# Patient Record
Sex: FEMALE | Race: WHITE | NOT HISPANIC OR LATINO | Employment: FULL TIME | URBAN - METROPOLITAN AREA
[De-identification: names, ages, dates, MRNs, and addresses within clinical notes are randomized per-mention and may not be internally consistent; named-entity substitution may affect disease eponyms.]

---

## 2017-01-21 ENCOUNTER — HOSPITAL ENCOUNTER (EMERGENCY)
Facility: CLINIC | Age: 22
Discharge: HOME | End: 2017-01-22
Attending: EMERGENCY MEDICINE | Admitting: EMERGENCY MEDICINE
Payer: COMMERCIAL

## 2017-01-21 ENCOUNTER — APPOINTMENT (OUTPATIENT)
Dept: ULTRASOUND IMAGING | Facility: CLINIC | Age: 22
End: 2017-01-21
Attending: EMERGENCY MEDICINE
Payer: COMMERCIAL

## 2017-01-21 ENCOUNTER — TELEPHONE (OUTPATIENT)
Dept: NURSING | Facility: CLINIC | Age: 22
End: 2017-01-21

## 2017-01-21 ENCOUNTER — APPOINTMENT (OUTPATIENT)
Dept: GENERAL RADIOLOGY | Facility: CLINIC | Age: 22
End: 2017-01-21
Attending: EMERGENCY MEDICINE
Payer: COMMERCIAL

## 2017-01-21 DIAGNOSIS — R05.9 COUGH: ICD-10-CM

## 2017-01-21 LAB
ALBUMIN SERPL-MCNC: 3.2 G/DL (ref 3.4–5)
ALP SERPL-CCNC: 50 U/L (ref 40–150)
ALT SERPL W P-5'-P-CCNC: 12 U/L (ref 0–50)
ANION GAP SERPL CALCULATED.3IONS-SCNC: 9 MMOL/L (ref 3–14)
AST SERPL W P-5'-P-CCNC: 14 U/L (ref 0–45)
BASOPHILS # BLD AUTO: 0 10E9/L (ref 0–0.2)
BASOPHILS NFR BLD AUTO: 0 %
BILIRUB SERPL-MCNC: 0.3 MG/DL (ref 0.2–1.3)
BUN SERPL-MCNC: 7 MG/DL (ref 7–30)
CALCIUM SERPL-MCNC: 8.2 MG/DL (ref 8.5–10.1)
CHLORIDE SERPL-SCNC: 109 MMOL/L (ref 94–109)
CO2 SERPL-SCNC: 22 MMOL/L (ref 20–32)
CREAT SERPL-MCNC: 0.53 MG/DL (ref 0.52–1.04)
CRP SERPL-MCNC: 3.8 MG/L (ref 0–8)
DIFFERENTIAL METHOD BLD: ABNORMAL
EOSINOPHIL # BLD AUTO: 0.1 10E9/L (ref 0–0.7)
EOSINOPHIL NFR BLD AUTO: 1.2 %
ERYTHROCYTE [DISTWIDTH] IN BLOOD BY AUTOMATED COUNT: 14 % (ref 10–15)
ERYTHROCYTE [SEDIMENTATION RATE] IN BLOOD BY WESTERGREN METHOD: 14 MM/H (ref 0–20)
GFR SERPL CREATININE-BSD FRML MDRD: ABNORMAL ML/MIN/1.7M2
GLUCOSE SERPL-MCNC: 67 MG/DL (ref 70–99)
HCT VFR BLD AUTO: 30.7 % (ref 35–47)
HGB BLD-MCNC: 10.5 G/DL (ref 11.7–15.7)
IMM GRANULOCYTES # BLD: 0 10E9/L (ref 0–0.4)
IMM GRANULOCYTES NFR BLD: 0.4 %
LYMPHOCYTES # BLD AUTO: 0.9 10E9/L (ref 0.8–5.3)
LYMPHOCYTES NFR BLD AUTO: 11.5 %
MCH RBC QN AUTO: 31.4 PG (ref 26.5–33)
MCHC RBC AUTO-ENTMCNC: 34.2 G/DL (ref 31.5–36.5)
MCV RBC AUTO: 92 FL (ref 78–100)
MONOCYTES # BLD AUTO: 0.6 10E9/L (ref 0–1.3)
MONOCYTES NFR BLD AUTO: 8.1 %
NEUTROPHILS # BLD AUTO: 6 10E9/L (ref 1.6–8.3)
NEUTROPHILS NFR BLD AUTO: 78.8 %
NRBC # BLD AUTO: 0 10*3/UL
NRBC BLD AUTO-RTO: 1 /100
PLATELET # BLD AUTO: 204 10E9/L (ref 150–450)
POTASSIUM SERPL-SCNC: 3.4 MMOL/L (ref 3.4–5.3)
PROT SERPL-MCNC: 6 G/DL (ref 6.8–8.8)
RBC # BLD AUTO: 3.34 10E12/L (ref 3.8–5.2)
SODIUM SERPL-SCNC: 140 MMOL/L (ref 133–144)
WBC # BLD AUTO: 7.6 10E9/L (ref 4–11)

## 2017-01-21 PROCEDURE — 93970 EXTREMITY STUDY: CPT

## 2017-01-21 PROCEDURE — 85025 COMPLETE CBC W/AUTO DIFF WBC: CPT | Performed by: EMERGENCY MEDICINE

## 2017-01-21 PROCEDURE — 99285 EMERGENCY DEPT VISIT HI MDM: CPT | Mod: 25 | Performed by: EMERGENCY MEDICINE

## 2017-01-21 PROCEDURE — 86140 C-REACTIVE PROTEIN: CPT | Performed by: EMERGENCY MEDICINE

## 2017-01-21 PROCEDURE — 93010 ELECTROCARDIOGRAM REPORT: CPT | Mod: Z6 | Performed by: EMERGENCY MEDICINE

## 2017-01-21 PROCEDURE — 93005 ELECTROCARDIOGRAM TRACING: CPT | Performed by: EMERGENCY MEDICINE

## 2017-01-21 PROCEDURE — 71020 XR CHEST 2 VW: CPT

## 2017-01-21 PROCEDURE — 85652 RBC SED RATE AUTOMATED: CPT | Performed by: EMERGENCY MEDICINE

## 2017-01-21 PROCEDURE — 80053 COMPREHEN METABOLIC PANEL: CPT | Performed by: EMERGENCY MEDICINE

## 2017-01-21 PROCEDURE — 36415 COLL VENOUS BLD VENIPUNCTURE: CPT | Performed by: EMERGENCY MEDICINE

## 2017-01-21 NOTE — ED AVS SNAPSHOT
[facility], [place], Emergency Department    [address] [place] [address]    Phone:  [phone]                                       Monica Puckett   MRN: [id number]    Department:  [facility], Emergency Department   Date of Visit:  1/21/2017           After Visit Summary Signature Page     I have received my discharge instructions, and my questions have been answered. I have discussed any challenges I see with this plan with the nurse or doctor.    ..........................................................................................................................................  Patient/Patient Representative Signature      ..........................................................................................................................................  Patient Representative Print Name and Relationship to Patient    ..................................................               ................................................  Date                                            Time    ..........................................................................................................................................  Reviewed by Signature/Title    ...................................................              ..............................................  Date                                                            Time

## 2017-01-21 NOTE — ED AVS SNAPSHOT
[facility], Emergency Department    [address] [facility] [address]    Phone:  [phone]                                       Monica Puckett   MRN: [id number]    Department:  [facility], Emergency Department   Date of Visit:  1/21/2017           Patient Information     Date Of Birth          1995        Your diagnoses for this visit were:     Cough        You were seen by Hosea Arevalo MD.        Discharge Instructions       Please make an appointment to follow up with your rheumatologist as soon as possible.    Try Benadryl at night for your cough.    Return to the emergency Department for any problems.      Future Appointments        Provider Department Dept Phone Center    2/8/2017 11:00 AM Chente Reina MD [facility] Rheumatology [phone] [facility]      24 Hour Appointment Hotline       To make an appointment at any [facility], call [phone] ([phone]). If you don't have a family doctor or clinic, we will help you find one. [place] clinics are conveniently located to serve the needs of you and your family.             Review of your medicines      Our records show that you are taking the medicines listed below. If these are incorrect, please call your family doctor or clinic.        Dose / Directions Last dose taken    doxylamine 25 MG Tabs tablet   Commonly known as:  UNISOM   Dose:  25 mg        Take 25 mg by mouth nightly as needed   Refills:  0        hydroxychloroquine 200 MG tablet   Commonly known as:  PLAQUENIL   Dose:  400 mg   Quantity:  90 tablet        Take 2 tablets (400 mg) by mouth daily   Refills:  6        prenatal multivitamin  plus iron 27-0.8 MG Tabs per tablet   Dose:  1 tablet        Take 1 tablet by mouth daily   Refills:  0        VALTREX PO   Dose:  500 mg        Take 500 mg by mouth as needed   Refills:  0                Procedures and tests performed during your visit     Corrine coleert DNA pcr    CBC with platelets  "differential    CRP inflammation    Chest XR,  PA & LAT    Comprehensive metabolic panel    EKG 12-lead    Erythrocyte sedimentation rate auto    Respiratory Virus Panel by PCR    US Lower Extremity Venous Duplex Bilateral      Orders Needing Specimen Collection     None      Pending Results     Date and Time Order Name Status Description    2017 0014 Bornathaliella pert parapert DNA pcr In process     2017 0014 Respiratory Virus Panel by PCR In process     2017 2103 US Lower Extremity Venous Duplex Bilateral Preliminary             Pending Culture Results     Date and Time Order Name Status Description    2017 0014 Bordetella pert parapert DNA pcr In process     2017 0014 Respiratory Virus Panel by PCR In process             Thank you for choosing [facility]       Thank you for choosing [facility] for your care. Our goal is always to provide you with excellent care. Hearing back from our patients is one way we can continue to improve our services. Please take a few minutes to complete the written survey that you may receive in the mail after you visit with us. Thank you!        [email or web address] Information     [employer] lets you send messages to your doctor, view your test results, renew your prescriptions, schedule appointments and more. To sign up, go to [email or web address] . Click on \"Log in\" on the left side of the screen, which will take you to the Welcome page. Then click on \"Sign up Now\" on the right side of the page.     You will be asked to enter the access code listed below, as well as some personal information. Please follow the directions to create your username and password.     Your access code is: [email or web address]  Expires: 2017  5:17 PM     Your access code will  in 90 days. If you need help or a new code, please call your [facility] clinic or [phone].        Care EveryWhere ID     This is your Care EveryWhere ID. This could be used by other organizations to access your [facility] " medical records  [id number]        After Visit Summary       This is your record. Keep this with you and show to your community pharmacist(s) and doctor(s) at your next visit.

## 2017-01-21 NOTE — TELEPHONE ENCOUNTER
"Call Type: Triage Call    Presenting Problem: \"I am at the [place] right now  because I have been coughing and have chest pain.\" Pt. says that she  has Lupus and is 18 weeks pregnant. Pt. says that the  at the  urgency center said that pt. may have a clot, in her lung, and wants  pt. to speak to her Rheumatologist, before pt. speaks to her OB Dr.  Pt. sees her OB DrChristy at an [facility], in [place]. Pt. says that the   at [facility] thinks pt. may need a CT scan. Dr. Harris  of [place] -was then paged, to call pt., at:  [phone], at: [phone], via page .  Triage Note:  Guideline Title: No Guideline Available (Pediatric)  Recommended Disposition: Call Provider Immediately  Original Inclination: Wanted to speak with a nurse  Override Disposition:  Intended Action: Call PCP/HCP  Physician Contacted: No  Reason: professional judgment or information in Reference ?  YES  Reason: professional judgment or information in Reference ? NO  Reason: professional judgment or information in Reference ? NO  Reason: professional judgment or information in Reference ? NO  Reason: professional judgment or information in Reference ? NO  Reason: professional judgment or information in Reference ? NO  Information only call and no triage required ? NO  Physician Instructions:  Care Advice: CALL PCP NOW: You need to discuss this with your child's  doctor. I'll page him now. If you haven't heard from the on-call doctor  within 30 minutes, call again.  CARE ADVICE given per Professional Judgment or Reference (No Guideline  Available - Pediatric).  "

## 2017-01-22 VITALS
TEMPERATURE: 98.4 F | SYSTOLIC BLOOD PRESSURE: 103 MMHG | RESPIRATION RATE: 17 BRPM | DIASTOLIC BLOOD PRESSURE: 56 MMHG | OXYGEN SATURATION: 100 % | HEIGHT: 66 IN | HEART RATE: 105 BPM

## 2017-01-22 LAB
FLUAV H1 2009 PAND RNA SPEC QL NAA+PROBE: NEGATIVE
FLUAV H1 RNA SPEC QL NAA+PROBE: NEGATIVE
FLUAV H3 RNA SPEC QL NAA+PROBE: NEGATIVE
FLUAV RNA SPEC QL NAA+PROBE: NEGATIVE
FLUBV RNA SPEC QL NAA+PROBE: NEGATIVE
HADV DNA SPEC QL NAA+PROBE: NEGATIVE
HADV DNA SPEC QL NAA+PROBE: NEGATIVE
HMPV RNA SPEC QL NAA+PROBE: NEGATIVE
HPIV1 RNA SPEC QL NAA+PROBE: NEGATIVE
HPIV2 RNA SPEC QL NAA+PROBE: NEGATIVE
HPIV3 RNA SPEC QL NAA+PROBE: NEGATIVE
INTERPRETATION ECG - MUSE: NORMAL
MICROBIOLOGIST REVIEW: NORMAL
RHINOVIRUS RNA SPEC QL NAA+PROBE: NEGATIVE
RSV RNA SPEC QL NAA+PROBE: NEGATIVE
RSV RNA SPEC QL NAA+PROBE: NEGATIVE
SPECIMEN SOURCE: NORMAL

## 2017-01-22 PROCEDURE — 87801 DETECT AGNT MULT DNA AMPLI: CPT | Performed by: EMERGENCY MEDICINE

## 2017-01-22 PROCEDURE — 87633 RESP VIRUS 12-25 TARGETS: CPT | Performed by: EMERGENCY MEDICINE

## 2017-01-22 NOTE — ED NOTES
Pt arrived to ED from [facility] in [facility]. They sent patient here for elevated d-dimer. Pt is 18 weeks pregnant in her first pregnancy. Pt has hx of lupus. Pt has had a cough for 2 months. pt developed leg swelling and pain yesterday as well as some chest pain that started 2 weeks a go that is on and off that became exacerbated the last two days.

## 2017-01-22 NOTE — ED PROVIDER NOTES
History     Chief Complaint   Patient presents with     Chest Pain     Leg Pain     Cough     HPI  Monica Puckett is a 21 year old female 18 weeks gestation with history of Lupus, RA and CKD who presents to the ED today from [facility] in [facility] with concern for chest pain, leg pain and cough. Patient reports cough for the past 2 months. She states she was seen in clinic about 3 weeks ago. At that time she was placed on a Zpac. She states it helped her symptoms initially, however, her cough returned and she developed chest pain and bilateral lower extremity swelling. The patient was seen in clinic today and sent here to the ED after labs revealed an elevated D-dimer at 1.55 with concern for PE.     I have reviewed the Medications, Allergies, Past Medical and Surgical History, and Social History in the [employer] system.  PAST MEDICAL HISTORY:   Past Medical History   Diagnosis Date     Lupus (H)      RA (rheumatoid arthritis) (H)      Chronic kidney disease        PAST SURGICAL HISTORY:   Past Surgical History   Procedure Laterality Date     Hc biopsy renal, percutaneous  1/18/13       FAMILY HISTORY: No family history on file.    SOCIAL HISTORY:   Social History   Substance Use Topics     Smoking status: Never Smoker      Smokeless tobacco: Never Used      Comment: no second hand smoke exposure at home     Alcohol Use: No       No current facility-administered medications for this encounter.     Current Outpatient Prescriptions   Medication     doxylamine (UNISOM) 25 MG TABS tablet     Prenatal Vit-Fe Fumarate-FA (PRENATAL MULTIVITAMIN  PLUS IRON) 27-0.8 MG TABS     ValACYclovir HCl (VALTREX PO)     hydroxychloroquine (PLAQUENIL) 200 MG tablet        Allergies   Allergen Reactions     Estrogens Other (See Comments)     Lupus flares     No Clinical Screening - See Comments Rash     Gets very sick from flu vaccines         Review of Systems   Constitutional: Negative for fever.   HENT: Negative for sore throat.   "  Respiratory: Positive for cough. Negative for shortness of breath.    Cardiovascular: Positive for chest pain and leg swelling.   Gastrointestinal: Negative for nausea, vomiting and diarrhea.   Skin: Negative for rash.   All other systems reviewed and are negative.      Physical Exam   BP: 111/48 mmHg  Pulse: 105  Temp: 98.4  F (36.9  C)  Resp: 14  Height: 167.6 cm (5' 6\")  SpO2: 98 %  Physical Exam   Constitutional: She is oriented to person, place, and time. Vital signs are normal. She appears well-developed and well-nourished.  Non-toxic appearance. She does not appear ill. No distress.   Patient is awake and alert, frequent, paroxysms of coughing noted.  Patient is able to speak in complete sentences and is protecting her airway without difficulty.  She is mentating normally and appears in no acute distress.   HENT:   Head: Normocephalic and atraumatic.   Mouth/Throat: Oropharynx is clear and moist. No oropharyngeal exudate.   Eyes: Conjunctivae and EOM are normal. Pupils are equal, round, and reactive to light. No scleral icterus.   Neck: Normal range of motion. Neck supple. No JVD present. No tracheal deviation present. No thyromegaly present.   Cardiovascular: Normal rate, regular rhythm, normal heart sounds and intact distal pulses.  Exam reveals no gallop and no friction rub.    No murmur heard.  Pulmonary/Chest: Effort normal and breath sounds normal. No respiratory distress.   Abdominal: Soft. Bowel sounds are normal. She exhibits no distension and no mass. There is no tenderness.   Musculoskeletal: Normal range of motion. She exhibits edema (trace bilateral pedal edema noted.). She exhibits no tenderness.   Lymphadenopathy:     She has no cervical adenopathy.   Neurological: She is alert and oriented to person, place, and time. She has normal strength. No cranial nerve deficit or sensory deficit.   Skin: Skin is warm and dry. No rash noted. No erythema. No pallor.   Psychiatric: She has a normal mood " and affect. Her behavior is normal.   Nursing note and vitals reviewed.      ED Course     [unfilled]  Procedures       8:20 PM  The patient was seen and examined by Dr. Arevalo in Room HW.          EKG Interpretation:      Interpreted by Hosea Arevalo    Symptoms at time of EKG: Cough, chest pain   Rhythm: normal sinus   Rate: 89  Axis: normal  Ectopy: none  Conduction: normal  ST Segments/ T Waves: No ST-T wave changes  Q Waves: none  Comparison to prior: No old EKG available    Clinical Impression: normal EKG          Results for orders placed or performed during the hospital encounter of 01/21/17   Chest XR,  PA & LAT    Narrative    XR CHEST 2 VW  1/21/2017 9:36 PM      HISTORY: cough, patient is 4 months pregnant    COMPARISON: 3/27/2013    FINDINGS: Frontal and lateral views of the chest. The cardiac  silhouette size and pulmonary vasculature are within normal limits.  There is no significant pleural effusion or pneumothorax. There are no  focal pulmonary opacities. The visualized upper abdomen and bones  appear normal.      Impression    IMPRESSION: Clear lungs.    I have personally reviewed the examination and initial interpretation  and I agree with the findings.    ZUNILDA TORRES MD   US Lower Extremity Venous Duplex Bilateral    Narrative    EXAMINATION: DOPPLER VENOUS ULTRASOUND OF BILATERAL LOWER EXTREMITIES,  1/21/2017 10:35 PM     COMPARISON: None.    HISTORY: Leg swelling, elevated d-dimer, history of lupus    TECHNIQUE:  Gray-scale evaluation with compression, spectral flow and  color Doppler assessment of the deep venous system of both legs from  groin to knee, and then at the ankles.    FINDINGS:  In both lower extremities, the common femoral, femoral, popliteal and  posterior tibial veins demonstrate normal compressibility and blood  flow.      Impression    IMPRESSION:  No evidence of deep venous thrombosis in either lower extremity.    I have personally reviewed the examination  and initial interpretation  and I agree with the findings.    JUAN EMMANUEL MD   CBC with platelets differential   Result Value Ref Range    WBC 7.6 4.0 - 11.0 10e9/L    RBC Count 3.34 (L) 3.8 - 5.2 10e12/L    Hemoglobin 10.5 (L) 11.7 - 15.7 g/dL    Hematocrit 30.7 (L) 35.0 - 47.0 %    MCV 92 78 - 100 fl    MCH 31.4 26.5 - 33.0 pg    MCHC 34.2 31.5 - 36.5 g/dL    RDW 14.0 10.0 - 15.0 %    Platelet Count 204 150 - 450 10e9/L    Diff Method Automated Method     % Neutrophils 78.8 %    % Lymphocytes 11.5 %    % Monocytes 8.1 %    % Eosinophils 1.2 %    % Basophils 0.0 %    % Immature Granulocytes 0.4 %    Nucleated RBCs 1 (H) 0 /100    Absolute Neutrophil 6.0 1.6 - 8.3 10e9/L    Absolute Lymphocytes 0.9 0.8 - 5.3 10e9/L    Absolute Monocytes 0.6 0.0 - 1.3 10e9/L    Absolute Eosinophils 0.1 0.0 - 0.7 10e9/L    Absolute Basophils 0.0 0.0 - 0.2 10e9/L    Abs Immature Granulocytes 0.0 0 - 0.4 10e9/L    Absolute Nucleated RBC 0.0    Comprehensive metabolic panel   Result Value Ref Range    Sodium 140 133 - 144 mmol/L    Potassium 3.4 3.4 - 5.3 mmol/L    Chloride 109 94 - 109 mmol/L    Carbon Dioxide 22 20 - 32 mmol/L    Anion Gap 9 3 - 14 mmol/L    Glucose 67 (L) 70 - 99 mg/dL    Urea Nitrogen 7 7 - 30 mg/dL    Creatinine 0.53 0.52 - 1.04 mg/dL    GFR Estimate >90  Non  GFR Calc   >60 mL/min/1.7m2    GFR Estimate If Black >90   GFR Calc   >60 mL/min/1.7m2    Calcium 8.2 (L) 8.5 - 10.1 mg/dL    Bilirubin Total 0.3 0.2 - 1.3 mg/dL    Albumin 3.2 (L) 3.4 - 5.0 g/dL    Protein Total 6.0 (L) 6.8 - 8.8 g/dL    Alkaline Phosphatase 50 40 - 150 U/L    ALT 12 0 - 50 U/L    AST 14 0 - 45 U/L   CRP inflammation   Result Value Ref Range    CRP Inflammation 3.8 0.0 - 8.0 mg/L   Erythrocyte sedimentation rate auto   Result Value Ref Range    Sed Rate 14 0 - 20 mm/h   EKG 12-lead   Result Value Ref Range    Interpretation ECG Click View Image link to view waveform and result    Respiratory Virus Panel by  PCR   Result Value Ref Range    Respiratory Virus Source Nasopharyngeal     Influenza A Negative NEG    Influenza A, H1 Negative NEG    Influenza A, H3 Negative NEG    Influenza A 2009 H1N1 Negative NEG    Influenza B Negative NEG    Respiratory Syncytial Virus A Negative NEG    Respiratory Syncytial Virus B Negative NEG    Parainfluenza Virus 1 Negative NEG    Parainfluenza Virus 2 Negative NEG    Parainfluenza Virus 3 Negative NEG    Human Metapneumovirus Negative NEG    Human Rhinovirus Negative NEG    Adenovirus Species B/E Negative NEG    Adenovirus Species C Negative NEG    Respiratory Virus Comment       The [employer] Respiratory Viral Panel (RVP) is a qualitative, multiplex,   diagnostic test for simultaneous detection and identification of multiple   respiratory viral nucleic acids in individuals exhibiting signs and symptoms of   respiratory infection. It uses innovative eSensor technology to provide   sensitive and specific respiratory virus detection.   The test detects Influenza A (H1 and H3), Influenza A 2009 H1N1, Influenza B,   Respiratory Syncytial Virus A, Respiratory Syncytial Virus B, Parainfluenza   Virus (1, 2 and 3), Human Metapneumovirus, Human Rhinovirus (HRV), Adenovirus   B/E and Adenovirus C.   The assay has received FDA approval for the testing of nasopharyngeal (NP)   swabs only. The Infectious Disease Diagnostic Laboratory at [facility], [place], has validated the performance   characteristics of the [employer] Respiratory Viral Panel for NP swabs, bronchial   alveolar lavage/wash, nasopharyngeal aspirate/wash and nasal wash.     Bordetella pert parapert DNA pcr   Result Value Ref Range    Specimen Description Nasopharyngeal     Bordetella Per/Paraper PCR  NEG     Negative  Negative for B. pertussis and B. parapertussis by PCR     This test was developed and its performance characteristics determined by the   Microbiology Laboratory at [facility]  [place], [place]. The PCR test has proven to be more sensitive than culture and   highly specific.  A false positive for B. pertussis may occur in samples   containing B. holmesii DNA.   A false positive for B. parapertussis may occur in samples containing B.   bronchiseptica DNA. Both B. holmesii and B. bronchiseptica rarely cause disease   in humans.  A negative result does not rule out the presence of B. pertussis or   B. parapertussis DNA in concentrations below detection level of the assay.   Nasopharyngeal swabs are the preferred specimen types.   Analyte Specific Reagents (ASRs) are used in many laboratory tests necessary   for standard medical care and generally do not require U.S. Food and Drug   Administration approval. The FDA has determined that such c learance or approval   is not necessary. This test is used for clinical purposes. It should not be   regarded as investigational or for research.   This laboratory is certified under the Clinical Laboratory Improvement   Amendments of 1988 (CLIA-88) as qualified to perform high complexity clinical   laboratory testing.           Assessments & Plan (with Medical Decision Making)   This patient presented to the emergency department after being referred from an urgent care in [place].  She is approximately 4 months pregnant, and also has a history of lupus and lupus nephritis.  She has had some swelling of her lower extremities, and has had approximately 2 months of cough and 2 weeks of chest pain with the cough.  She was found to have a mildly elevated d-dimer, and was referred here for further evaluation. Here in the Emergency Department, she does not manifest leukocytosis.  Her chest x-ray demonstrated no evidence of pneumonia, pleural effusion, pneumothorax, cardiomegaly or pulmonary edema.  CRP and sed rate are within normal limits, decreasing suspicion of acute lupus flare.  Pertussis and respiratory viral testing was sent.   After labs and x-ray returned, I did talk with the patient and her family regarding further workup for possible pulmonary embolism.  Patient has multiple reasons to have an elevated d-dimer, and her symptoms have been quite long-standing, suggestive of more of a bronchitis type picture.  Nonetheless, she does have some risk factor for pulmonary embolism.  She is, however, not significantly tachycardic and is not hypoxic.  Predominant complaint seems to be cough and not shortness of breath or pleuritic chest pain.  We had a long discussion regarding risk and benefit of a VQ scan and CT scan in the setting of early pregnancy.  I feel pulmonary embolus is unlikely, however, it is a possibility.  After weighing the options, patient and her mother elected to forego further testing for pulmonary embolism at this point and to follow-up with her with her rheumatologist.  I feel that this is an acceptable plan as the patient does appear stable and has had no signs of right heart strain or other EKG abnormalities, and is otherwise hemodynamically stable with no hypoxia or distress.  Patient will return should anything worsen or change and was discharged in good condition.    I have reviewed the nursing notes.    I have reviewed the findings, diagnosis, plan and need for follow up with the patient.    Discharge Medication List as of 1/22/2017  1:08 AM          Final diagnoses:   Cough     Amarjit BUNN , am serving as a trained medical scribe to document services personally performed byBeto Arevalo MD, based on the provider's statements to me.   Beto BUNN MD, was physically present and have reviewed and verified the accuracy of this note documented by Amarjit Flores.    1/21/2017   [facility], EMERGENCY DEPARTMENT      Hosea Arevalo MD  01/23/17 [id number]

## 2017-01-22 NOTE — DISCHARGE INSTRUCTIONS
Please make an appointment to follow up with your rheumatologist as soon as possible.    Try Benadryl at night for your cough.    Return to the emergency Department for any problems.

## 2017-01-23 ENCOUNTER — TELEPHONE (OUTPATIENT)
Dept: RHEUMATOLOGY | Facility: CLINIC | Age: 22
End: 2017-01-23

## 2017-01-23 LAB
B PERT+PARAPERT DNA PNL SPEC NAA+PROBE: NORMAL
SPECIMEN SOURCE: NORMAL

## 2017-01-23 ASSESSMENT — ENCOUNTER SYMPTOMS
VOMITING: 0
SORE THROAT: 0
COUGH: 1
FEVER: 0
SHORTNESS OF BREATH: 0
NAUSEA: 0
DIARRHEA: 0

## 2017-01-23 NOTE — TELEPHONE ENCOUNTER
Called and spoke with pt, gave her Dr. Reina's recommendations, that she should see her PCP, that she should have labs done from October, and that she should come see Dr. Reina.  She will see Dr. Reina this Wednesday.    Pt understands and will do so.    Flavia Mayfield RN  Rheumatology Clinic

## 2017-01-23 NOTE — TELEPHONE ENCOUNTER
Her albs from 1/21 show neg ID work up and NL inflammation markers. She never did the blood work I ordered in 10/2016. Recommend f/u with PCP, I could also see her on 1/25 at 10:30 am.

## 2017-02-08 ENCOUNTER — OFFICE VISIT (OUTPATIENT)
Dept: RHEUMATOLOGY | Facility: CLINIC | Age: 22
End: 2017-02-08
Attending: INTERNAL MEDICINE
Payer: COMMERCIAL

## 2017-02-08 VITALS
OXYGEN SATURATION: 99 % | BODY MASS INDEX: 23.14 KG/M2 | WEIGHT: 144 LBS | SYSTOLIC BLOOD PRESSURE: 104 MMHG | HEART RATE: 71 BPM | DIASTOLIC BLOOD PRESSURE: 64 MMHG | HEIGHT: 66 IN

## 2017-02-08 DIAGNOSIS — Z51.81 ENCOUNTER FOR MEDICATION MONITORING: ICD-10-CM

## 2017-02-08 DIAGNOSIS — M32.9 SYSTEMIC LUPUS ERYTHEMATOSUS (H): Primary | ICD-10-CM

## 2017-02-08 DIAGNOSIS — M32.9 SYSTEMIC LUPUS ERYTHEMATOSUS (H): ICD-10-CM

## 2017-02-08 LAB
ALBUMIN SERPL-MCNC: 3.3 G/DL (ref 3.4–5)
ALBUMIN UR-MCNC: NEGATIVE MG/DL
ALT SERPL W P-5'-P-CCNC: 13 U/L (ref 0–50)
AMORPH CRY #/AREA URNS HPF: ABNORMAL /HPF
APPEARANCE UR: ABNORMAL
AST SERPL W P-5'-P-CCNC: 10 U/L (ref 0–45)
BACTERIA #/AREA URNS HPF: ABNORMAL /HPF
BASOPHILS # BLD AUTO: 0 10E9/L (ref 0–0.2)
BASOPHILS NFR BLD AUTO: 0.1 %
BILIRUB UR QL STRIP: NEGATIVE
COLOR UR AUTO: YELLOW
CREAT UR-MCNC: 128 MG/DL
CREAT UR-MCNC: 128 MG/DL
CRP SERPL-MCNC: <2.9 MG/L (ref 0–8)
DAT POLY-SP REAG RBC QL: NORMAL
DIFFERENTIAL METHOD BLD: ABNORMAL
EOSINOPHIL # BLD AUTO: 0 10E9/L (ref 0–0.7)
EOSINOPHIL NFR BLD AUTO: 0.4 %
ERYTHROCYTE [DISTWIDTH] IN BLOOD BY AUTOMATED COUNT: 13.4 % (ref 10–15)
ERYTHROCYTE [SEDIMENTATION RATE] IN BLOOD BY WESTERGREN METHOD: 15 MM/H (ref 0–20)
GLUCOSE UR STRIP-MCNC: NEGATIVE MG/DL
HCT VFR BLD AUTO: 34.7 % (ref 35–47)
HGB BLD-MCNC: 11.9 G/DL (ref 11.7–15.7)
HGB UR QL STRIP: NEGATIVE
IMM GRANULOCYTES # BLD: 0 10E9/L (ref 0–0.4)
IMM GRANULOCYTES NFR BLD: 0.5 %
KETONES UR STRIP-MCNC: NEGATIVE MG/DL
LEUKOCYTE ESTERASE UR QL STRIP: NEGATIVE
LYMPHOCYTES # BLD AUTO: 0.9 10E9/L (ref 0.8–5.3)
LYMPHOCYTES NFR BLD AUTO: 11 %
MCH RBC QN AUTO: 32.3 PG (ref 26.5–33)
MCHC RBC AUTO-ENTMCNC: 34.3 G/DL (ref 31.5–36.5)
MCV RBC AUTO: 94 FL (ref 78–100)
MONOCYTES # BLD AUTO: 0.5 10E9/L (ref 0–1.3)
MONOCYTES NFR BLD AUTO: 5.8 %
MUCOUS THREADS #/AREA URNS LPF: PRESENT /LPF
NEUTROPHILS # BLD AUTO: 6.5 10E9/L (ref 1.6–8.3)
NEUTROPHILS NFR BLD AUTO: 82.2 %
NITRATE UR QL: NEGATIVE
NRBC # BLD AUTO: 0 10*3/UL
NRBC BLD AUTO-RTO: 0 /100
PH UR STRIP: 8 PH (ref 5–7)
PLATELET # BLD AUTO: 245 10E9/L (ref 150–450)
PROT UR-MCNC: 0.1 G/L
PROT/CREAT 24H UR: 0.08 G/G CR (ref 0–0.2)
RBC # BLD AUTO: 3.68 10E12/L (ref 3.8–5.2)
RBC #/AREA URNS AUTO: 2 /HPF (ref 0–2)
SP GR UR STRIP: 1.02 (ref 1–1.03)
SQUAMOUS #/AREA URNS AUTO: 1 /HPF (ref 0–1)
URATE SERPL-MCNC: 2.8 MG/DL (ref 2.6–6)
URN SPEC COLLECT METH UR: ABNORMAL
UROBILINOGEN UR STRIP-MCNC: 0 MG/DL (ref 0–2)
WBC # BLD AUTO: 7.9 10E9/L (ref 4–11)
WBC #/AREA URNS AUTO: 9 /HPF (ref 0–2)

## 2017-02-08 PROCEDURE — 84450 TRANSFERASE (AST) (SGOT): CPT | Performed by: INTERNAL MEDICINE

## 2017-02-08 PROCEDURE — 82040 ASSAY OF SERUM ALBUMIN: CPT | Performed by: INTERNAL MEDICINE

## 2017-02-08 PROCEDURE — 86140 C-REACTIVE PROTEIN: CPT | Performed by: INTERNAL MEDICINE

## 2017-02-08 PROCEDURE — 84460 ALANINE AMINO (ALT) (SGPT): CPT | Performed by: INTERNAL MEDICINE

## 2017-02-08 PROCEDURE — 86160 COMPLEMENT ANTIGEN: CPT | Performed by: INTERNAL MEDICINE

## 2017-02-08 PROCEDURE — 84550 ASSAY OF BLOOD/URIC ACID: CPT | Performed by: INTERNAL MEDICINE

## 2017-02-08 PROCEDURE — 86880 COOMBS TEST DIRECT: CPT | Performed by: INTERNAL MEDICINE

## 2017-02-08 PROCEDURE — 84156 ASSAY OF PROTEIN URINE: CPT | Performed by: INTERNAL MEDICINE

## 2017-02-08 PROCEDURE — 36415 COLL VENOUS BLD VENIPUNCTURE: CPT | Performed by: INTERNAL MEDICINE

## 2017-02-08 PROCEDURE — 86225 DNA ANTIBODY NATIVE: CPT | Performed by: INTERNAL MEDICINE

## 2017-02-08 PROCEDURE — 85652 RBC SED RATE AUTOMATED: CPT | Performed by: INTERNAL MEDICINE

## 2017-02-08 PROCEDURE — 81001 URINALYSIS AUTO W/SCOPE: CPT | Performed by: INTERNAL MEDICINE

## 2017-02-08 PROCEDURE — 86162 COMPLEMENT TOTAL (CH50): CPT | Performed by: INTERNAL MEDICINE

## 2017-02-08 PROCEDURE — 85025 COMPLETE CBC W/AUTO DIFF WBC: CPT | Performed by: INTERNAL MEDICINE

## 2017-02-08 PROCEDURE — 99212 OFFICE O/P EST SF 10 MIN: CPT | Mod: ZF

## 2017-02-08 ASSESSMENT — PAIN SCALES - GENERAL: PAINLEVEL: NO PAIN (0)

## 2017-02-08 NOTE — MR AVS SNAPSHOT
After Visit Summary   2/8/2017    Monica Puckett    MRN: [id number]           Patient Information     Date Of Birth          1995        Visit Information        Provider Department      2/8/2017 11:00 AM Chente Reina MD [facility] Rheumatology        Today's Diagnoses     Systemic lupus erythematosus (H)    -  1       Care Instructions    Please schedule high risk OB appointment  2D-Echo  Labs today, depending on labs consider prednisone taper  May 26 at 8 am        Follow-ups after your visit        Your next 10 appointments already scheduled     Feb 08, 2017 12:15 PM   Lab with  LAB   [facility] Lab ([facility])    [address] [place] [id number] [phone]            Feb 10, 2017  8:00 AM   Ech Complete with [id number]   [facility] Echo ([facility])    [address] [place] [address] [id number] [phone]           1.  Please bring or wear a comfortable two-piece outfit. 2.  You may eat, drink and take your normal medicines. 3.  For any questions that cannot be answered, please contact the ordering physician            May 26, 2017  8:00 AM   (Arrive by 7:45 AM)   Return Visit with Chente Reina MD   [facility] Rheumatology ([facility])    [address] [place] [address] [id number] [phone]              Future tests that were ordered for you today     Open Future Orders        Priority Expected Expires Ordered    Complement C3 Routine  2/8/2018 2/8/2017    Complement C4 Routine  2/8/2018 2/8/2017    CRP inflammation Routine  2/8/2018 2/8/2017    Erythrocyte sedimentation rate auto Routine  2/8/2018 2/8/2017    DNA double stranded antibodies Routine  2/8/2018 2/8/2017    Routine UA with Micro Reflex to Culture Routine  2/8/2018 2/8/2017    Protein  random urine Routine  2/8/2018 2/8/2017    Creatinine random urine Routine  2/8/2018 2/8/2017     "Complement total Routine  2/8/2018 2/8/2017            Who to contact     If you have questions or need follow up information about today's clinic visit or your schedule please contact [facility] RHEUMATOLOGY directly at [phone].  Normal or non-critical lab and imaging results will be communicated to you by MyChart, letter or phone within 4 business days after the clinic has received the results. If you do not hear from us within 7 days, please contact the clinic through MyChart or phone. If you have a critical or abnormal lab result, we will notify you by phone as soon as possible.  Submit refill requests through [employer] or call your pharmacy and they will forward the refill request to us. Please allow 3 business days for your refill to be completed.          Additional Information About Your Visit        Care EveryWhere ID     This is your Care EveryWhere ID. This could be used by other organizations to access your [facility] medical records  [id number]        Your Vitals Were     Pulse Height BMI (Body Mass Index) Pulse Oximetry          71 1.676 m (5' 6\") 23.25 kg/m2 99%         Blood Pressure from Last 3 Encounters:   02/08/17 104/64   01/22/17 103/56   10/26/16 116/73    Weight from Last 3 Encounters:   02/08/17 65.318 kg (144 lb)   10/26/16 66.86 kg (147 lb 6.4 oz)   03/27/14 65.1 kg (143 lb 8.3 oz) (80.26 %*)     * Growth percentiles are based on CDC 2-20 Years data.               Primary Care Provider Office Phone # Fax #    Una Canales [phone]       [facility] [address] [address]        Thank you!     Thank you for choosing [facility] RHEUMATOLOGY  for your care. Our goal is always to provide you with excellent care. Hearing back from our patients is one way we can continue to improve our services. Please take a few minutes to complete the written survey that you may receive in the mail after your visit with us. Thank you!             Your Updated " Medication List - Protect others around you: Learn how to safely use, store and throw away your medicines at www.disposemymeds.org.          This list is accurate as of: 2/8/17 12:13 PM.  Always use your most recent med list.                   Brand Name Dispense Instructions for use    doxylamine 25 MG Tabs tablet    UNISOM     Take 25 mg by mouth nightly as needed       FISH OIL PO      Take 1 tablet by mouth daily Fish oil with DHA       hydroxychloroquine 200 MG tablet    PLAQUENIL    90 tablet    Take 2 tablets (400 mg) by mouth daily       prenatal multivitamin  plus iron 27-0.8 MG Tabs per tablet      Take 1 tablet by mouth daily       VALTREX PO      Take 500 mg by mouth as needed

## 2017-02-08 NOTE — PATIENT INSTRUCTIONS
Please schedule high risk OB appointment  2D-Echo  Labs today, depending on labs consider prednisone taper  May 26 at 8 am

## 2017-02-08 NOTE — LETTER
2/8/2017      RE: Monica Puckett  [address] [place] [address]       [facility] - Rheumatology Clinic Visit     Monica Puckett MRN# [id number]   YOB: 1995    Primary care provider: Una Canales  Feb 8, 2017          Assessment and Plan:     Problem list:  # SLE w/ LN stage V (diagnosed 2/2013 age 18 y/o), features of pleuritic chest pain, malar rash/photosensitivity, fatigue, myalgias, arthralgias, oral ulcers, significant proteinuria w/ total urine Pr/Cr to 5 (2/2013), with + AWILDA (1:160 speckled pattern, repeat 10/2016 neg), +RNP (48.7), previously +ds-DNA (10/2016 neg) and +Sm (10/2016 neg), normal C3/C4, chronic mild leukopenia  # perceived poor tolerance to cellcept - GI upset. Discontinued ~  6/2016 (about 2-3 months prior to conception)  # mild leukopenia (3.7), normal differential  # Prior treatment: high dose steroids (mostly off steroids since 2014), oral CYC (~2/2013 - 7/2013), HCQ (2/2016 - 6/2016, now 10/2016 - current), Cellcept (~7/2013 - 6/2016), RTX (?7/2014 and 1/2015)    Discussion:  Pt with history of SLE with LN class V diagnosed 2/2013 most recently managed on cellcept + HCQ with good disease control over the past 2 years. She has not had any significant proteinuria or positive ds-DNA in over 2 years. Complements have always been normal. No recent flares. Repeat AWILDA negative 10/24/2016 as well as neg repeat SSA/SSB, antiphospholipid Ab's, and UA.     5 mo pregnant with IVET of 6/24/2017.    I would recommend she continue HCQ throughout pregnancy and to be seen at least once in the [facility] high-risk OB clinic given her history.     We do not need to add any additional medications at this time given her lupus seems to be in remission with no active LN. If she has difficulties with a flare throughout her pregnancy, we can use steroids safely to treat, and if needed, AZA up to 2g/d. Will also obtain TTE given her +RNP to screen for pulmonary HTN.      Will plan to repeat labs and see her back in clinic in 3 months.     Plan:  - high risk OB maternal/fetal medicine consult  - continue  mg BID, will need annual ophtho exams  - Labs today, consider prednisone if symptoms feel to be lupus related pending labs  -2D-Echo to eval for pulm HTN given + anti-RNP    Follow-up May 26 at 8AM        Orders Placed This Encounter   Procedures     Complement C3     Complement C4     CRP inflammation     Erythrocyte sedimentation rate auto     DNA double stranded antibodies     Routine UA with Micro Reflex to Culture     Protein  random urine     Creatinine random urine     Complement total       I saw and examined the patient with Dr. Reina. I acted as scribe for Dr. Reina.      Vinh Page MS IV    Attending Note: I saw and examined the patient with medical student Vinh. This note was written by who acted as scribe for me. I agree with findings and recommendations written in this note. The note reflects decisions made by me.    Chente Reina MD                Active Problem List:     Patient Active Problem List    Diagnosis Date Noted     Systemic lupus erythematosus (H) 08/16/2013     Observation after surgery 01/18/2013     Pain in joint, shoulder region 01/14/2011   Acne  Dysmenorrhea           History of Present Illness:     Patient is a 21 year old female referred by rheumatologist Dr. Ambar Muniz from [facility] Rhe[facility]ology for SLE and high risk pregnancy. The patient has a history of SLE with stg V LN (diagnosed age 17).     She reportedly developed a flu-like illness 11/2012 and had several UC visits from 11/2012 to 1/2013. She presented to the ED in 1/2013 after waking up with an erythematous facial rash and swelling of her face which was initially thought to be angioedema. She notes at the time experiencing fevers, fatigue, oral ulcer, and pleuritic chest pain. Outpatient workup was initiated and she was found to have a +AWILDA, +ds-DNA, +Sm and  "significant proteinuria. She was hospitalized at [facility] and underwent a renal biopsy which reportedly showed LN class V. She was referred to pediatric rheumatology and has since followed with Dr. Wyatt from 1/2013 until last year. She was previously seen by Dr. Yeh at  Pediatric nephrology, but for the past 2 years or so she has not seen a nephrologist. She was initially treated with high dose steroids (solumedrol 1g, unclear how many days), HCQ, and prednisone and reports being placed on \"chemotherapy\" pill which she thinks was cyclophosphamide for the first 4-5 months (+ a GnRH agonist). Per media tab scanned notes it appears she was given oral cyclophosphamide 50 mg tablets, but unclear dose. Notes she was then put back on prednisone 75 mg daily and was transitioned to cellcept 1000 mg BID. Notes she received RTX infusions (unclear dose and number) for what sounds like 2 cycles, last cycle received around 1/2015. She states the RTX was given due to \"flares\" of her lupus which included significant arthralgias, fatigue, skin rashes, and \"abnormal blood tests.\" She thinks her last flare was at least 2 years ago and that her lupus has otherwise been very well controlled. She self-stopped both her HCQ and cellcept about 2-3 months ago due to some GI upset that got to be \"annyoing.\"     Last Monday she had a pregnancy test that resulted positive and she was referred back to rheumatology. She was evaluated by Dr. Muniz from [facility] adult rheumatology where extensive workup including repeat blood tests w/ serology, kidney function, UA were obtained, which all resulted mostly normal other than +RNP. Her AWILDA, ds-DNA, C3/C4, SSA/SSB, Sm were all negative. UA was negative for protein, blood, or WBC. No total urine Pr/Cr ratio was obtained, but per chart review, this was last >0.5 g in 2/2013. She was told to restart her HCQ at 400 mg QD and was referred to lupus clinic here at the [facility].     She " "notes very intermittent arthralgias of her hands \"just here and there,\" some fatigue (which she attributes to being pregnant), and some lower abdominal cramping pain that is intermittent and severe for the past 2-3 weeks lasting seconds to 3 minutes occuring 2x per day. She had a TVUS yesterday which showed a normal gestational sac measuring around 5 weeks. She has not yet seen an OB. Estimated due date June 24/2016.     Today: Patient was hospitalized 4 weeks ago for bronchitis/severe cough that was unresponsive to antibiotics. There was some concern for DVT at that time but ultrasound was negative. Patient has been having intermittent malar rash along with intermittent swelling, myalgias and arthralgias that are predominantly in the hands but also in the ankles. Patient feels that her energy level has been about the same as previous. Chest pain is not bad currently, but has recently been increased, associated with cough. Negative for oral ulcers. Patient was recently seen several days ago in [facility] ED for medication overdose but she threw up most of the pills and was discharged to [facility] the same day. Patient has been taking her Plaquenil daily but has forgotten every once in a while.        Review of Systems:   Complete ROS negative except for symptoms mentioned in the HPI     No h/o myalgia, arthralgia, unintentional weight loss, loss of appetite, fevers, rash, swollen glands  No family or personal history of ulcerative colitis or chron's disease.   Patient denies any raynauds, recurrent mouth/genital ulcers, sicca symptoms, recurrent sinusitis/rhinitis  No h/o recurrent miscarriages or arterial/venous thrombosis in the past  No h/o persistent shortness of breath, cough  No h/o persistent vomiting, constipation, diarrhea. Some mild nausea, abd pain as described above  No h/o cancer           Past Medical History:   PMH - acne, dysmenorrhea, SLE  PSH - wisdom tooth extraction, renal biopsy 2/2013  Injuries - prior " "fracture         Social History:     Social History     Occupational History     Not on file.     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, teaches swimming lessons  School - finishing EMT class         Family History:   Mother - arthritis, likely OA  MGM - arthritis, likely OA         Allergies:   Estrogens  Influenza tri-split 08-09 vac  Measles/mumps/rubella vacc  Varicella virus vacc live         Medications:     Current Outpatient Prescriptions   Medication Sig Dispense Refill     doxylamine (UNISOM) 25 MG TABS tablet Take 25 mg by mouth nightly as needed       Prenatal Vit-Fe Fumarate-FA (PRENATAL MULTIVITAMIN  PLUS IRON) 27-0.8 MG TABS Take 1 tablet by mouth daily       ValACYclovir HCl (VALTREX PO) Take 500 mg by mouth as needed       hydroxychloroquine (PLAQUENIL) 200 MG tablet Take 2 tablets (400 mg) by mouth daily 90 tablet 6            Physical Exam:   Blood pressure 104/64, pulse 71, height 1.676 m (5' 6\"), weight 65.3 kg (144 lb), SpO2 99 %.  Wt Readings from Last 4 Encounters:   02/08/17 65.3 kg (144 lb)   10/26/16 66.9 kg (147 lb 6.4 oz)   03/27/14 65.1 kg (143 lb 8.3 oz) (80 %)*   03/11/14 65.1 kg (143 lb 8.3 oz) (80 %)*     * Growth percentiles are based on CDC 2-20 Years data.     BP Readings from Last 3 Encounters:   01/22/17 103/56   10/26/16 116/73   03/27/14 114/45       Constitutional: well-developed, appearing stated age, cooperative, mother present  Eyes: PERRLA, normal conjunctiva, sclera  ENT: nl external ears, nose, lips. No mucous membrane lesions, normal saliva pool  Neck: no cervical or supraclavicular lymphadenopathy  Resp: CTAB, no wheezing, breathing comfortably on room air  CV: RRR, no m/r/g, no LE edema  GI: soft, NT/ND, +BS, no HSM  : not " tested  Lymph: no cervical, supraclavicular or epitrochlear nodes  MS: All joints including shoulder, elbow, wrist, MCP/PIP/DIP, hip, knee, ankle, and foot MTP/IP joints examined and normal w/o synovitis or deformity  Full ROM.  Fist 100%.    No dactylitis,  tenosynovitis, enthesopathy.  Skin: no nail pitting; very faint malar rash BL cheeks/nose sparing nasolabial folds  Psych: nl judgement, orientation, memory, affect.  Neuro: CN II-XII grossly intact, moving all extremities equally, normal gait. Strength BL deltoid 5/5, BL tricep 5/5, BL bicep 5/5, BL hip flexors 5/5         Data:     Results for orders placed or performed during the hospital encounter of 01/21/17   Chest XR,  PA & LAT    Narrative    XR CHEST 2 VW  1/21/2017 9:36 PM      HISTORY: cough, patient is 4 months pregnant    COMPARISON: 3/27/2013    FINDINGS: Frontal and lateral views of the chest. The cardiac  silhouette size and pulmonary vasculature are within normal limits.  There is no significant pleural effusion or pneumothorax. There are no  focal pulmonary opacities. The visualized upper abdomen and bones  appear normal.      Impression    IMPRESSION: Clear lungs.    I have personally reviewed the examination and initial interpretation  and I agree with the findings.    ZUNILDA TORRES MD   US Lower Extremity Venous Duplex Bilateral    Narrative    EXAMINATION: DOPPLER VENOUS ULTRASOUND OF BILATERAL LOWER EXTREMITIES,  1/21/2017 10:35 PM     COMPARISON: None.    HISTORY: Leg swelling, elevated d-dimer, history of lupus    TECHNIQUE:  Gray-scale evaluation with compression, spectral flow and  color Doppler assessment of the deep venous system of both legs from  groin to knee, and then at the ankles.    FINDINGS:  In both lower extremities, the common femoral, femoral, popliteal and  posterior tibial veins demonstrate normal compressibility and blood  flow.      Impression    IMPRESSION:  No evidence of deep venous thrombosis in either lower  extremity.    I have personally reviewed the examination and initial interpretation  and I agree with the findings.    JUAN EMMANUEL MD   CBC with platelets differential   Result Value Ref Range    WBC 7.6 4.0 - 11.0 10e9/L    RBC Count 3.34 (L) 3.8 - 5.2 10e12/L    Hemoglobin 10.5 (L) 11.7 - 15.7 g/dL    Hematocrit 30.7 (L) 35.0 - 47.0 %    MCV 92 78 - 100 fl    MCH 31.4 26.5 - 33.0 pg    MCHC 34.2 31.5 - 36.5 g/dL    RDW 14.0 10.0 - 15.0 %    Platelet Count 204 150 - 450 10e9/L    Diff Method Automated Method     % Neutrophils 78.8 %    % Lymphocytes 11.5 %    % Monocytes 8.1 %    % Eosinophils 1.2 %    % Basophils 0.0 %    % Immature Granulocytes 0.4 %    Nucleated RBCs 1 (H) 0 /100    Absolute Neutrophil 6.0 1.6 - 8.3 10e9/L    Absolute Lymphocytes 0.9 0.8 - 5.3 10e9/L    Absolute Monocytes 0.6 0.0 - 1.3 10e9/L    Absolute Eosinophils 0.1 0.0 - 0.7 10e9/L    Absolute Basophils 0.0 0.0 - 0.2 10e9/L    Abs Immature Granulocytes 0.0 0 - 0.4 10e9/L    Absolute Nucleated RBC 0.0    Comprehensive metabolic panel   Result Value Ref Range    Sodium 140 133 - 144 mmol/L    Potassium 3.4 3.4 - 5.3 mmol/L    Chloride 109 94 - 109 mmol/L    Carbon Dioxide 22 20 - 32 mmol/L    Anion Gap 9 3 - 14 mmol/L    Glucose 67 (L) 70 - 99 mg/dL    Urea Nitrogen 7 7 - 30 mg/dL    Creatinine 0.53 0.52 - 1.04 mg/dL    GFR Estimate >90  Non  GFR Calc   >60 mL/min/1.7m2    GFR Estimate If Black >90   GFR Calc   >60 mL/min/1.7m2    Calcium 8.2 (L) 8.5 - 10.1 mg/dL    Bilirubin Total 0.3 0.2 - 1.3 mg/dL    Albumin 3.2 (L) 3.4 - 5.0 g/dL    Protein Total 6.0 (L) 6.8 - 8.8 g/dL    Alkaline Phosphatase 50 40 - 150 U/L    ALT 12 0 - 50 U/L    AST 14 0 - 45 U/L   CRP inflammation   Result Value Ref Range    CRP Inflammation 3.8 0.0 - 8.0 mg/L   Erythrocyte sedimentation rate auto   Result Value Ref Range    Sed Rate 14 0 - 20 mm/h   EKG 12-lead   Result Value Ref Range    Interpretation ECG Click View Image link to  view waveform and result    Respiratory Virus Panel by PCR   Result Value Ref Range    Respiratory Virus Source Nasopharyngeal     Influenza A Negative NEG    Influenza A, H1 Negative NEG    Influenza A, H3 Negative NEG    Influenza A 2009 H1N1 Negative NEG    Influenza B Negative NEG    Respiratory Syncytial Virus A Negative NEG    Respiratory Syncytial Virus B Negative NEG    Parainfluenza Virus 1 Negative NEG    Parainfluenza Virus 2 Negative NEG    Parainfluenza Virus 3 Negative NEG    Human Metapneumovirus Negative NEG    Human Rhinovirus Negative NEG    Adenovirus Species B/E Negative NEG    Adenovirus Species C Negative NEG    Respiratory Virus Comment       The [employer] Respiratory Viral Panel (RVP) is a qualitative, multiplex,   diagnostic test for simultaneous detection and identification of multiple   respiratory viral nucleic acids in individuals exhibiting signs and symptoms of   respiratory infection. It uses innovative eSensor technology to provide   sensitive and specific respiratory virus detection.   The test detects Influenza A (H1 and H3), Influenza A 2009 H1N1, Influenza B,   Respiratory Syncytial Virus A, Respiratory Syncytial Virus B, Parainfluenza   Virus (1, 2 and 3), Human Metapneumovirus, Human Rhinovirus (HRV), Adenovirus   B/E and Adenovirus C.   The assay has received FDA approval for the testing of nasopharyngeal (NP)   swabs only. The Infectious Disease Diagnostic Laboratory at [facility], [place], has validated the performance   characteristics of the [employer] Respiratory Viral Panel for NP swabs, bronchial   alveolar lavage/wash, nasopharyngeal aspirate/wash and nasal wash.     Bordetella pert parapert DNA pcr   Result Value Ref Range    Specimen Description Nasopharyngeal     Bordetella Per/Paraper PCR  NEG     Negative  Negative for B. pertussis and B. parapertussis by PCR     This test was developed and its performance characteristics determined by  the   Microbiology Laboratory at [place], [place]. The PCR test has proven to be more sensitive than culture and   highly specific.  A false positive for B. pertussis may occur in samples   containing B. holmesii DNA.   A false positive for B. parapertussis may occur in samples containing B.   bronchiseptica DNA. Both B. holmesii and B. bronchiseptica rarely cause disease   in humans.  A negative result does not rule out the presence of B. pertussis or   B. parapertussis DNA in concentrations below detection level of the assay.   Nasopharyngeal swabs are the preferred specimen types.   Analyte Specific Reagents (ASRs) are used in many laboratory tests necessary   for standard medical care and generally do not require U.S. Food and Drug   Administration approval. The FDA has determined that such c learance or approval   is not necessary. This test is used for clinical purposes. It should not be   regarded as investigational or for research.   This laboratory is certified under the Clinical Laboratory Improvement   Amendments of 1988 (CLIA-88) as qualified to perform high complexity clinical   laboratory testing.       US OB 10/25/2016:  1. Gestational sac present measuring 5 weeks and 6 days..  2. No adnexal masses are seen.    Prior workup through [facility] reviewed 10/26/2016:    Positive/abnormal:  AWILDA 1/2013 1:160 speckled, repeat AWILDA neg 10/24/2016  RNP 48.7  CBC - WBC 3.7 w/ normal diff  ESR 45 (4/2013), no repeat    Negative/normal:  Bustos (reportedly initially +)  SSA, SSB  ANCA  Lupus anticoagulant screen abnormal, but confirmatory testing negative  Cardiolipin IgA/G/M neg  Beta 2 GP1 Ab IgA/G/M neg  C3/C4 (never abnormal)  Cr - 0.68, lytes normal   CK  HCV  HIV  HLA-B27  LFT's normal  Quant gold negative  RF negative x2  Anti-CCP negative x2  TSH   RPR  Prior Lyme  TTG  Ds-DNA neg x6 (reportedly initially +)  UA - trace LE, otherwise negative for  protein/blood/WBC  Hgb, plts  CRP < 2.9          Chente Reina MD

## 2017-02-08 NOTE — NURSING NOTE
"Chief Complaint   Patient presents with     RECHECK     Follow up for Lupus        Initial /64 mmHg  Pulse 71  Ht 1.676 m (5' 6\")  Wt 65.318 kg (144 lb)  BMI 23.25 kg/m2  SpO2 99% Estimated body mass index is 23.25 kg/(m^2) as calculated from the following:    Height as of this encounter: 1.676 m (5' 6\").    Weight as of this encounter: 65.318 kg (144 lb).  Medication Reconciliation: complete   Shalonda Ramos CMA    "

## 2017-02-08 NOTE — PROGRESS NOTES
[facility] - Rheumatology Clinic Visit     Monica Puckett MRN# [id number]   YOB: 1995    Primary care provider: Una Canales  Feb 8, 2017          Assessment and Plan:     Problem list:  # SLE w/ LN stage V (diagnosed 2/2013 age 18 y/o), features of pleuritic chest pain, malar rash/photosensitivity, fatigue, myalgias, arthralgias, oral ulcers, significant proteinuria w/ total urine Pr/Cr to 5 (2/2013), with + AWILDA (1:160 speckled pattern, repeat 10/2016 neg), +RNP (48.7), previously +ds-DNA (10/2016 neg) and +Sm (10/2016 neg), normal C3/C4, chronic mild leukopenia  # perceived poor tolerance to cellcept - GI upset. Discontinued ~  6/2016 (about 2-3 months prior to conception)  # mild leukopenia (3.7), normal differential  # Prior treatment: high dose steroids (mostly off steroids since 2014), oral CYC (~2/2013 - 7/2013), HCQ (2/2016 - 6/2016, now 10/2016 - current), Cellcept (~7/2013 - 6/2016), RTX (?7/2014 and 1/2015)    Discussion:  Pt with history of SLE with LN class V diagnosed 2/2013 most recently managed on cellcept + HCQ with good disease control over the past 2 years. She has not had any significant proteinuria or positive ds-DNA in over 2 years. Complements have always been normal. No recent flares. Repeat AWLIDA negative 10/24/2016 as well as neg repeat SSA/SSB, antiphospholipid Ab's, and UA.     5 mo pregnant with IVET of 6/24/2017.    I would recommend she continue HCQ throughout pregnancy and to be seen at least once in the [facility] high-risk OB clinic given her history.     We do not need to add any additional medications at this time given her lupus seems to be in remission with no active LN. If she has difficulties with a flare throughout her pregnancy, we can use steroids safely to treat, and if needed, AZA up to 2g/d. Will also obtain TTE given her +RNP to screen for pulmonary HTN.     Will plan to repeat labs and see her back in clinic in 3 months.     Plan:  - high  risk OB maternal/fetal medicine consult  - continue  mg BID, will need annual ophtho exams  - Labs today, consider prednisone if symptoms feel to be lupus related pending labs  -2D-Echo to eval for pulm HTN given + anti-RNP    Follow-up May 26 at 8AM        Orders Placed This Encounter   Procedures     Complement C3     Complement C4     CRP inflammation     Erythrocyte sedimentation rate auto     DNA double stranded antibodies     Routine UA with Micro Reflex to Culture     Protein  random urine     Creatinine random urine     Complement total       I saw and examined the patient with Dr. Reina. I acted as scribe for Dr. Reina.      Vinh Page MS IV    Attending Note: I saw and examined the patient with medical student Vinh. This note was written by who acted as scribe for me. I agree with findings and recommendations written in this note. The note reflects decisions made by me.    Chente Reina MD                Active Problem List:     Patient Active Problem List    Diagnosis Date Noted     Systemic lupus erythematosus (H) 08/16/2013     Observation after surgery 01/18/2013     Pain in joint, shoulder region 01/14/2011   Acne  Dysmenorrhea           History of Present Illness:     Patient is a 21 year old female referred by rheumatologist Dr. Ambar Muniz from [facility] Rhe[facility]ology for SLE and high risk pregnancy. The patient has a history of SLE with stg V LN (diagnosed age 17).     She reportedly developed a flu-like illness 11/2012 and had several UC visits from 11/2012 to 1/2013. She presented to the ED in 1/2013 after waking up with an erythematous facial rash and swelling of her face which was initially thought to be angioedema. She notes at the time experiencing fevers, fatigue, oral ulcer, and pleuritic chest pain. Outpatient workup was initiated and she was found to have a +AWILDA, +ds-DNA, +Sm and significant proteinuria. She was hospitalized at [facility] and underwent a renal  "biopsy which reportedly showed LN class V. She was referred to pediatric rheumatology and has since followed with Dr. Wyatt from 1/2013 until last year. She was previously seen by Dr. Yeh at  Pediatric nephrology, but for the past 2 years or so she has not seen a nephrologist. She was initially treated with high dose steroids (solumedrol 1g, unclear how many days), HCQ, and prednisone and reports being placed on \"chemotherapy\" pill which she thinks was cyclophosphamide for the first 4-5 months (+ a GnRH agonist). Per media tab scanned notes it appears she was given oral cyclophosphamide 50 mg tablets, but unclear dose. Notes she was then put back on prednisone 75 mg daily and was transitioned to cellcept 1000 mg BID. Notes she received RTX infusions (unclear dose and number) for what sounds like 2 cycles, last cycle received around 1/2015. She states the RTX was given due to \"flares\" of her lupus which included significant arthralgias, fatigue, skin rashes, and \"abnormal blood tests.\" She thinks her last flare was at least 2 years ago and that her lupus has otherwise been very well controlled. She self-stopped both her HCQ and cellcept about 2-3 months ago due to some GI upset that got to be \"annyoing.\"     Last Monday she had a pregnancy test that resulted positive and she was referred back to rheumatology. She was evaluated by Dr. Muniz from [facility] adult rheumatology where extensive workup including repeat blood tests w/ serology, kidney function, UA were obtained, which all resulted mostly normal other than +RNP. Her AWILDA, ds-DNA, C3/C4, SSA/SSB, Sm were all negative. UA was negative for protein, blood, or WBC. No total urine Pr/Cr ratio was obtained, but per chart review, this was last >0.5 g in 2/2013. She was told to restart her HCQ at 400 mg QD and was referred to lupus clinic here at the [facility].     She notes very intermittent arthralgias of her hands \"just here and there,\" some fatigue (which " she attributes to being pregnant), and some lower abdominal cramping pain that is intermittent and severe for the past 2-3 weeks lasting seconds to 3 minutes occuring 2x per day. She had a TVUS yesterday which showed a normal gestational sac measuring around 5 weeks. She has not yet seen an OB. Estimated due date June 24/2016.     Today: Patient was hospitalized 4 weeks ago for bronchitis/severe cough that was unresponsive to antibiotics. There was some concern for DVT at that time but ultrasound was negative. Patient has been having intermittent malar rash along with intermittent swelling, myalgias and arthralgias that are predominantly in the hands but also in the ankles. Patient feels that her energy level has been about the same as previous. Chest pain is not bad currently, but has recently been increased, associated with cough. Negative for oral ulcers. Patient was recently seen several days ago in [facility] ED for medication overdose but she threw up most of the pills and was [place] [facility] [place]. Patient has been taking her Plaquenil daily but has forgotten every once in a while.        Review of Systems:   Complete ROS negative except for symptoms mentioned in the HPI     No h/o myalgia, arthralgia, unintentional weight loss, loss of appetite, fevers, rash, swollen glands  No family or personal history of ulcerative colitis or chron's disease.   Patient denies any raynauds, recurrent mouth/genital ulcers, sicca symptoms, recurrent sinusitis/rhinitis  No h/o recurrent miscarriages or arterial/venous thrombosis in the past  No h/o persistent shortness of breath, cough  No h/o persistent vomiting, constipation, diarrhea. Some mild nausea, abd pain as described above  No h/o cancer           Past Medical History:   PMH - acne, dysmenorrhea, SLE  PSH - wisdom tooth extraction, renal biopsy 2/2013  Injuries - prior fracture         Social History:     Social History     Occupational History     Not on file.  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, teaches swimming lessons  School - finishing EMT class         Family History:   Mother - arthritis, likely OA  MGM - arthritis, likely OA         Allergies:   Estrogens  Influenza tri-split 08-09 vac  Measles/mumps/rubella vacc  Varicella virus vacc live         Medications:     Current Outpatient Prescriptions   Medication Sig Dispense Refill     doxylamine (UNISOM) 25 MG TABS tablet Take 25 mg by mouth nightly as needed       Prenatal Vit-Fe Fumarate-FA (PRENATAL MULTIVITAMIN  PLUS IRON) 27-0.8 MG TABS Take 1 tablet by mouth daily       ValACYclovir HCl (VALTREX PO) Take 500 mg by mouth as needed       hydroxychloroquine (PLAQUENIL) 200 MG tablet Take 2 tablets (400 mg) by mouth daily 90 tablet 6            Physical Exam:   Blood pressure 104/64, pulse 71, height 1.676 m (5' 6\"), weight 65.3 kg (144 lb), SpO2 99 %.  Wt Readings from Last 4 Encounters:   02/08/17 65.3 kg (144 lb)   10/26/16 66.9 kg (147 lb 6.4 oz)   03/27/14 65.1 kg (143 lb 8.3 oz) (80 %)*   03/11/14 65.1 kg (143 lb 8.3 oz) (80 %)*     * Growth percentiles are based on CDC 2-20 Years data.     BP Readings from Last 3 Encounters:   01/22/17 103/56   10/26/16 116/73   03/27/14 114/45       Constitutional: well-developed, appearing stated age, cooperative, mother present  Eyes: PERRLA, normal conjunctiva, sclera  ENT: nl external ears, nose, lips. No mucous membrane lesions, normal saliva pool  Neck: no cervical or supraclavicular lymphadenopathy  Resp: CTAB, no wheezing, breathing comfortably on room air  CV: RRR, no m/r/g, no LE edema  GI: soft, NT/ND, +BS, no HSM  : not tested  Lymph: no cervical, supraclavicular or epitrochlear nodes  MS: All joints including shoulder, " elbow, wrist, MCP/PIP/DIP, hip, knee, ankle, and foot MTP/IP joints examined and normal w/o synovitis or deformity  Full ROM.  Fist 100%.    No dactylitis,  tenosynovitis, enthesopathy.  Skin: no nail pitting; very faint malar rash BL cheeks/nose sparing nasolabial folds  Psych: nl judgement, orientation, memory, affect.  Neuro: CN II-XII grossly intact, moving all extremities equally, normal gait. Strength BL deltoid 5/5, BL tricep 5/5, BL bicep 5/5, BL hip flexors 5/5         Data:     Results for orders placed or performed during the hospital encounter of 01/21/17   Chest XR,  PA & LAT    Narrative    XR CHEST 2 VW  1/21/2017 9:36 PM      HISTORY: cough, patient is 4 months pregnant    COMPARISON: 3/27/2013    FINDINGS: Frontal and lateral views of the chest. The cardiac  silhouette size and pulmonary vasculature are within normal limits.  There is no significant pleural effusion or pneumothorax. There are no  focal pulmonary opacities. The visualized upper abdomen and bones  appear normal.      Impression    IMPRESSION: Clear lungs.    I have personally reviewed the examination and initial interpretation  and I agree with the findings.    ZUNILDA TORRES MD   US Lower Extremity Venous Duplex Bilateral    Narrative    EXAMINATION: DOPPLER VENOUS ULTRASOUND OF BILATERAL LOWER EXTREMITIES,  1/21/2017 10:35 PM     COMPARISON: None.    HISTORY: Leg swelling, elevated d-dimer, history of lupus    TECHNIQUE:  Gray-scale evaluation with compression, spectral flow and  color Doppler assessment of the deep venous system of both legs from  groin to knee, and then at the ankles.    FINDINGS:  In both lower extremities, the common femoral, femoral, popliteal and  posterior tibial veins demonstrate normal compressibility and blood  flow.      Impression    IMPRESSION:  No evidence of deep venous thrombosis in either lower extremity.    I have personally reviewed the examination and initial interpretation  and I agree with  the findings.    JUAN EMMANUEL MD   CBC with platelets differential   Result Value Ref Range    WBC 7.6 4.0 - 11.0 10e9/L    RBC Count 3.34 (L) 3.8 - 5.2 10e12/L    Hemoglobin 10.5 (L) 11.7 - 15.7 g/dL    Hematocrit 30.7 (L) 35.0 - 47.0 %    MCV 92 78 - 100 fl    MCH 31.4 26.5 - 33.0 pg    MCHC 34.2 31.5 - 36.5 g/dL    RDW 14.0 10.0 - 15.0 %    Platelet Count 204 150 - 450 10e9/L    Diff Method Automated Method     % Neutrophils 78.8 %    % Lymphocytes 11.5 %    % Monocytes 8.1 %    % Eosinophils 1.2 %    % Basophils 0.0 %    % Immature Granulocytes 0.4 %    Nucleated RBCs 1 (H) 0 /100    Absolute Neutrophil 6.0 1.6 - 8.3 10e9/L    Absolute Lymphocytes 0.9 0.8 - 5.3 10e9/L    Absolute Monocytes 0.6 0.0 - 1.3 10e9/L    Absolute Eosinophils 0.1 0.0 - 0.7 10e9/L    Absolute Basophils 0.0 0.0 - 0.2 10e9/L    Abs Immature Granulocytes 0.0 0 - 0.4 10e9/L    Absolute Nucleated RBC 0.0    Comprehensive metabolic panel   Result Value Ref Range    Sodium 140 133 - 144 mmol/L    Potassium 3.4 3.4 - 5.3 mmol/L    Chloride 109 94 - 109 mmol/L    Carbon Dioxide 22 20 - 32 mmol/L    Anion Gap 9 3 - 14 mmol/L    Glucose 67 (L) 70 - 99 mg/dL    Urea Nitrogen 7 7 - 30 mg/dL    Creatinine 0.53 0.52 - 1.04 mg/dL    GFR Estimate >90  Non  GFR Calc   >60 mL/min/1.7m2    GFR Estimate If Black >90   GFR Calc   >60 mL/min/1.7m2    Calcium 8.2 (L) 8.5 - 10.1 mg/dL    Bilirubin Total 0.3 0.2 - 1.3 mg/dL    Albumin 3.2 (L) 3.4 - 5.0 g/dL    Protein Total 6.0 (L) 6.8 - 8.8 g/dL    Alkaline Phosphatase 50 40 - 150 U/L    ALT 12 0 - 50 U/L    AST 14 0 - 45 U/L   CRP inflammation   Result Value Ref Range    CRP Inflammation 3.8 0.0 - 8.0 mg/L   Erythrocyte sedimentation rate auto   Result Value Ref Range    Sed Rate 14 0 - 20 mm/h   EKG 12-lead   Result Value Ref Range    Interpretation ECG Click View Image link to view waveform and result    Respiratory Virus Panel by PCR   Result Value Ref Range    Respiratory  Virus Source Nasopharyngeal     Influenza A Negative NEG    Influenza A, H1 Negative NEG    Influenza A, H3 Negative NEG    Influenza A 2009 H1N1 Negative NEG    Influenza B Negative NEG    Respiratory Syncytial Virus A Negative NEG    Respiratory Syncytial Virus B Negative NEG    Parainfluenza Virus 1 Negative NEG    Parainfluenza Virus 2 Negative NEG    Parainfluenza Virus 3 Negative NEG    Human Metapneumovirus Negative NEG    Human Rhinovirus Negative NEG    Adenovirus Species B/E Negative NEG    Adenovirus Species C Negative NEG    Respiratory Virus Comment       The [employer] Respiratory Viral Panel (RVP) is a qualitative, multiplex,   diagnostic test for simultaneous detection and identification of multiple   respiratory viral nucleic acids in individuals exhibiting signs and symptoms of   respiratory infection. It uses innovative eSensor technology to provide   sensitive and specific respiratory virus detection.   The test detects Influenza A (H1 and H3), Influenza A 2009 H1N1, Influenza B,   Respiratory Syncytial Virus A, Respiratory Syncytial Virus B, Parainfluenza   Virus (1, 2 and 3), Human Metapneumovirus, Human Rhinovirus (HRV), Adenovirus   B/E and Adenovirus C.   The assay has received FDA approval for the testing of nasopharyngeal (NP)   swabs only. The Infectious Disease Diagnostic Laboratory at [facility], has validated the performance   characteristics of the [employer] Respiratory Viral Panel for NP swabs, bronchial   alveolar lavage/wash, nasopharyngeal aspirate/wash and nasal wash.     Bordetella pert parapert DNA pcr   Result Value Ref Range    Specimen Description Nasopharyngeal     Bordetella Per/Paraper PCR  NEG     Negative  Negative for B. pertussis and B. parapertussis by PCR     This test was developed and its performance characteristics determined by the   Microbiology Laboratory at [place], [place].  The PCR test has proven to be more sensitive than culture and   highly specific.  A false positive for B. pertussis may occur in samples   containing B. holmesii DNA.   A false positive for B. parapertussis may occur in samples containing B.   bronchiseptica DNA. Both B. holmesii and B. bronchiseptica rarely cause disease   in humans.  A negative result does not rule out the presence of B. pertussis or   B. parapertussis DNA in concentrations below detection level of the assay.   Nasopharyngeal swabs are the preferred specimen types.   Analyte Specific Reagents (ASRs) are used in many laboratory tests necessary   for standard medical care and generally do not require U.S. Food and Drug   Administration approval. The FDA has determined that such c learance or approval   is not necessary. This test is used for clinical purposes. It should not be   regarded as investigational or for research.   This laboratory is certified under the Clinical Laboratory Improvement   Amendments of 1988 (CLIA-88) as qualified to perform high complexity clinical   laboratory testing.       US OB 10/25/2016:  1. Gestational sac present measuring 5 weeks and 6 days..  2. No adnexal masses are seen.    Prior workup through [facility] reviewed 10/26/2016:    Positive/abnormal:  AWILDA 1/2013 1:160 speckled, repeat AWILDA neg 10/24/2016  RNP 48.7  CBC - WBC 3.7 w/ normal diff  ESR 45 (4/2013), no repeat    Negative/normal:  Bustos (reportedly initially +)  SSA, SSB  ANCA  Lupus anticoagulant screen abnormal, but confirmatory testing negative  Cardiolipin IgA/G/M neg  Beta 2 GP1 Ab IgA/G/M neg  C3/C4 (never abnormal)  Cr - 0.68, lytes normal   CK  HCV  HIV  HLA-B27  LFT's normal  Quant gold negative  RF negative x2  Anti-CCP negative x2  TSH   RPR  Prior Lyme  TTG  Ds-DNA neg x6 (reportedly initially +)  UA - trace LE, otherwise negative for protein/blood/WBC  Hgb, plts  CRP < 2.9

## 2017-02-08 NOTE — LETTER
Patient:  Monica Puckett  :   1995  MRN:     [id number]        Ms.Hailey Puckett  [address] [facility] [address]        2017    Dear ,    We are writing to inform you of your test results. Lupus labs look much better now so your current symptoms are unlikely to be due to lupus.        Resulted Orders   Albumin level   Result Value Ref Range    Albumin 3.3 (L) 3.4 - 5.0 g/dL   ALT   Result Value Ref Range    ALT 13 0 - 50 U/L   AST   Result Value Ref Range    AST 10 0 - 45 U/L   CBC with platelets differential   Result Value Ref Range    WBC 7.9 4.0 - 11.0 10e9/L    RBC Count 3.68 (L) 3.8 - 5.2 10e12/L    Hemoglobin 11.9 11.7 - 15.7 g/dL    Hematocrit 34.7 (L) 35.0 - 47.0 %    MCV 94 78 - 100 fl    MCH 32.3 26.5 - 33.0 pg    MCHC 34.3 31.5 - 36.5 g/dL    RDW 13.4 10.0 - 15.0 %    Platelet Count 245 150 - 450 10e9/L    Diff Method Automated Method     % Neutrophils 82.2 %    % Lymphocytes 11.0 %    % Monocytes 5.8 %    % Eosinophils 0.4 %    % Basophils 0.1 %    % Immature Granulocytes 0.5 %    Nucleated RBCs 0 0 /100    Absolute Neutrophil 6.5 1.6 - 8.3 10e9/L    Absolute Lymphocytes 0.9 0.8 - 5.3 10e9/L    Absolute Monocytes 0.5 0.0 - 1.3 10e9/L    Absolute Eosinophils 0.0 0.0 - 0.7 10e9/L    Absolute Basophils 0.0 0.0 - 0.2 10e9/L    Abs Immature Granulocytes 0.0 0 - 0.4 10e9/L    Absolute Nucleated RBC 0.0    Complement C3   Result Value Ref Range    Complement C3 97 76 - 169 mg/dL   Complement C4   Result Value Ref Range    Complement C4 23 15 - 50 mg/dL   CRP inflammation   Result Value Ref Range    CRP Inflammation <2.9 0.0 - 8.0 mg/L   Erythrocyte sedimentation rate auto   Result Value Ref Range    Sed Rate 15 0 - 20 mm/h   DNA double stranded antibodies   Result Value Ref Range    DNA-ds 2 <10 IU/mL      Comment:      Negative   Uric acid   Result Value Ref Range    Uric Acid 2.8 2.6 - 6.0 mg/dL   Routine UA with Micro Reflex to Culture   Result Value Ref Range     Color Urine Yellow     Appearance Urine Cloudy     Glucose Urine Negative NEG mg/dL    Bilirubin Urine Negative NEG    Ketones Urine Negative NEG mg/dL    Specific Gravity Urine 1.017 1.003 - 1.035    Blood Urine Negative NEG    pH Urine 8.0 (H) 5.0 - 7.0 pH    Protein Albumin Urine Negative NEG mg/dL    Urobilinogen mg/dL 0.0 0.0 - 2.0 mg/dL    Nitrite Urine Negative NEG    Leukocyte Esterase Urine Negative NEG    Source Midstream Urine     WBC Urine 9 (H) 0 - 2 /HPF    RBC Urine 2 0 - 2 /HPF    Bacteria Urine Few (A) NEG /HPF    Squamous Epithelial /HPF Urine 1 0 - 1 /HPF    Mucous Urine Present (A) NEG /LPF    Amorphous Crystals Moderate (A) NEG /HPF   Protein  random urine   Result Value Ref Range    Protein Random Urine 0.10 g/L    Protein Total Urine g/gr Creatinine 0.08 0 - 0.2 g/g Cr   Creatinine random urine   Result Value Ref Range    Creatinine Urine Random 128 mg/dL   Direct antiglobulin test (DATG)   Result Value Ref Range    TESSY  Broad Spectrum Neg    Complement total   Result Value Ref Range    Complement CH50 Total 75       Comment:      Reference range: 60 to 144  Unit:  Units  (Note)  INTERPRETIVE INFORMATION: Complement Activity, Total EIA   59   Units or less .......... Low      Units .............. Normal   145  Units or greater ....... High  Performed by [facility],  [address] [place] [id number] [phone]  [email or web address], Lázaro Segal MD, Lab. Director     Creatinine urine calculation only   Result Value Ref Range    Creatinine Urine 128 mg/dL       Chente Reina MD

## 2017-02-09 LAB
C3 SERPL-MCNC: 97 MG/DL (ref 76–169)
C4 SERPL-MCNC: 23 MG/DL (ref 15–50)
DSDNA AB SER-ACNC: 2 IU/ML

## 2017-02-11 LAB — CH50 SERPL-ACNC: 75

## 2017-02-12 NOTE — PROGRESS NOTES
Quick Note:    Lupus labs look much better now so your current symptoms are unlikely to be due to lupus.  ______

## 2017-02-20 ENCOUNTER — CARE COORDINATION (OUTPATIENT)
Dept: MATERNAL FETAL MEDICINE | Facility: CLINIC | Age: 22
End: 2017-02-20

## 2017-02-21 ENCOUNTER — TELEPHONE (OUTPATIENT)
Dept: RHEUMATOLOGY | Facility: CLINIC | Age: 22
End: 2017-02-21

## 2017-02-21 NOTE — TELEPHONE ENCOUNTER
----- Message from Akanksha Gutiérrez sent at 2/17/2017  1:32 PM CST -----  Regarding: Dr. Reina - Pt requests referral faxed to [facility] [place]  Contact: [phone]  Ileana  Pt requests the referral from Dr. Reina be faxed to [facility] in [place] - [phone] (p) and [phone] (f).  Monica can be reached at [phone].  Ok to [facility].  Thank you  [facility]

## 2017-02-22 ENCOUNTER — TELEPHONE (OUTPATIENT)
Dept: MATERNAL FETAL MEDICINE | Facility: CLINIC | Age: 22
End: 2017-02-22

## 2017-02-22 NOTE — TELEPHONE ENCOUNTER
MFM received a referral for preconception consultation in October 2016.  PCC has been in contact w/ pt's referring provider, and per Rheumatologist, patient going to [facility] for consultation.  Removing order.      Akanksha Reed

## 2017-06-01 ENCOUNTER — TELEPHONE (OUTPATIENT)
Dept: RHEUMATOLOGY | Facility: CLINIC | Age: 22
End: 2017-06-01

## 2017-06-01 DIAGNOSIS — Z51.81 ENCOUNTER FOR MEDICATION MONITORING: ICD-10-CM

## 2017-06-01 DIAGNOSIS — M32.9 SYSTEMIC LUPUS ERYTHEMATOSUS (H): Primary | ICD-10-CM

## 2017-06-01 NOTE — TELEPHONE ENCOUNTER
Patient calling to request her lupus labs be sent ASAP over to [facility] [place] [facility]. She is going in for an appointment today (patient 38 weeks pregnant) and would like them to draw her labs at the same time. She is not feeling very well (cough, soar throat, fever) and wants to make sure it is not lupus related.     Patient does not know contact info for [facility] lab. Informed that we may be unable to assist with this with such a short turn-around time, but that writer would try and send as high priority. Patient agrees.

## 2017-06-01 NOTE — TELEPHONE ENCOUNTER
Writer cued and signed standing lab orders. Writer will route to provider to add labs at discretion.   Adrianna Davis LPN

## 2017-06-01 NOTE — TELEPHONE ENCOUNTER
Writer faxed orders signed per Dr. Reina to [facility]. Writer left message with patient attempting to inform her.   Adrianna Davis LPN

## 2017-06-06 DIAGNOSIS — M32.9 SYSTEMIC LUPUS ERYTHEMATOSUS (H): ICD-10-CM

## 2017-06-06 LAB
ALBUMIN UR-MCNC: NEGATIVE MG/DL
APPEARANCE UR: CLEAR
BASOPHILS # BLD AUTO: 0 10E9/L (ref 0–0.2)
BASOPHILS NFR BLD AUTO: 0.1 %
BILIRUB UR QL STRIP: NEGATIVE
COLOR UR AUTO: YELLOW
DIFFERENTIAL METHOD BLD: ABNORMAL
EOSINOPHIL # BLD AUTO: 0.1 10E9/L (ref 0–0.7)
EOSINOPHIL NFR BLD AUTO: 0.5 %
ERYTHROCYTE [DISTWIDTH] IN BLOOD BY AUTOMATED COUNT: 12.5 % (ref 10–15)
ERYTHROCYTE [SEDIMENTATION RATE] IN BLOOD BY WESTERGREN METHOD: 47 MM/H (ref 0–20)
GLUCOSE UR STRIP-MCNC: NEGATIVE MG/DL
HCT VFR BLD AUTO: 33.6 % (ref 35–47)
HGB BLD-MCNC: 11.4 G/DL (ref 11.7–15.7)
HGB UR QL STRIP: NEGATIVE
KETONES UR STRIP-MCNC: NEGATIVE MG/DL
LEUKOCYTE ESTERASE UR QL STRIP: NEGATIVE
LYMPHOCYTES # BLD AUTO: 1.4 10E9/L (ref 0.8–5.3)
LYMPHOCYTES NFR BLD AUTO: 14.9 %
MCH RBC QN AUTO: 31.8 PG (ref 26.5–33)
MCHC RBC AUTO-ENTMCNC: 33.9 G/DL (ref 31.5–36.5)
MCV RBC AUTO: 94 FL (ref 78–100)
MONOCYTES # BLD AUTO: 0.6 10E9/L (ref 0–1.3)
MONOCYTES NFR BLD AUTO: 6.7 %
NEUTROPHILS # BLD AUTO: 7.1 10E9/L (ref 1.6–8.3)
NEUTROPHILS NFR BLD AUTO: 77.8 %
NITRATE UR QL: NEGATIVE
PH UR STRIP: 5.5 PH (ref 5–7)
PLATELET # BLD AUTO: 290 10E9/L (ref 150–450)
RBC # BLD AUTO: 3.58 10E12/L (ref 3.8–5.2)
SP GR UR STRIP: 1.02 (ref 1–1.03)
URN SPEC COLLECT METH UR: NORMAL
UROBILINOGEN UR STRIP-ACNC: 0.2 EU/DL (ref 0.2–1)
WBC # BLD AUTO: 9.2 10E9/L (ref 4–11)

## 2017-06-06 PROCEDURE — 86225 DNA ANTIBODY NATIVE: CPT | Performed by: INTERNAL MEDICINE

## 2017-06-06 PROCEDURE — 86160 COMPLEMENT ANTIGEN: CPT | Performed by: INTERNAL MEDICINE

## 2017-06-06 PROCEDURE — 85652 RBC SED RATE AUTOMATED: CPT | Performed by: INTERNAL MEDICINE

## 2017-06-06 PROCEDURE — 86162 COMPLEMENT TOTAL (CH50): CPT | Mod: 90 | Performed by: INTERNAL MEDICINE

## 2017-06-06 PROCEDURE — 84156 ASSAY OF PROTEIN URINE: CPT | Performed by: INTERNAL MEDICINE

## 2017-06-06 PROCEDURE — 84550 ASSAY OF BLOOD/URIC ACID: CPT | Performed by: INTERNAL MEDICINE

## 2017-06-06 PROCEDURE — 36415 COLL VENOUS BLD VENIPUNCTURE: CPT | Performed by: INTERNAL MEDICINE

## 2017-06-06 PROCEDURE — 81003 URINALYSIS AUTO W/O SCOPE: CPT | Performed by: INTERNAL MEDICINE

## 2017-06-06 PROCEDURE — 84460 ALANINE AMINO (ALT) (SGPT): CPT | Performed by: INTERNAL MEDICINE

## 2017-06-06 PROCEDURE — 82040 ASSAY OF SERUM ALBUMIN: CPT | Performed by: INTERNAL MEDICINE

## 2017-06-06 PROCEDURE — 84450 TRANSFERASE (AST) (SGOT): CPT | Performed by: INTERNAL MEDICINE

## 2017-06-06 PROCEDURE — 85025 COMPLETE CBC W/AUTO DIFF WBC: CPT | Performed by: INTERNAL MEDICINE

## 2017-06-06 PROCEDURE — 99000 SPECIMEN HANDLING OFFICE-LAB: CPT | Performed by: INTERNAL MEDICINE

## 2017-06-06 PROCEDURE — 86140 C-REACTIVE PROTEIN: CPT | Performed by: INTERNAL MEDICINE

## 2017-06-06 PROCEDURE — 82565 ASSAY OF CREATININE: CPT | Performed by: INTERNAL MEDICINE

## 2017-06-06 NOTE — LETTER
Patient:  Monica Puckett  :   1995  MRN:     [id number]        Ms.Hailey Puckett  [address] [facility] [address]        2017    Dear ,    We are writing to inform you of your test results. CH50 (complement) was low and ESR (inflammation marker) was high on these labs correlated with lupus flare but both improved on repeat testing.      Resulted Orders   Albumin level   Result Value Ref Range    Albumin 2.9 (L) 3.4 - 5.0 g/dL   ALT   Result Value Ref Range    ALT 16 0 - 50 U/L   AST   Result Value Ref Range    AST 18 0 - 45 U/L   CBC with platelets differential   Result Value Ref Range    WBC 9.2 4.0 - 11.0 10e9/L    RBC Count 3.58 (L) 3.8 - 5.2 10e12/L    Hemoglobin 11.4 (L) 11.7 - 15.7 g/dL    Hematocrit 33.6 (L) 35.0 - 47.0 %    MCV 94 78 - 100 fl    MCH 31.8 26.5 - 33.0 pg    MCHC 33.9 31.5 - 36.5 g/dL    RDW 12.5 10.0 - 15.0 %    Platelet Count 290 150 - 450 10e9/L    Diff Method Automated Method     % Neutrophils 77.8 %    % Lymphocytes 14.9 %    % Monocytes 6.7 %    % Eosinophils 0.5 %    % Basophils 0.1 %    Absolute Neutrophil 7.1 1.6 - 8.3 10e9/L    Absolute Lymphocytes 1.4 0.8 - 5.3 10e9/L    Absolute Monocytes 0.6 0.0 - 1.3 10e9/L    Absolute Eosinophils 0.1 0.0 - 0.7 10e9/L    Absolute Basophils 0.0 0.0 - 0.2 10e9/L   Complement C3   Result Value Ref Range    Complement C3 153 76 - 169 mg/dL   Complement C4   Result Value Ref Range    Complement C4 29 15 - 50 mg/dL   Creatinine   Result Value Ref Range    Creatinine 0.50 (L) 0.52 - 1.04 mg/dL    GFR Estimate >90  Non  GFR Calc   >60 mL/min/1.7m2    GFR Estimate If Black >90   GFR Calc   >60 mL/min/1.7m2   CRP inflammation   Result Value Ref Range    CRP Inflammation 3.2 0.0 - 8.0 mg/L   Erythrocyte sedimentation rate auto   Result Value Ref Range    Sed Rate 47 (H) 0 - 20 mm/h   DNA double stranded antibodies   Result Value Ref Range    DNA-ds 2 <10 IU/mL      Comment:      Negative    Protein  random urine   Result Value Ref Range    Protein Random Urine 0.14 g/L    Protein Total Urine g/gr Creatinine 0.12 0 - 0.2 g/g Cr   Creatinine random urine   Result Value Ref Range    Creatinine Urine Random 130 mg/dL   Uric acid   Result Value Ref Range    Uric Acid 3.1 2.6 - 6.0 mg/dL   Complement total   Result Value Ref Range    Complement CH50 Total 38 (L)       Comment:      Reference range: 60 to 144  Unit:  Units  (Note)  If low CH50 value is unexpected or does not correlate with  the patient's clinical condition, repeat analysis with a  fresh frozen serum sample is suggested for verification.  INTERPRETIVE INFORMATION: Complement Activity, Total EIA   59   Units or less .......... Low      Units .............. Normal   145  Units or greater ....... High  Performed by [facility],  [address] [place] [id number] [phone]  [email or web address], Lázaro Segal MD, Lab. Director     *UA reflex to Microscopic and Culture ([place] [facility] (except [place] and [place])   Result Value Ref Range    Color Urine Yellow     Appearance Urine Clear     Glucose Urine Negative NEG mg/dL    Bilirubin Urine Negative NEG    Ketones Urine Negative NEG mg/dL    Specific Gravity Urine 1.020 1.003 - 1.035    Blood Urine Negative NEG    pH Urine 5.5 5.0 - 7.0 pH    Protein Albumin Urine Negative NEG mg/dL    Urobilinogen Urine 0.2 0.2 - 1.0 EU/dL    Nitrite Urine Negative NEG    Leukocyte Esterase Urine Negative NEG    Source Midstream Urine        Chente Reina MD

## 2017-06-07 LAB
ALBUMIN SERPL-MCNC: 2.9 G/DL (ref 3.4–5)
ALT SERPL W P-5'-P-CCNC: 16 U/L (ref 0–50)
AST SERPL W P-5'-P-CCNC: 18 U/L (ref 0–45)
C3 SERPL-MCNC: 153 MG/DL (ref 76–169)
C4 SERPL-MCNC: 29 MG/DL (ref 15–50)
CREAT SERPL-MCNC: 0.5 MG/DL (ref 0.52–1.04)
CREAT UR-MCNC: 130 MG/DL
CRP SERPL-MCNC: 3.2 MG/L (ref 0–8)
GFR SERPL CREATININE-BSD FRML MDRD: ABNORMAL ML/MIN/1.7M2
PROT UR-MCNC: 0.14 G/L
PROT/CREAT 24H UR: 0.12 G/G CR (ref 0–0.2)
URATE SERPL-MCNC: 3.1 MG/DL (ref 2.6–6)

## 2017-06-08 ENCOUNTER — TELEPHONE (OUTPATIENT)
Dept: RHEUMATOLOGY | Facility: CLINIC | Age: 22
End: 2017-06-08

## 2017-06-08 DIAGNOSIS — M32.9 SYSTEMIC LUPUS ERYTHEMATOSUS (H): Primary | ICD-10-CM

## 2017-06-08 LAB
CH50 SERPL-ACNC: 38
DSDNA AB SER-ACNC: 2 IU/ML

## 2017-06-08 NOTE — TELEPHONE ENCOUNTER
"Pt requesting interpretation of 6/6/17 Dr. Latosha marcos. Concerned b/c almost 37 wks pregnant. Reports she is having a flare:     Pain: \"Very painful\" generalized, constant muscular pain. Worse (7/10) when she wakes up, decreases w/ use, increases again w/ rest.     Swelling: Yes, hands \"super swollen.\"    Color change: Denies.    Warm to touch: Yes, told by others hands, pt does not feel it.    Stiffness: Hands, but able to move.    Temp: Highest was 102 on 6/3/17. Decreased to approx 99/100    OTC, Rx meds: Tylenol until few days ago for fever. Helping w/ pain. No prednisone.    Started: 6/1/17    Reports pain on inhalation, especially in PM. Hx of this. No difficulty breathing/SOB. Butterfly rash starting. Fatigue. Requesting assistance ASAP. Please advise at [phone]. DVM fine. Sent to [facility].  "

## 2017-06-09 RX ORDER — PREDNISONE 5 MG/1
TABLET ORAL
Qty: 60 TABLET | Refills: 0 | Status: SHIPPED | OUTPATIENT
Start: 2017-06-09 | End: 2017-06-28

## 2017-06-09 NOTE — TELEPHONE ENCOUNTER
Labs overall are not too bad but suggestive of lupus flare given low CH50. Recommend prednisone 4tab=20 mg qd x 5 days, 3tab=15 mg qd x 5 days, 2tab=10 mg qd x 5 days, 1 tab=5 mg qd x 5 days then stop. Unfortunately she has been noncompliant with visits and no showed last time.

## 2017-06-09 NOTE — TELEPHONE ENCOUNTER
Writer left message with patient, attempted to communicate Dr. Reina's note. Writer cued up prednisone 5 mg taper order but, no pharmacy selected. Writer will await patient's phone call to confirm a pharmacy.   Adrianna Davis LPN

## 2017-06-09 NOTE — TELEPHONE ENCOUNTER
Pt called back. Dr. Reina's note relayed to pt. Please send prednisone Rx to [facility]. Sent to [facility].

## 2017-06-09 NOTE — TELEPHONE ENCOUNTER
Pt called back. Says usually w/ flares she is started on IV prednisone d/t flares quickly escalating. Caused her to be out of work for 3 months in past. She is 38 wks pregnant, first baby, and she is worried. Asking if she could speak w/ Dr. Reina or come by for brief talk. Please advise at [phone]. Sent to [facility].

## 2017-06-10 NOTE — TELEPHONE ENCOUNTER
Called and spoke with patient on Fri 6/9, offered her an appointment on 6/15 at noon as add on as she forgot last appointment and missed it. Discussed test results, low CH50 is suggestive of lupus flare. Re-assured her that she does not need hospitalization or IV steroid. She has arthralgia, recurrence of malar rash, no longer has CP. She agreed to try pred taper and Rx was faxed.

## 2017-06-19 ENCOUNTER — TELEPHONE (OUTPATIENT)
Dept: RHEUMATOLOGY | Facility: CLINIC | Age: 22
End: 2017-06-19

## 2017-06-19 NOTE — TELEPHONE ENCOUNTER
[facility] at [facility] postpartum mother/baby unit asking if Dr. Reina can see pt in-hospital for her. Pt due for discharge 6/20/17. Please advise at [phone]. Sent to [facility].

## 2017-06-19 NOTE — TELEPHONE ENCOUNTER
Writer spoke with Kimmy and informed her that she can have the post-partum provider paged to speak with the on-call rheumatologist which is Dr. Reina.   Adrianna Davis LPN

## 2017-06-21 ENCOUNTER — TELEPHONE (OUTPATIENT)
Dept: RHEUMATOLOGY | Facility: CLINIC | Age: 22
End: 2017-06-21

## 2017-06-21 DIAGNOSIS — M32.9 SYSTEMIC LUPUS ERYTHEMATOSUS, UNSPECIFIED SLE TYPE, UNSPECIFIED ORGAN INVOLVEMENT STATUS (H): Primary | ICD-10-CM

## 2017-06-21 NOTE — TELEPHONE ENCOUNTER
"Pt called reporting flare Sx's. Had son 6/19/17. Reports: \"face breaking out in some spots.\" Reports butterfly rash and is very pale. Feeling much more fatigued than expecting following delivery. Thinking it is flare-related. General malaise, more in extremities. Cannot distinguish if pain is in joints or muscles. Says takes times for muscles to loosen when she relaxes. Swelling in hands. Denies joint redness/warmth. Taking prednisone. Reports bilateral flank pain. Asking to speak to Dr. Reina about seeing nephrologist. Please advise at [phone]. Sent to [facility].  "

## 2017-06-23 NOTE — TELEPHONE ENCOUNTER
Called and spoke with pt regarding current flare, she continues to have rash on face, states that they are dark spots, Continues to feel fatigued and also, is having life side flank pain that is 7-8/10 at times, is taking percocet for stitches related to birth, which does help pain most of the time.  Currently though no problems urinating, does appear darker in color, is increasing water intake to help with that.  Pt would like to be able to come in to see Dr. Reina, and she would also like a recommendation for a nephrologist.  Pt is aware that Dr. Reina is out of the office until Monday, would like to wait until then to follow up, she is currently on 10 mg a day of prednisone.      Pt was made aware that Dr. Reina may not be able to see her next week. Will route to Dr. Reina for review and advice for when she returns.    Flavia Mayfield RN  [facility]

## 2017-06-26 DIAGNOSIS — M32.9 SYSTEMIC LUPUS ERYTHEMATOSUS, UNSPECIFIED SLE TYPE, UNSPECIFIED ORGAN INVOLVEMENT STATUS (H): Primary | ICD-10-CM

## 2017-06-27 DIAGNOSIS — M32.9 SYSTEMIC LUPUS ERYTHEMATOSUS, UNSPECIFIED SLE TYPE, UNSPECIFIED ORGAN INVOLVEMENT STATUS (H): ICD-10-CM

## 2017-06-27 LAB
ALBUMIN UR-MCNC: NEGATIVE MG/DL
APPEARANCE UR: CLEAR
BASOPHILS # BLD AUTO: 0 10E9/L (ref 0–0.2)
BASOPHILS NFR BLD AUTO: 0.2 %
BILIRUB UR QL STRIP: NEGATIVE
COLOR UR AUTO: YELLOW
DIFFERENTIAL METHOD BLD: NORMAL
EOSINOPHIL # BLD AUTO: 0.1 10E9/L (ref 0–0.7)
EOSINOPHIL NFR BLD AUTO: 1 %
ERYTHROCYTE [DISTWIDTH] IN BLOOD BY AUTOMATED COUNT: 12.2 % (ref 10–15)
ERYTHROCYTE [SEDIMENTATION RATE] IN BLOOD BY WESTERGREN METHOD: 37 MM/H (ref 0–20)
GLUCOSE UR STRIP-MCNC: NEGATIVE MG/DL
HCT VFR BLD AUTO: 36 % (ref 35–47)
HGB BLD-MCNC: 12.4 G/DL (ref 11.7–15.7)
HGB UR QL STRIP: ABNORMAL
KETONES UR STRIP-MCNC: NEGATIVE MG/DL
LEUKOCYTE ESTERASE UR QL STRIP: ABNORMAL
LYMPHOCYTES # BLD AUTO: 1.9 10E9/L (ref 0.8–5.3)
LYMPHOCYTES NFR BLD AUTO: 18.7 %
MCH RBC QN AUTO: 31.6 PG (ref 26.5–33)
MCHC RBC AUTO-ENTMCNC: 34.4 G/DL (ref 31.5–36.5)
MCV RBC AUTO: 92 FL (ref 78–100)
MONOCYTES # BLD AUTO: 0.8 10E9/L (ref 0–1.3)
MONOCYTES NFR BLD AUTO: 7.5 %
NEUTROPHILS # BLD AUTO: 7.4 10E9/L (ref 1.6–8.3)
NEUTROPHILS NFR BLD AUTO: 72.6 %
NITRATE UR QL: NEGATIVE
PH UR STRIP: 7 PH (ref 5–7)
PLATELET # BLD AUTO: 422 10E9/L (ref 150–450)
RBC # BLD AUTO: 3.92 10E12/L (ref 3.8–5.2)
RBC #/AREA URNS AUTO: ABNORMAL /HPF (ref 0–2)
SP GR UR STRIP: 1.01 (ref 1–1.03)
URN SPEC COLLECT METH UR: ABNORMAL
UROBILINOGEN UR STRIP-ACNC: 0.2 EU/DL (ref 0.2–1)
WBC # BLD AUTO: 10.2 10E9/L (ref 4–11)
WBC #/AREA URNS AUTO: ABNORMAL /HPF (ref 0–2)

## 2017-06-27 PROCEDURE — 86225 DNA ANTIBODY NATIVE: CPT | Performed by: FAMILY MEDICINE

## 2017-06-27 PROCEDURE — 36415 COLL VENOUS BLD VENIPUNCTURE: CPT | Performed by: FAMILY MEDICINE

## 2017-06-27 PROCEDURE — 80053 COMPREHEN METABOLIC PANEL: CPT | Performed by: FAMILY MEDICINE

## 2017-06-27 PROCEDURE — 81001 URINALYSIS AUTO W/SCOPE: CPT | Performed by: FAMILY MEDICINE

## 2017-06-27 PROCEDURE — 86162 COMPLEMENT TOTAL (CH50): CPT | Mod: 90 | Performed by: FAMILY MEDICINE

## 2017-06-27 PROCEDURE — 84156 ASSAY OF PROTEIN URINE: CPT | Performed by: FAMILY MEDICINE

## 2017-06-27 PROCEDURE — 85652 RBC SED RATE AUTOMATED: CPT | Performed by: FAMILY MEDICINE

## 2017-06-27 PROCEDURE — 85025 COMPLETE CBC W/AUTO DIFF WBC: CPT | Performed by: FAMILY MEDICINE

## 2017-06-27 PROCEDURE — 86140 C-REACTIVE PROTEIN: CPT | Performed by: FAMILY MEDICINE

## 2017-06-27 PROCEDURE — 86160 COMPLEMENT ANTIGEN: CPT

## 2017-06-27 PROCEDURE — 99000 SPECIMEN HANDLING OFFICE-LAB: CPT | Performed by: FAMILY MEDICINE

## 2017-06-27 NOTE — LETTER
Patient:  Monica Puckett  :   1995  MRN:     [id number]        Ms.Hailey Puckett  [address] [address] [facility] [address]        July 3, 2017    Dear ,    We are writing to inform you of your test results. CRP (inflammation marker) is slightly increased but other inflammation marker (ESR) is improved. Lupus markers are normal including CH50 which is back to normal (good news).      Resulted Orders   CRP inflammation   Result Value Ref Range    CRP Inflammation 10.0 (H) 0.0 - 8.0 mg/L   Complement C3   Result Value Ref Range    Complement C3 154 76 - 169 mg/dL   Complement C4   Result Value Ref Range    Complement C4 39 15 - 50 mg/dL   Complement total   Result Value Ref Range    Complement CH50 Total 74       Comment:      Reference range: 60 to 144  Unit:  Units  (Note)  INTERPRETIVE INFORMATION: Complement Activity, Total EIA   59   Units or less .......... Low      Units .............. Normal   145  Units or greater ....... High  Performed by [facility],  [address] [place] [id number] [phone]  [email or web address], Lázaro Segal MD, Lab. Director     Erythrocyte sedimentation rate auto   Result Value Ref Range    Sed Rate 37 (H) 0 - 20 mm/h   Creatinine random urine   Result Value Ref Range    Creatinine Urine Random 17 mg/dL   Protein  random urine (includes Creatinine Urine)   Result Value Ref Range    Protein Random Urine <0.05 g/L    Protein Total Urine g/gr Creatinine Unable to calculate due to low value 0 - 0.2 g/g Cr   DNA double stranded antibodies   Result Value Ref Range    DNA-ds 1 <10 IU/mL      Comment:      Negative   CBC with platelets differential   Result Value Ref Range    WBC 10.2 4.0 - 11.0 10e9/L    RBC Count 3.92 3.8 - 5.2 10e12/L    Hemoglobin 12.4 11.7 - 15.7 g/dL    Hematocrit 36.0 35.0 - 47.0 %    MCV 92 78 - 100 fl    MCH 31.6 26.5 - 33.0 pg    MCHC 34.4 31.5 - 36.5 g/dL    RDW 12.2 10.0 - 15.0 %    Platelet Count 422 150 - 450 10e9/L     Diff Method Automated Method     % Neutrophils 72.6 %    % Lymphocytes 18.7 %    % Monocytes 7.5 %    % Eosinophils 1.0 %    % Basophils 0.2 %    Absolute Neutrophil 7.4 1.6 - 8.3 10e9/L    Absolute Lymphocytes 1.9 0.8 - 5.3 10e9/L    Absolute Monocytes 0.8 0.0 - 1.3 10e9/L    Absolute Eosinophils 0.1 0.0 - 0.7 10e9/L    Absolute Basophils 0.0 0.0 - 0.2 10e9/L   Comprehensive metabolic panel   Result Value Ref Range    Sodium 142 133 - 144 mmol/L    Potassium 4.1 3.4 - 5.3 mmol/L    Chloride 107 94 - 109 mmol/L    Carbon Dioxide 26 20 - 32 mmol/L    Anion Gap 9 3 - 14 mmol/L    Glucose 64 (L) 70 - 99 mg/dL    Urea Nitrogen 12 7 - 30 mg/dL    Creatinine 0.65 0.52 - 1.04 mg/dL    GFR Estimate >90  Non  GFR Calc   >60 mL/min/1.7m2    GFR Estimate If Black >90   GFR Calc   >60 mL/min/1.7m2    Calcium 9.6 8.5 - 10.1 mg/dL    Bilirubin Total 0.2 0.2 - 1.3 mg/dL    Albumin 3.2 (L) 3.4 - 5.0 g/dL    Protein Total 7.0 6.8 - 8.8 g/dL    Alkaline Phosphatase 125 40 - 150 U/L    ALT 17 0 - 50 U/L    AST 13 0 - 45 U/L   UA reflex to Microscopic   Result Value Ref Range    Color Urine Yellow     Appearance Urine Clear     Glucose Urine Negative NEG mg/dL    Bilirubin Urine Negative NEG    Ketones Urine Negative NEG mg/dL    Specific Gravity Urine 1.010 1.003 - 1.035    Blood Urine Small (A) NEG    pH Urine 7.0 5.0 - 7.0 pH    Protein Albumin Urine Negative NEG mg/dL    Urobilinogen Urine 0.2 0.2 - 1.0 EU/dL    Nitrite Urine Negative NEG    Leukocyte Esterase Urine Small (A) NEG    Source Midstream Urine    Creatinine urine calculation only   Result Value Ref Range    Creatinine Urine 17 mg/dL   Urine Microscopic   Result Value Ref Range    WBC Urine 2-5 (A) 0 - 2 /HPF    RBC Urine O - 2 0 - 2 /HPF       Chente Reina MD

## 2017-06-27 NOTE — TELEPHONE ENCOUNTER
Called and spoke with pt, she is continuing to flare.  She is going to get labs done today and will come see Dr. Reina tomorrow at noon.    Flavia Mayfield RN  [facility]

## 2017-06-28 ENCOUNTER — OFFICE VISIT (OUTPATIENT)
Dept: RHEUMATOLOGY | Facility: CLINIC | Age: 22
End: 2017-06-28
Attending: INTERNAL MEDICINE
Payer: COMMERCIAL

## 2017-06-28 VITALS
DIASTOLIC BLOOD PRESSURE: 67 MMHG | WEIGHT: 153.1 LBS | SYSTOLIC BLOOD PRESSURE: 104 MMHG | BODY MASS INDEX: 24.6 KG/M2 | HEART RATE: 68 BPM | OXYGEN SATURATION: 98 % | HEIGHT: 66 IN

## 2017-06-28 DIAGNOSIS — M32.9 SYSTEMIC LUPUS ERYTHEMATOSUS, UNSPECIFIED SLE TYPE, UNSPECIFIED ORGAN INVOLVEMENT STATUS (H): ICD-10-CM

## 2017-06-28 DIAGNOSIS — N04.20 NEPHROTIC SYNDROME WITH LESION OF MEMBRANOUS GLOMERULONEPHRITIS: ICD-10-CM

## 2017-06-28 DIAGNOSIS — Z51.81 ENCOUNTER FOR MEDICATION MONITORING: Primary | ICD-10-CM

## 2017-06-28 LAB
ALBUMIN SERPL-MCNC: 3.2 G/DL (ref 3.4–5)
ALP SERPL-CCNC: 125 U/L (ref 40–150)
ALT SERPL W P-5'-P-CCNC: 17 U/L (ref 0–50)
ANION GAP SERPL CALCULATED.3IONS-SCNC: 9 MMOL/L (ref 3–14)
AST SERPL W P-5'-P-CCNC: 13 U/L (ref 0–45)
BILIRUB SERPL-MCNC: 0.2 MG/DL (ref 0.2–1.3)
BUN SERPL-MCNC: 12 MG/DL (ref 7–30)
CALCIUM SERPL-MCNC: 9.6 MG/DL (ref 8.5–10.1)
CHLORIDE SERPL-SCNC: 107 MMOL/L (ref 94–109)
CO2 SERPL-SCNC: 26 MMOL/L (ref 20–32)
CREAT SERPL-MCNC: 0.65 MG/DL (ref 0.52–1.04)
CREAT UR-MCNC: 17 MG/DL
CREAT UR-MCNC: 17 MG/DL
CRP SERPL-MCNC: 10 MG/L (ref 0–8)
GFR SERPL CREATININE-BSD FRML MDRD: ABNORMAL ML/MIN/1.7M2
GLUCOSE SERPL-MCNC: 64 MG/DL (ref 70–99)
POTASSIUM SERPL-SCNC: 4.1 MMOL/L (ref 3.4–5.3)
PROT SERPL-MCNC: 7 G/DL (ref 6.8–8.8)
PROT UR-MCNC: <0.05 G/L
PROT/CREAT 24H UR: NORMAL G/G CR (ref 0–0.2)
SODIUM SERPL-SCNC: 142 MMOL/L (ref 133–144)

## 2017-06-28 PROCEDURE — 99212 OFFICE O/P EST SF 10 MIN: CPT | Mod: ZF

## 2017-06-28 RX ORDER — PREDNISONE 5 MG/1
TABLET ORAL
Qty: 50 TABLET | Refills: 0 | Status: SHIPPED | OUTPATIENT
Start: 2017-06-28 | End: 2018-11-14

## 2017-06-28 RX ORDER — HYDROXYCHLOROQUINE SULFATE 200 MG/1
400 TABLET, FILM COATED ORAL DAILY
Qty: 180 TABLET | Refills: 0 | Status: SHIPPED | OUTPATIENT
Start: 2017-06-28 | End: 2017-10-14

## 2017-06-28 ASSESSMENT — PAIN SCALES - GENERAL: PAINLEVEL: MODERATE PAIN (4)

## 2017-06-28 NOTE — LETTER
6/28/2017      RE: Monica Puckett  [address] [place] [address]       [facility] - Rheumatology Clinic Visit     Monica Puckett MRN# [id number]   YOB: 1995    Primary care provider: Una Canales  Jun 28, 2017          Assessment and Plan:     Problem list:  # SLE w/ LN stage V (diagnosed 2/2013 age 18 y/o), features of pleuritic chest pain, malar rash/photosensitivity, fatigue, myalgias, arthralgias, oral ulcers, significant proteinuria w/ total urine Pr/Cr to 5 (2/2013), with + AWILDA (1:160 speckled pattern, repeat 10/2016 neg), +RNP (48.7), previously +ds-DNA (10/2016 neg) and +Sm (10/2016 neg), normal C3/C4, chronic mild leukopenia  # perceived poor tolerance to cellcept - GI upset. Discontinued ~  6/2016 (about 2-3 months prior to conception)  # mild leukopenia (3.7), normal differential  # Prior treatment: high dose steroids (mostly off steroids since 2014), oral CYC (~2/2013 - 7/2013), HCQ (2/2016 - 6/2016, now 10/2016 - current), Cellcept (~7/2013 - 6/2016), RTX (?7/2014 and 1/2015)    Discussion:  Pt with history of SLE with LN class V diagnosed 2/2013 most recently managed on cellcept + HCQ with good disease control over the past 2 years. She has not had any significant proteinuria or positive ds-DNA in over 2 years. Complements C3/C4 have always been normal. Repeat AWILDA negative 10/24/2016 as well as neg repeat SSA/SSB, antiphospholipid Ab's, and UA.    She delivered her baby son on 6/19/2017 (uneventful, NVD, breastfeeds), her IVET was 6/24/2017. She had a small flare towards end of pregnancy in 1st week of pregnancy with malar rash, inflammatory arthritis supported by drop in CH50 and increased ESR. She responded well to prednisone 20 mg po qd taper.    Had labs yesterday (CH50 pending, rest of labs are stable), reports increased joint pain and L flank pain. No malar rash.    Do not suspect L flank pain to be due to lupus. She ran out of HCQ 4 days  ago.    If she continues to have flare ups, recommend to add AZA.     Plan:  -L flank pain US, if normal, recommend f/u with OB/GYN. Suspect pain to be musculoskeletal in nature.  -4tab=20 mg qd x 5 days, 3tab=15 mg qd x 5 days, 2tab=10 mg qd x 5 days, 1 tab=5 mg qd x 5 days then stop  - resume  mg BID, will need annual ophtho exams. Referral was placed.  -F/U lab results from 6/27/2017  -2D-Echo to eval for pulm HTN given + anti-RNP    Follow-up 7/26/2017          Orders Placed This Encounter   Procedures     US Renal Complete     OPHTHALMOLOGY ADULT REFERRAL                   Active Problem List:     Patient Active Problem List    Diagnosis Date Noted     Systemic lupus erythematosus (H) 08/16/2013     Priority: Medium     Observation after surgery 01/18/2013     Priority: Medium     Pain in joint, shoulder region 01/14/2011     Priority: Medium   Acne  Dysmenorrhea           History of Present Illness:     Patient is a 21 year old female referred by rheumatologist Dr. Ambar Muniz from [facility] for SLE and high risk pregnancy. The patient has a history of SLE with stg V LN (diagnosed age 17).     She reportedly developed a flu-like illness 11/2012 and had several UC visits from 11/2012 to 1/2013. She presented to the ED in 1/2013 after waking up with an erythematous facial rash and swelling of her face which was initially thought to be angioedema. She notes at the time experiencing fevers, fatigue, oral ulcer, and pleuritic chest pain. Outpatient workup was initiated and she was found to have a +AWILDA, +ds-DNA, +Sm and significant proteinuria. She was hospitalized at [facility] and underwent a renal biopsy which reportedly showed LN class V. She was referred to pediatric rheumatology and has since followed with Dr. Wyatt from 1/2013 until last year. She was previously seen by Dr. Yeh at  Pediatric nephrology, but for the past 2 years or so she has not seen a nephrologist.  "She was initially treated with high dose steroids (solumedrol 1g, unclear how many days), HCQ, and prednisone and reports being placed on \"chemotherapy\" pill which she thinks was cyclophosphamide for the first 4-5 months (+ a GnRH agonist). Per media tab scanned notes it appears she was given oral cyclophosphamide 50 mg tablets, but unclear dose. Notes she was then put back on prednisone 75 mg daily and was transitioned to cellcept 1000 mg BID. Notes she received RTX infusions (unclear dose and number) for what sounds like 2 cycles, last cycle received around 1/2015. She states the RTX was given due to \"flares\" of her lupus which included significant arthralgias, fatigue, skin rashes, and \"abnormal blood tests.\" She thinks her last flare was at least 2 years ago and that her lupus has otherwise been very well controlled. She self-stopped both her HCQ and cellcept about 2-3 months ago due to some GI upset that got to be \"annyoing.\"     Last Monday she had a pregnancy test that resulted positive and she was referred back to rheumatology. She was evaluated by Dr. Muniz from [facility] adult rheumatology where extensive workup including repeat blood tests w/ serology, kidney function, UA were obtained, which all resulted mostly normal other than +RNP. Her AWILDA, ds-DNA, C3/C4, SSA/SSB, Sm were all negative. UA was negative for protein, blood, or WBC. No total urine Pr/Cr ratio was obtained, but per chart review, this was last >0.5 g in 2/2013. She was told to restart her HCQ at 400 mg QD and was referred to lupus clinic here at the [facility].     She notes very intermittent arthralgias of her hands \"just here and there,\" some fatigue (which she attributes to being pregnant), and some lower abdominal cramping pain that is intermittent and severe for the past 2-3 weeks lasting seconds to 3 minutes occuring 2x per day. She had a TVUS yesterday which showed a normal gestational sac measuring around 5 weeks. She has not yet " seen an OB. Estimated due date June 24/2016.     2/8/2017: Patient was hospitalized 4 weeks ago for bronchitis/severe cough that was unresponsive to antibiotics. There was some concern for DVT at that time but ultrasound was negative. Patient has been having intermittent malar rash along with intermittent swelling, myalgias and arthralgias that are predominantly in the hands but also in the ankles. Patient feels that her energy level has been about the same as previous. Chest pain is not bad currently, but has recently been increased, associated with cough. Negative for oral ulcers. Patient was recently seen several days ago in [facility] ED for medication overdose but she threw up most of the pills and was discharged to [facility] the same day. Patient has been taking her Plaquenil daily but has forgotten every once in a while.     Today: She delivered her son (arianna) on 6/19/2017, had NVD with no complications. She is doing both breast feeding and formula.     Reports throbbing pain over L flank since delivery. Pain is not improving, percocet helps to ease the pain.    Hands are puffy and swollen and gets gelling. AM stiffness is 10-15 minutes.    No oral ulcers. No skin rash today but had the malar rash intermittently.    No hair loss.    No pleuritic CP.    Ran out of HCQ 4 days ago.         Review of Systems:   Complete ROS negative except for symptoms mentioned in the HPI     No h/o myalgia, arthralgia, unintentional weight loss, loss of appetite, fevers, rash, swollen glands  No family or personal history of ulcerative colitis or chron's disease.   Patient denies any raynauds, recurrent mouth/genital ulcers, sicca symptoms, recurrent sinusitis/rhinitis  No h/o recurrent miscarriages or arterial/venous thrombosis in the past  No h/o persistent shortness of breath, cough  No h/o persistent vomiting, constipation, diarrhea. Some mild nausea, abd pain as described above  No h/o cancer           Past Medical History:   PMH  "- acne, dysmenorrhea, SLE  PSH - wisdom tooth extraction, renal biopsy 2/2013  Injuries - prior fracture         Social History:     Social History     Occupational History     Not on file.     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 60 tablet 0     Omega-3 Fatty Acids (FISH OIL PO) Take 1 tablet by mouth daily Fish oil with DHA       doxylamine (UNISOM) 25 MG TABS tablet Take 25 mg by mouth nightly as needed       Prenatal Vit-Fe Fumarate-FA (PRENATAL MULTIVITAMIN  PLUS IRON) 27-0.8 MG TABS Take 1 tablet by mouth daily       ValACYclovir HCl (VALTREX PO) Take 500 mg by mouth as needed       hydroxychloroquine (PLAQUENIL) 200 MG tablet Take 2 tablets (400 mg) by mouth daily 90 tablet 6            Physical Exam:   Blood pressure 104/67, pulse 68, height 1.676 m (5' 6\"), weight 69.4 kg (153 lb 1.6 oz), SpO2 98 %.  Wt Readings from Last 4 Encounters:   06/28/17 69.4 kg (153 lb 1.6 oz)   02/08/17 65.3 kg (144 lb)   10/26/16 66.9 kg (147 lb 6.4 oz)   03/27/14 65.1 kg (143 lb 8.3 oz) (80 %)*     * Growth percentiles are based on CDC 2-20 Years data.     BP Readings from Last 3 Encounters:   06/28/17 104/67   02/08/17 104/64   01/22/17 103/56       Constitutional: well-developed, appearing stated age, " cooperative  Eyes: PERRLA, normal conjunctiva, sclera  ENT: nl external ears, nose, lips. No mucous membrane lesions, normal saliva pool  Neck: no cervical or supraclavicular lymphadenopathy  Resp: CTAB  CV: RRR, no m/r/g, no LE edema  GI: soft, NT/ND, no HSM  : not tested  Lymph: no cervical, supraclavicular or epitrochlear nodes  MS: no active synovitis or joint tenderness  Skin: no skin rash  Psych: nl affect.  Neuro: CN II-XII grossly intact, moving all extremities equally, normal gait. Strength BL deltoid 5/5, BL tricep 5/5, BL bicep 5/5, BL hip flexors 5/5         Data:     Component      Latest Ref Rng & Units 6/6/2017   WBC      4.0 - 11.0 10e9/L 9.2   RBC Count      3.8 - 5.2 10e12/L 3.58 (L)   Hemoglobin      11.7 - 15.7 g/dL 11.4 (L)   Hematocrit      35.0 - 47.0 % 33.6 (L)   MCV      78 - 100 fl 94   MCH      26.5 - 33.0 pg 31.8   MCHC      31.5 - 36.5 g/dL 33.9   RDW      10.0 - 15.0 % 12.5   Platelet Count      150 - 450 10e9/L 290   Diff Method       Automated Method   % Neutrophils      % 77.8   % Lymphocytes      % 14.9   % Monocytes      % 6.7   % Eosinophils      % 0.5   % Basophils      % 0.1   Absolute Neutrophil      1.6 - 8.3 10e9/L 7.1   Absolute Lymphocytes      0.8 - 5.3 10e9/L 1.4   Absolute Monocytes      0.0 - 1.3 10e9/L 0.6   Absolute Eosinophils      0.0 - 0.7 10e9/L 0.1   Absolute Basophils      0.0 - 0.2 10e9/L 0.0   Color Urine       Yellow   Appearance Urine       Clear   Glucose Urine      NEG mg/dL Negative   Bilirubin Urine      NEG Negative   Ketones Urine      NEG mg/dL Negative   Specific Gravity Urine      1.003 - 1.035 1.020   Blood Urine      NEG Negative   pH Urine      5.0 - 7.0 pH 5.5   Protein Albumin Urine      NEG mg/dL Negative   Urobilinogen Urine      0.2 - 1.0 EU/dL 0.2   Nitrite Urine      NEG Negative   Leukocyte Esterase Urine      NEG Negative   Source       Midstream Urine   Creatinine      0.52 - 1.04 mg/dL 0.50 (L)   GFR Estimate      >60 mL/min/1.7m2  >90 . . .   GFR Estimate If Black      >60 mL/min/1.7m2 >90 . . .   Protein Random Urine      g/L 0.14   Protein Total Urine g/gr Creatinine      0 - 0.2 g/g Cr 0.12   Albumin      3.4 - 5.0 g/dL 2.9 (L)   ALT      0 - 50 U/L 16   AST      0 - 45 U/L 18   Complement C3      76 - 169 mg/dL 153   Complement C4      15 - 50 mg/dL 29   CRP Inflammation      0.0 - 8.0 mg/L 3.2   Sed Rate      0 - 20 mm/h 47 (H)   DNA-ds      <10 IU/mL 2   Creatinine Urine Random      mg/dL 130   Uric Acid      2.6 - 6.0 mg/dL 3.1   Complement CH50 Total       38 (L)     Results for orders placed or performed in visit on 06/27/17   Erythrocyte sedimentation rate auto   Result Value Ref Range    Sed Rate 37 (H) 0 - 20 mm/h   CBC with platelets differential   Result Value Ref Range    WBC 10.2 4.0 - 11.0 10e9/L    RBC Count 3.92 3.8 - 5.2 10e12/L    Hemoglobin 12.4 11.7 - 15.7 g/dL    Hematocrit 36.0 35.0 - 47.0 %    MCV 92 78 - 100 fl    MCH 31.6 26.5 - 33.0 pg    MCHC 34.4 31.5 - 36.5 g/dL    RDW 12.2 10.0 - 15.0 %    Platelet Count 422 150 - 450 10e9/L    Diff Method Automated Method     % Neutrophils 72.6 %    % Lymphocytes 18.7 %    % Monocytes 7.5 %    % Eosinophils 1.0 %    % Basophils 0.2 %    Absolute Neutrophil 7.4 1.6 - 8.3 10e9/L    Absolute Lymphocytes 1.9 0.8 - 5.3 10e9/L    Absolute Monocytes 0.8 0.0 - 1.3 10e9/L    Absolute Eosinophils 0.1 0.0 - 0.7 10e9/L    Absolute Basophils 0.0 0.0 - 0.2 10e9/L   UA reflex to Microscopic   Result Value Ref Range    Color Urine Yellow     Appearance Urine Clear     Glucose Urine Negative NEG mg/dL    Bilirubin Urine Negative NEG    Ketones Urine Negative NEG mg/dL    Specific Gravity Urine 1.010 1.003 - 1.035    Blood Urine Small (A) NEG    pH Urine 7.0 5.0 - 7.0 pH    Protein Albumin Urine Negative NEG mg/dL    Urobilinogen Urine 0.2 0.2 - 1.0 EU/dL    Nitrite Urine Negative NEG    Leukocyte Esterase Urine Small (A) NEG    Source Midstream Urine    Urine Microscopic   Result Value Ref  Range    WBC Urine 2-5 (A) 0 - 2 /HPF    RBC Urine O - 2 0 - 2 /HPF     US OB 10/25/2016:  1. Gestational sac present measuring 5 weeks and 6 days..  2. No adnexal masses are seen.    Prior workup through [facility] reviewed 10/26/2016:    Positive/abnormal:  AWILDA 1/2013 1:160 speckled, repeat AWILDA neg 10/24/2016  RNP 48.7  CBC - WBC 3.7 w/ normal diff  ESR 45 (4/2013), no repeat    Negative/normal:  Bustos (reportedly initially +)  SSA, SSB  ANCA  Lupus anticoagulant screen abnormal, but confirmatory testing negative  Cardiolipin IgA/G/M neg  Beta 2 GP1 Ab IgA/G/M neg  C3/C4 (never abnormal)  Cr - 0.68, lytes normal   CK  HCV  HIV  HLA-B27  LFT's normal  Quant gold negative  RF negative x2  Anti-CCP negative x2  TSH   RPR  Prior Lyme  TTG  Ds-DNA neg x6 (reportedly initially +)  UA - trace LE, otherwise negative for protein/blood/WBC  Hgb, plts  CRP < 2.9          Chente Reina MD

## 2017-06-28 NOTE — PATIENT INSTRUCTIONS
Resume plaquenil    Eye exam referral    L flank ultrasound and f/u OB/GYN    Prednisone taper: 4tab=20 mg qd x 5 days, 3tab=15 mg qd x 5 days, 2tab=10 mg qd x 5 days, 1 tab=5 mg qd x 5 days then stop    Consider imuran in future if flare continues    7/26 at noon add on

## 2017-06-28 NOTE — PROGRESS NOTES
[facility] - Rheumatology Clinic Visit     Monica Puckett MRN# [id number]   YOB: 1995    Primary care provider: Una Canales  Jun 28, 2017          Assessment and Plan:     Problem list:  # SLE w/ LN stage V (diagnosed 2/2013 age 18 y/o), features of pleuritic chest pain, malar rash/photosensitivity, fatigue, myalgias, arthralgias, oral ulcers, significant proteinuria w/ total urine Pr/Cr to 5 (2/2013), with + AWILDA (1:160 speckled pattern, repeat 10/2016 neg), +RNP (48.7), previously +ds-DNA (10/2016 neg) and +Sm (10/2016 neg), normal C3/C4, chronic mild leukopenia  # perceived poor tolerance to cellcept - GI upset. Discontinued ~  6/2016 (about 2-3 months prior to conception)  # mild leukopenia (3.7), normal differential  # Prior treatment: high dose steroids (mostly off steroids since 2014), oral CYC (~2/2013 - 7/2013), HCQ (2/2016 - 6/2016, now 10/2016 - current), Cellcept (~7/2013 - 6/2016), RTX (?7/2014 and 1/2015)    Discussion:  Pt with history of SLE with LN class V diagnosed 2/2013 most recently managed on cellcept + HCQ with good disease control over the past 2 years. She has not had any significant proteinuria or positive ds-DNA in over 2 years. Complements C3/C4 have always been normal. Repeat AWILDA negative 10/24/2016 as well as neg repeat SSA/SSB, antiphospholipid Ab's, and UA.    She delivered her baby son on 6/19/2017 (uneventful, NVD, breastfeeds), her IVET was 6/24/2017. She had a small flare towards end of pregnancy in 1st week of pregnancy with malar rash, inflammatory arthritis supported by drop in CH50 and increased ESR. She responded well to prednisone 20 mg po qd taper.    Had labs yesterday (CH50 pending, rest of labs are stable), reports increased joint pain and L flank pain. No malar rash.    Do not suspect L flank pain to be due to lupus. She ran out of HCQ 4 days ago.    If she continues to have flare ups, recommend to add AZA.     Plan:  -L flank pain  US, if normal, recommend f/u with OB/GYN. Suspect pain to be musculoskeletal in nature.  -4tab=20 mg qd x 5 days, 3tab=15 mg qd x 5 days, 2tab=10 mg qd x 5 days, 1 tab=5 mg qd x 5 days then stop  - resume  mg BID, will need annual ophtho exams. Referral was placed.  -F/U lab results from 6/27/2017  -2D-Echo to eval for pulm HTN given + anti-RNP    Follow-up 7/26/2017          Orders Placed This Encounter   Procedures     US Renal Complete     OPHTHALMOLOGY ADULT REFERRAL                   Active Problem List:     Patient Active Problem List    Diagnosis Date Noted     Systemic lupus erythematosus (H) 08/16/2013     Priority: Medium     Observation after surgery 01/18/2013     Priority: Medium     Pain in joint, shoulder region 01/14/2011     Priority: Medium   Acne  Dysmenorrhea           History of Present Illness:     Patient is a 21 year old female referred by rheumatologist Dr. Ambar Muniz from [facility] for SLE and high risk pregnancy. The patient has a history of SLE with stg V LN (diagnosed age 17).     She reportedly developed a flu-like illness 11/2012 and had several UC visits from 11/2012 to 1/2013. She presented to the ED in 1/2013 after waking up with an erythematous facial rash and swelling of her face which was initially thought to be angioedema. She notes at the time experiencing fevers, fatigue, oral ulcer, and pleuritic chest pain. Outpatient workup was initiated and she was found to have a +AWILDA, +ds-DNA, +Sm and significant proteinuria. She was hospitalized at [facility] and underwent a renal biopsy which reportedly showed LN class V. She was referred to pediatric rheumatology and has since followed with Dr. Wyatt from 1/2013 until last year. She was previously seen by Dr. Yeh at  Pediatric nephrology, but for the past 2 years or so she has not seen a nephrologist. She was initially treated with high dose steroids (solumedrol 1g, unclear how many days),  "HCQ, and prednisone and reports being placed on \"chemotherapy\" pill which she thinks was cyclophosphamide for the first 4-5 months (+ a GnRH agonist). Per media tab scanned notes it appears she was given oral cyclophosphamide 50 mg tablets, but unclear dose. Notes she was then put back on prednisone 75 mg daily and was transitioned to cellcept 1000 mg BID. Notes she received RTX infusions (unclear dose and number) for what sounds like 2 cycles, last cycle received around 1/2015. She states the RTX was given due to \"flares\" of her lupus which included significant arthralgias, fatigue, skin rashes, and \"abnormal blood tests.\" She thinks her last flare was at least 2 years ago and that her lupus has otherwise been very well controlled. She self-stopped both her HCQ and cellcept about 2-3 months ago due to some GI upset that got to be \"annyoing.\"     Last Monday she had a pregnancy test that resulted positive and she was referred back to rheumatology. She was evaluated by Dr. Muniz from [facility] adult rheumatology where extensive workup including repeat blood tests w/ serology, kidney function, UA were obtained, which all resulted mostly normal other than +RNP. Her AWILDA, ds-DNA, C3/C4, SSA/SSB, Sm were all negative. UA was negative for protein, blood, or WBC. No total urine Pr/Cr ratio was obtained, but per chart review, this was last >0.5 g in 2/2013. She was told to restart her HCQ at 400 mg QD and was referred to lupus clinic here at the [facility].     She notes very intermittent arthralgias of her hands \"just here and there,\" some fatigue (which she attributes to being pregnant), and some lower abdominal cramping pain that is intermittent and severe for the past 2-3 weeks lasting seconds to 3 minutes occuring 2x per day. She had a TVUS yesterday which showed a normal gestational sac measuring around 5 weeks. She has not yet seen an OB. Estimated due date June 24/2016.     2/8/2017: Patient was hospitalized 4 weeks " ago for bronchitis/severe cough that was unresponsive to antibiotics. There was some concern for DVT at that time but ultrasound was negative. Patient has been having intermittent malar rash along with intermittent swelling, myalgias and arthralgias that are predominantly in the hands but also in the ankles. Patient feels that her energy level has been about the same as previous. Chest pain is not bad currently, but has recently been increased, associated with cough. Negative for oral ulcers. Patient was recently seen several days ago in [facility] ED for medication overdose but she threw up most of the pills and was discharged to [facility] the same day. Patient has been taking her Plaquenil daily but has forgotten every once in a while.     Today: She delivered her son (arianna) on 6/19/2017, had NVD with no complications. She is doing both breast feeding and formula.     Reports throbbing pain over L flank since delivery. Pain is not improving, percocet helps to ease the pain.    Hands are puffy and swollen and gets gelling. AM stiffness is 10-15 minutes.    No oral ulcers. No skin rash today but had the malar rash intermittently.    No hair loss.    No pleuritic CP.    Ran out of HCQ 4 days ago.         Review of Systems:   Complete ROS negative except for symptoms mentioned in the HPI     No h/o myalgia, arthralgia, unintentional weight loss, loss of appetite, fevers, rash, swollen glands  No family or personal history of ulcerative colitis or chron's disease.   Patient denies any raynauds, recurrent mouth/genital ulcers, sicca symptoms, recurrent sinusitis/rhinitis  No h/o recurrent miscarriages or arterial/venous thrombosis in the past  No h/o persistent shortness of breath, cough  No h/o persistent vomiting, constipation, diarrhea. Some mild nausea, abd pain as described above  No h/o cancer           Past Medical History:   PMH - acne, dysmenorrhea, SLE  PSH - wisdom tooth extraction, renal biopsy 2/2013  Injuries -  "prior fracture         Social History:     Social History     Occupational History     Not on file.     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 60 tablet 0     Omega-3 Fatty Acids (FISH OIL PO) Take 1 tablet by mouth daily Fish oil with DHA       doxylamine (UNISOM) 25 MG TABS tablet Take 25 mg by mouth nightly as needed       Prenatal Vit-Fe Fumarate-FA (PRENATAL MULTIVITAMIN  PLUS IRON) 27-0.8 MG TABS Take 1 tablet by mouth daily       ValACYclovir HCl (VALTREX PO) Take 500 mg by mouth as needed       hydroxychloroquine (PLAQUENIL) 200 MG tablet Take 2 tablets (400 mg) by mouth daily 90 tablet 6            Physical Exam:   Blood pressure 104/67, pulse 68, height 1.676 m (5' 6\"), weight 69.4 kg (153 lb 1.6 oz), SpO2 98 %.  Wt Readings from Last 4 Encounters:   06/28/17 69.4 kg (153 lb 1.6 oz)   02/08/17 65.3 kg (144 lb)   10/26/16 66.9 kg (147 lb 6.4 oz)   03/27/14 65.1 kg (143 lb 8.3 oz) (80 %)*     * Growth percentiles are based on CDC 2-20 Years data.     BP Readings from Last 3 Encounters:   06/28/17 104/67   02/08/17 104/64   01/22/17 103/56       Constitutional: well-developed, appearing stated age, cooperative  Eyes: PERRLA, normal conjunctiva, sclera  ENT: nl external ears, nose, lips. No mucous " membrane lesions, normal saliva pool  Neck: no cervical or supraclavicular lymphadenopathy  Resp: CTAB  CV: RRR, no m/r/g, no LE edema  GI: soft, NT/ND, no HSM  : not tested  Lymph: no cervical, supraclavicular or epitrochlear nodes  MS: no active synovitis or joint tenderness  Skin: no skin rash  Psych: nl affect.  Neuro: CN II-XII grossly intact, moving all extremities equally, normal gait. Strength BL deltoid 5/5, BL tricep 5/5, BL bicep 5/5, BL hip flexors 5/5         Data:     Component      Latest Ref Rng & Units 6/6/2017   WBC      4.0 - 11.0 10e9/L 9.2   RBC Count      3.8 - 5.2 10e12/L 3.58 (L)   Hemoglobin      11.7 - 15.7 g/dL 11.4 (L)   Hematocrit      35.0 - 47.0 % 33.6 (L)   MCV      78 - 100 fl 94   MCH      26.5 - 33.0 pg 31.8   MCHC      31.5 - 36.5 g/dL 33.9   RDW      10.0 - 15.0 % 12.5   Platelet Count      150 - 450 10e9/L 290   Diff Method       Automated Method   % Neutrophils      % 77.8   % Lymphocytes      % 14.9   % Monocytes      % 6.7   % Eosinophils      % 0.5   % Basophils      % 0.1   Absolute Neutrophil      1.6 - 8.3 10e9/L 7.1   Absolute Lymphocytes      0.8 - 5.3 10e9/L 1.4   Absolute Monocytes      0.0 - 1.3 10e9/L 0.6   Absolute Eosinophils      0.0 - 0.7 10e9/L 0.1   Absolute Basophils      0.0 - 0.2 10e9/L 0.0   Color Urine       Yellow   Appearance Urine       Clear   Glucose Urine      NEG mg/dL Negative   Bilirubin Urine      NEG Negative   Ketones Urine      NEG mg/dL Negative   Specific Gravity Urine      1.003 - 1.035 1.020   Blood Urine      NEG Negative   pH Urine      5.0 - 7.0 pH 5.5   Protein Albumin Urine      NEG mg/dL Negative   Urobilinogen Urine      0.2 - 1.0 EU/dL 0.2   Nitrite Urine      NEG Negative   Leukocyte Esterase Urine      NEG Negative   Source       Midstream Urine   Creatinine      0.52 - 1.04 mg/dL 0.50 (L)   GFR Estimate      >60 mL/min/1.7m2 >90 . . .   GFR Estimate If Black      >60 mL/min/1.7m2 >90 . . .   Protein Random Urine      g/L 0.14    Protein Total Urine g/gr Creatinine      0 - 0.2 g/g Cr 0.12   Albumin      3.4 - 5.0 g/dL 2.9 (L)   ALT      0 - 50 U/L 16   AST      0 - 45 U/L 18   Complement C3      76 - 169 mg/dL 153   Complement C4      15 - 50 mg/dL 29   CRP Inflammation      0.0 - 8.0 mg/L 3.2   Sed Rate      0 - 20 mm/h 47 (H)   DNA-ds      <10 IU/mL 2   Creatinine Urine Random      mg/dL 130   Uric Acid      2.6 - 6.0 mg/dL 3.1   Complement CH50 Total       38 (L)     Results for orders placed or performed in visit on 06/27/17   Erythrocyte sedimentation rate auto   Result Value Ref Range    Sed Rate 37 (H) 0 - 20 mm/h   CBC with platelets differential   Result Value Ref Range    WBC 10.2 4.0 - 11.0 10e9/L    RBC Count 3.92 3.8 - 5.2 10e12/L    Hemoglobin 12.4 11.7 - 15.7 g/dL    Hematocrit 36.0 35.0 - 47.0 %    MCV 92 78 - 100 fl    MCH 31.6 26.5 - 33.0 pg    MCHC 34.4 31.5 - 36.5 g/dL    RDW 12.2 10.0 - 15.0 %    Platelet Count 422 150 - 450 10e9/L    Diff Method Automated Method     % Neutrophils 72.6 %    % Lymphocytes 18.7 %    % Monocytes 7.5 %    % Eosinophils 1.0 %    % Basophils 0.2 %    Absolute Neutrophil 7.4 1.6 - 8.3 10e9/L    Absolute Lymphocytes 1.9 0.8 - 5.3 10e9/L    Absolute Monocytes 0.8 0.0 - 1.3 10e9/L    Absolute Eosinophils 0.1 0.0 - 0.7 10e9/L    Absolute Basophils 0.0 0.0 - 0.2 10e9/L   UA reflex to Microscopic   Result Value Ref Range    Color Urine Yellow     Appearance Urine Clear     Glucose Urine Negative NEG mg/dL    Bilirubin Urine Negative NEG    Ketones Urine Negative NEG mg/dL    Specific Gravity Urine 1.010 1.003 - 1.035    Blood Urine Small (A) NEG    pH Urine 7.0 5.0 - 7.0 pH    Protein Albumin Urine Negative NEG mg/dL    Urobilinogen Urine 0.2 0.2 - 1.0 EU/dL    Nitrite Urine Negative NEG    Leukocyte Esterase Urine Small (A) NEG    Source Midstream Urine    Urine Microscopic   Result Value Ref Range    WBC Urine 2-5 (A) 0 - 2 /HPF    RBC Urine O - 2 0 - 2 /HPF     US OB 10/25/2016:  1. Gestational  sac present measuring 5 weeks and 6 days..  2. No adnexal masses are seen.    [facility]:    Positive/abnormal:  AWILDA 1/2013 1:160 speckled, repeat AWILDA neg 10/24/2016  RNP 48.7  CBC - WBC 3.7 w/ normal diff  ESR 45 (4/2013), no repeat    Negative/normal:  Bustos (reportedly initially +)  SSA, SSB  ANCA  Lupus anticoagulant screen abnormal, but confirmatory testing negative  Cardiolipin IgA/G/M neg  Beta 2 GP1 Ab IgA/G/M neg  C3/C4 (never abnormal)  Cr - 0.68, lytes normal   CK  HCV  HIV  HLA-B27  LFT's normal  Quant gold negative  RF negative x2  Anti-CCP negative x2  TSH   RPR  Prior Lyme  TTG  Ds-DNA neg x6 (reportedly initially +)  UA - trace LE, otherwise negative for protein/blood/WBC  Hgb, plts  CRP < 2.9

## 2017-06-28 NOTE — MR AVS SNAPSHOT
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  Call member services at your health plan with any benefit or coverage questions.      Please bring the following with you to your appointment:    (1) Any X-Rays, CTs or MRIs which have been performed.  Contact the facility where they were done to arrange for  prior to your scheduled appointment.    (2) List of current medications  (3) This referral request   (4) Any documents/labs given to you for this referral                  Your next 10 appointments already scheduled     Jun 28, 2017  1:45 PM CDT   US RENAL COMPLETE with [email or web address]   [facility] ([facility])    [address]  1st Floor  [facility] [address] [phone]           Please bring a list of your medicines (including vitamins, minerals and over-the-counter drugs). Also, tell your doctor about any allergies you may have.  "Wear comfortable clothes and leave your valuables at home.  You do not need to do anything special to prepare for your exam.  Please call the Imaging Department at your exam site with any questions.            Jul 26, 2017 12:00 PM CDT   (Arrive by 11:45 AM)   RETURN LUPUS with Chente Reina MD   [facility] ([facility])    [address] [id number] [phone]            Dec 06, 2017 10:00 AM CST   (Arrive by 9:45 AM)   RETURN LUPUS with Chente Reina MD   [facility] ([facility])    [address] [address] [phone]              Future tests that were ordered for you today     Open Future Orders        Priority Expected Expires Ordered    US Renal Complete Today  6/28/2018 6/28/2017            Who to contact     If you have questions or need follow up information about today's clinic visit or your schedule please contact [facility] directly at [phone].  Normal or non-critical lab and imaging results will be communicated to you by DDRdrivehart, letter or phone within 4 business days after the clinic has received the results. If you do not hear from us within 7 days, please contact the clinic through JumpPostt or phone. If you have a critical or abnormal lab result, we will notify you by phone as soon as possible.  Submit refill requests through Spark E[employer] or call your pharmacy and they will forward the refill request to us. Please allow 3 business days for your refill to be completed.          Additional Information About Your Visit        Spark Etail Information     [employer] lets you send messages to your doctor, view your test results, renew your prescriptions, schedule appointments and more. To sign up, go to [email or web address] . Click on \"Log in\" on the left side of the screen, which will take you to the Welcome page. Then click on \"Sign up Now\" on the " "right side of the page.     You will be asked to enter the access code listed below, as well as some personal information. Please follow the directions to create your username and password.     Your access code is: [email or web address]  Expires: 2017  6:30 AM     Your access code will  in 90 days. If you need help or a new code, please call your [facility] or [phone].        Care EveryWhere ID     This is your Care EveryWhere ID. This could be used by other organizations to access your [facility] medical records  [id number]        Your Vitals Were     Pulse Height Pulse Oximetry BMI (Body Mass Index)          68 1.676 m (5' 6\") 98% 24.71 kg/m2         Blood Pressure from Last 3 Encounters:   17 104/67   17 104/64   17 103/56    Weight from Last 3 Encounters:   17 69.4 kg (153 lb 1.6 oz)   17 65.3 kg (144 lb)   10/26/16 66.9 kg (147 lb 6.4 oz)              We Performed the Following     OPHTHALMOLOGY ADULT REFERRAL          Where to get your medicines      These medications were sent to [facility]/pharmacy #[id number] - [place], [place] -  [facility]   [facility], [facility] [address]     Phone:  [phone]     hydroxychloroquine 200 MG tablet    predniSONE 5 MG tablet          Primary Care Provider Office Phone # Fax #    Una Canales [phone]       [facility] [address] [address]        Equal Access to Services     [facility] AH: Hadii aad ku hadasho Soomaali, waaxda luqadaha, qaybta kaalmada kiesha avila . So [facility] [phone].    ATENCIÓN: Si habla español, tiene a napoles disposición servicios gratuitos de asistencia lingüística. Llame al [phone].    We comply with applicable federal civil rights laws and [place] laws. We do not discriminate on the basis of race, color, national origin, age, disability sex, sexual orientation or gender identity.            Thank you!     Thank " you for choosing [facility] R[facility]  for your care. Our goal is always to provide you with excellent care. Hearing back from our patients is one way we can continue to improve our services. Please take a few minutes to complete the written survey that you may receive in the mail after your visit with us. Thank you!             Your Updated Medication List - Protect others around you: Learn how to safely use, store and throw away your medicines at www.disposemymeds.org.          This list is accurate as of: 6/28/17  1:32 PM.  Always use your most recent med list.                   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    Systemic lupus erythematosus, unspecified SLE type, unspecified organ involvement status (H)       prenatal multivitamin  plus iron 27-0.8 MG Tabs per tablet      Take 1 tablet by mouth daily        VALTREX PO      Take 500 mg by mouth as needed

## 2017-06-29 LAB
C3 SERPL-MCNC: 154 MG/DL (ref 76–169)
C4 SERPL-MCNC: 39 MG/DL (ref 15–50)
DSDNA AB SER-ACNC: 1 IU/ML

## 2017-06-30 LAB — CH50 SERPL-ACNC: 74

## 2017-07-03 ENCOUNTER — TELEPHONE (OUTPATIENT)
Dept: RHEUMATOLOGY | Facility: CLINIC | Age: 22
End: 2017-07-03

## 2017-07-03 NOTE — TELEPHONE ENCOUNTER
Chente Reina MD at 6/28/2017  4:18 PM      Status: Signed            Ultrasound is OK, you might see your OB/GYN if your left flank pain continues.        Called and spoke with pt regarding note from , pt had no questions.    Flavia Mayfield RN  Rheumatology Clinic

## 2017-07-03 NOTE — PROGRESS NOTES
CRP (inflammation marker) is slightly increased but other inflammation marker (ESR) is improved. Lupus markers are normal including CH50 which is back to normal (good news).

## 2017-07-06 NOTE — PROGRESS NOTES
CH50 (complement) was low and ESR (inflammation marker) was high on these labs correlated with lupus flare but both improved on repeat testing.

## 2017-09-13 ENCOUNTER — HOSPITAL ENCOUNTER (EMERGENCY)
Facility: CLINIC | Age: 22
Discharge: HOME | End: 2017-09-14
Attending: EMERGENCY MEDICINE | Admitting: EMERGENCY MEDICINE
Payer: COMMERCIAL

## 2017-09-13 DIAGNOSIS — R10.13 ABDOMINAL PAIN, EPIGASTRIC: Primary | ICD-10-CM

## 2017-09-13 LAB
ALBUMIN SERPL-MCNC: 4.1 G/DL (ref 3.4–5)
ALBUMIN UR-MCNC: NEGATIVE MG/DL
ALP SERPL-CCNC: 80 U/L (ref 40–150)
ALT SERPL W P-5'-P-CCNC: 61 U/L (ref 0–50)
ANION GAP SERPL CALCULATED.3IONS-SCNC: 8 MMOL/L (ref 3–14)
APPEARANCE UR: ABNORMAL
AST SERPL W P-5'-P-CCNC: 34 U/L (ref 0–45)
B-HCG SERPL-ACNC: <1 IU/L (ref 0–5)
BASOPHILS # BLD AUTO: 0 10E9/L (ref 0–0.2)
BASOPHILS NFR BLD AUTO: 0.4 %
BILIRUB SERPL-MCNC: 0.5 MG/DL (ref 0.2–1.3)
BILIRUB UR QL STRIP: NEGATIVE
BUN SERPL-MCNC: 15 MG/DL (ref 7–30)
CALCIUM SERPL-MCNC: 9.1 MG/DL (ref 8.5–10.1)
CHLORIDE SERPL-SCNC: 107 MMOL/L (ref 94–109)
CO2 SERPL-SCNC: 25 MMOL/L (ref 20–32)
COLOR UR AUTO: YELLOW
CREAT SERPL-MCNC: 0.63 MG/DL (ref 0.52–1.04)
DIFFERENTIAL METHOD BLD: NORMAL
EOSINOPHIL # BLD AUTO: 0.2 10E9/L (ref 0–0.7)
EOSINOPHIL NFR BLD AUTO: 2.8 %
ERYTHROCYTE [DISTWIDTH] IN BLOOD BY AUTOMATED COUNT: 12.5 % (ref 10–15)
GFR SERPL CREATININE-BSD FRML MDRD: >90 ML/MIN/1.7M2
GLUCOSE SERPL-MCNC: 94 MG/DL (ref 70–99)
GLUCOSE UR STRIP-MCNC: NEGATIVE MG/DL
HCG UR QL: NEGATIVE
HCT VFR BLD AUTO: 37.6 % (ref 35–47)
HGB BLD-MCNC: 13.2 G/DL (ref 11.7–15.7)
HGB UR QL STRIP: NEGATIVE
IMM GRANULOCYTES # BLD: 0 10E9/L (ref 0–0.4)
IMM GRANULOCYTES NFR BLD: 0.4 %
KETONES UR STRIP-MCNC: NEGATIVE MG/DL
LEUKOCYTE ESTERASE UR QL STRIP: ABNORMAL
LIPASE SERPL-CCNC: 126 U/L (ref 73–393)
LYMPHOCYTES # BLD AUTO: 1.7 10E9/L (ref 0.8–5.3)
LYMPHOCYTES NFR BLD AUTO: 30.3 %
MCH RBC QN AUTO: 30.1 PG (ref 26.5–33)
MCHC RBC AUTO-ENTMCNC: 35.1 G/DL (ref 31.5–36.5)
MCV RBC AUTO: 86 FL (ref 78–100)
MONOCYTES # BLD AUTO: 0.5 10E9/L (ref 0–1.3)
MONOCYTES NFR BLD AUTO: 9.2 %
MUCOUS THREADS #/AREA URNS LPF: PRESENT /LPF
NEUTROPHILS # BLD AUTO: 3.2 10E9/L (ref 1.6–8.3)
NEUTROPHILS NFR BLD AUTO: 56.9 %
NITRATE UR QL: NEGATIVE
NRBC # BLD AUTO: 0 10*3/UL
NRBC BLD AUTO-RTO: 0 /100
PH UR STRIP: 5 PH (ref 5–7)
PLATELET # BLD AUTO: 301 10E9/L (ref 150–450)
POTASSIUM SERPL-SCNC: 3.7 MMOL/L (ref 3.4–5.3)
PROT SERPL-MCNC: 7.3 G/DL (ref 6.8–8.8)
RBC # BLD AUTO: 4.39 10E12/L (ref 3.8–5.2)
RBC #/AREA URNS AUTO: 2 /HPF (ref 0–2)
SODIUM SERPL-SCNC: 140 MMOL/L (ref 133–144)
SOURCE: ABNORMAL
SP GR UR STRIP: 1.02 (ref 1–1.03)
SPECIMEN SOURCE: NORMAL
SQUAMOUS #/AREA URNS AUTO: 1 /HPF (ref 0–1)
UROBILINOGEN UR STRIP-MCNC: 0 MG/DL (ref 0–2)
WBC # BLD AUTO: 5.7 10E9/L (ref 4–11)
WBC #/AREA URNS AUTO: 31 /HPF (ref 0–2)
WET PREP SPEC: NORMAL

## 2017-09-13 PROCEDURE — 87591 N.GONORRHOEAE DNA AMP PROB: CPT | Performed by: EMERGENCY MEDICINE

## 2017-09-13 PROCEDURE — 96375 TX/PRO/DX INJ NEW DRUG ADDON: CPT

## 2017-09-13 PROCEDURE — 84702 CHORIONIC GONADOTROPIN TEST: CPT | Performed by: EMERGENCY MEDICINE

## 2017-09-13 PROCEDURE — 25000128 H RX IP 250 OP 636: Performed by: EMERGENCY MEDICINE

## 2017-09-13 PROCEDURE — 96361 HYDRATE IV INFUSION ADD-ON: CPT

## 2017-09-13 PROCEDURE — 80053 COMPREHEN METABOLIC PANEL: CPT | Performed by: EMERGENCY MEDICINE

## 2017-09-13 PROCEDURE — 83690 ASSAY OF LIPASE: CPT | Performed by: EMERGENCY MEDICINE

## 2017-09-13 PROCEDURE — 87210 SMEAR WET MOUNT SALINE/INK: CPT | Performed by: EMERGENCY MEDICINE

## 2017-09-13 PROCEDURE — 96374 THER/PROPH/DIAG INJ IV PUSH: CPT

## 2017-09-13 PROCEDURE — 85025 COMPLETE CBC W/AUTO DIFF WBC: CPT | Performed by: EMERGENCY MEDICINE

## 2017-09-13 PROCEDURE — 87491 CHLMYD TRACH DNA AMP PROBE: CPT | Performed by: EMERGENCY MEDICINE

## 2017-09-13 PROCEDURE — 81025 URINE PREGNANCY TEST: CPT | Performed by: EMERGENCY MEDICINE

## 2017-09-13 PROCEDURE — 99284 EMERGENCY DEPT VISIT MOD MDM: CPT | Mod: 25

## 2017-09-13 PROCEDURE — 81001 URINALYSIS AUTO W/SCOPE: CPT | Performed by: EMERGENCY MEDICINE

## 2017-09-13 RX ORDER — METOCLOPRAMIDE HYDROCHLORIDE 5 MG/ML
10 INJECTION INTRAMUSCULAR; INTRAVENOUS ONCE
Status: COMPLETED | OUTPATIENT
Start: 2017-09-13 | End: 2017-09-13

## 2017-09-13 RX ORDER — DIPHENHYDRAMINE HYDROCHLORIDE 50 MG/ML
25 INJECTION INTRAMUSCULAR; INTRAVENOUS ONCE
Status: COMPLETED | OUTPATIENT
Start: 2017-09-13 | End: 2017-09-13

## 2017-09-13 RX ADMIN — DIPHENHYDRAMINE HYDROCHLORIDE 25 MG: 50 INJECTION, SOLUTION INTRAMUSCULAR; INTRAVENOUS at 21:23

## 2017-09-13 RX ADMIN — METOCLOPRAMIDE 10 MG: 5 INJECTION, SOLUTION INTRAMUSCULAR; INTRAVENOUS at 21:26

## 2017-09-13 RX ADMIN — SODIUM CHLORIDE 1000 ML: 9 INJECTION, SOLUTION INTRAVENOUS at 21:23

## 2017-09-13 ASSESSMENT — ENCOUNTER SYMPTOMS
VOMITING: 1
ABDOMINAL PAIN: 1
DIARRHEA: 0
BACK PAIN: 1
NECK PAIN: 0
CHILLS: 0
FREQUENCY: 0
DYSURIA: 0
HEMATURIA: 0
DIAPHORESIS: 0
CONSTIPATION: 0
FLANK PAIN: 0
DIFFICULTY URINATING: 0
FEVER: 1
NAUSEA: 1

## 2017-09-13 NOTE — ED AVS SNAPSHOT
[facility] Emergency Department    [address]    [facility] [address]    Phone:  [phone]    Fax:  [phone]                                       Monica Puckett   MRN: [id number]    Department:  [facility] Emergency Department   Date of Visit:  9/13/2017           Patient Information     Date Of Birth          1995        Your diagnoses for this visit were:     Abdominal pain, epigastric        You were seen by Paul Francois MD.      Follow-up Information     Follow up with [facility] Emergency Department.    Specialty:  EMERGENCY MEDICINE    Why:  If symptoms worsen    Contact information:    [address]  [facility] [id number] [phone]        Follow up with Una Canales MD. Schedule an appointment as soon as possible for a visit in 1 day.    Specialty:  Family Practice    Why:  For abdominal pain recheck    Contact information:    [facility]  [address]  [address] [facility] [address]  [phone]          Discharge Instructions       Discharge Instructions  Abdominal Pain    Abdominal pain (belly pain) can be caused by many things. Your evaluation today does not show the exact cause for your pain. Your provider today has decided that it is unlikely your pain is due to a life threatening problem, or a problem requiring surgery or hospital admission. Sometimes those problems cannot be found right away, so it is very important that you follow up as directed.  Sometimes only the changes which occur over time allow the cause of your pain to be found.    Generally, every Emergency Department visit should have a follow-up clinic visit with either a primary or a specialty clinic/provider. Please follow-up as instructed by your emergency provider today. With abdominal pain, we often recommend very close follow-up, such as the following day.    ADULTS:  Return to the Emergency Department right away if:      You get an  oral temperature above 102oF or as directed by your provider.    You have blood in your stools. This may be bright red or appear as black, tarry stools.      You keep vomiting (throwing up) or cannot drink liquids.    You see blood when you vomit.     You cannot have a bowel movement or you cannot pass gas.    Your stomach gets bloated or bigger.    Your skin or the whites of your eyes look yellow.    You faint.    You have bloody, frequent or painful urination (peeing).    You have new symptoms or anything that worries you.    CHILDREN:  Return to the Emergency Department right away if your child has any of the above-listed symptoms or the following:      Pushes your hand away or screams/cries when his/her belly is touched.    You notice your child is very fussy or weak.    Your child is very tired and is too tired to eat or drink.    Your child is dehydrated.  Signs of dehydration can be:  o Significant change in the amount of wet diapers/urine.  o Your infant or child starts to have dry mouth and lips, or no saliva (spit) or tears.    PREGNANT WOMEN:  Return to the Emergency Department right away if you have any of the above-listed symptoms or the following:      You have bleeding, leaking fluid or passing tissue from the vagina.    You have worse pain or cramping, or pain in your shoulder or back.    You have vomiting that will not stop.    You have a temperature of 100oF or more.    Your baby is not moving as much as usual.    You faint.    You get a bad headache with or without eye problems and abdominal pain.    You have a seizure.    You have unusual discharge from your vagina and abdominal pain.    Abdominal pain is pretty common during pregnancy.  Your pain may or may not be related to your pregnancy. You should follow-up closely with your OB provider so they can evaluate you and your baby.  Until you follow-up with your regular provider, do the following:       Avoid sex and do not put anything in your  "vagina.    Drink clear fluids.    Only take medications approved by your provider.    MORE INFORMATION:    Appendicitis:  A possible cause of abdominal pain in any person who still has their appendix is acute appendicitis. Appendicitis is often hard to diagnose.  Testing does not always rule out early appendicitis or other causes of abdominal pain. Close follow-up with your provider and re-evaluations may be needed to figure out the reason for your abdominal pain.    Follow-up:  It is very important that you make an appointment with your clinic and go to the appointment.  If you do not follow-up with your primary provider, it may result in missing an important development which could result in permanent injury or disability and/or lasting pain.  If there is any problem keeping your appointment, call your provider or return to the Emergency Department.    Medications:  Take your medications as directed by your provider today.  Before using over-the-counter medications, ask your provider and make sure to take the medications as directed.  If you have any questions about medications, ask your provider.    Diet:  Resume your normal diet as much as possible, but do not eat fried, fatty or spicy foods while you have pain.  Do not drink alcohol or have caffeine.  Do not smoke tobacco.    Probiotics: If you have been given an antibiotic, you may want to also take a probiotic pill or eat yogurt with live cultures. Probiotics have \"good bacteria\" to help your intestines stay healthy. Studies have shown that probiotics help prevent diarrhea (loose stools) and other intestine problems (including C. diff infection) when you take antibiotics. You can buy these without a prescription in the pharmacy section of the store.     If you were given a prescription for medicine here today, be sure to read all of the information (including the package insert) that comes with your prescription.  This will include important information about " the medicine, its side effects, and any warnings that you need to know about.  The pharmacist who fills the prescription can provide more information and answer questions you may have about the medicine.  If you have questions or concerns that the pharmacist cannot address, please call or return to the Emergency Department.       Remember that you can always come back to the Emergency Department if you are not able to see your regular provider in the amount of time listed above, if you get any new symptoms, or if there is anything that worries you.  Discharge Instructions  Urinary Tract Infection  You or your child have been diagnosed with a urinary tract infection, or UTI. The urinary tract includes the kidneys (which make urine/pee), ureters (the tubes that carry urine/pee from the kidneys to the bladder), the bladder (which stores urine/pee), and urethra (the tube that carries urine/pee out of the bladder). Urinary tract infections occur when bacteria travel up the urethra into the bladder (bladder infection) and, in some cases, from there into the kidneys (kidney infection).  Generally, every Emergency Department visit should have a follow-up clinic visit with either a primary or a specialty clinic/provider. Please follow-up as instructed by your emergency provider today.  Return to the Emergency Department if:    You or your child have severe back pain.    You or your child are vomiting (throwing up) so that you cannot take your medicine.    You or your child have a new fever (had not previously had a fever) over 101 F.    You or your child have confusion or are very weak, or feel very ill.    Your child seems much more ill, will not wake up, will not respond right, or is crying for a long time and will not calm down.    You or your child are showing signs of dehydration. These signs may include decreased urination (pee), dry mouth/gums/tongue, or decreased activity.    Follow-up with your provider:      Children under 24 months need to be seen by their regular provider within one week after a diagnosis of a UTI. It may be necessary to do some more tests to look at the child s kidney or bladder.    You should begin to feel better within 24 - 48 hours of starting your antibiotic; follow-up with your regular clinic/doctor/provider if this is not the case.    Treatment:     You will be treated with an antibiotic to kill the bacteria. We have to make an educated guess, based on what we know about common bacteria and antibiotics, as to which antibiotic will work for your infection. We will be correct most times but there will be some cases where the antibiotic chosen is not correct (see urine cultures below).    Take a pain medication such as acetaminophen (Tylenol ) or ibuprofen (Advil , Motrin , Nuprin ).    Phenazopyridine (Pyridium , Uristat ) is a prescription medication that numbs the bladder to reduce the burning pain of some UTIs.  The same medication is available in a non-prescription version (Azo-Standard , Urodol ). This medication will change the color of the urine and tears (usually blue or orange). If you wear contacts, do not wear them while taking this medication as they may be stained by the medication.    Urine Cultures:    If indicated, a urine culture may have been performed today. This test generally takes 24-48 hours to complete so the results are not known at this time. The results can confirm that an infection is present but also determine which antibiotic is effective for the specific bacteria that is causing the infection. If your urine culture shows that the antibiotic you were given today will not work to treat your infection, we will attempt to contact you to make arrangements to change the antibiotic. If the culture confirms that the antibiotic is effective for your infection, you will not be contacted. We often recommend follow-up with your regular physician/provider on the culture  "results regardless of this process.    Antibiotic Warning:     If you have been placed on antibiotics - watch for signs of allergic reaction.  These include rash, lip swelling, difficulty breathing, wheezing, and dizziness.  If you develop any of these symptoms, stop the antibiotic immediately and go to an emergency room or urgent care for evaluation.    Probiotics: If you have been given an antibiotic, you may want to also take a probiotic pill or eat yogurt with live cultures. Probiotics have \"good bacteria\" to help your intestines stay healthy. Studies have shown that probiotics help prevent diarrhea and other intestine problems (including C. diff infection) when you take antibiotics. You can buy these without a prescription in the pharmacy section of the store.   If you were given a prescription for medicine here today, be sure to read all of the information (including the package insert) that comes with your prescription.  This will include important information about the medicine, its side effects, and any warnings that you need to know about.  The pharmacist who fills the prescription can provide more information and answer questions you may have about the medicine.  If you have questions or concerns that the pharmacist cannot address, please call or return to the Emergency Department.   Remember that you can always come back to the Emergency Department if you are not able to see your regular provider in the amount of time listed above, if you get any new symptoms, or if there is anything that worries you.      Future Appointments        Provider Department Dept Phone Center    12/6/2017 10:00 AM Chente Reina MD [facility] Rheumatology [phone] [facility]      24 Hour Appointment Hotline       To make an appointment at any [facility], call [phone] ([phone]). If you don't have a family doctor or clinic, we will help you find one. [facility] are conveniently located to serve the needs " of you and your family.             Review of your medicines      START taking        Dose / Directions Last dose taken    nitroFURantoin (macrocrystal-monohydrate) 100 MG capsule   Commonly known as:  MACROBID   Dose:  100 mg   Quantity:  6 capsule        Take 1 capsule (100 mg) by mouth 2 times daily   Refills:  0        ondansetron 4 MG ODT tab   Commonly known as:  ZOFRAN ODT   Dose:  4 mg   Quantity:  10 tablet        Take 1 tablet (4 mg) by mouth every 8 hours as needed for nausea   Refills:  0          Our records show that you are taking the medicines listed below. If these are incorrect, please call your family doctor or clinic.        Dose / Directions Last dose taken    doxylamine 25 MG Tabs tablet   Commonly known as:  UNISOM   Dose:  25 mg        Take 25 mg by mouth nightly as needed   Refills:  0        FISH OIL PO   Dose:  1 tablet        Take 1 tablet by mouth daily Fish oil with DHA   Refills:  0        hydroxychloroquine 200 MG tablet   Commonly known as:  PLAQUENIL   Dose:  400 mg   Quantity:  180 tablet        Take 2 tablets (400 mg) by mouth daily   Refills:  0        predniSONE 5 MG tablet   Commonly known as:  DELTASONE   Quantity:  50 tablet        4tab=20 mg qd x 5 days, 3tab=15 mg qd x 5 days, 2tab=10 mg qd x 5 days, 1 tab=5 mg qd x 5 days then stop.   Refills:  0        prenatal multivitamin plus iron 27-0.8 MG Tabs per tablet   Dose:  1 tablet        Take 1 tablet by mouth daily   Refills:  0        VALTREX PO   Dose:  500 mg        Take 500 mg by mouth as needed   Refills:  0                Prescriptions were sent or printed at these locations (2 Prescriptions)                   Other Prescriptions                Printed at Department/Unit printer (2 of 2)         nitroFURantoin, macrocrystal-monohydrate, (MACROBID) 100 MG capsule               ondansetron (ZOFRAN ODT) 4 MG ODT tab                Procedures and tests performed during your visit     CBC with platelets differential     Chlamydia trachomatis PCR    Comprehensive metabolic panel    HCG qualitative urine    HCG quantitative pregnancy    Lipase    Neisseria gonorrhoeae PCR    UA with Microscopic    US Abdomen Limited    Wet prep      Orders Needing Specimen Collection     None      Pending Results     Date and Time Order Name Status Description    9/13/2017 2117 Chlamydia trachomatis PCR In process     9/13/2017 2117 Neisseria gonorrhoeae PCR In process     9/13/2017 2117 US Abdomen Limited Preliminary             Pending Culture Results     Date and Time Order Name Status Description    9/13/2017 2117 Chlamydia trachomatis PCR In process     9/13/2017 2117 Neisseria gonorrhoeae PCR In process             Pending Results Instructions     If you had any lab results that were not finalized at the time of your Discharge, you can call the ED Lab Result RN at [phone]. You will be contacted by this team for any positive Lab results or changes in treatment. The nurses are available 7 days a week from 10A to 6:30P.  You can leave a message 24 hours per day and they will return your call.        Test Results From Your Hospital Stay        9/13/2017  9:26 PM      Component Results     Component Value Ref Range & Units Status    WBC 5.7 4.0 - 11.0 10e9/L Final    RBC Count 4.39 3.8 - 5.2 10e12/L Final    Hemoglobin 13.2 11.7 - 15.7 g/dL Final    Hematocrit 37.6 35.0 - 47.0 % Final    MCV 86 78 - 100 fl Final    MCH 30.1 26.5 - 33.0 pg Final    MCHC 35.1 31.5 - 36.5 g/dL Final    RDW 12.5 10.0 - 15.0 % Final    Platelet Count 301 150 - 450 10e9/L Final    Diff Method Automated Method  Final    % Neutrophils 56.9 % Final    % Lymphocytes 30.3 % Final    % Monocytes 9.2 % Final    % Eosinophils 2.8 % Final    % Basophils 0.4 % Final    % Immature Granulocytes 0.4 % Final    Nucleated RBCs 0 0 /100 Final    Absolute Neutrophil 3.2 1.6 - 8.3 10e9/L Final    Absolute Lymphocytes 1.7 0.8 - 5.3 10e9/L Final    Absolute Monocytes 0.5 0.0 - 1.3  10e9/L Final    Absolute Eosinophils 0.2 0.0 - 0.7 10e9/L Final    Absolute Basophils 0.0 0.0 - 0.2 10e9/L Final    Abs Immature Granulocytes 0.0 0 - 0.4 10e9/L Final    Absolute Nucleated RBC 0.0  Final         9/13/2017  9:46 PM      Component Results     Component Value Ref Range & Units Status    Sodium 140 133 - 144 mmol/L Final    Potassium 3.7 3.4 - 5.3 mmol/L Final    Chloride 107 94 - 109 mmol/L Final    Carbon Dioxide 25 20 - 32 mmol/L Final    Anion Gap 8 3 - 14 mmol/L Final    Glucose 94 70 - 99 mg/dL Final    Urea Nitrogen 15 7 - 30 mg/dL Final    Creatinine 0.63 0.52 - 1.04 mg/dL Final    GFR Estimate >90 >60 mL/min/1.7m2 Final    Non  GFR Calc    GFR Estimate If Black >90 >60 mL/min/1.7m2 Final    African American GFR Calc    Calcium 9.1 8.5 - 10.1 mg/dL Final    Bilirubin Total 0.5 0.2 - 1.3 mg/dL Final    Albumin 4.1 3.4 - 5.0 g/dL Final    Protein Total 7.3 6.8 - 8.8 g/dL Final    Alkaline Phosphatase 80 40 - 150 U/L Final    ALT 61 (H) 0 - 50 U/L Final    AST 34 0 - 45 U/L Final         9/13/2017  9:46 PM      Component Results     Component Value Ref Range & Units Status    Lipase 126 73 - 393 U/L Final         9/13/2017  9:46 PM      Component Results     Component Value Ref Range & Units Status    HCG Quantitative Serum <1 0 - 5 IU/L Final         9/13/2017 10:35 PM      Component Results     Component Value Ref Range & Units Status    Color Urine Yellow  Final    Appearance Urine Slightly Cloudy  Final    Glucose Urine Negative NEG^Negative mg/dL Final    Bilirubin Urine Negative NEG^Negative Final    Ketones Urine Negative NEG^Negative mg/dL Final    Specific Gravity Urine 1.025 1.003 - 1.035 Final    Blood Urine Negative NEG^Negative Final    pH Urine 5.0 5.0 - 7.0 pH Final    Protein Albumin Urine Negative NEG^Negative mg/dL Final    Urobilinogen mg/dL 0.0 0.0 - 2.0 mg/dL Final    Nitrite Urine Negative NEG^Negative Final    Leukocyte Esterase Urine Trace (A) NEG^Negative  Final    Source Midstream Urine  Final    WBC Urine 31 (H) 0 - 2 /HPF Final    RBC Urine 2 0 - 2 /HPF Final    Squamous Epithelial /HPF Urine 1 0 - 1 /HPF Final    Mucous Urine Present (A) NEG^Negative /LPF Final         9/13/2017 10:36 PM      Component Results     Component Value Ref Range & Units Status    HCG Qual Urine Negative NEG^Negative Final    This test is for screening purposes.  Results should be interpreted along with   the clinical picture.  Confirmation testing is available if warranted by   ordering [id number], HCG Quantitative Pregnancy.           9/14/2017 12:35 AM      Narrative     US ABDOMEN LIMITED  9/14/2017 12:30 AM      HISTORY: Right upper quadrant pain.     COMPARISON: 6/28/2017.    FINDINGS: The liver is normal in size and texture without focal mass.  There is no intra or extrahepatic biliary dilatation. The common  hepatic duct measures 0.3 cm. The gallbladder is normal in appearance  without gallstones. The pancreas is completely obscured by bowel gas.  The right kidney measures 11.1 cm. There is mild dilatation of the  collecting system in the lower pole of the kidney as has been seen  previously. The proximal abdominal aorta and IVC appear normal.        Impression     IMPRESSION:  1. No gallstone or biliary dilatation.  2. Caliectasis in the lower pole of the right kidney as seen  previously.         9/13/2017 10:51 PM      Component Results     Component    Specimen Description    Vagina    Wet Prep    No clue cells seen    Wet Prep    No yeast seen    Wet Prep    No Trichomonas seen    Wet Prep    Many  PMNs seen           9/13/2017 10:46 PM         9/13/2017 10:46 PM                Clinical Quality Measure: Blood Pressure Screening     Your blood pressure was checked while you were in the emergency department today. The last reading we obtained was  BP: 104/45 . Please read the guidelines below about what these numbers mean and what you should do about them.  If your systolic blood  "pressure (the top number) is less than 120 and your diastolic blood pressure (the bottom number) is less than 80, then your blood pressure is normal. There is nothing more that you need to do about it.  If your systolic blood pressure (the top number) is 120-139 or your diastolic blood pressure (the bottom number) is 80-89, your blood pressure may be higher than it should be. You should have your blood pressure rechecked within a year by a primary care provider.  If your systolic blood pressure (the top number) is 140 or greater or your diastolic blood pressure (the bottom number) is 90 or greater, you may have high blood pressure. High blood pressure is treatable, but if left untreated over time it can put you at risk for heart attack, stroke, or kidney failure. You should have your blood pressure rechecked by a primary care provider within the next 4 weeks.  If your provider in the emergency department today gave you specific instructions to follow-up with your doctor or provider even sooner than that, you should follow that instruction and not wait for up to 4 weeks for your follow-up visit.        Thank you for choosing [facility]       Thank you for choosing [facility] for your care. Our goal is always to provide you with excellent care. Hearing back from our patients is one way we can continue to improve our services. Please take a few minutes to complete the written survey that you may receive in the mail after you visit with us. Thank you!        [employer] Information     [employer] lets you send messages to your doctor, view your test results, renew your prescriptions, schedule appointments and more. To sign up, go to [email or web address] . Click on \"Log in\" on the left side of the screen, which will take you to the Welcome page. Then click on \"Sign up Now\" on the right side of the page.     You will be asked to enter the access code listed below, as well as some personal information. Please follow the " directions to create your username and password.     Your access code is: [email or web address]  Expires: 2017 12:46 AM     Your access code will  in 90 days. If you need help or a new code, please call your [place] clinic or [phone].        Care EveryWhere ID     This is your Care EveryWhere ID. This could be used by other organizations to access your [place] medical records  [id number]        Equal Access to Services     [facility] : Hadii cheryle swann hadasho Soericali, waaxda luqadaha, qaybta kaalmada adeegyada, kiesha molina . So [facility] [phone].    ATENCIÓN: Si habla español, tiene a napoles disposición servicios gratuitos de asistencia lingüística. Llame al [phone].    We comply with applicable federal civil rights laws and [place] laws. We do not discriminate on the basis of race, color, national origin, age, disability sex, sexual orientation or gender identity.            After Visit Summary       This is your record. Keep this with you and show to your community pharmacist(s) and doctor(s) at your next visit.

## 2017-09-13 NOTE — LETTER
To Whom it may concern:      Monica Puckett was seen in our Emergency Department today, 09/14/17.  I expect her condition to improve over the next 2 days.  she may return to work 9/15/2017.    Sincerely,        Paul Francois MD

## 2017-09-13 NOTE — ED AVS SNAPSHOT
[facility] Emergency Department    [address]    [facility] [address]    Phone:  [phone]    Fax:  [phone]                                       Monica Puckett   MRN: [id number]    Department:  [facility] Emergency Department   Date of Visit:  9/13/2017           After Visit Summary Signature Page     I have received my discharge instructions, and my questions have been answered. I have discussed any challenges I see with this plan with the nurse or doctor.    ..........................................................................................................................................  Patient/Patient Representative Signature      ..........................................................................................................................................  Patient Representative Print Name and Relationship to Patient    ..................................................               ................................................  Date                                            Time    ..........................................................................................................................................  Reviewed by Signature/Title    ...................................................              ..............................................  Date                                                            Time

## 2017-09-14 ENCOUNTER — APPOINTMENT (OUTPATIENT)
Dept: ULTRASOUND IMAGING | Facility: CLINIC | Age: 22
End: 2017-09-14
Attending: EMERGENCY MEDICINE
Payer: COMMERCIAL

## 2017-09-14 VITALS
RESPIRATION RATE: 18 BRPM | DIASTOLIC BLOOD PRESSURE: 45 MMHG | SYSTOLIC BLOOD PRESSURE: 104 MMHG | WEIGHT: 145 LBS | OXYGEN SATURATION: 97 % | BODY MASS INDEX: 23.3 KG/M2 | HEART RATE: 64 BPM | TEMPERATURE: 97 F | HEIGHT: 66 IN

## 2017-09-14 LAB
C TRACH DNA SPEC QL NAA+PROBE: NEGATIVE
N GONORRHOEA DNA SPEC QL NAA+PROBE: NEGATIVE
SPECIMEN SOURCE: NORMAL
SPECIMEN SOURCE: NORMAL

## 2017-09-14 PROCEDURE — 76705 ECHO EXAM OF ABDOMEN: CPT

## 2017-09-14 RX ORDER — NITROFURANTOIN 25; 75 MG/1; MG/1
100 CAPSULE ORAL 2 TIMES DAILY
Qty: 6 CAPSULE | Refills: 0 | Status: SHIPPED | OUTPATIENT
Start: 2017-09-14 | End: 2018-03-23

## 2017-09-14 RX ORDER — ONDANSETRON 4 MG/1
4 TABLET, ORALLY DISINTEGRATING ORAL EVERY 8 HOURS PRN
Qty: 10 TABLET | Refills: 0 | Status: SHIPPED | OUTPATIENT
Start: 2017-09-14 | End: 2018-10-26

## 2017-09-14 NOTE — DISCHARGE INSTRUCTIONS

## 2017-09-14 NOTE — ED NOTES
Patient has been having right quadrant pain that radiates into back. Pain 10/10 Denies vomiting but is nauseated with frequent diarrhea.

## 2017-09-14 NOTE — ED PROVIDER NOTES
History     Chief Complaint:  Abdominal pain    HPI   Monica Puckett is a 21 year old female with a history of CKD and lupus who presents with her boyfriend to the ED for abdominal pain. The patient reports that her pain started a week ago in her lower back and was diagnosed as having an ovarian cyst. The pain then radiated into the epigastric and LUQ region of the abdomen as well. She notes that the pain throughout the week was intermittent. 6 days ago she had an episode of vomiting coupled with a fever. Yesterday she noticed having white vaginal discharge that carried on into today. Tonight, her pain suddenly became worse after eating dinner and has been having constant epigastric and LUQ abdominal pain. Here she reports feeling nauseous but denies any vomiting, fever, flank pain, vaginal pain, pelvic pain, urinary problems, or any other symptoms. Her last menstrual period was August 13th. She denies any previous abdomen surgeries or a possibility of being pregnant.    Allergies:  Estrogens (lupus flares)     Medications:    Plaquenil  Deltasone  Fish Oil  Unisom  Prenatal Multivitamin plus Iron  Valtrex     Past Medical History:    CKD  Systemic lupus erythematosus  RA  Pain in shoulder joint    Past Surgical History:    Renal percutaneous biopsy    Family History:    History reviewed. No pertinent family history.     Social History:  Smoking status: Never  Alcohol use: No  Marital Status: Single      Review of Systems   Constitutional: Positive for fever. Negative for chills and diaphoresis.   Gastrointestinal: Positive for abdominal pain (periumbilical), nausea and vomiting. Negative for constipation and diarrhea.   Genitourinary: Positive for vaginal discharge. Negative for decreased urine volume, difficulty urinating, dysuria, flank pain, frequency, hematuria, pelvic pain, urgency, vaginal bleeding and vaginal pain.   Musculoskeletal: Positive for back pain (low). Negative for gait problem and neck pain.  "  All other systems reviewed and are negative.    Physical Exam   Patient Vitals for the past 24 hrs:   BP Temp Temp src Pulse Resp SpO2 Height Weight   09/13/17 2243 106/49 - - - - 97 % - -   09/13/17 2205 - - - 64 18 97 % - -   09/13/17 2202 - - - - - 97 % - -   09/13/17 2145 - - - - - 96 % - -   09/13/17 2130 124/77 - - - - 98 % - -   09/13/17 2057 (!) 146/98 97  F (36.1  C) Oral 135 18 99 % 1.676 m (5' 6\") 65.8 kg (145 lb)     Physical Exam  General: Alert, appears well-developed and well-nourished. Cooperative.     In moderate distress, tearful  HEENT:  Head:  Atraumatic  Ears:  External ears are normal  Mouth/Throat:  Oropharynx is without erythema or exudate and mucous membranes are moist.  Eyes:   Conjunctivae normal and EOM are normal. No scleral icterus.    Pupils are equal, round, and reactive to light.   Neck:   Normal range of motion. Neck supple.  CV:  Tachycardic rate, regular rhythm, normal heart sounds and radial and dorsalis pedis pulses are 2+ and symmetric.  No murmur.  Resp:  Breath sounds are clear bilaterally    Non-labored, no retractions or accessory muscle use  GI:  Abdomen is soft, no distension, mid epigastric and LUQ tenderness. No tenderness to     suprapubic region. No rebound or guarding.  Pelvic: White discharge. No cervical motion tenderness. No vaginal bleeding. Normal age     appropriate genitalia.  MS:  Normal range of motion. No edema.    Normal strength in all 4 extremities.     Back atraumatic.    No CVA tenderness bilaterally.   Skin:  Warm and dry.  No rash or lesions noted.  Neuro:   Alert. Normal strength.  Sensation intact in all 4 extremities. GCS: 15  Psych: Normal mood and affect.    Emergency Department Course     Imaging:  Radiographic findings were communicated with the patient who voiced understanding of the findings.    US Abdomen Limited:  IMPRESSION:  1. No gallstone or biliary dilatation.  2. Caliectasis in the lower pole of the right kidney as " seen  previously.  Results per radiology.    Laboratory:  Wet Prep: none seen  GC Chlamydia: Pending  Neisseria gonorrhoeae: Pending  UA: Trace leukocyte esterase, Mucous present, WBC 31 (H) o/w Negative  HCG qualitative: Negative  CBC: WNL (WBC 5.7, HGB 13.2, )   CMP: ALT 61 (H) o/w WNL (Creatinine 0.63)  Lipase: 126  HCG quantitative: <1    Interventions:  2123: NS 1L IV Bolus  2123: Benadryl 25 mg IV  2126: Reglan 10 mg IV    Emergency Department Course:  Past medical records, nursing notes, and vitals reviewed.  2111: I performed an exam of the patient and obtained history, as documented above. GCS 15.    IV inserted and blood drawn. The patient was placed on continuous cardiac monitoring and pulse oximetry.    The patient was sent for a US while in the emergency department, findings above.    0045: I rechecked the patient. Findings and plan explained to the Patient. Patient discharged home with instructions regarding supportive care, medications, and reasons to return. The importance of close follow-up was reviewed.     Impression & Plan      Medical Decision Making:  Monica Puckett is a 21 year old female who presents with mid epigastric abdominal pain. The patient is extremely tearful at bedside and writhing in pain initially. Patient was given IV fluids, Reglan, and benadryl for her extreme abdominal discomfort. Patient improved significantly after Reglan administration. Her UA is mildly concerning for a UTI. The patient is having some urinary frequency symptoms and due to trace leukocyte esterase and some WBC's seen on her urine, we will treat for an uncomplicated UTI. The patient was given a 3 day course of Macrobid. On my recheck, patient's abdominal pain is quite improved. She had an ultrasound which was negative for cholecystitis, no evidence of gall stones. The patient also had a pelvic exam as she had some vaginal discharge. Wet prep was negative and gonorrhea and chlamydia PCR's are pending. No  cervical motion tenderness, low concern for PID or other sinister uterine pathology. Patient with no suprapubic abdominal pain, no lateralization of that pain, and very low concern for ovarian pathology. Lipase is within normal range and LFT's all appear normal. It is unclear as to the etiology of the patient's abdominal pain. This could be a gastritis or gastroenteritis. Although the patient does not have diarrhea symptoms, she only had one episode of vomiting which was several days ago. The symptoms have been persistent for several days so I suspect that this is unlikely surgical pathology. Although, patient will return if she has continuing or worsening symptoms in the next 24 hours. She is planning on going to her primary care for a 24 hour abdominal pain recheck. She will return to the ED if worsening symptoms prior to that. If she were to return to the ED, I would consider a CT scan of her abdomen for other sinister pathology's not evaluated for this evening. Patient orally challenged well prior to discharge. She understood return precautions and return instructions prior to discharge.    Diagnosis:    ICD-10-CM   1. Abdominal pain, epigastric R10.13     Disposition:  discharged to home  Discharge Medications:  New Prescriptions    NITROFURANTOIN, MACROCRYSTAL-MONOHYDRATE, (MACROBID) 100 MG CAPSULE    Take 1 capsule (100 mg) by mouth 2 times daily    ONDANSETRON (ZOFRAN ODT) 4 MG ODT TAB    Take 1 tablet (4 mg) by mouth every 8 hours as needed for nausea     Rosa Jammie  9/13/2017   [facility] EMERGENCY DEPARTMENT  I, Rosa Jammie, am serving as a scribe at 9:11 PM on 9/13/2017 to document services personally performed by Paul Francois MD based on my observations and the provider's statements to me.        Paul Francois MD  09/14/17 0112

## 2017-10-14 DIAGNOSIS — M32.9 SYSTEMIC LUPUS ERYTHEMATOSUS, UNSPECIFIED SLE TYPE, UNSPECIFIED ORGAN INVOLVEMENT STATUS (H): ICD-10-CM

## 2017-10-14 DIAGNOSIS — N04.20 NEPHROTIC SYNDROME WITH LESION OF MEMBRANOUS GLOMERULONEPHRITIS: ICD-10-CM

## 2017-10-14 NOTE — LETTER
Dearae Anderson,    Regular eye exams are required while taking Hydroxychloroquine (Plaquenil). These may be yearly or as determined by your eye specialist.    Although vision problems and loss of sight while taking hydroxchloroquine are very rare, notify your doctor if you notice changes in your vision. Visual changes experienced early on the medication or seen early during regular eye exams usually improve after stopping the medication.     Eye exams should be completed by an ophthalmologist who is experienced in monitoring for Hydroxchloroquine toxicity.    Exam may include visual field testing and slit lamp exam or other testing as determined by the ophthalmologist.     We received a refill request from your pharmacy. A copy of your current eye exam was not found in your medical record. Your Hydroxychloroquine prescription has been refilled for 3 months.  Please request your eye clinic to fax or mail a copy of your eye exam report to our clinic indicating that testing was completed for toxicity screening.        Sincerely,  Rheumatology Staff

## 2017-10-16 RX ORDER — HYDROXYCHLOROQUINE SULFATE 200 MG/1
400 TABLET, FILM COATED ORAL DAILY
Qty: 180 TABLET | Refills: 0 | Status: SHIPPED | OUTPATIENT
Start: 2017-10-16 | End: 2018-01-13

## 2017-10-16 NOTE — TELEPHONE ENCOUNTER
Plaquenil      Last Written Prescription Date:  6-28-17  Last Fill Quantity: 180,   # refills: 0  Last Office Visit: 6-28-17  Future Office visit: 12-6-17  Last Eye Exam:none    No record of eye exam in EPIC - Letter sent today.      Kathleen M Doege RN

## 2017-10-19 PROBLEM — Z79.899 LONG-TERM USE OF PLAQUENIL: Status: ACTIVE | Noted: 2017-10-19

## 2017-11-03 NOTE — TELEPHONE ENCOUNTER
Left message requesting return call. Have attempted to call x 5, no response from pt.  Letter has been sent.  Will close encounter at this time.    Flavia Mayfield RN  R[facility]umatology Clinic

## 2018-01-13 DIAGNOSIS — M32.9 SYSTEMIC LUPUS ERYTHEMATOSUS, UNSPECIFIED SLE TYPE, UNSPECIFIED ORGAN INVOLVEMENT STATUS (H): ICD-10-CM

## 2018-01-13 DIAGNOSIS — N04.20 NEPHROTIC SYNDROME WITH LESION OF MEMBRANOUS GLOMERULONEPHRITIS: ICD-10-CM

## 2018-01-15 NOTE — TELEPHONE ENCOUNTER
hydroxychloroquine (PLAQUENIL) 200 MG   Last Written Prescription Date:  10/16/17  Last Fill Quantity: 180,   # refills: 0  Last Office Visit : 6/28/17  Future Office visit:  NONE  EYE EXAM   NONE   LETTER SENT 10/16/17  Routing refill request to provider for review/approval because:  NO EYE EXAM   LETTER + 90 DAY RF 10/16/17  NO MD APPT  NO SHOW LETTER SENT  12/6/17

## 2018-01-16 RX ORDER — HYDROXYCHLOROQUINE SULFATE 200 MG/1
400 TABLET, FILM COATED ORAL DAILY
Qty: 60 TABLET | Refills: 0 | Status: SHIPPED | OUTPATIENT
Start: 2018-01-16 | End: 2018-03-23

## 2018-03-16 DIAGNOSIS — M25.519 PAIN IN JOINT, SHOULDER REGION: ICD-10-CM

## 2018-03-16 DIAGNOSIS — Z79.899 LONG-TERM USE OF PLAQUENIL: ICD-10-CM

## 2018-03-16 DIAGNOSIS — Z79.899 HIGH RISK MEDICATION USE: ICD-10-CM

## 2018-03-16 DIAGNOSIS — M32.9 SYSTEMIC LUPUS ERYTHEMATOSUS (H): Primary | ICD-10-CM

## 2018-03-16 NOTE — TELEPHONE ENCOUNTER
Called PT and said Dr Reina would like to see her next Friday 3/23/18.  She said that works great and I advised her she needs labs done before so to come 45 minutes early and get labs before. She said that works great.  I added Lupus labs in the computer for her as well.     Piotr STAFFORDN  Rheumatology

## 2018-03-23 ENCOUNTER — OFFICE VISIT (OUTPATIENT)
Dept: RHEUMATOLOGY | Facility: CLINIC | Age: 23
End: 2018-03-23
Attending: INTERNAL MEDICINE
Payer: COMMERCIAL

## 2018-03-23 VITALS
SYSTOLIC BLOOD PRESSURE: 119 MMHG | HEIGHT: 66 IN | DIASTOLIC BLOOD PRESSURE: 76 MMHG | BODY MASS INDEX: 22.69 KG/M2 | WEIGHT: 141.2 LBS | OXYGEN SATURATION: 99 % | TEMPERATURE: 98.1 F | HEART RATE: 79 BPM

## 2018-03-23 DIAGNOSIS — N04.20 NEPHROTIC SYNDROME WITH LESION OF MEMBRANOUS GLOMERULONEPHRITIS: ICD-10-CM

## 2018-03-23 DIAGNOSIS — M32.9 SYSTEMIC LUPUS ERYTHEMATOSUS (H): ICD-10-CM

## 2018-03-23 DIAGNOSIS — M32.9 SYSTEMIC LUPUS ERYTHEMATOSUS, UNSPECIFIED SLE TYPE, UNSPECIFIED ORGAN INVOLVEMENT STATUS (H): Primary | ICD-10-CM

## 2018-03-23 DIAGNOSIS — Z79.899 LONG-TERM USE OF PLAQUENIL: ICD-10-CM

## 2018-03-23 DIAGNOSIS — Z79.899 HIGH RISK MEDICATION USE: ICD-10-CM

## 2018-03-23 LAB
ALBUMIN UR-MCNC: 30 MG/DL
APPEARANCE UR: ABNORMAL
BACTERIA #/AREA URNS HPF: ABNORMAL /HPF
BASOPHILS # BLD AUTO: 0 10E9/L (ref 0–0.2)
BASOPHILS NFR BLD AUTO: 0.6 %
BILIRUB UR QL STRIP: NEGATIVE
C3 SERPL-MCNC: 91 MG/DL (ref 76–169)
C4 SERPL-MCNC: 23 MG/DL (ref 15–50)
COLOR UR AUTO: ABNORMAL
CREAT UR-MCNC: 309 MG/DL
CRP SERPL-MCNC: <2.9 MG/L (ref 0–8)
DIFFERENTIAL METHOD BLD: NORMAL
EOSINOPHIL # BLD AUTO: 0.1 10E9/L (ref 0–0.7)
EOSINOPHIL NFR BLD AUTO: 1.6 %
ERYTHROCYTE [DISTWIDTH] IN BLOOD BY AUTOMATED COUNT: 12.1 % (ref 10–15)
ERYTHROCYTE [SEDIMENTATION RATE] IN BLOOD BY WESTERGREN METHOD: 13 MM/H (ref 0–20)
GLUCOSE UR STRIP-MCNC: NEGATIVE MG/DL
HCT VFR BLD AUTO: 36.4 % (ref 35–47)
HGB BLD-MCNC: 12.6 G/DL (ref 11.7–15.7)
HGB UR QL STRIP: NEGATIVE
IMM GRANULOCYTES # BLD: 0 10E9/L (ref 0–0.4)
IMM GRANULOCYTES NFR BLD: 0.2 %
KETONES UR STRIP-MCNC: NEGATIVE MG/DL
LEUKOCYTE ESTERASE UR QL STRIP: ABNORMAL
LYMPHOCYTES # BLD AUTO: 1.5 10E9/L (ref 0.8–5.3)
LYMPHOCYTES NFR BLD AUTO: 29.5 %
MCH RBC QN AUTO: 31.1 PG (ref 26.5–33)
MCHC RBC AUTO-ENTMCNC: 34.6 G/DL (ref 31.5–36.5)
MCV RBC AUTO: 90 FL (ref 78–100)
MONOCYTES # BLD AUTO: 0.5 10E9/L (ref 0–1.3)
MONOCYTES NFR BLD AUTO: 10.2 %
MUCOUS THREADS #/AREA URNS LPF: PRESENT /LPF
NEUTROPHILS # BLD AUTO: 2.9 10E9/L (ref 1.6–8.3)
NEUTROPHILS NFR BLD AUTO: 57.9 %
NITRATE UR QL: NEGATIVE
NRBC # BLD AUTO: 0 10*3/UL
NRBC BLD AUTO-RTO: 0 /100
PH UR STRIP: 5 PH (ref 5–7)
PLATELET # BLD AUTO: 244 10E9/L (ref 150–450)
PROT UR-MCNC: 0.24 G/L
PROT/CREAT 24H UR: 0.08 G/G CR (ref 0–0.2)
RBC # BLD AUTO: 4.05 10E12/L (ref 3.8–5.2)
RBC #/AREA URNS AUTO: 1 /HPF (ref 0–2)
SOURCE: ABNORMAL
SP GR UR STRIP: 1.03 (ref 1–1.03)
SQUAMOUS #/AREA URNS AUTO: 2 /HPF (ref 0–1)
UROBILINOGEN UR STRIP-MCNC: 0 MG/DL (ref 0–2)
WBC # BLD AUTO: 4.9 10E9/L (ref 4–11)
WBC #/AREA URNS AUTO: 27 /HPF (ref 0–5)

## 2018-03-23 PROCEDURE — 86225 DNA ANTIBODY NATIVE: CPT | Performed by: INTERNAL MEDICINE

## 2018-03-23 PROCEDURE — 86140 C-REACTIVE PROTEIN: CPT | Performed by: INTERNAL MEDICINE

## 2018-03-23 PROCEDURE — 86160 COMPLEMENT ANTIGEN: CPT | Performed by: INTERNAL MEDICINE

## 2018-03-23 PROCEDURE — 85025 COMPLETE CBC W/AUTO DIFF WBC: CPT | Performed by: INTERNAL MEDICINE

## 2018-03-23 PROCEDURE — 86162 COMPLEMENT TOTAL (CH50): CPT | Performed by: INTERNAL MEDICINE

## 2018-03-23 PROCEDURE — 81001 URINALYSIS AUTO W/SCOPE: CPT | Performed by: INTERNAL MEDICINE

## 2018-03-23 PROCEDURE — G0463 HOSPITAL OUTPT CLINIC VISIT: HCPCS

## 2018-03-23 PROCEDURE — 87086 URINE CULTURE/COLONY COUNT: CPT | Performed by: INTERNAL MEDICINE

## 2018-03-23 PROCEDURE — 85652 RBC SED RATE AUTOMATED: CPT | Performed by: INTERNAL MEDICINE

## 2018-03-23 PROCEDURE — 36415 COLL VENOUS BLD VENIPUNCTURE: CPT | Performed by: INTERNAL MEDICINE

## 2018-03-23 PROCEDURE — 84156 ASSAY OF PROTEIN URINE: CPT | Performed by: INTERNAL MEDICINE

## 2018-03-23 RX ORDER — HYDROXYCHLOROQUINE SULFATE 200 MG/1
400 TABLET, FILM COATED ORAL DAILY
Qty: 60 TABLET | Refills: 11 | Status: SHIPPED | OUTPATIENT
Start: 2018-03-23 | End: 2018-11-14

## 2018-03-23 ASSESSMENT — PAIN SCALES - GENERAL: PAINLEVEL: MODERATE PAIN (5)

## 2018-03-23 NOTE — PROGRESS NOTES
[facility] - Rheumatology Clinic Visit     Monica Puckett MRN# [id number]   YOB: 1995    Primary care provider: Una Canales  Mar 23, 2018          Assessment and Plan:     Problem list:  # SLE w/ LN stage V (diagnosed 2/2013 age 16 y/o), features of pleuritic chest pain, malar rash/photosensitivity, fatigue, myalgias, arthralgias, oral ulcers, significant proteinuria w/ total urine Pr/Cr to 5 (2/2013), with + AWILDA (1:160 speckled pattern, repeat 10/2016 neg), +RNP (48.7), previously +ds-DNA (10/2016 neg) and +Sm (10/2016 neg), normal C3/C4, chronic mild leukopenia  # perceived poor tolerance to cellcept - GI upset. Discontinued ~  6/2016 (about 2-3 months prior to conception)  # mild leukopenia (3.7), normal differential  # Prior treatment: high dose steroids (mostly off steroids since 2014), oral CYC (~2/2013 - 7/2013), HCQ (2/2016 - 6/2016, now 10/2016 - current), Cellcept (~7/2013 - 6/2016), RTX (?7/2014 and 1/2015)    Discussion:  Pt with history of SLE with LN class V diagnosed 2/2013 most recently managed on HCQ. She has not had any significant proteinuria or positive ds-DNA in over 2 years. Complements C3/C4 have always been normal. Repeat AWILDA negative 10/24/2016 as well as neg repeat SSA/SSB, antiphospholipid Ab's, and UA.    She had a normal delivery of her first child on 6/19/2017. She had a small flare towards the end of pregnancy with malar rash, inflammatory arthritis supported by drop in CH50 and increased ESR. She responded well to prednisone 20 mg PO Qday taper.     Currently with faint malar rash and fluctuating joint pain. Has not taken HCQ for 6-8 wk since she ran out. Ophtho exam up to date (1/2018). She has mild L abd pain (which has had in the past, f/u with OB). U/A abnormal, f/u urine cx.     Plan:  - Resume hydroxychloroquine 200 BID (she has not been taking for 6-8 wk because she ran out)  - f/u urine cx -- if positive will need to tx UTI  - f/u in 3  "months      Discussed with Dr Reina.    Chon Bernal MD, MPH  Internal Medicine, PGY-2    Attending Note: I saw and evaluated the patient with Dr. Bernal. I agree with the assessment and plan.    Chente Reina MD            Active Problem List:     Patient Active Problem List    Diagnosis Date Noted     Long-term use of Plaquenil 10/19/2017     Priority: Medium     Systemic lupus erythematosus (H) 08/16/2013     Priority: Medium     Observation after surgery 01/18/2013     Priority: Medium     Pain in joint, shoulder region 01/14/2011     Priority: Medium   Acne  Dysmenorrhea           History of Present Illness:     Patient is a 21 year old female referred by rheumatologist Dr. Ambar Muniz from [facility] for SLE and high risk pregnancy. The patient has a history of SLE with stg V LN (diagnosed age 17).     She reportedly developed a flu-like illness 11/2012 and had several UC visits from 11/2012 to 1/2013. She presented to the ED in 1/2013 after waking up with an erythematous facial rash and swelling of her face which was initially thought to be angioedema. She notes at the time experiencing fevers, fatigue, oral ulcer, and pleuritic chest pain. Outpatient workup was initiated and she was found to have a +AWILDA, +ds-DNA, +Sm and significant proteinuria. She was hospitalized at [facility] and underwent a renal biopsy which reportedly showed LN class V. She was referred to pediatric rheumatology and has since followed with Dr. Wyatt from 1/2013 until last year. She was previously seen by Dr. Yeh at  Pediatric nephrology, but for the past 2 years or so she has not seen a nephrologist. She was initially treated with high dose steroids (solumedrol 1g, unclear how many days), HCQ, and prednisone and reports being placed on \"chemotherapy\" pill which she thinks was cyclophosphamide for the first 4-5 months (+ a GnRH agonist). Per media tab scanned notes it appears she was given oral " "cyclophosphamide 50 mg tablets, but unclear dose. Notes she was then put back on prednisone 75 mg daily and was transitioned to cellcept 1000 mg BID. Notes she received RTX infusions (unclear dose and number) for what sounds like 2 cycles, last cycle received around 1/2015. She states the RTX was given due to \"flares\" of her lupus which included significant arthralgias, fatigue, skin rashes, and \"abnormal blood tests.\" She thinks her last flare was at least 2 years ago and that her lupus has otherwise been very well controlled. She self-stopped both her HCQ and cellcept about 2-3 months ago due to some GI upset that got to be \"annyoing.\"     Last Monday she had a pregnancy test that resulted positive and she was referred back to rheumatology. She was evaluated by Dr. Muniz from [facility] adult rheumatology where extensive workup including repeat blood tests w/ serology, kidney function, UA were obtained, which all resulted mostly normal other than +RNP. Her AWILDA, ds-DNA, C3/C4, SSA/SSB, Sm were all negative. UA was negative for protein, blood, or WBC. No total urine Pr/Cr ratio was obtained, but per chart review, this was last >0.5 g in 2/2013. She was told to restart her HCQ at 400 mg QD and was referred to lupus clinic here at the [facility].     She notes very intermittent arthralgias of her hands \"just here and there,\" some fatigue (which she attributes to being pregnant), and some lower abdominal cramping pain that is intermittent and severe for the past 2-3 weeks lasting seconds to 3 minutes occuring 2x per day. She had a TVUS yesterday which showed a normal gestational sac measuring around 5 weeks. She has not yet seen an OB. Estimated due date June 24/2016.     2/8/2017: Patient was hospitalized 4 weeks ago for bronchitis/severe cough that was unresponsive to antibiotics. There was some concern for DVT at that time but ultrasound was negative. Patient has been having intermittent malar rash along with " intermittent swelling, myalgias and arthralgias that are predominantly in the hands but also in the ankles. Patient feels that her energy level has been about the same as previous. Chest pain is not bad currently, but has recently been increased, associated with cough. Negative for oral ulcers. Patient was recently seen several days ago in [facility] ED for medication overdose but she threw up most of the pills and was discharged to [facility] the same day. Patient has been taking her Plaquenil daily but has forgotten every once in a while.     6/2017: She delivered her son (arianna) on 6/19/2017, had NVD with no complications. She is doing both breast feeding and formula.     Reports throbbing pain over L flank since delivery. Pain is not improving, percocet helps to ease the pain.    Hands are puffy and swollen and gets gelling. AM stiffness is 10-15 minutes.    No oral ulcers. No skin rash today but had the malar rash intermittently.    No hair loss.    No pleuritic CP.    Ran out of HCQ 4 days ago.    3/2018: Today she reports she recently found out she was pregnant. Her LMP was 2/12 giving an estimated delivery date of 11/19/2018. She is currently ~5 weeks gestational age. She has been feeling lightheaded and fatigued. She had a runny nose and cough for the past two weeks, which she says is finally improved. She reports a mild rash on her face. She says her joint pain fluctuates, today it is not too bad. She feels it most in her wrists and knees. She has felt some L abd pain. She denies dysuria.         Review of Systems:   Complete ROS negative except for symptoms mentioned in the HPI     No h/o myalgia, arthralgia, unintentional weight loss, loss of appetite, fevers, rash, swollen glands  No family or personal history of ulcerative colitis or chron's disease.   Patient denies any raynauds, recurrent mouth/genital ulcers, sicca symptoms, recurrent sinusitis/rhinitis  No h/o recurrent miscarriages or arterial/venous  thrombosis in the past  No h/o persistent shortness of breath, cough  No h/o persistent vomiting, constipation, diarrhea. Some mild nausea, abd pain as described above  No h/o cancer           Past Medical History:   PMH - acne, dysmenorrhea, SLE  PSH - wisdom tooth extraction, renal biopsy 2/2013  Injuries - prior fracture         Social History:     Social History     Occupational History     Not on file.     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, teaches swimming lessons  School - finishing EMT class         Family History:   Mother - arthritis, likely OA  MGM - arthritis, likely OA         Allergies:   Estrogens  Influenza tri-split 08-09 vac  Measles/mumps/rubella vacc  Varicella virus vacc live         Medications:     Current Outpatient Prescriptions   Medication Sig Dispense Refill     Amphetamine-Dextroamphetamine (ADDERALL PO) Take 20 mg by mouth daily       Amphetamine-Dextroamphetamine (ADDERALL XR PO) Take 20 mg by mouth daily       Omega-3 Fatty Acids (FISH OIL PO) Take 1 tablet by mouth daily Fish oil with DHA       Prenatal Vit-Fe Fumarate-FA (PRENATAL MULTIVITAMIN  PLUS IRON) 27-0.8 MG TABS Take 1 tablet by mouth daily       hydroxychloroquine (PLAQUENIL) 200 MG tablet Take 2 tablets (400 mg) by mouth daily *EYE EXAM NEEDED* No more refills till we hear from patient, she has lost follow up. (Patient not taking: Reported on 3/23/2018) 60 tablet 0     ondansetron (ZOFRAN ODT) 4 MG ODT tab Take 1 tablet (4 mg) by mouth every 8 hours as needed for nausea (Patient not taking: Reported on 3/23/2018) 10 tablet 0     predniSONE (DELTASONE) 5 MG tablet 4tab=20 mg qd x 5 days, 3tab=15 mg qd x 5 days, 2tab=10 mg qd x 5 days, 1 tab=5 mg qd x 5 days then stop. (Patient not taking:  "Reported on 3/23/2018) 50 tablet 0     doxylamine (UNISOM) 25 MG TABS tablet Take 25 mg by mouth nightly as needed       ValACYclovir HCl (VALTREX PO) Take 500 mg by mouth as needed              Physical Exam:   Blood pressure 119/76, pulse 79, temperature 98.1  F (36.7  C), temperature source Oral, height 1.676 m (5' 6\"), weight 64 kg (141 lb 3.2 oz), last menstrual period 02/12/2018, SpO2 99 %, not currently breastfeeding.  Wt Readings from Last 4 Encounters:   03/23/18 64 kg (141 lb 3.2 oz)   09/13/17 65.8 kg (145 lb)   06/28/17 69.4 kg (153 lb 1.6 oz)   02/08/17 65.3 kg (144 lb)     BP Readings from Last 3 Encounters:   03/23/18 119/76   09/14/17 104/45   06/28/17 104/67       Constitutional: well-developed, appearing stated age, cooperative  Eyes: PERRLA, normal conjunctiva, sclera  ENT: nl external ears, nose, lips. No mucous membrane lesions, normal saliva pool  Neck: no cervical or supraclavicular lymphadenopathy  Resp: CTAB  CV: RRR, no m/r/g, no LE edema  GI: soft, NT/ND, no HSM  : not tested  Lymph: no cervical, supraclavicular or epitrochlear nodes  MS: no active synovitis or joint tenderness  Skin: faint malar rash present  Psych: nl affect.         Data:     Component      Latest Ref Rng & Units 6/6/2017   WBC      4.0 - 11.0 10e9/L 9.2   RBC Count      3.8 - 5.2 10e12/L 3.58 (L)   Hemoglobin      11.7 - 15.7 g/dL 11.4 (L)   Hematocrit      35.0 - 47.0 % 33.6 (L)   MCV      78 - 100 fl 94   MCH      26.5 - 33.0 pg 31.8   MCHC      31.5 - 36.5 g/dL 33.9   RDW      10.0 - 15.0 % 12.5   Platelet Count      150 - 450 10e9/L 290   Diff Method       Automated Method   % Neutrophils      % 77.8   % Lymphocytes      % 14.9   % Monocytes      % 6.7   % Eosinophils      % 0.5   % Basophils      % 0.1   Absolute Neutrophil      1.6 - 8.3 10e9/L 7.1   Absolute Lymphocytes      0.8 - 5.3 10e9/L 1.4   Absolute Monocytes      0.0 - 1.3 10e9/L 0.6   Absolute Eosinophils      0.0 - 0.7 10e9/L 0.1   Absolute " Basophils      0.0 - 0.2 10e9/L 0.0   Color Urine       Yellow   Appearance Urine       Clear   Glucose Urine      NEG mg/dL Negative   Bilirubin Urine      NEG Negative   Ketones Urine      NEG mg/dL Negative   Specific Gravity Urine      1.003 - 1.035 1.020   Blood Urine      NEG Negative   pH Urine      5.0 - 7.0 pH 5.5   Protein Albumin Urine      NEG mg/dL Negative   Urobilinogen Urine      0.2 - 1.0 EU/dL 0.2   Nitrite Urine      NEG Negative   Leukocyte Esterase Urine      NEG Negative   Source       Midstream Urine   Creatinine      0.52 - 1.04 mg/dL 0.50 (L)   GFR Estimate      >60 mL/min/1.7m2 >90 . . .   GFR Estimate If Black      >60 mL/min/1.7m2 >90 . . .   Protein Random Urine      g/L 0.14   Protein Total Urine g/gr Creatinine      0 - 0.2 g/g Cr 0.12   Albumin      3.4 - 5.0 g/dL 2.9 (L)   ALT      0 - 50 U/L 16   AST      0 - 45 U/L 18   Complement C3      76 - 169 mg/dL 153   Complement C4      15 - 50 mg/dL 29   CRP Inflammation      0.0 - 8.0 mg/L 3.2   Sed Rate      0 - 20 mm/h 47 (H)   DNA-ds      <10 IU/mL 2   Creatinine Urine Random      mg/dL 130   Uric Acid      2.6 - 6.0 mg/dL 3.1   Complement CH50 Total       38 (L)     Results for orders placed or performed in visit on 03/23/18   Routine UA with micro reflex to culture   Result Value Ref Range    Color Urine Lori     Appearance Urine Slightly Cloudy     Glucose Urine Negative NEG^Negative mg/dL    Bilirubin Urine Negative NEG^Negative    Ketones Urine Negative NEG^Negative mg/dL    Specific Gravity Urine 1.031 1.003 - 1.035    Blood Urine Negative NEG^Negative    pH Urine 5.0 5.0 - 7.0 pH    Protein Albumin Urine 30 (A) NEG^Negative mg/dL    Urobilinogen mg/dL 0.0 0.0 - 2.0 mg/dL    Nitrite Urine Negative NEG^Negative    Leukocyte Esterase Urine Small (A) NEG^Negative    Source Midstream Urine     WBC Urine 27 (H) 0 - 5 /HPF    RBC Urine 1 0 - 2 /HPF    Bacteria Urine Few (A) NEG^Negative /HPF    Squamous Epithelial /HPF Urine 2 (H) 0 -  1 /HPF    Mucous Urine Present (A) NEG^Negative /LPF   CBC with platelets differential   Result Value Ref Range    WBC 4.9 4.0 - 11.0 10e9/L    RBC Count 4.05 3.8 - 5.2 10e12/L    Hemoglobin 12.6 11.7 - 15.7 g/dL    Hematocrit 36.4 35.0 - 47.0 %    MCV 90 78 - 100 fl    MCH 31.1 26.5 - 33.0 pg    MCHC 34.6 31.5 - 36.5 g/dL    RDW 12.1 10.0 - 15.0 %    Platelet Count 244 150 - 450 10e9/L    Diff Method Automated Method     % Neutrophils 57.9 %    % Lymphocytes 29.5 %    % Monocytes 10.2 %    % Eosinophils 1.6 %    % Basophils 0.6 %    % Immature Granulocytes 0.2 %    Nucleated RBCs 0 0 /100    Absolute Neutrophil 2.9 1.6 - 8.3 10e9/L    Absolute Lymphocytes 1.5 0.8 - 5.3 10e9/L    Absolute Monocytes 0.5 0.0 - 1.3 10e9/L    Absolute Eosinophils 0.1 0.0 - 0.7 10e9/L    Absolute Basophils 0.0 0.0 - 0.2 10e9/L    Abs Immature Granulocytes 0.0 0 - 0.4 10e9/L    Absolute Nucleated RBC 0.0    Erythrocyte sedimentation rate auto   Result Value Ref Range    Sed Rate 13 0 - 20 mm/h     US OB 10/25/2016:  1. Gestational sac present measuring 5 weeks and 6 days..  2. No adnexal masses are seen.    Prior workup through [facility] reviewed 10/26/2016:    Positive/abnormal:  AWILDA 1/2013 1:160 speckled, repeat AWILDA neg 10/24/2016  RNP 48.7  CBC - WBC 3.7 w/ normal diff  ESR 45 (4/2013), no repeat    Negative/normal:  Bustos (reportedly initially +)  SSA, SSB  ANCA  Lupus anticoagulant screen abnormal, but confirmatory testing negative  Cardiolipin IgA/G/M neg  Beta 2 GP1 Ab IgA/G/M neg  C3/C4 (never abnormal)  Cr - 0.68, lytes normal   CK  HCV  HIV  HLA-B27  LFT's normal  Quant gold negative  RF negative x2  Anti-CCP negative x2  TSH   RPR  Prior Lyme  TTG  Ds-DNA neg x6 (reportedly initially +)  UA - trace LE, otherwise negative for protein/blood/WBC  Hgb, plts  CRP < 2.9

## 2018-03-23 NOTE — LETTER
Patient:  Monica Puckett  :   1995  MRN:     [id number]        Ms.Hailey Puckett  [address] [place] [facility] [address]        2018    Dear ,    We are writing to inform you of your test results. Over all stable labs except low CH50 which expect to normalize by resuming plaquenil.      Resulted Orders   Routine UA with micro reflex to culture   Result Value Ref Range    Color Urine Lori     Appearance Urine Slightly Cloudy     Glucose Urine Negative NEG^Negative mg/dL    Bilirubin Urine Negative NEG^Negative    Ketones Urine Negative NEG^Negative mg/dL    Specific Gravity Urine 1.031 1.003 - 1.035    Blood Urine Negative NEG^Negative    pH Urine 5.0 5.0 - 7.0 pH    Protein Albumin Urine 30 (A) NEG^Negative mg/dL    Urobilinogen mg/dL 0.0 0.0 - 2.0 mg/dL    Nitrite Urine Negative NEG^Negative    Leukocyte Esterase Urine Small (A) NEG^Negative    Source Midstream Urine     WBC Urine 27 (H) 0 - 5 /HPF    RBC Urine 1 0 - 2 /HPF    Bacteria Urine Few (A) NEG^Negative /HPF    Squamous Epithelial /HPF Urine 2 (H) 0 - 1 /HPF    Mucous Urine Present (A) NEG^Negative /LPF   Protein  random urine with Creat Ratio   Result Value Ref Range    Protein Random Urine 0.24 g/L    Protein Total Urine g/gr Creatinine 0.08 0 - 0.2 g/g Cr   CBC with platelets differential   Result Value Ref Range    WBC 4.9 4.0 - 11.0 10e9/L    RBC Count 4.05 3.8 - 5.2 10e12/L    Hemoglobin 12.6 11.7 - 15.7 g/dL    Hematocrit 36.4 35.0 - 47.0 %    MCV 90 78 - 100 fl    MCH 31.1 26.5 - 33.0 pg    MCHC 34.6 31.5 - 36.5 g/dL    RDW 12.1 10.0 - 15.0 %    Platelet Count 244 150 - 450 10e9/L    Diff Method Automated Method     % Neutrophils 57.9 %    % Lymphocytes 29.5 %    % Monocytes 10.2 %    % Eosinophils 1.6 %    % Basophils 0.6 %    % Immature Granulocytes 0.2 %    Nucleated RBCs 0 0 /100    Absolute Neutrophil 2.9 1.6 - 8.3 10e9/L    Absolute Lymphocytes 1.5 0.8 - 5.3 10e9/L    Absolute Monocytes 0.5 0.0 - 1.3 10e9/L     Absolute Eosinophils 0.1 0.0 - 0.7 10e9/L    Absolute Basophils 0.0 0.0 - 0.2 10e9/L    Abs Immature Granulocytes 0.0 0 - 0.4 10e9/L    Absolute Nucleated RBC 0.0    DNA double stranded antibodies   Result Value Ref Range    DNA-ds 2 <10 IU/mL      Comment:      Negative   Erythrocyte sedimentation rate auto   Result Value Ref Range    Sed Rate 13 0 - 20 mm/h   Complement total   Result Value Ref Range    Complement CH50 Total 27 (L) 60 - 144  Units      Comment:      (Note)  If low CH50 value is unexpected or does not correlate with   the patient's clinical condition, repeat analysis with a   fresh frozen serum sample is suggested for verification.  INTERPRETIVE INFORMATION: Complement Activity, Total EIA   59   Units or less .......... Low      Units .............. Normal   145  Units or greater ....... High  Performed by [email or web address],  [address] [place] [id number] [phone]  [email or web address], Lázaro Segal MD, Lab. Director     Complement C4   Result Value Ref Range    Complement C4 23 15 - 50 mg/dL   Complement C3   Result Value Ref Range    Complement C3 91 76 - 169 mg/dL   CRP inflammation   Result Value Ref Range    CRP Inflammation <2.9 0.0 - 8.0 mg/L   Creatinine urine calculation only   Result Value Ref Range    Creatinine Urine 309 mg/dL   Urine Culture Aerobic Bacterial   Result Value Ref Range    Specimen Description Midstream Urine     Special Requests Specimen received in preservative     Culture Micro       <10,000 colonies/mL  mixed urogenital wayne  Susceptibility testing not routinely done         Chente Reina MD

## 2018-03-23 NOTE — MR AVS SNAPSHOT
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  Normal or non-critical lab and imaging results will be communicated to you by MyChart, letter or phone within 4 business days after the clinic has received the results. If you do not hear from us within 7 days, please contact the clinic through [email or web address] or phone. If you have a critical or abnormal lab result, we will notify you by phone as soon as possible.  Submit refill requests through [employer] or call your pharmacy and they will forward the refill request to us. Please allow 3 business days for your refill to be completed.          Additional Information About Your Visit        MyChart Information     [employer] lets you send messages to your doctor, view your test results, renew your prescriptions, schedule appointments and more. To sign up, go to [email or web address] . Click on \"Log in\" on the left side of the screen, which will take you to the Welcome page. Then click on \"Sign up Now\" on the right side of the page.     You will be asked to enter the access code listed below, as well as some personal information. Please follow the directions to create your username and password.     Your access code is: [email or web address]  Expires: 6/17/2018  6:30 AM   " "  Your access code will  in 90 days. If you need help or a new code, please call your [facility] clinic or [phone].        Care EveryWhere ID     This is your Care EveryWhere ID. This could be used by other organizations to access your [facility] medical records  [id number]        Your Vitals Were     Pulse Temperature Height Last Period Pulse Oximetry Breastfeeding?    79 98.1  F (36.7  C) (Oral) 1.676 m (5' 6\") 2018 99% No    BMI (Body Mass Index)                   22.79 kg/m2            Blood Pressure from Last 3 Encounters:   18 119/76   17 104/45   17 104/67    Weight from Last 3 Encounters:   18 64 kg (141 lb 3.2 oz)   17 65.8 kg (145 lb)   17 69.4 kg (153 lb 1.6 oz)              Today, you had the following     No orders found for display         Today's Medication Changes          These changes are accurate as of 3/23/18 11:59 PM.  If you have any questions, ask your nurse or doctor.               These medicines have changed or have updated prescriptions.        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 Please contact your care team if you have questions.     nitroFURantoin (macrocrystal-monohydrate) 100 MG capsule   Commonly known as:  MACROBID   Stopped by:  Chente Reina MD                Where to get your medicines      These medications were sent to [facility]/pharmacy #[id number] - [place], [place] -   [address] [place], [facility] [address]     Phone:  [phone]     hydroxychloroquine 200 " MG tablet                Primary Care Provider Office Phone # Fax #    Una Canales [phone]       [facility] [address]  [address] [facility] [address]        Equal Access to Services     WILLIS THORNE : Hadii cheryle ku jenno Soericali, waaxda luqadaha, qaybta kaalmada adebrigitteyada, kiesha reddylisa nolan. So [facility] [phone].    ATENCIÓN: Si habla español, tiene a napoles disposición servicios gratuitos de asistencia lingüística. Ll[facility] [phone].    We comply with applicable federal civil rights laws and [place] laws. We do not discriminate on the basis of race, color, national origin, age, disability, sex, sexual orientation, or gender identity.            Thank you!     Thank you for choosing [facility] RHEUMATOLOGY  for your care. Our goal is always to provide you with excellent care. Hearing back from our patients is one way we can continue to improve our services. Please take a few minutes to complete the written survey that you may receive in the mail after your visit with us. Thank you!             Your Updated Medication List - Protect others around you: Learn how to safely use, store and throw away your medicines at www.disposemymeds.org.          This list is accurate as of 3/23/18 11:59 PM.  Always use your most recent med list.                   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    Systemic lupus erythematosus, unspecified SLE type, unspecified organ involvement status (H)       prenatal multivitamin plus iron 27-0.8 MG Tabs per tablet      Take 1 tablet by mouth daily        VALTREX PO      Take 500 mg by mouth as needed        * Notice:  This list has 2 medication(s) that are the same as other medications prescribed for you. Read the directions carefully, and ask your doctor or other care provider to review them with you.

## 2018-03-23 NOTE — LETTER
3/23/2018       RE: Monica Puckett  [address] [place] [address]     Dear Colleague,    Thank you for referring your patient, Monica Puckett, to the [facility] RHEUMATOLOGY at [facility]. Please see a copy of my visit note below.    [facility] - Rheumatology Clinic Visit     Monica Puckett MRN# [id number]   YOB: 1995    Primary care provider: Una Canales  Mar 23, 2018          Assessment and Plan:     Problem list:  # SLE w/ LN stage V (diagnosed 2/2013 age 16 y/o), features of pleuritic chest pain, malar rash/photosensitivity, fatigue, myalgias, arthralgias, oral ulcers, significant proteinuria w/ total urine Pr/Cr to 5 (2/2013), with + AWILDA (1:160 speckled pattern, repeat 10/2016 neg), +RNP (48.7), previously +ds-DNA (10/2016 neg) and +Sm (10/2016 neg), normal C3/C4, chronic mild leukopenia  # perceived poor tolerance to cellcept - GI upset. Discontinued ~  6/2016 (about 2-3 months prior to conception)  # mild leukopenia (3.7), normal differential  # Prior treatment: high dose steroids (mostly off steroids since 2014), oral CYC (~2/2013 - 7/2013), HCQ (2/2016 - 6/2016, now 10/2016 - current), Cellcept (~7/2013 - 6/2016), RTX (?7/2014 and 1/2015)    Discussion:  Pt with history of SLE with LN class V diagnosed 2/2013 most recently managed on HCQ. She has not had any significant proteinuria or positive ds-DNA in over 2 years. Complements C3/C4 have always been normal. Repeat WAILDA negative 10/24/2016 as well as neg repeat SSA/SSB, antiphospholipid Ab's, and UA.    She had a normal delivery of her first child on 6/19/2017. She had a small flare towards the end of pregnancy with malar rash, inflammatory arthritis supported by drop in CH50 and increased ESR. She responded well to prednisone 20 mg PO Qday taper.     Currently with faint malar rash and fluctuating joint pain. Has not taken HCQ for 6-8 wk since she ran out. Ophtho exam up  to date (1/2018). She has mild L abd pain (which has had in the past, f/u with OB). U/A abnormal, f/u urine cx.     Plan:  - Resume hydroxychloroquine 200 BID (she has not been taking for 6-8 wk because she ran out)  - f/u urine cx -- if positive will need to tx UTI  - f/u in 3 months      Discussed with Dr Reina.    Chon Bernal MD, MPH  Internal Medicine, PGY-2    Attending Note: I saw and evaluated the patient with Dr. Bernal. I agree with the assessment and plan.    Chente Reina MD            Active Problem List:     Patient Active Problem List    Diagnosis Date Noted     Long-term use of Plaquenil 10/19/2017     Priority: Medium     Systemic lupus erythematosus (H) 08/16/2013     Priority: Medium     Observation after surgery 01/18/2013     Priority: Medium     Pain in joint, shoulder region 01/14/2011     Priority: Medium   Acne  Dysmenorrhea           History of Present Illness:     Patient is a 21 year old female referred by rheumatologist Dr. Ambar Muniz from [facility] Rhe[facility]ology for SLE and high risk pregnancy. The patient has a history of SLE with stg V LN (diagnosed age 17).     She reportedly developed a flu-like illness 11/2012 and had several UC visits from 11/2012 to 1/2013. She presented to the ED in 1/2013 after waking up with an erythematous facial rash and swelling of her face which was initially thought to be angioedema. She notes at the time experiencing fevers, fatigue, oral ulcer, and pleuritic chest pain. Outpatient workup was initiated and she was found to have a +AWILDA, +ds-DNA, +Sm and significant proteinuria. She was hospitalized at [facility] and underwent a renal biopsy which reportedly showed LN class V. She was referred to pediatric rheumatology and has since followed with Dr. Wyatt from 1/2013 until last year. She was previously seen by Dr. Yeh at  Pediatric nephrology, but for the past 2 years or so she has not seen a nephrologist. She was initially treated  "with high dose steroids (solumedrol 1g, unclear how many days), HCQ, and prednisone and reports being placed on \"chemotherapy\" pill which she thinks was cyclophosphamide for the first 4-5 months (+ a GnRH agonist). Per media tab scanned notes it appears she was given oral cyclophosphamide 50 mg tablets, but unclear dose. Notes she was then put back on prednisone 75 mg daily and was transitioned to cellcept 1000 mg BID. Notes she received RTX infusions (unclear dose and number) for what sounds like 2 cycles, last cycle received around 1/2015. She states the RTX was given due to \"flares\" of her lupus which included significant arthralgias, fatigue, skin rashes, and \"abnormal blood tests.\" She thinks her last flare was at least 2 years ago and that her lupus has otherwise been very well controlled. She self-stopped both her HCQ and cellcept about 2-3 months ago due to some GI upset that got to be \"annyoing.\"     Last Monday she had a pregnancy test that resulted positive and she was referred back to rheumatology. She was evaluated by Dr. Muniz from [facility] adult rheumatology where extensive workup including repeat blood tests w/ serology, kidney function, UA were obtained, which all resulted mostly normal other than +RNP. Her AWILDA, ds-DNA, C3/C4, SSA/SSB, Sm were all negative. UA was negative for protein, blood, or WBC. No total urine Pr/Cr ratio was obtained, but per chart review, this was last >0.5 g in 2/2013. She was told to restart her HCQ at 400 mg QD and was referred to lupus clinic here at the [facility].     She notes very intermittent arthralgias of her hands \"just here and there,\" some fatigue (which she attributes to being pregnant), and some lower abdominal cramping pain that is intermittent and severe for the past 2-3 weeks lasting seconds to 3 minutes occuring 2x per day. She had a TVUS yesterday which showed a normal gestational sac measuring around 5 weeks. She has not yet seen an OB. Estimated due " date June 24/2016.     2/8/2017: Patient was hospitalized 4 weeks ago for bronchitis/severe cough that was unresponsive to antibiotics. There was some concern for DVT at that time but ultrasound was negative. Patient has been having intermittent malar rash along with intermittent swelling, myalgias and arthralgias that are predominantly in the hands but also in the ankles. Patient feels that her energy level has been about the same as previous. Chest pain is not bad currently, but has recently been increased, associated with cough. Negative for oral ulcers. Patient was recently seen several days ago in [facility] ED for medication overdose but she threw up most of the pills and was discharged to [facility] the same day. Patient has been taking her Plaquenil daily but has forgotten every once in a while.     6/2017: She delivered her son (arianna) on 6/19/2017, had NVD with no complications. She is doing both breast feeding and formula.     Reports throbbing pain over L flank since delivery. Pain is not improving, percocet helps to ease the pain.    Hands are puffy and swollen and gets gelling. AM stiffness is 10-15 minutes.    No oral ulcers. No skin rash today but had the malar rash intermittently.    No hair loss.    No pleuritic CP.    Ran out of HCQ 4 days ago.    3/2018: Today she reports she recently found out she was pregnant. Her LMP was 2/12 giving an estimated delivery date of 11/19/2018. She is currently ~5 weeks gestational age. She has been feeling lightheaded and fatigued. She had a runny nose and cough for the past two weeks, which she says is finally improved. She reports a mild rash on her face. She says her joint pain fluctuates, today it is not too bad. She feels it most in her wrists and knees. She has felt some L abd pain. She denies dysuria.         Review of Systems:   Complete ROS negative except for symptoms mentioned in the HPI     No h/o myalgia, arthralgia, unintentional weight loss, loss of  appetite, fevers, rash, swollen glands  No family or personal history of ulcerative colitis or chron's disease.   Patient denies any raynauds, recurrent mouth/genital ulcers, sicca symptoms, recurrent sinusitis/rhinitis  No h/o recurrent miscarriages or arterial/venous thrombosis in the past  No h/o persistent shortness of breath, cough  No h/o persistent vomiting, constipation, diarrhea. Some mild nausea, abd pain as described above  No h/o cancer           Past Medical History:   PMH - acne, dysmenorrhea, SLE  PSH - wisdom tooth extraction, renal biopsy 2/2013  Injuries - prior fracture         Social History:     Social History     Occupational History     Not on file.     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, teaches swimming lessons  School - finishing EMT class         Family History:   Mother - arthritis, likely OA  MGM - arthritis, likely OA         Allergies:   Estrogens  Influenza tri-split 08-09 vac  Measles/mumps/rubella vacc  Varicella virus vacc live         Medications:     Current Outpatient Prescriptions   Medication Sig Dispense Refill     Amphetamine-Dextroamphetamine (ADDERALL PO) Take 20 mg by mouth daily       Amphetamine-Dextroamphetamine (ADDERALL XR PO) Take 20 mg by mouth daily       Omega-3 Fatty Acids (FISH OIL PO) Take 1 tablet by mouth daily Fish oil with DHA       Prenatal Vit-Fe Fumarate-FA (PRENATAL MULTIVITAMIN  PLUS IRON) 27-0.8 MG TABS Take 1 tablet by mouth daily       hydroxychloroquine (PLAQUENIL) 200 MG tablet Take 2 tablets (400 mg) by mouth daily *EYE EXAM NEEDED* No more refills till we hear from patient, she has lost follow up. (Patient not taking: Reported on 3/23/2018) 60 tablet 0     ondansetron (ZOFRAN ODT) 4 MG ODT tab Take 1 tablet (4  "mg) by mouth every 8 hours as needed for nausea (Patient not taking: Reported on 3/23/2018) 10 tablet 0     predniSONE (DELTASONE) 5 MG tablet 4tab=20 mg qd x 5 days, 3tab=15 mg qd x 5 days, 2tab=10 mg qd x 5 days, 1 tab=5 mg qd x 5 days then stop. (Patient not taking: Reported on 3/23/2018) 50 tablet 0     doxylamine (UNISOM) 25 MG TABS tablet Take 25 mg by mouth nightly as needed       ValACYclovir HCl (VALTREX PO) Take 500 mg by mouth as needed              Physical Exam:   Blood pressure 119/76, pulse 79, temperature 98.1  F (36.7  C), temperature source Oral, height 1.676 m (5' 6\"), weight 64 kg (141 lb 3.2 oz), last menstrual period 02/12/2018, SpO2 99 %, not currently breastfeeding.  Wt Readings from Last 4 Encounters:   03/23/18 64 kg (141 lb 3.2 oz)   09/13/17 65.8 kg (145 lb)   06/28/17 69.4 kg (153 lb 1.6 oz)   02/08/17 65.3 kg (144 lb)     BP Readings from Last 3 Encounters:   03/23/18 119/76   09/14/17 104/45   06/28/17 104/67       Constitutional: well-developed, appearing stated age, cooperative  Eyes: PERRLA, normal conjunctiva, sclera  ENT: nl external ears, nose, lips. No mucous membrane lesions, normal saliva pool  Neck: no cervical or supraclavicular lymphadenopathy  Resp: CTAB  CV: RRR, no m/r/g, no LE edema  GI: soft, NT/ND, no HSM  : not tested  Lymph: no cervical, supraclavicular or epitrochlear nodes  MS: no active synovitis or joint tenderness  Skin: faint malar rash present  Psych: nl affect.         Data:     Component      Latest Ref Rng & Units 6/6/2017   WBC      4.0 - 11.0 10e9/L 9.2   RBC Count      3.8 - 5.2 10e12/L 3.58 (L)   Hemoglobin      11.7 - 15.7 g/dL 11.4 (L)   Hematocrit      35.0 - 47.0 % 33.6 (L)   MCV      78 - 100 fl 94   MCH      26.5 - 33.0 pg 31.8   MCHC      31.5 - 36.5 g/dL 33.9   RDW      10.0 - 15.0 % 12.5   Platelet Count      150 - 450 10e9/L 290   Diff Method       Automated Method   % Neutrophils      % 77.8   % Lymphocytes      % 14.9   % Monocytes      % " 6.7   % Eosinophils      % 0.5   % Basophils      % 0.1   Absolute Neutrophil      1.6 - 8.3 10e9/L 7.1   Absolute Lymphocytes      0.8 - 5.3 10e9/L 1.4   Absolute Monocytes      0.0 - 1.3 10e9/L 0.6   Absolute Eosinophils      0.0 - 0.7 10e9/L 0.1   Absolute Basophils      0.0 - 0.2 10e9/L 0.0   Color Urine       Yellow   Appearance Urine       Clear   Glucose Urine      NEG mg/dL Negative   Bilirubin Urine      NEG Negative   Ketones Urine      NEG mg/dL Negative   Specific Gravity Urine      1.003 - 1.035 1.020   Blood Urine      NEG Negative   pH Urine      5.0 - 7.0 pH 5.5   Protein Albumin Urine      NEG mg/dL Negative   Urobilinogen Urine      0.2 - 1.0 EU/dL 0.2   Nitrite Urine      NEG Negative   Leukocyte Esterase Urine      NEG Negative   Source       Midstream Urine   Creatinine      0.52 - 1.04 mg/dL 0.50 (L)   GFR Estimate      >60 mL/min/1.7m2 >90 . . .   GFR Estimate If Black      >60 mL/min/1.7m2 >90 . . .   Protein Random Urine      g/L 0.14   Protein Total Urine g/gr Creatinine      0 - 0.2 g/g Cr 0.12   Albumin      3.4 - 5.0 g/dL 2.9 (L)   ALT      0 - 50 U/L 16   AST      0 - 45 U/L 18   Complement C3      76 - 169 mg/dL 153   Complement C4      15 - 50 mg/dL 29   CRP Inflammation      0.0 - 8.0 mg/L 3.2   Sed Rate      0 - 20 mm/h 47 (H)   DNA-ds      <10 IU/mL 2   Creatinine Urine Random      mg/dL 130   Uric Acid      2.6 - 6.0 mg/dL 3.1   Complement CH50 Total       38 (L)     Results for orders placed or performed in visit on 03/23/18   Routine UA with micro reflex to culture   Result Value Ref Range    Color Urine Lori     Appearance Urine Slightly Cloudy     Glucose Urine Negative NEG^Negative mg/dL    Bilirubin Urine Negative NEG^Negative    Ketones Urine Negative NEG^Negative mg/dL    Specific Gravity Urine 1.031 1.003 - 1.035    Blood Urine Negative NEG^Negative    pH Urine 5.0 5.0 - 7.0 pH    Protein Albumin Urine 30 (A) NEG^Negative mg/dL    Urobilinogen mg/dL 0.0 0.0 - 2.0 mg/dL     Nitrite Urine Negative NEG^Negative    Leukocyte Esterase Urine Small (A) NEG^Negative    Source Midstream Urine     WBC Urine 27 (H) 0 - 5 /HPF    RBC Urine 1 0 - 2 /HPF    Bacteria Urine Few (A) NEG^Negative /HPF    Squamous Epithelial /HPF Urine 2 (H) 0 - 1 /HPF    Mucous Urine Present (A) NEG^Negative /LPF   CBC with platelets differential   Result Value Ref Range    WBC 4.9 4.0 - 11.0 10e9/L    RBC Count 4.05 3.8 - 5.2 10e12/L    Hemoglobin 12.6 11.7 - 15.7 g/dL    Hematocrit 36.4 35.0 - 47.0 %    MCV 90 78 - 100 fl    MCH 31.1 26.5 - 33.0 pg    MCHC 34.6 31.5 - 36.5 g/dL    RDW 12.1 10.0 - 15.0 %    Platelet Count 244 150 - 450 10e9/L    Diff Method Automated Method     % Neutrophils 57.9 %    % Lymphocytes 29.5 %    % Monocytes 10.2 %    % Eosinophils 1.6 %    % Basophils 0.6 %    % Immature Granulocytes 0.2 %    Nucleated RBCs 0 0 /100    Absolute Neutrophil 2.9 1.6 - 8.3 10e9/L    Absolute Lymphocytes 1.5 0.8 - 5.3 10e9/L    Absolute Monocytes 0.5 0.0 - 1.3 10e9/L    Absolute Eosinophils 0.1 0.0 - 0.7 10e9/L    Absolute Basophils 0.0 0.0 - 0.2 10e9/L    Abs Immature Granulocytes 0.0 0 - 0.4 10e9/L    Absolute Nucleated RBC 0.0    Erythrocyte sedimentation rate auto   Result Value Ref Range    Sed Rate 13 0 - 20 mm/h     US OB 10/25/2016:  1. Gestational sac present measuring 5 weeks and 6 days..  2. No adnexal masses are seen.    Prior workup through [facility] reviewed 10/26/2016:    Positive/abnormal:  AWILDA 1/2013 1:160 speckled, repeat AWILDA neg 10/24/2016  RNP 48.7  CBC - WBC 3.7 w/ normal diff  ESR 45 (4/2013), no repeat    Negative/normal:  Bustos (reportedly initially +)  SSA, SSB  ANCA  Lupus anticoagulant screen abnormal, but confirmatory testing negative  Cardiolipin IgA/G/M neg  Beta 2 GP1 Ab IgA/G/M neg  C3/C4 (never abnormal)  Cr - 0.68, lytes normal   CK  HCV  HIV  HLA-B27  LFT's normal  Quant gold negative  RF negative x2  Anti-CCP negative x2  TSH   RPR  Prior Lyme  TTG  Ds-DNA neg x6 (reportedly  initially +)  UA - trace LE, otherwise negative for protein/blood/WBC  Hgb, plts  CRP < 2.9      Sincerely,    Chente Reina MD

## 2018-03-23 NOTE — NURSING NOTE
"/76 (BP Location: Right arm, Patient Position: Sitting, Cuff Size: Adult Regular)  Pulse 79  Temp 98.1  F (36.7  C) (Oral)  Ht 1.676 m (5' 6\")  Wt 64 kg (141 lb 3.2 oz)  LMP 02/12/2018  SpO2 99%  Breastfeeding? No  BMI 22.79 kg/m2    "

## 2018-03-24 LAB
BACTERIA SPEC CULT: NORMAL
Lab: NORMAL
SPECIMEN SOURCE: NORMAL

## 2018-03-25 LAB — CH50 SERPL-ACNC: 27 CAE UNITS (ref 60–144)

## 2018-03-26 LAB — DSDNA AB SER-ACNC: 2 IU/ML

## 2018-05-02 ENCOUNTER — TELEPHONE (OUTPATIENT)
Dept: RHEUMATOLOGY | Facility: CLINIC | Age: 23
End: 2018-05-02

## 2018-05-02 NOTE — TELEPHONE ENCOUNTER
Left message requesting return call.  Need an updated phone number.    Flavia Mayfield RN  Rheumatology Clinic

## 2018-05-02 NOTE — TELEPHONE ENCOUNTER
Health Call Center    Phone Message    May a detailed message be left on voicemail: no    Reason for Call: Other: The pt is seeing an OB/GYN at [facility] and they are not treating her as a high risk pregnancy and will not until told otherwise by another MD. Pt wants to discuss what she should do so that she rcvs the correct care that she needs. Please call the pt asap to discuss. Also, the pt's cousin  today so she is very distraught.     Action Taken: Message routed to:  [facility] ([facility]

## 2018-05-03 NOTE — TELEPHONE ENCOUNTER
[facility] Call Center    Phone Message    May a detailed message be left on voicemail: no    Reason for Call: Other: Pt 's father Paul called in, stating they had received a message from [employer] plus asking for a phone number for pt. Updated chart, her mobile is [phone]. Please f/u when available.    Action Taken: Other: Saved to encounter.

## 2018-05-03 NOTE — TELEPHONE ENCOUNTER
Called and spoke with pt, she no longer needs Dr. Reina to intervene in getting her into see a high risk OB.  She was able to get it worked out with Dr. Carter.  She will be seeing a high risk OB.      Pt is wondering though, if she should be concerned about a gene mutation that women with autoimmune diseases have, where they are unable to process folic acid.  So her OB is wondering if pt needs to have a multivitamin that does not have folic acid.     Pt aware that I will be waiting to hear from Dr. Reina and will call her tomorrow.    Flavia Mayfield RN  Rheumatology Clinic

## 2018-05-04 NOTE — TELEPHONE ENCOUNTER
Called and spoke with pt regarding Dr. Reina's response.  Pt states she is going to follow her OB's recommendation and take a Prenatal vitamin without folic acid.    Flavia Mayfield RN  Rheumatology Clinic

## 2018-05-04 NOTE — TELEPHONE ENCOUNTER
I am unaware of such mutation in autoimmune disorders. We recommend the same prenatal vit and folic acid to our lupus patients during pregnancy.

## 2018-06-29 ENCOUNTER — TELEPHONE (OUTPATIENT)
Dept: INTERNAL MEDICINE | Facility: CLINIC | Age: 23
End: 2018-06-29

## 2018-06-29 ENCOUNTER — OFFICE VISIT - HEALTHEAST (OUTPATIENT)
Dept: UROLOGY | Facility: CLINIC | Age: 23
End: 2018-06-29

## 2018-06-29 DIAGNOSIS — N13.30 HYDRONEPHROSIS, UNSPECIFIED HYDRONEPHROSIS TYPE: ICD-10-CM

## 2018-06-29 DIAGNOSIS — N23 RENAL COLIC: ICD-10-CM

## 2018-06-29 DIAGNOSIS — N39.0 UTI (URINARY TRACT INFECTION): ICD-10-CM

## 2018-06-29 DIAGNOSIS — M32.9 SYSTEMIC LUPUS ERYTHEMATOSUS, UNSPECIFIED SLE TYPE, UNSPECIFIED ORGAN INVOLVEMENT STATUS (H): Primary | ICD-10-CM

## 2018-06-29 LAB
ALBUMIN SERPL-MCNC: 2.8 G/DL (ref 3.5–5)
ALBUMIN UR-MCNC: ABNORMAL MG/DL
ANION GAP SERPL CALCULATED.3IONS-SCNC: 8 MMOL/L (ref 5–18)
APPEARANCE UR: ABNORMAL
BASOPHILS # BLD AUTO: 0 THOU/UL (ref 0–0.2)
BASOPHILS NFR BLD AUTO: 0 % (ref 0–2)
BILIRUB UR QL STRIP: NEGATIVE
BUN SERPL-MCNC: 6 MG/DL (ref 8–22)
C REACTIVE PROTEIN LHE: 3.4 MG/DL (ref 0–0.8)
CALCIUM SERPL-MCNC: 9 MG/DL (ref 8.5–10.5)
CHLORIDE BLD-SCNC: 107 MMOL/L (ref 98–107)
CO2 SERPL-SCNC: 24 MMOL/L (ref 22–31)
COLOR UR AUTO: YELLOW
CREAT SERPL-MCNC: 0.55 MG/DL (ref 0.6–1.1)
EOSINOPHIL # BLD AUTO: 0.2 THOU/UL (ref 0–0.4)
EOSINOPHIL NFR BLD AUTO: 2 % (ref 0–6)
ERYTHROCYTE [DISTWIDTH] IN BLOOD BY AUTOMATED COUNT: 13.2 % (ref 11–14.5)
GFR SERPL CREATININE-BSD FRML MDRD: >60 ML/MIN/1.73M2
GLUCOSE BLD-MCNC: 101 MG/DL (ref 70–125)
GLUCOSE UR STRIP-MCNC: NEGATIVE MG/DL
HCT VFR BLD AUTO: 33 % (ref 35–47)
HGB BLD-MCNC: 11.1 G/DL (ref 12–16)
HGB UR QL STRIP: ABNORMAL
KETONES UR STRIP-MCNC: NEGATIVE MG/DL
LEUKOCYTE ESTERASE UR QL STRIP: ABNORMAL
LYMPHOCYTES # BLD AUTO: 1.2 THOU/UL (ref 0.8–4.4)
LYMPHOCYTES NFR BLD AUTO: 11 % (ref 20–40)
MCH RBC QN AUTO: 32.1 PG (ref 27–34)
MCHC RBC AUTO-ENTMCNC: 33.6 G/DL (ref 32–36)
MCV RBC AUTO: 95 FL (ref 80–100)
MONOCYTES # BLD AUTO: 0.6 THOU/UL (ref 0–0.9)
MONOCYTES NFR BLD AUTO: 6 % (ref 2–10)
NEUTROPHILS # BLD AUTO: 8.5 THOU/UL (ref 2–7.7)
NEUTROPHILS NFR BLD AUTO: 81 % (ref 50–70)
NITRATE UR QL: NEGATIVE
PH UR STRIP: 7 [PH] (ref 5–8)
PHOSPHATE SERPL-MCNC: 3.2 MG/DL (ref 2.5–4.5)
PLATELET # BLD AUTO: 287 THOU/UL (ref 140–440)
PMV BLD AUTO: 9.2 FL (ref 8.5–12.5)
POTASSIUM BLD-SCNC: 3.7 MMOL/L (ref 3.5–5)
RBC # BLD AUTO: 3.46 MILL/UL (ref 3.8–5.4)
SODIUM SERPL-SCNC: 139 MMOL/L (ref 136–145)
SP GR UR STRIP: 1.02 (ref 1–1.03)
UROBILINOGEN UR STRIP-ACNC: ABNORMAL
WBC: 10.6 THOU/UL (ref 4–11)

## 2018-06-29 ASSESSMENT — MIFFLIN-ST. JEOR: SCORE: 1424.47

## 2018-06-29 NOTE — TELEPHONE ENCOUNTER
"I called pt and provided her with Dr Reina's recommendations:  \"She is pregnant and if is this much pain should contact her OB/GYN right away and notify them and go to the ER. I ordered labs including lupus labs as well to assess for lupus flare\"    Pt says she her OB/GYN kept telling her to take Tylenol and not to worry. Pt says she last saw OB/GYN about 2 weeks ago. Pt says she has been to the ER 4 times in 2 weeks.    Pt says she just took a Zofran, but she will go to [place] urgent care in [place] tomorrow or this evening.    Salma Rene, ARSLAN    "

## 2018-06-29 NOTE — TELEPHONE ENCOUNTER
I saw her last in March not June. She is pregnant and if is this much pain should contact her OB/GYN right away and notify them and go to the ER. I ordered labs including lupus labs as well to assess for lupus flare.

## 2018-06-29 NOTE — TELEPHONE ENCOUNTER
I called pt. Pt says she has been having severe R kidney pain. Pt says she started having pain about 3 months ago, around 04/01/18. Pt said her pain is 7/10 and has been crying since yesterday. She says she throws up the opiods, when she tries taking them.     Pt says she went to the [facility] [place] today. Urologist preformed a US and found nothing. Urologist suspects pt may has Lupus Cystitis.     Pt previously had a catheter placed 06/25 at the [facility] because of bladder retention. Pt says she was retaining 300-400 cc of urine after going to the bathroom. Pt says urine retention is a little better & cath helps the pelvic pressure.     Pt said the kidney pain started about a week after her office visit with Dr Reina on 03/23. Pt says she has UTI symptoms, but all the test results show normal.    Pt's pharmacy is [facility] in [place]    Salma Rene LPN

## 2018-06-29 NOTE — TELEPHONE ENCOUNTER
[facility] Call Center    Phone Message    May a detailed message be left on voicemail: yes    Reason for Call: Other: Pt called as she is still experiencing severe kidney pain, her lupus if flaring up, and she is 19-20 weeks pregnant.  Pt called from the office of a kidney specialist - she had a catheter in at the moment, to relieve the built up fluid.  Please have Dr. Reina follow up with pt.  Thank you.     Action Taken: Other: [facility]

## 2018-06-30 LAB — BACTERIA SPEC CULT: NO GROWTH

## 2018-07-01 ENCOUNTER — COMMUNICATION - HEALTHEAST (OUTPATIENT)
Dept: UROLOGY | Facility: CLINIC | Age: 23
End: 2018-07-01

## 2018-07-06 ENCOUNTER — COMMUNICATION - HEALTHEAST (OUTPATIENT)
Dept: UROLOGY | Facility: CLINIC | Age: 23
End: 2018-07-06

## 2018-07-09 ENCOUNTER — TELEPHONE (OUTPATIENT)
Dept: RHEUMATOLOGY | Facility: CLINIC | Age: 23
End: 2018-07-09

## 2018-07-09 NOTE — TELEPHONE ENCOUNTER
Dr. Reina would like to see the pt at 10 am on Wednesday.  Pt has seen nephrologist, they are thinking that it could be her lupus attacking her bladder.     Pt has been having pain in her bladder, feels like a bladder infection, retaining urine, pain backing up into her kidney.  Has been tested several times for bladder infections, always negative, but +blood in urine and WBCs in urine.  Has been seen in the ER x 4, admitted once due to pain, but ends up getting sent home.  Severe kidney pain, and urinating is very painful.  Pt has been vomiting and low grade fevers since this started 3.5 months ago. Pt is also having her lupus rash and puffy fingers.  She also has fatigue that has gotten worse as well.  Pt has also been feeling dizzy and having headaches, these are all new symptoms.    Pt aware to come to clinic on Wednesday at 10 am. She will get labs done at a [place] clinic tomorrow.  Lab orders are in.    Flavia Mayfield RN  Rheumatology Clinic

## 2018-07-09 NOTE — TELEPHONE ENCOUNTER
[facility] Call Center    Phone Message    May a detailed message be left on voicemail: yes    Reason for Call: Other: Pt is wanting a call back to discuss lupus flare up and getting an appt with Dr. Latosha desai. She is 22 weeks pregnant and needs to discuss care. Please f/u with pt asap.      Action Taken: Message routed to:  [facility] Rheum

## 2018-07-10 DIAGNOSIS — M32.9 SYSTEMIC LUPUS ERYTHEMATOSUS, UNSPECIFIED SLE TYPE, UNSPECIFIED ORGAN INVOLVEMENT STATUS (H): Primary | ICD-10-CM

## 2018-07-10 DIAGNOSIS — R82.90 NONSPECIFIC FINDING ON EXAMINATION OF URINE: ICD-10-CM

## 2018-07-10 LAB
ALBUMIN UR-MCNC: 100 MG/DL
APPEARANCE UR: ABNORMAL
BACTERIA #/AREA URNS HPF: ABNORMAL /HPF
BASOPHILS # BLD AUTO: 0 10E9/L (ref 0–0.2)
BASOPHILS NFR BLD AUTO: 0.1 %
BILIRUB UR QL STRIP: NEGATIVE
COLOR UR AUTO: YELLOW
DIFFERENTIAL METHOD BLD: ABNORMAL
EOSINOPHIL # BLD AUTO: 0.3 10E9/L (ref 0–0.7)
EOSINOPHIL NFR BLD AUTO: 2.9 %
ERYTHROCYTE [DISTWIDTH] IN BLOOD BY AUTOMATED COUNT: 13 % (ref 10–15)
ERYTHROCYTE [SEDIMENTATION RATE] IN BLOOD BY WESTERGREN METHOD: 41 MM/H (ref 0–20)
GLUCOSE UR STRIP-MCNC: NEGATIVE MG/DL
HCT VFR BLD AUTO: 33.6 % (ref 35–47)
HGB BLD-MCNC: 11.2 G/DL (ref 11.7–15.7)
HGB UR QL STRIP: ABNORMAL
KETONES UR STRIP-MCNC: NEGATIVE MG/DL
LEUKOCYTE ESTERASE UR QL STRIP: ABNORMAL
LYMPHOCYTES # BLD AUTO: 1.6 10E9/L (ref 0.8–5.3)
LYMPHOCYTES NFR BLD AUTO: 16.4 %
MCH RBC QN AUTO: 31.7 PG (ref 26.5–33)
MCHC RBC AUTO-ENTMCNC: 33.3 G/DL (ref 31.5–36.5)
MCV RBC AUTO: 95 FL (ref 78–100)
MONOCYTES # BLD AUTO: 0.8 10E9/L (ref 0–1.3)
MONOCYTES NFR BLD AUTO: 7.5 %
NEUTROPHILS # BLD AUTO: 7.3 10E9/L (ref 1.6–8.3)
NEUTROPHILS NFR BLD AUTO: 73.1 %
NITRATE UR QL: NEGATIVE
PH UR STRIP: 7.5 PH (ref 5–7)
PLATELET # BLD AUTO: 329 10E9/L (ref 150–450)
RBC # BLD AUTO: 3.53 10E12/L (ref 3.8–5.2)
RBC #/AREA URNS AUTO: ABNORMAL /HPF
SOURCE: ABNORMAL
SP GR UR STRIP: 1.02 (ref 1–1.03)
UROBILINOGEN UR STRIP-ACNC: 0.2 EU/DL (ref 0.2–1)
WBC # BLD AUTO: 10 10E9/L (ref 4–11)
WBC #/AREA URNS AUTO: >100 /HPF

## 2018-07-10 PROCEDURE — 85652 RBC SED RATE AUTOMATED: CPT | Performed by: INTERNAL MEDICINE

## 2018-07-10 PROCEDURE — 84156 ASSAY OF PROTEIN URINE: CPT | Performed by: INTERNAL MEDICINE

## 2018-07-10 PROCEDURE — 86140 C-REACTIVE PROTEIN: CPT | Performed by: INTERNAL MEDICINE

## 2018-07-10 PROCEDURE — 85025 COMPLETE CBC W/AUTO DIFF WBC: CPT | Performed by: INTERNAL MEDICINE

## 2018-07-10 PROCEDURE — 36415 COLL VENOUS BLD VENIPUNCTURE: CPT | Performed by: INTERNAL MEDICINE

## 2018-07-10 PROCEDURE — 81001 URINALYSIS AUTO W/SCOPE: CPT | Performed by: INTERNAL MEDICINE

## 2018-07-10 PROCEDURE — 86225 DNA ANTIBODY NATIVE: CPT | Performed by: INTERNAL MEDICINE

## 2018-07-10 PROCEDURE — 82040 ASSAY OF SERUM ALBUMIN: CPT | Performed by: INTERNAL MEDICINE

## 2018-07-10 PROCEDURE — 99000 SPECIMEN HANDLING OFFICE-LAB: CPT | Performed by: INTERNAL MEDICINE

## 2018-07-10 PROCEDURE — 87086 URINE CULTURE/COLONY COUNT: CPT | Performed by: INTERNAL MEDICINE

## 2018-07-10 PROCEDURE — 84450 TRANSFERASE (AST) (SGOT): CPT | Performed by: INTERNAL MEDICINE

## 2018-07-10 PROCEDURE — 86160 COMPLEMENT ANTIGEN: CPT | Performed by: INTERNAL MEDICINE

## 2018-07-10 PROCEDURE — 84460 ALANINE AMINO (ALT) (SGPT): CPT | Performed by: INTERNAL MEDICINE

## 2018-07-10 PROCEDURE — 86162 COMPLEMENT TOTAL (CH50): CPT | Mod: 90 | Performed by: INTERNAL MEDICINE

## 2018-07-10 PROCEDURE — 82565 ASSAY OF CREATININE: CPT | Performed by: INTERNAL MEDICINE

## 2018-07-10 NOTE — LETTER
Patient:  Monica Puckett  :   1995  MRN:     [id number]        Ms.Hailey Puckett  [address] [facility] [address]        2018    Dear ,    We are writing to inform you of your test results. High ESR/CRP, drop in CH50 and increased urine protein is concerning for lupus flare up. Has prednisone helped? Please keep your appointment with nephrology tomorrow to decide about prednisone taper.      Results for orders placed or performed in visit on 07/10/18   ALT   Result Value Ref Range    ALT 18 0 - 50 U/L   Albumin level   Result Value Ref Range    Albumin 2.9 (L) 3.4 - 5.0 g/dL   AST   Result Value Ref Range    AST 15 0 - 45 U/L   CBC with platelets differential   Result Value Ref Range    WBC 10.0 4.0 - 11.0 10e9/L    RBC Count 3.53 (L) 3.8 - 5.2 10e12/L    Hemoglobin 11.2 (L) 11.7 - 15.7 g/dL    Hematocrit 33.6 (L) 35.0 - 47.0 %    MCV 95 78 - 100 fl    MCH 31.7 26.5 - 33.0 pg    MCHC 33.3 31.5 - 36.5 g/dL    RDW 13.0 10.0 - 15.0 %    Platelet Count 329 150 - 450 10e9/L    Diff Method Automated Method     % Neutrophils 73.1 %    % Lymphocytes 16.4 %    % Monocytes 7.5 %    % Eosinophils 2.9 %    % Basophils 0.1 %    Absolute Neutrophil 7.3 1.6 - 8.3 10e9/L    Absolute Lymphocytes 1.6 0.8 - 5.3 10e9/L    Absolute Monocytes 0.8 0.0 - 1.3 10e9/L    Absolute Eosinophils 0.3 0.0 - 0.7 10e9/L    Absolute Basophils 0.0 0.0 - 0.2 10e9/L   Creatinine   Result Value Ref Range    Creatinine 0.52 0.52 - 1.04 mg/dL    GFR Estimate >90 >60 mL/min/1.7m2    GFR Estimate If Black >90 >60 mL/min/1.7m2   Complement C4   Result Value Ref Range    Complement C4 38 15 - 50 mg/dL   Complement C3   Result Value Ref Range    Complement C3 148 76 - 169 mg/dL   CRP inflammation   Result Value Ref Range    CRP Inflammation 13.0 (H) 0.0 - 8.0 mg/L   Erythrocyte sedimentation rate auto   Result Value Ref Range    Sed Rate 41 (H) 0 - 20 mm/h   DNA double stranded antibodies   Result Value Ref Range    DNA-ds 1  <10 IU/mL   Protein  random urine with Creat Ratio   Result Value Ref Range    Protein Random Urine 0.64 g/L    Protein Total Urine g/gr Creatinine 0.99 (H) 0 - 0.2 g/g Cr   Complement total   Result Value Ref Range    Complement CH50 Total 51 (L) 60 - 144  Units   *UA reflex to Microscopic and Culture ([place] and [facility] (except [place] and [place])   Result Value Ref Range    Color Urine Yellow     Appearance Urine Turbid     Glucose Urine Negative NEG^Negative mg/dL    Bilirubin Urine Negative NEG^Negative    Ketones Urine Negative NEG^Negative mg/dL    Specific Gravity Urine 1.020 1.003 - 1.035    Blood Urine Moderate (A) NEG^Negative    pH Urine 7.5 (H) 5.0 - 7.0 pH    Protein Albumin Urine 100 (A) NEG^Negative mg/dL    Urobilinogen Urine 0.2 0.2 - 1.0 EU/dL    Nitrite Urine Negative NEG^Negative    Leukocyte Esterase Urine Large (A) NEG^Negative    Source Midstream Urine    Creatinine urine calculation only   Result Value Ref Range    Creatinine Urine 65 mg/dL   Urine Microscopic   Result Value Ref Range    WBC Urine >100 (A) OTO5^0 - 5 /HPF    RBC Urine 25-50 (A) OTO2^O - 2 /HPF    Bacteria Urine Many (A) NEG^Negative /HPF   Urine Culture Aerobic Bacterial   Result Value Ref Range    Specimen Description Midstream Urine     Culture Micro No growth        Chente Reina MD

## 2018-07-11 ENCOUNTER — OFFICE VISIT (OUTPATIENT)
Dept: RHEUMATOLOGY | Facility: CLINIC | Age: 23
End: 2018-07-11
Attending: INTERNAL MEDICINE
Payer: COMMERCIAL

## 2018-07-11 ENCOUNTER — MYC MEDICAL ADVICE (OUTPATIENT)
Dept: RHEUMATOLOGY | Facility: CLINIC | Age: 23
End: 2018-07-11

## 2018-07-11 VITALS
WEIGHT: 147.2 LBS | OXYGEN SATURATION: 96 % | HEART RATE: 78 BPM | SYSTOLIC BLOOD PRESSURE: 110 MMHG | BODY MASS INDEX: 23.66 KG/M2 | HEIGHT: 66 IN | DIASTOLIC BLOOD PRESSURE: 68 MMHG

## 2018-07-11 DIAGNOSIS — M32.9 SYSTEMIC LUPUS ERYTHEMATOSUS, UNSPECIFIED SLE TYPE, UNSPECIFIED ORGAN INVOLVEMENT STATUS (H): Primary | ICD-10-CM

## 2018-07-11 LAB
ALBUMIN SERPL-MCNC: 2.9 G/DL (ref 3.4–5)
ALT SERPL W P-5'-P-CCNC: 18 U/L (ref 0–50)
AST SERPL W P-5'-P-CCNC: 15 U/L (ref 0–45)
BACTERIA SPEC CULT: NO GROWTH
CREAT SERPL-MCNC: 0.52 MG/DL (ref 0.52–1.04)
CREAT UR-MCNC: 65 MG/DL
CRP SERPL-MCNC: 13 MG/L (ref 0–8)
GFR SERPL CREATININE-BSD FRML MDRD: >90 ML/MIN/1.7M2
PROT UR-MCNC: 0.64 G/L
PROT/CREAT 24H UR: 0.99 G/G CR (ref 0–0.2)
SPECIMEN SOURCE: NORMAL

## 2018-07-11 PROCEDURE — G0463 HOSPITAL OUTPT CLINIC VISIT: HCPCS | Mod: ZF

## 2018-07-11 RX ORDER — PREDNISONE 20 MG/1
20 TABLET ORAL DAILY
Qty: 30 TABLET | Refills: 1 | Status: SHIPPED | OUTPATIENT
Start: 2018-07-11 | End: 2018-11-14

## 2018-07-11 ASSESSMENT — PAIN SCALES - GENERAL: PAINLEVEL: SEVERE PAIN (7)

## 2018-07-11 NOTE — LETTER
July 11, 2018    Monica Puckett  [address]  [facility] [address]      To Whom It May Concern:    Monica Puckett is a patient of mine who has Systemic Lupus Erythematosus(SLE).   She is currently having a flare of her SLE and is being treated for this flare.  Please excuse her from work she her next appointment with me in a month.  If her condition improves before then, she will return to work as able.    Sincerely,    Chente Reina MD    Division of Rheumatic and Autoimmune Diseases

## 2018-07-11 NOTE — MR AVS SNAPSHOT
After Visit Summary   7/11/2018    Monica Puckett    MRN: [id number]           Patient Information     Date Of Birth          1995        Visit Information        Provider Department      7/11/2018 10:00 AM Chente Reina MD [facility] Rheumatology        Today's Diagnoses     Systemic lupus erythematosus, unspecified SLE type, unspecified organ involvement status (H)    -  1      Care Instructions    Prednisone 20 mg a day till I get all test results and get nephrology evaluation    Will send a Rx for Phenazopyridine to help pain if it is safe in pregnancy        Return in a month 8/8 11:15 am add on                  Follow-ups after your visit        Your next 10 appointments already scheduled     Aug 08, 2018 11:15 AM CDT   (Arrive by 11:00 AM)   RETURN LUPUS with Chente Reina MD   [facility] Rheumatology ([facility])    [address] [place] [address]  [address]  [facility] [address] [phone]              Who to contact     If you have questions or need follow up information about today's clinic visit or your schedule please contact [facility] RHEUMATOLOGY directly at [phone].  Normal or non-critical lab and imaging results will be communicated to you by [employer], letter or phone within 4 business days after the clinic has received the results. If you do not hear from us within 7 days, please contact the clinic through [employer] or phone. If you have a critical or abnormal lab result, we will notify you by phone as soon as possible.  Submit refill requests through [employer] or call your pharmacy and they will forward the refill request to us. Please allow 3 business days for your refill to be completed.          Additional Information About Your Visit        MindmancerharZENTICKET Information     [employer] gives you secure access to your electronic health record. If you see a primary care provider, you can also send messages to your care team and make appointments. If you have  "questions, please call your primary care clinic.  If you do not have a primary care provider, please call [phone] and they will assist you.        Care EveryWhere ID     This is your Care EveryWhere ID. This could be used by other organizations to access your [facility] medical records  [id number]        Your Vitals Were     Pulse Height Last Period Pulse Oximetry BMI (Body Mass Index)       78 1.676 m (5' 6\") 02/12/2018 96% 23.76 kg/m2        Blood Pressure from Last 3 Encounters:   07/11/18 110/68   03/23/18 119/76   09/14/17 104/45    Weight from Last 3 Encounters:   07/11/18 66.8 kg (147 lb 3.2 oz)   03/23/18 64 kg (141 lb 3.2 oz)   09/13/17 65.8 kg (145 lb)              Today, you had the following     No orders found for display         Today's Medication Changes          These changes are accurate as of 7/11/18 12:09 PM.  If you have any questions, ask your nurse or doctor.               These medicines have changed or have updated prescriptions.        Dose/Directions    * predniSONE 5 MG tablet   Commonly known as:  DELTASONE   This may have changed:  Another medication with the same name was added. Make sure you understand how and when to take each.   Used for:  Systemic lupus erythematosus, unspecified SLE type, unspecified organ involvement status (H)   Changed by:  Chente Reina MD        4tab=20 mg qd x 5 days, 3tab=15 mg qd x 5 days, 2tab=10 mg qd x 5 days, 1 tab=5 mg qd x 5 days then stop.   Quantity:  50 tablet   Refills:  0       * predniSONE 20 MG tablet   Commonly known as:  DELTASONE   This may have changed:  You were already taking a medication with the same name, and this prescription was added. Make sure you understand how and when to take each.   Used for:  Systemic lupus erythematosus, unspecified SLE type, unspecified organ involvement status (H)   Changed by:  Chente Reina MD        Dose:  20 mg   Take 1 tablet (20 mg) by mouth daily   Quantity:  30 tablet   Refills:  1    "    * Notice:  This list has 2 medication(s) that are the same as other medications prescribed for you. Read the directions carefully, and ask your doctor or other care provider to review them with you.         Where to get your medicines      These medications were sent to [facility] #[id number] - [place], [place] - [address]  [address], [facility] [address]     Phone:  [phone]     predniSONE 20 MG tablet                Primary Care Provider Office Phone # Fax #    Una Canales [phone]       [facility] [address]  [address] [facility] [address]        Equal Access to Services     : Hadii aad ku hadasho Soomaali, waaxda luqadaha, qaybta kaalmada adeegyada, kiesha greer adebrigitte molina . So [facility] [phone].    ATENCIÓN: Si habla español, tiene a napoles disposición servicios gratuitos de asistencia lingüística. Ll[facility] [phone].    We comply with applicable federal civil rights laws and [place] laws. We do not discriminate on the basis of race, color, national origin, age, disability, sex, sexual orientation, or gender identity.            Thank you!     Thank you for choosing [facility] RHEUMATOLOGY  for your care. Our goal is always to provide you with excellent care. Hearing back from our patients is one way we can continue to improve our services. Please take a few minutes to complete the written survey that you may receive in the mail after your visit with us. Thank you!             Your Updated Medication List - Protect others around you: Learn how to safely use, store and throw away your medicines at www.disposemymeds.org.          This list is accurate as of 7/11/18 12:09 PM.  Always use your most recent med list.                   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    Systemic lupus erythematosus, unspecified SLE type, unspecified organ involvement status (H)       * predniSONE 20 MG tablet    DELTASONE    30 tablet    Take 1 tablet (20 mg) by mouth daily    Systemic lupus erythematosus, unspecified SLE type, unspecified organ involvement status (H)       prenatal multivitamin plus iron 27-0.8 MG Tabs per tablet      Take 1 tablet by mouth daily        VALTREX PO      Take 500 mg by mouth as needed        * Notice:  This list has 4 medication(s) that are the same as other medications prescribed for you. Read the directions carefully, and ask your doctor or other care provider to review them with you.

## 2018-07-11 NOTE — PROGRESS NOTES
[facility] - Rheumatology Clinic Visit (urgent visit with concern for lupus cystitis, lupus flare during pregnancy)     Monica Puckett MRN# [id number]   YOB: 1995    Primary care provider: Una Canales  2018          Assessment and Plan:     Problem list:  # SLE w/ LN class V (diagnosed 2013 age 16 y/o), features of pleuritic chest pain, malar rash/photosensitivity, fatigue, myalgias, arthralgias, oral ulcers, significant proteinuria w/ total urine Pr/Cr to 5 (2013), with + AWILDA (1:160 speckled pattern, repeat 10/2016 neg), +RNP (48.7), previously +ds-DNA (10/2016 neg) and +Sm (10/2016 neg), normal C3/C4, chronic mild leukopenia  # perceived poor tolerance to cellcept - GI upset. Discontinued ~  2016 (about 2-3 months prior to conception)  # mild leukopenia (3.7), normal differential  # Prior treatment: high dose steroids (mostly off steroids since ), oral CYC (~2013 - 2013), HCQ (2016 - 2016, now 10/2016 - current), Cellcept (~2013 - 2016), RTX (?2014 and 2015)    Discussion:  Pt  with history of SLE with LN class V diagnosed 2013 most recently managed on HCQ. 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 She is currently 22 weeks pregnant and expected due date is 2018.     She also has a number of other symptoms, including extreme fatigue, upper respiratory symptoms, malar rash, knee pain, dizziness, memory fog, severe bilateral hand pain, constipation, and bladder spasms. She has continued taking her plaquenil 400 mg daily.    Several ED visits for severe dysuria, no confirmed UTI. Lupus cystitis is a rare manifestation of SLE but it's a possible explanation for pt's sx.     Pt has other sx  of lupus flare up and will treat as lupus flare to see if bladder sx improve. Lupus labs have been ordered, will follow results. Should see nephrology with concern for lupus nephritis flare, no concern for pre-eclampsia given NL BP.    Plan:  - Will add 20 mg prednisone daily until she sees nephrology (referral was placed), prednisone is safe in pregnancy but BP and BG should be monitored closely  - We can consider imuran down the line as steroid sparing agent if can't come off prednisone, AZA is safe in pregnancy   - Phenazopyridine 200 mg TID x 2 days for pain with urination is an option, will check with our pharmacist to make sure it's safe in pregnancy (according to uptodate, should be safe)  - Will follow up with the patients bloodwork, majority of which is currently pending and decide on prednisone taper pending test results, pt's response, renal appointment  -Was reminded to have annual eye exam on HCQ  -Recommend to be off work till pain is under better control    RTC 8/8/2018    Discussed with Dr Reina.    Chava Smiley DO PGY-3    Attending Note: I saw and evaluated the patient with Dr. Smiley. I agree with the assessment and plan.    Chente Reina MD                Active Problem List:     Patient Active Problem List    Diagnosis Date Noted     Long-term use of Plaquenil 10/19/2017     Priority: Medium     Systemic lupus erythematosus (H) 08/16/2013     Priority: Medium     Observation after surgery 01/18/2013     Priority: Medium     Pain in joint, shoulder region 01/14/2011     Priority: Medium   Acne  Dysmenorrhea           History of Present Illness:     Patient is a 21 year old female referred by rheumatologist Dr. Ambar Muniz from [facility] for SLE and high risk pregnancy. The patient has a history of SLE with stage V LN (diagnosed age 17).   She reportedly developed a flu-like illness 11/2012 and had several UC visits from 11/2012 to 1/2013. She presented to the ED in 1/2013 after  "waking up with an erythematous facial rash and swelling of her face which was initially thought to be angioedema. She notes at the time experiencing fevers, fatigue, oral ulcer, and pleuritic chest pain. Outpatient workup was initiated and she was found to have a +AWILDA, +ds-DNA, +Sm and significant proteinuria. She was hospitalized at [facility] and underwent a renal biopsy which reportedly showed LN class V. She was referred to pediatric rheumatology and has since followed with Dr. Wyatt from 1/2013 until last year. She was previously seen by Dr. Yeh at  Pediatric nephrology, but for the past 2 years or so she has not seen a nephrologist. She was initially treated with high dose steroids (solumedrol 1g, unclear how many days), HCQ, and prednisone and reports being placed on \"chemotherapy\" pill which she thinks was cyclophosphamide for the first 4-5 months (+ a GnRH agonist). Per media tab scanned notes it appears she was given oral cyclophosphamide 50 mg tablets, but unclear dose. Notes she was then put back on prednisone 75 mg daily and was transitioned to cellcept 1000 mg BID. Notes she received RTX infusions (unclear dose and number) for what sounds like 2 cycles, last cycle received around 1/2015. She states the RTX was given due to \"flares\" of her lupus which included significant arthralgias, fatigue, skin rashes, and \"abnormal blood tests.\" She thinks her last flare was at least 2 years ago and that her lupus has otherwise been very well controlled. She self-stopped both her HCQ and cellcept about 2-3 months ago due to some GI upset that got to be \"annyoing.\"     She had a pregnancy test that resulted positive and she was referred back to rheumatology. She was evaluated by Dr. Muniz from [facility] adult [facility] where extensive workup including repeat blood tests w/ serology, kidney function, UA were obtained, which all resulted mostly normal other than +RNP. Her AWILDA, ds-DNA, C3/C4, " "SSA/SSB, Sm were all negative. UA was negative for protein, blood, or WBC. No total urine Pr/Cr ratio was obtained, but per chart review, this was last >0.5 g in 2/2013. She was told to restart her HCQ at 400 mg QD and was referred to lupus clinic here at the [facility].     She notes very intermittent arthralgias of her hands \"just here and there,\" some fatigue (which she attributes to being pregnant), and some lower abdominal cramping pain that is intermittent and severe for the past 2-3 weeks lasting seconds to 3 minutes occuring 2x per day. She had a TVUS yesterday which showed a normal gestational sac measuring around 5 weeks. She has not yet seen an OB. Estimated due date June 24/2016.     2/8/2017: Patient was hospitalized 4 weeks ago for bronchitis/severe cough that was unresponsive to antibiotics. There was some concern for DVT at that time but ultrasound was negative. Patient has been having intermittent malar rash along with intermittent swelling, myalgias and arthralgias that are predominantly in the hands but also in the ankles. Patient feels that her energy level has been about the same as previous. Chest pain is not bad currently, but has recently been increased, associated with cough. Negative for oral ulcers. Patient was recently seen several days ago in [facility] ED for medication overdose but she threw up most of the pills and was discharged to [facility] the same day. Patient has been taking her Plaquenil daily but has forgotten every once in a while.     6/2017: She delivered her son (arianna) on 6/19/2017, had NVD with no complications. She is doing both breast feeding and formula.   Reports throbbing pain over L flank since delivery. Pain is not improving, percocet helps to ease the pain.  Hands are puffy and swollen and gets gelling. AM stiffness is 10-15 minutes.  No oral ulcers. No skin rash today but had the malar rash intermittently.  No hair loss.  No pleuritic CP.  Ran out of HCQ 4 days " ago.    3/2018: She reports she recently found out she was pregnant. Her LMP was 2/12 giving an estimated delivery date of 11/19/2018. She is currently ~5 weeks gestational age. She has been feeling lightheaded and fatigued. She had a runny nose and cough for the past two weeks, which she says is finally improved. She reports a mild rash on her face. She says her joint pain fluctuates, today it is not too bad. She feels it most in her wrists and knees. She has felt some L abd pain. She denies dysuria.    7/2018: Today, Ms. Puckett is currently 22 weeks pregnant with a expected delivery date of November 20th, 2018. She complains of extreme pain with urination and right sided flank pain. The patient says the symptoms have been present for approximately 3 months. She has had 4 visits to the ED during this time for these symptoms, one of those times resulting in a 3 day admission where she was started on IV antibiotics and sent home with a catheter to use for a week. She feels the same symptoms now, except that the flank pain has began to appear in her left side as well, though not as excruciatingly painful as the right side. She says she has been given numerous antibiotics and the work up for this has been negative for UTI multiple times. There is concern for lupus cystitis from her OBGYN at this point as well. She also complains of DIP swelling, return of her malar rash, vomiting, low grade fever, increased sensation of bladder pressure, dizziness, URI-like symptoms, SOB due to pain with inhalation from her flank, and bladder spasms. She is crying currently from the pain at the time of this examination. Her UA from this visit shows a large number of proteins. UA also shows >100 WBC and many bacteria, there is a culture that is currently pending among other lab work.          Review of Systems:   Complete ROS negative except for symptoms mentioned in the HPI   General: fatigue, generalized aches and pains throughout her  body, low grade fevers  Eye: normal  ENT: URI-like symptoms  CV: pleuritic chest pain, no palpitations  Resp: SOB from inhalation due to the flank pain  GI: Constipation- last BM was yesterday, but the one before that was 3 days prior  : severe right flank pain and pain/blood with urination. She also has 2 week history of left sided flank pain  MSK: generalized pain thoughout all of her joints. Swelling of her DIP's bilaterally. Knee pain. There is also numbness of her 1st-3rd digits in both hands bilaterally  Skin: malar rash reappearing on her face, but no other rashes  Endocrine: increased fatigues to the point she can only function 2 hours out of the day.  Neuro: dizziness, numbness of the 1st-3rd digits in both hands bilaterally  Psych: anxiety, depression           Past Medical History:   PMH - acne, dysmenorrhea, SLE  PSH - wisdom tooth extraction, renal biopsy 2/2013  Injuries - prior fracture         Social History:     Social History     Occupational History     Not on file.     Social History Main Topics     Smoking status: Never Smoker     Smokeless tobacco: Never Used      Comment: no second hand smoke exposure at home     Alcohol use No     Drug use: No     Sexual activity: Yes   Working as a , currently does not qualify for FMLA as she just started in her position         Family History:   Mother - arthritis, likely OA  MGM - arthritis, likely OA         Allergies:   Estrogens  Influenza tri-split 08-09 vac  Measles/mumps/rubella vacc  Varicella virus vacc live         Medications:     Current Outpatient Prescriptions   Medication Sig Dispense Refill     hydroxychloroquine (PLAQUENIL) 200 MG tablet Take 2 tablets (400 mg) by mouth daily 60 tablet 11     ondansetron (ZOFRAN ODT) 4 MG ODT tab Take 1 tablet (4 mg) by mouth every 8 hours as needed for nausea 10 tablet 0     predniSONE (DELTASONE) 20 MG tablet Take 1 tablet (20 mg) by mouth daily 30 tablet 1     Prenatal Vit-Fe Fumarate-FA  "(PRENATAL MULTIVITAMIN  PLUS IRON) 27-0.8 MG TABS Take 1 tablet by mouth daily       Amphetamine-Dextroamphetamine (ADDERALL PO) Take 20 mg by mouth daily       Amphetamine-Dextroamphetamine (ADDERALL XR PO) Take 20 mg by mouth daily       doxylamine (UNISOM) 25 MG TABS tablet Take 25 mg by mouth nightly as needed       Omega-3 Fatty Acids (FISH OIL PO) Take 1 tablet by mouth daily Fish oil with DHA       predniSONE (DELTASONE) 5 MG tablet 4tab=20 mg qd x 5 days, 3tab=15 mg qd x 5 days, 2tab=10 mg qd x 5 days, 1 tab=5 mg qd x 5 days then stop. (Patient not taking: Reported on 3/23/2018) 50 tablet 0     ValACYclovir HCl (VALTREX PO) Take 500 mg by mouth as needed              Physical Exam:   Height 1.676 m (5' 6\"), weight 66.8 kg (147 lb 3.2 oz), last menstrual period 02/12/2018, not currently breastfeeding.  Wt Readings from Last 4 Encounters:   07/11/18 66.8 kg (147 lb 3.2 oz)   03/23/18 64 kg (141 lb 3.2 oz)   09/13/17 65.8 kg (145 lb)   06/28/17 69.4 kg (153 lb 1.6 oz)     BP Readings from Last 3 Encounters:   07/11/18 110/68   03/23/18 119/76   09/14/17 104/45       Constitutional: in pain, NAD  Eyes: PERRLA, normal conjunctiva, sclera  ENT: nl external ears, nose, lips. No mucous membrane lesions, normal saliva pool  Neck: no cervical or supraclavicular lymphadenopathy  Resp: Lungs clear to ausculation,  CV: RRR, no m/r/g, no LE edema  GI: did not allow examination due to L flank pain  Lymph: no cervical, supraclavicular nodes  MS: no active synovitis or joint tenderness or joint deformities,  Skin: +malar rash  Psych: crying during her examination due to the pain          Data:     Component      Latest Ref Rng & Units 6/6/2017   WBC      4.0 - 11.0 10e9/L 9.2   RBC Count      3.8 - 5.2 10e12/L 3.58 (L)   Hemoglobin      11.7 - 15.7 g/dL 11.4 (L)   Hematocrit      35.0 - 47.0 % 33.6 (L)   MCV      78 - 100 fl 94   MCH      26.5 - 33.0 pg 31.8   MCHC      31.5 - 36.5 g/dL 33.9   RDW      10.0 - 15.0 % 12.5 "   Platelet Count      150 - 450 10e9/L 290   Diff Method       Automated Method   % Neutrophils      % 77.8   % Lymphocytes      % 14.9   % Monocytes      % 6.7   % Eosinophils      % 0.5   % Basophils      % 0.1   Absolute Neutrophil      1.6 - 8.3 10e9/L 7.1   Absolute Lymphocytes      0.8 - 5.3 10e9/L 1.4   Absolute Monocytes      0.0 - 1.3 10e9/L 0.6   Absolute Eosinophils      0.0 - 0.7 10e9/L 0.1   Absolute Basophils      0.0 - 0.2 10e9/L 0.0   Color Urine       Yellow   Appearance Urine       Clear   Glucose Urine      NEG mg/dL Negative   Bilirubin Urine      NEG Negative   Ketones Urine      NEG mg/dL Negative   Specific Gravity Urine      1.003 - 1.035 1.020   Blood Urine      NEG Negative   pH Urine      5.0 - 7.0 pH 5.5   Protein Albumin Urine      NEG mg/dL Negative   Urobilinogen Urine      0.2 - 1.0 EU/dL 0.2   Nitrite Urine      NEG Negative   Leukocyte Esterase Urine      NEG Negative   Source       Midstream Urine   Creatinine      0.52 - 1.04 mg/dL 0.50 (L)   GFR Estimate      >60 mL/min/1.7m2 >90 . . .   GFR Estimate If Black      >60 mL/min/1.7m2 >90 . . .   Protein Random Urine      g/L 0.14   Protein Total Urine g/gr Creatinine      0 - 0.2 g/g Cr 0.12   Albumin      3.4 - 5.0 g/dL 2.9 (L)   ALT      0 - 50 U/L 16   AST      0 - 45 U/L 18   Complement C3      76 - 169 mg/dL 153   Complement C4      15 - 50 mg/dL 29   CRP Inflammation      0.0 - 8.0 mg/L 3.2   Sed Rate      0 - 20 mm/h 47 (H)   DNA-ds      <10 IU/mL 2   Creatinine Urine Random      mg/dL 130   Uric Acid      2.6 - 6.0 mg/dL 3.1   Complement CH50 Total       38 (L)     Results for orders placed or performed in visit on 07/10/18   CBC with platelets differential   Result Value Ref Range    WBC 10.0 4.0 - 11.0 10e9/L    RBC Count 3.53 (L) 3.8 - 5.2 10e12/L    Hemoglobin 11.2 (L) 11.7 - 15.7 g/dL    Hematocrit 33.6 (L) 35.0 - 47.0 %    MCV 95 78 - 100 fl    MCH 31.7 26.5 - 33.0 pg    MCHC 33.3 31.5 - 36.5 g/dL    RDW 13.0 10.0 -  15.0 %    Platelet Count 329 150 - 450 10e9/L    Diff Method Automated Method     % Neutrophils 73.1 %    % Lymphocytes 16.4 %    % Monocytes 7.5 %    % Eosinophils 2.9 %    % Basophils 0.1 %    Absolute Neutrophil 7.3 1.6 - 8.3 10e9/L    Absolute Lymphocytes 1.6 0.8 - 5.3 10e9/L    Absolute Monocytes 0.8 0.0 - 1.3 10e9/L    Absolute Eosinophils 0.3 0.0 - 0.7 10e9/L    Absolute Basophils 0.0 0.0 - 0.2 10e9/L   Erythrocyte sedimentation rate auto   Result Value Ref Range    Sed Rate 41 (H) 0 - 20 mm/h   *UA reflex to Microscopic and Culture ([place] and [facility] (except [place] and [place])   Result Value Ref Range    Color Urine Yellow     Appearance Urine Turbid     Glucose Urine Negative NEG^Negative mg/dL    Bilirubin Urine Negative NEG^Negative    Ketones Urine Negative NEG^Negative mg/dL    Specific Gravity Urine 1.020 1.003 - 1.035    Blood Urine Moderate (A) NEG^Negative    pH Urine 7.5 (H) 5.0 - 7.0 pH    Protein Albumin Urine 100 (A) NEG^Negative mg/dL    Urobilinogen Urine 0.2 0.2 - 1.0 EU/dL    Nitrite Urine Negative NEG^Negative    Leukocyte Esterase Urine Large (A) NEG^Negative    Source Midstream Urine    Urine Microscopic   Result Value Ref Range    WBC Urine >100 (A) OTO5^0 - 5 /HPF    RBC Urine 25-50 (A) OTO2^O - 2 /HPF    Bacteria Urine Many (A) NEG^Negative /HPF   Urine Culture Aerobic Bacterial   Result Value Ref Range    Specimen Description Midstream Urine     Culture Micro No growth      US OB 10/25/2016:  1. Gestational sac present measuring 5 weeks and 6 days..  2. No adnexal masses are seen.    [facility]  Positive/abnormal:  AWILDA 1/2013 1:160 speckled, repeat AWILDA neg 10/24/2016  RNP 48.7  CBC - WBC 3.7 w/ normal diff  ESR 45 (4/2013), no repeat  Negative/normal:  Bustos (reportedly initially +)  SSA, SSB  ANCA  Lupus anticoagulant screen abnormal, but confirmatory testing negative  Cardiolipin IgA/G/M neg  Beta 2 GP1 Ab IgA/G/M neg  C3/C4  (never abnormal)  Cr - 0.68, lytes normal   CK  HCV  HIV  HLA-B27  LFT's normal  Quant gold negative  RF negative x2  Anti-CCP negative x2  TSH   RPR  Prior Lyme  TTG  Ds-DNA neg x6 (reportedly initially +)  UA - trace LE, otherwise negative for protein/blood/WBC  Hgb, plts  CRP < 2.9

## 2018-07-11 NOTE — LETTER
2018      RE: Monica Puckett  [id number]  [facility] [address]       [facility] - Rheumatology Clinic Visit (urgent visit with concern for lupus cystitis, lupus flare during pregnancy)     Monica Puckett MRN# [id number]   YOB: 1995    Primary care provider: Una Canales  2018          Assessment and Plan:     Problem list:  # SLE w/ LN class V (diagnosed 2013 age 18 y/o), features of pleuritic chest pain, malar rash/photosensitivity, fatigue, myalgias, arthralgias, oral ulcers, significant proteinuria w/ total urine Pr/Cr to 5 (2013), with + AWILDA (1:160 speckled pattern, repeat 10/2016 neg), +RNP (48.7), previously +ds-DNA (10/2016 neg) and +Sm (10/2016 neg), normal C3/C4, chronic mild leukopenia  # perceived poor tolerance to cellcept - GI upset. Discontinued ~  2016 (about 2-3 months prior to conception)  # mild leukopenia (3.7), normal differential  # Prior treatment: high dose steroids (mostly off steroids since ), oral CYC (~2013 - 2013), HCQ (2016 - 2016, now 10/2016 - current), Cellcept (~2013 - 2016), RTX (?2014 and 2015)    Discussion:  Pt  with history of SLE with LN class V diagnosed 2013 most recently managed on HCQ. 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 She is currently 22 weeks pregnant and expected due date is 2018.     She also has a number of other symptoms, including extreme fatigue, upper respiratory symptoms, malar rash, knee pain, dizziness, memory fog, severe bilateral hand pain, constipation, and bladder spasms. She has continued taking her plaquenil 400 mg daily.    Several ED visits for severe dysuria, no confirmed UTI. Lupus cystitis is a rare  manifestation of SLE but it's a possible explanation for pt's sx.     Pt has other sx of lupus flare up and will treat as lupus flare to see if bladder sx improve. Lupus labs have been ordered, will follow results. Should see nephrology with concern for lupus nephritis flare, no concern for pre-eclampsia given NL BP.    Plan:  - Will add 20 mg prednisone daily until she sees nephrology (referral was placed), prednisone is safe in pregnancy but BP and BG should be monitored closely  - We can consider imuran down the line as steroid sparing agent if can't come off prednisone, AZA is safe in pregnancy   - Phenazopyridine 200 mg TID x 2 days for pain with urination is an option, will check with our pharmacist to make sure it's safe in pregnancy (according to uptodate, should be safe)  - Will follow up with the patients bloodwork, majority of which is currently pending and decide on prednisone taper pending test results, pt's response, renal appointment  -Was reminded to have annual eye exam on HCQ  -Recommend to be off work till pain is under better control    RTC 8/8/2018    Discussed with Dr Reina.    Chava Smiley DO PGY-3    Attending Note: I saw and evaluated the patient with Dr. Smiley. I agree with the assessment and plan.    Chente Reina MD                Active Problem List:     Patient Active Problem List    Diagnosis Date Noted     Long-term use of Plaquenil 10/19/2017     Priority: Medium     Systemic lupus erythematosus (H) 08/16/2013     Priority: Medium     Observation after surgery 01/18/2013     Priority: Medium     Pain in joint, shoulder region 01/14/2011     Priority: Medium   Acne  Dysmenorrhea           History of Present Illness:     Patient is a 21 year old female referred by rheumatologist Dr. Ambar [facility] for SLE and high risk pregnancy. The patient has a history of SLE with stage V LN (diagnosed age 17).   She reportedly developed a flu-like illness 11/2012 and  "had several UC visits from 11/2012 to 1/2013. She presented to the ED in 1/2013 after waking up with an erythematous facial rash and swelling of her face which was initially thought to be angioedema. She notes at the time experiencing fevers, fatigue, oral ulcer, and pleuritic chest pain. Outpatient workup was initiated and she was found to have a +AWILDA, +ds-DNA, +Sm and significant proteinuria. She was hospitalized at [facility] and underwent a renal biopsy which reportedly showed LN class V. She was referred to pediatric rheumatology and has since followed with Dr. Wyatt from 1/2013 until last year. She was previously seen by Dr. Yeh at  Pediatric nephrology, but for the past 2 years or so she has not seen a nephrologist. She was initially treated with high dose steroids (solumedrol 1g, unclear how many days), HCQ, and prednisone and reports being placed on \"chemotherapy\" pill which she thinks was cyclophosphamide for the first 4-5 months (+ a GnRH agonist). Per media tab scanned notes it appears she was given oral cyclophosphamide 50 mg tablets, but unclear dose. Notes she was then put back on prednisone 75 mg daily and was transitioned to cellcept 1000 mg BID. Notes she received RTX infusions (unclear dose and number) for what sounds like 2 cycles, last cycle received around 1/2015. She states the RTX was given due to \"flares\" of her lupus which included significant arthralgias, fatigue, skin rashes, and \"abnormal blood tests.\" She thinks her last flare was at least 2 years ago and that her lupus has otherwise been very well controlled. She self-stopped both her HCQ and cellcept about 2-3 months ago due to some GI upset that got to be \"annyoing.\"     She had a pregnancy test that resulted positive and she was referred back to rheumatology. She was evaluated by Dr. Muniz from [facility] adult rheumatology where extensive workup including repeat blood tests w/ serology, kidney function, UA were " "obtained, which all resulted mostly normal other than +RNP. Her AWILDA, ds-DNA, C3/C4, SSA/SSB, Sm were all negative. UA was negative for protein, blood, or WBC. No total urine Pr/Cr ratio was obtained, but per chart review, this was last >0.5 g in 2/2013. She was told to restart her HCQ at 400 mg QD and was referred to lupus clinic here at the [facility].     She notes very intermittent arthralgias of her hands \"just here and there,\" some fatigue (which she attributes to being pregnant), and some lower abdominal cramping pain that is intermittent and severe for the past 2-3 weeks lasting seconds to 3 minutes occuring 2x per day. She had a TVUS yesterday which showed a normal gestational sac measuring around 5 weeks. She has not yet seen an OB. Estimated due date June 24/2016.     2/8/2017: Patient was hospitalized 4 weeks ago for bronchitis/severe cough that was unresponsive to antibiotics. There was some concern for DVT at that time but ultrasound was negative. Patient has been having intermittent malar rash along with intermittent swelling, myalgias and arthralgias that are predominantly in the hands but also in the ankles. Patient feels that her energy level has been about the same as previous. Chest pain is not bad currently, but has recently been increased, associated with cough. Negative for oral ulcers. Patient was recently seen several days ago in [facility] ED for medication overdose but she threw up most of the pills and was discharged to Mom the same day. Patient has been taking her Plaquenil daily but has forgotten every once in a while.     6/2017: She delivered her son (arianna) on 6/19/2017, had NVD with no complications. She is doing both breast feeding and formula.   Reports throbbing pain over L flank since delivery. Pain is not improving, percocet helps to ease the pain.  Hands are puffy and swollen and gets gelling. AM stiffness is 10-15 minutes.  No oral ulcers. No skin rash today but had the malar " rash intermittently.  No hair loss.  No pleuritic CP.  Ran out of HCQ 4 days ago.    3/2018: She reports she recently found out she was pregnant. Her LMP was 2/12 giving an estimated delivery date of 11/19/2018. She is currently ~5 weeks gestational age. She has been feeling lightheaded and fatigued. She had a runny nose and cough for the past two weeks, which she says is finally improved. She reports a mild rash on her face. She says her joint pain fluctuates, today it is not too bad. She feels it most in her wrists and knees. She has felt some L abd pain. She denies dysuria.    7/2018: Today, Ms. Puckett is currently 22 weeks pregnant with a expected delivery date of November 20th, 2018. She complains of extreme pain with urination and right sided flank pain. The patient says the symptoms have been present for approximately 3 months. She has had 4 visits to the ED during this time for these symptoms, one of those times resulting in a 3 day admission where she was started on IV antibiotics and sent home with a catheter to use for a week. She feels the same symptoms now, except that the flank pain has began to appear in her left side as well, though not as excruciatingly painful as the right side. She says she has been given numerous antibiotics and the work up for this has been negative for UTI multiple times. There is concern for lupus cystitis from her OBGYN at this point as well. She also complains of DIP swelling, return of her malar rash, vomiting, low grade fever, increased sensation of bladder pressure, dizziness, URI-like symptoms, SOB due to pain with inhalation from her flank, and bladder spasms. She is crying currently from the pain at the time of this examination. Her UA from this visit shows a large number of proteins. UA also shows >100 WBC and many bacteria, there is a culture that is currently pending among other lab work.          Review of Systems:   Complete ROS negative except for symptoms  mentioned in the HPI   General: fatigue, generalized aches and pains throughout her body, low grade fevers  Eye: normal  ENT: URI-like symptoms  CV: pleuritic chest pain, no palpitations  Resp: SOB from inhalation due to the flank pain  GI: Constipation- last BM was yesterday, but the one before that was 3 days prior  : severe right flank pain and pain/blood with urination. She also has 2 week history of left sided flank pain  MSK: generalized pain thoughout all of her joints. Swelling of her DIP's bilaterally. Knee pain. There is also numbness of her 1st-3rd digits in both hands bilaterally  Skin: malar rash reappearing on her face, but no other rashes  Endocrine: increased fatigues to the point she can only function 2 hours out of the day.  Neuro: dizziness, numbness of the 1st-3rd digits in both hands bilaterally  Psych: anxiety, depression           Past Medical History:   PMH - acne, dysmenorrhea, SLE  PSH - wisdom tooth extraction, renal biopsy 2/2013  Injuries - prior fracture         Social History:     Social History     Occupational History     Not on file.     Social History Main Topics     Smoking status: Never Smoker     Smokeless tobacco: Never Used      Comment: no second hand smoke exposure at home     Alcohol use No     Drug use: No     Sexual activity: Yes   Working as a , currently does not qualify for FMLA as she just started in her position         Family History:   Mother - arthritis, likely OA  MGM - arthritis, likely OA         Allergies:   Estrogens  Influenza tri-split 08-09 vac  Measles/mumps/rubella vacc  Varicella virus vacc live         Medications:     Current Outpatient Prescriptions   Medication Sig Dispense Refill     hydroxychloroquine (PLAQUENIL) 200 MG tablet Take 2 tablets (400 mg) by mouth daily 60 tablet 11     ondansetron (ZOFRAN ODT) 4 MG ODT tab Take 1 tablet (4 mg) by mouth every 8 hours as needed for nausea 10 tablet 0     predniSONE (DELTASONE) 20 MG tablet  "Take 1 tablet (20 mg) by mouth daily 30 tablet 1     Prenatal Vit-Fe Fumarate-FA (PRENATAL MULTIVITAMIN  PLUS IRON) 27-0.8 MG TABS Take 1 tablet by mouth daily       Amphetamine-Dextroamphetamine (ADDERALL PO) Take 20 mg by mouth daily       Amphetamine-Dextroamphetamine (ADDERALL XR PO) Take 20 mg by mouth daily       doxylamine (UNISOM) 25 MG TABS tablet Take 25 mg by mouth nightly as needed       Omega-3 Fatty Acids (FISH OIL PO) Take 1 tablet by mouth daily Fish oil with DHA       predniSONE (DELTASONE) 5 MG tablet 4tab=20 mg qd x 5 days, 3tab=15 mg qd x 5 days, 2tab=10 mg qd x 5 days, 1 tab=5 mg qd x 5 days then stop. (Patient not taking: Reported on 3/23/2018) 50 tablet 0     ValACYclovir HCl (VALTREX PO) Take 500 mg by mouth as needed              Physical Exam:   Height 1.676 m (5' 6\"), weight 66.8 kg (147 lb 3.2 oz), last menstrual period 02/12/2018, not currently breastfeeding.  Wt Readings from Last 4 Encounters:   07/11/18 66.8 kg (147 lb 3.2 oz)   03/23/18 64 kg (141 lb 3.2 oz)   09/13/17 65.8 kg (145 lb)   06/28/17 69.4 kg (153 lb 1.6 oz)     BP Readings from Last 3 Encounters:   07/11/18 110/68   03/23/18 119/76   09/14/17 104/45       Constitutional: in pain, NAD  Eyes: PERRLA, normal conjunctiva, sclera  ENT: nl external ears, nose, lips. No mucous membrane lesions, normal saliva pool  Neck: no cervical or supraclavicular lymphadenopathy  Resp: Lungs clear to ausculation,  CV: RRR, no m/r/g, no LE edema  GI: did not allow examination due to L flank pain  Lymph: no cervical, supraclavicular nodes  MS: no active synovitis or joint tenderness or joint deformities,  Skin: +malar rash  Psych: crying during her examination due to the pain          Data:     Component      Latest Ref Rng & Units 6/6/2017   WBC      4.0 - 11.0 10e9/L 9.2   RBC Count      3.8 - 5.2 10e12/L 3.58 (L)   Hemoglobin      11.7 - 15.7 g/dL 11.4 (L)   Hematocrit      35.0 - 47.0 % 33.6 (L)   MCV      78 - 100 fl 94   MCH      26.5 - " 33.0 pg 31.8   MCHC      31.5 - 36.5 g/dL 33.9   RDW      10.0 - 15.0 % 12.5   Platelet Count      150 - 450 10e9/L 290   Diff Method       Automated Method   % Neutrophils      % 77.8   % Lymphocytes      % 14.9   % Monocytes      % 6.7   % Eosinophils      % 0.5   % Basophils      % 0.1   Absolute Neutrophil      1.6 - 8.3 10e9/L 7.1   Absolute Lymphocytes      0.8 - 5.3 10e9/L 1.4   Absolute Monocytes      0.0 - 1.3 10e9/L 0.6   Absolute Eosinophils      0.0 - 0.7 10e9/L 0.1   Absolute Basophils      0.0 - 0.2 10e9/L 0.0   Color Urine       Yellow   Appearance Urine       Clear   Glucose Urine      NEG mg/dL Negative   Bilirubin Urine      NEG Negative   Ketones Urine      NEG mg/dL Negative   Specific Gravity Urine      1.003 - 1.035 1.020   Blood Urine      NEG Negative   pH Urine      5.0 - 7.0 pH 5.5   Protein Albumin Urine      NEG mg/dL Negative   Urobilinogen Urine      0.2 - 1.0 EU/dL 0.2   Nitrite Urine      NEG Negative   Leukocyte Esterase Urine      NEG Negative   Source       Midstream Urine   Creatinine      0.52 - 1.04 mg/dL 0.50 (L)   GFR Estimate      >60 mL/min/1.7m2 >90 . . .   GFR Estimate If Black      >60 mL/min/1.7m2 >90 . . .   Protein Random Urine      g/L 0.14   Protein Total Urine g/gr Creatinine      0 - 0.2 g/g Cr 0.12   Albumin      3.4 - 5.0 g/dL 2.9 (L)   ALT      0 - 50 U/L 16   AST      0 - 45 U/L 18   Complement C3      76 - 169 mg/dL 153   Complement C4      15 - 50 mg/dL 29   CRP Inflammation      0.0 - 8.0 mg/L 3.2   Sed Rate      0 - 20 mm/h 47 (H)   DNA-ds      <10 IU/mL 2   Creatinine Urine Random      mg/dL 130   Uric Acid      2.6 - 6.0 mg/dL 3.1   Complement CH50 Total       38 (L)     Results for orders placed or performed in visit on 07/10/18   CBC with platelets differential   Result Value Ref Range    WBC 10.0 4.0 - 11.0 10e9/L    RBC Count 3.53 (L) 3.8 - 5.2 10e12/L    Hemoglobin 11.2 (L) 11.7 - 15.7 g/dL    Hematocrit 33.6 (L) 35.0 - 47.0 %    MCV 95 78 - 100 fl     MCH 31.7 26.5 - 33.0 pg    MCHC 33.3 31.5 - 36.5 g/dL    RDW 13.0 10.0 - 15.0 %    Platelet Count 329 150 - 450 10e9/L    Diff Method Automated Method     % Neutrophils 73.1 %    % Lymphocytes 16.4 %    % Monocytes 7.5 %    % Eosinophils 2.9 %    % Basophils 0.1 %    Absolute Neutrophil 7.3 1.6 - 8.3 10e9/L    Absolute Lymphocytes 1.6 0.8 - 5.3 10e9/L    Absolute Monocytes 0.8 0.0 - 1.3 10e9/L    Absolute Eosinophils 0.3 0.0 - 0.7 10e9/L    Absolute Basophils 0.0 0.0 - 0.2 10e9/L   Erythrocyte sedimentation rate auto   Result Value Ref Range    Sed Rate 41 (H) 0 - 20 mm/h   *UA reflex to Microscopic and Culture ([place] and [facility] (except [place] and [place])   Result Value Ref Range    Color Urine Yellow     Appearance Urine Turbid     Glucose Urine Negative NEG^Negative mg/dL    Bilirubin Urine Negative NEG^Negative    Ketones Urine Negative NEG^Negative mg/dL    Specific Gravity Urine 1.020 1.003 - 1.035    Blood Urine Moderate (A) NEG^Negative    pH Urine 7.5 (H) 5.0 - 7.0 pH    Protein Albumin Urine 100 (A) NEG^Negative mg/dL    Urobilinogen Urine 0.2 0.2 - 1.0 EU/dL    Nitrite Urine Negative NEG^Negative    Leukocyte Esterase Urine Large (A) NEG^Negative    Source Midstream Urine    Urine Microscopic   Result Value Ref Range    WBC Urine >100 (A) OTO5^0 - 5 /HPF    RBC Urine 25-50 (A) OTO2^O - 2 /HPF    Bacteria Urine Many (A) NEG^Negative /HPF   Urine Culture Aerobic Bacterial   Result Value Ref Range    Specimen Description Midstream Urine     Culture Micro No growth      US OB 10/25/2016:  1. Gestational sac present measuring 5 weeks and 6 days..  2. No adnexal masses are seen.    Prior workup through [facility] reviewed 10/26/2016:  Positive/abnormal:  AWILDA 1/2013 1:160 speckled, repeat AWILDA neg 10/24/2016  RNP 48.7  CBC - WBC 3.7 w/ normal diff  ESR 45 (4/2013), no repeat  Negative/normal:  Bustos (reportedly initially +)  SSA, SSB  ANCA  Lupus anticoagulant screen abnormal, but confirmatory  testing negative  Cardiolipin IgA/G/M neg  Beta 2 GP1 Ab IgA/G/M neg  C3/C4 (never abnormal)  Cr - 0.68, lytes normal   CK  HCV  HIV  HLA-B27  LFT's normal  Quant gold negative  RF negative x2  Anti-CCP negative x2  TSH   RPR  Prior Lyme  TTG  Ds-DNA neg x6 (reportedly initially +)  UA - trace LE, otherwise negative for protein/blood/WBC  Hgb, plts  CRP < 2.9          Chente Reina MD

## 2018-07-11 NOTE — PATIENT INSTRUCTIONS
Prednisone 20 mg a day till I get all test results and get nephrology evaluation    Will send a Rx for Phenazopyridine to help pain if it is safe in pregnancy        Return in a month 8/8 11:15 am add on

## 2018-07-12 LAB
C3 SERPL-MCNC: 148 MG/DL (ref 76–169)
C4 SERPL-MCNC: 38 MG/DL (ref 15–50)
DSDNA AB SER-ACNC: 1 IU/ML

## 2018-07-12 NOTE — TELEPHONE ENCOUNTER
Chente Reina MD Lundell, Lindsay M, [facility] Cc: Flavia Mayfield, RN                   Thank you, Flavia sent Rx for 2 days, if prednisone does not help, could try this med for dysuria.       Called and updated pt about what to about medication for urinary discomfort.  She has tried the prednisone and doesn't think that it is helping as she is still having issues urinating and it is still painful.  She is on day 2 of prednisone.      She will likely try other medication tomorrow.    Flavia Mayfield RN  Rheumatology Clinic

## 2018-07-12 NOTE — TELEPHONE ENCOUNTER
I spoke to pt and confirmed that she recieved Rx for prednisone. Also, Pt asked about rx for urinary tract discomfort and confirmed it was sent to pharm this morning.     Salma Rene LPN

## 2018-07-14 LAB — CH50 SERPL-ACNC: 51 CAE UNITS (ref 60–144)

## 2018-07-15 ENCOUNTER — HEALTH MAINTENANCE LETTER (OUTPATIENT)
Age: 23
End: 2018-07-15

## 2018-07-17 ENCOUNTER — TELEPHONE (OUTPATIENT)
Dept: NEPHROLOGY | Facility: CLINIC | Age: 23
End: 2018-07-17

## 2018-07-18 ENCOUNTER — OFFICE VISIT (OUTPATIENT)
Dept: NEPHROLOGY | Facility: CLINIC | Age: 23
End: 2018-07-18
Attending: INTERNAL MEDICINE
Payer: COMMERCIAL

## 2018-07-18 VITALS
OXYGEN SATURATION: 97 % | BODY MASS INDEX: 23.87 KG/M2 | SYSTOLIC BLOOD PRESSURE: 105 MMHG | DIASTOLIC BLOOD PRESSURE: 69 MMHG | WEIGHT: 148.5 LBS | HEART RATE: 85 BPM | TEMPERATURE: 98.1 F | HEIGHT: 66 IN

## 2018-07-18 DIAGNOSIS — O09.90 HIGH-RISK PREGNANCY, UNSPECIFIED TRIMESTER: Primary | ICD-10-CM

## 2018-07-18 DIAGNOSIS — M32.19 OTHER SYSTEMIC LUPUS ERYTHEMATOSUS WITH OTHER ORGAN INVOLVEMENT (H): ICD-10-CM

## 2018-07-18 DIAGNOSIS — M32.9 SYSTEMIC LUPUS ERYTHEMATOSUS, UNSPECIFIED SLE TYPE, UNSPECIFIED ORGAN INVOLVEMENT STATUS (H): Primary | ICD-10-CM

## 2018-07-18 DIAGNOSIS — M32.19 SYSTEMIC LUPUS ERYTHEMATOSUS WITH OTHER ORGAN INVOLVEMENT, UNSPECIFIED SLE TYPE (H): ICD-10-CM

## 2018-07-18 LAB
ALBUMIN UR-MCNC: 100 MG/DL
APPEARANCE UR: ABNORMAL
BACTERIA #/AREA URNS HPF: ABNORMAL /HPF
BILIRUB UR QL STRIP: NEGATIVE
COLOR UR AUTO: YELLOW
CREAT UR-MCNC: 81 MG/DL
GLUCOSE UR STRIP-MCNC: NEGATIVE MG/DL
HGB UR QL STRIP: NEGATIVE
KETONES UR STRIP-MCNC: NEGATIVE MG/DL
LEUKOCYTE ESTERASE UR QL STRIP: ABNORMAL
NITRATE UR QL: NEGATIVE
PH UR STRIP: 8 PH (ref 5–7)
PROT UR-MCNC: 0.78 G/L
PROT/CREAT 24H UR: 0.96 G/G CR (ref 0–0.2)
RBC #/AREA URNS AUTO: 9 /HPF (ref 0–2)
SOURCE: ABNORMAL
SP GR UR STRIP: 1.01 (ref 1–1.03)
UROBILINOGEN UR STRIP-MCNC: 0 MG/DL (ref 0–2)
WBC #/AREA URNS AUTO: >182 /HPF (ref 0–5)

## 2018-07-18 PROCEDURE — 85613 RUSSELL VIPER VENOM DILUTED: CPT | Performed by: INTERNAL MEDICINE

## 2018-07-18 PROCEDURE — 86147 CARDIOLIPIN ANTIBODY EA IG: CPT | Performed by: INTERNAL MEDICINE

## 2018-07-18 PROCEDURE — 87086 URINE CULTURE/COLONY COUNT: CPT | Performed by: INTERNAL MEDICINE

## 2018-07-18 PROCEDURE — 84156 ASSAY OF PROTEIN URINE: CPT | Performed by: INTERNAL MEDICINE

## 2018-07-18 PROCEDURE — 86235 NUCLEAR ANTIGEN ANTIBODY: CPT | Performed by: INTERNAL MEDICINE

## 2018-07-18 PROCEDURE — 81001 URINALYSIS AUTO W/SCOPE: CPT | Performed by: INTERNAL MEDICINE

## 2018-07-18 PROCEDURE — G0463 HOSPITAL OUTPT CLINIC VISIT: HCPCS | Mod: ZF

## 2018-07-18 ASSESSMENT — PAIN SCALES - GENERAL: PAINLEVEL: SEVERE PAIN (6)

## 2018-07-18 NOTE — PROGRESS NOTES
Nephrology Clinic    Monica Campbell MRN:[id number] YOB: 1995  Date of Service: 07/18/2018  Primary care provider: Una Canales  Requesting physician: Dr Chente Reina      REASON FOR CONSULT: Pyuria and possible lupus flare.    HISTORY OF PRESENT ILLNESS:   Monica Campbell is a 22 year old female who presents for evaluation of recurrent dysuria, pyuria, in setting of repeatedly negative urine cultures. Question whether these could be related to lupus nephritis or lupus cystitis.    SLE x 5 years, since age 17. 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     Got IV medrol + cyclophosphamide x 6 months -> MMF + MTX   -> MTX po weekly for about 3 months , then stopped because quiescence.    -> + RITUXIMAB every 8 months x 3 doses at which time she was on no other med.  Last dose beginning of 2016.  Preg  #1, delivery June 19.  Preg was uneventful, full term! Healthy Rog[place].  After delivery, had pain x 7 weeks but eventually cleared. Remained on no immunosuppressants other that HCQ.  Was asymptomatic other than urinary tract symptoms.    Has had hospitalized in mid June for flank pain, [facility].  She was told she had increase protein, rbc, and wbc in urine, and concern about a flare of lupus nephritis.  She Reports having been told of hydronephrosis on the  Right side during her last hospitalization.  She Reportedly received  IV antibiotics x 3 days followed by oral Abx  (Cefaclor?) x 10 days.  Cultures were reportedly neg.  After an initial improvement, the symptoms returned  fast.  Prednisone started last week 20 mg daily    MS CAMPBELL IS CURRENTLY 22 WEEKS PREGNANT.  OB: Followed Dr Montana  At [facility].  Due for level II US today.    ROS  Positive for myalgias and arthralgias   Had oral ulcers better since on pred.  +SOB + BLACK.   C Pressure with difficulty breathing.   Heart racing  No Cough  Mild malar rash started about 3 m ago.  + N, V - random, does not think it is related to  pregnancy  Mild swelling in fingers.  Some Hair loss.      PAST MEDICAL HISTORY:  Past Medical History:   Diagnosis Date     Chronic kidney disease      Lupus      RA (rheumatoid arthritis) (H)      PAST SURGICAL HISTORY:  Past Surgical History:   Procedure Laterality Date     HC BIOPSY RENAL, PERCUTANEOUS  1/18/13     MEDICATIONS:  Prescription Medications as of 7/18/2018             Amphetamine-Dextroamphetamine (ADDERALL PO) Take 20 mg by mouth daily    Amphetamine-Dextroamphetamine (ADDERALL XR PO) Take 20 mg by mouth daily    doxylamine (UNISOM) 25 MG TABS tablet Take 25 mg by mouth nightly as needed    hydroxychloroquine (PLAQUENIL) 200 MG tablet Take 2 tablets (400 mg) by mouth daily    Omega-3 Fatty Acids (FISH OIL PO) Take 1 tablet by mouth daily Fish oil with DHA    ondansetron (ZOFRAN ODT) 4 MG ODT tab Take 1 tablet (4 mg) by mouth every 8 hours as needed for nausea    phenazopyridine (AZO) 97.5 MG tablet Take 2 tablets (195 mg) by mouth 3 times daily as needed for urinary tract discomfort    predniSONE (DELTASONE) 20 MG tablet Take 1 tablet (20 mg) by mouth daily         Prenatal Vit-Fe Fumarate-FA (PRENATAL MULTIVITAMIN  PLUS IRON) 27-0.8 MG TABS Take 1 tablet by mouth daily    ValACYclovir HCl (VALTREX PO) Take 500 mg by mouth as needed         ALLERGIES:    Allergies   Allergen Reactions     Estrogens Other (See Comments)     Lupus flares     No Clinical Screening - See Comments Rash     Gets very sick from flu vaccines     REVIEW OF SYSTEMS:  A comprehensive review of systems was performed and found to be negative except as described here or above.   SOCIAL HISTORY:   Social History     Social History     Marital status: Single     Spouse name: N/A     Number of children: N/A     Years of education: N/A     Occupational History     Not on file.     Social History Main Topics     Smoking status: Never Smoker     Smokeless tobacco: Never Used      Comment: no second hand smoke exposure at home      "Alcohol use No     Drug use: No     Sexual activity: Yes     Other Topics Concern     Not on file     Social History Narrative     FAMILY MEDICAL HISTORY:   No family history on file.  PHYSICAL EXAM:   /69  Pulse 85  Temp 98.1  F (36.7  C) (Oral)  Ht 1.676 m (5' 6\")  Wt 67.4 kg (148 lb 8 oz)  LMP 02/12/2018  SpO2 97%  BMI 23.97 kg/m2  GENERAL APPEARANCE: alert and no distress  EYES: nonicteric  HENT: mouth without ulcers or lesions  NECK: supple, no adenopathy  RESP: lungs clear to auscultation   CV: 2/6 sys m no rub.  Increased HR  Respiratory variation   ABDOMEN: soft, nontender, normal bowel sounds, . Gravid uterus   Extremities: no clubbing, cyanosis, or edema  MS: no evidence of inflammation in joints, no muscle tenderness  SKIN: mild faint malar rash  NEURO: mentation intact and speech normal  PSYCH: affect normal/bright   LABS:   Recent Results (from the past 1008 hour(s))   ALT    Collection Time: 07/10/18  3:57 PM   Result Value Ref Range    ALT 18 0 - 50 U/L   Albumin level    Collection Time: 07/10/18  3:57 PM   Result Value Ref Range    Albumin 2.9 (L) 3.4 - 5.0 g/dL   AST    Collection Time: 07/10/18  3:57 PM   Result Value Ref Range    AST 15 0 - 45 U/L   CBC with platelets differential    Collection Time: 07/10/18  3:57 PM   Result Value Ref Range    WBC 10.0 4.0 - 11.0 10e9/L    RBC Count 3.53 (L) 3.8 - 5.2 10e12/L    Hemoglobin 11.2 (L) 11.7 - 15.7 g/dL    Hematocrit 33.6 (L) 35.0 - 47.0 %    MCV 95 78 - 100 fl    MCH 31.7 26.5 - 33.0 pg    MCHC 33.3 31.5 - 36.5 g/dL    RDW 13.0 10.0 - 15.0 %    Platelet Count 329 150 - 450 10e9/L    Diff Method Automated Method     % Neutrophils 73.1 %    % Lymphocytes 16.4 %    % Monocytes 7.5 %    % Eosinophils 2.9 %    % Basophils 0.1 %    Absolute Neutrophil 7.3 1.6 - 8.3 10e9/L    Absolute Lymphocytes 1.6 0.8 - 5.3 10e9/L    Absolute Monocytes 0.8 0.0 - 1.3 10e9/L    Absolute Eosinophils 0.3 0.0 - 0.7 10e9/L    Absolute Basophils 0.0 0.0 - 0.2 " 10e9/L   Creatinine    Collection Time: 07/10/18  3:57 PM   Result Value Ref Range    Creatinine 0.52 0.52 - 1.04 mg/dL    GFR Estimate >90 >60 mL/min/1.7m2    GFR Estimate If Black >90 >60 mL/min/1.7m2   Complement C4    Collection Time: 07/10/18  3:57 PM   Result Value Ref Range    Complement C4 38 15 - 50 mg/dL   Complement C3    Collection Time: 07/10/18  3:57 PM   Result Value Ref Range    Complement C3 148 76 - 169 mg/dL   CRP inflammation    Collection Time: 07/10/18  3:57 PM   Result Value Ref Range    CRP Inflammation 13.0 (H) 0.0 - 8.0 mg/L   Erythrocyte sedimentation rate auto    Collection Time: 07/10/18  3:57 PM   Result Value Ref Range    Sed Rate 41 (H) 0 - 20 mm/h   DNA double stranded antibodies    Collection Time: 07/10/18  3:57 PM   Result Value Ref Range    DNA-ds 1 <10 IU/mL   Complement total    Collection Time: 07/10/18  3:57 PM   Result Value Ref Range    Complement CH50 Total 51 (L) 60 - 144  Units   Protein  random urine with Creat Ratio    Collection Time: 07/10/18  4:00 PM   Result Value Ref Range    Protein Random Urine 0.64 g/L    Protein Total Urine g/gr Creatinine 0.99 (H) 0 - 0.2 g/g Cr   *UA reflex to Microscopic and Culture ([place] and [facility] (except [place] and [place])    Collection Time: 07/10/18  4:00 PM   Result Value Ref Range    Color Urine Yellow     Appearance Urine Turbid     Glucose Urine Negative NEG^Negative mg/dL    Bilirubin Urine Negative NEG^Negative    Ketones Urine Negative NEG^Negative mg/dL    Specific Gravity Urine 1.020 1.003 - 1.035    Blood Urine Moderate (A) NEG^Negative    pH Urine 7.5 (H) 5.0 - 7.0 pH    Protein Albumin Urine 100 (A) NEG^Negative mg/dL    Urobilinogen Urine 0.2 0.2 - 1.0 EU/dL    Nitrite Urine Negative NEG^Negative    Leukocyte Esterase Urine Large (A) NEG^Negative    Source Midstream Urine    Creatinine urine calculation only    Collection Time: 07/10/18  4:00 PM   Result Value Ref Range    Creatinine Urine 65 mg/dL    Urine Culture Aerobic Bacterial    Collection Time: 07/10/18  4:00 PM   Result Value Ref Range    Specimen Description Midstream Urine     Culture Micro No growth    Urine Microscopic    Collection Time: 07/10/18  4:00 PM   Result Value Ref Range    WBC Urine >100 (A) OTO5^0 - 5 /HPF    RBC Urine 25-50 (A) OTO2^O - 2 /HPF    Bacteria Urine Many (A) NEG^Negative /HPF     CMP  Recent Labs   Lab Test  07/10/18   1557  09/13/17   2115  06/27/17   1603  06/06/17   1712   01/21/17   2041  06/04/14   1600  04/28/14   1201  04/17/14   1515   03/11/14   1107   NA   --   140  142   --    --   140  142  141  142  143   < >  141   POTASSIUM   --   3.7  4.1   --    --   3.4  4.1  4.2  3.8   < >  4.5   CHLORIDE   --   107  107   --    --   109  101  102  104   < >  104   CO2   --   25  26   --    --   22  25  25  21   < >  26   ANIONGAP   --   8  9   --    --   9  16  13  17   < >  10.4   GLC   --   94  64*   --    --   67*  90  87  100*   < >  76   BUN   --   15  12   --    --   7  15  14  12   < >  14   CR  0.52  0.63  0.65  0.50*   --   0.53  0.74  0.63  0.53  0.62   < >  0.50   GFRESTIMATED  >90  >90  >90  Non African American GFR Calc    >90  Non  GFR Calc     --   >90  Non  GFR Calc    >90  >90  >90  >90   < >  >90   GFRESTBLACK  >90  >90  >90  African American GFR Calc    >90   GFR Calc     --   >90   GFR Calc    >90  >90  >90  >90   < >  >90   VIRGEN   --   9.1  9.6   --    --   8.2*  9.4  9.0  9.3   < >  9.4   PHOS   --    --    --    --    --    --   4.9*  3.4  4.8*   --   4.5   PROTTOTAL   --   7.3  7.0   --    --   6.0*   --    --   7.2   < >   --    ALBUMIN  2.9*  4.1  3.2*  2.9*   < >  3.2*  3.9  4.2  4.1  4.1   < >  4.0   BILITOTAL   --   0.5  0.2   --    --   0.3   --    --   0.3   < >   --    ALKPHOS   --   80  125   --    --   50   --    --   61   < >   --    AST  15  34  13  18   < >  14   --    --   29   < >   --    ALT  18  61*  17  16   < >   12   --    --   27   < >   --     < > = values in this interval not displayed.     CBC  Recent Labs   Lab Test  07/10/18   1557  03/23/18   1056  09/13/17   2115  06/27/17   1603   HGB  11.2*  12.6  13.2  12.4   WBC  10.0  4.9  5.7  10.2   RBC  3.53*  4.05  4.39  3.92   HCT  33.6*  36.4  37.6  36.0   MCV  95  90  86  92   MCH  31.7  31.1  30.1  31.6   MCHC  33.3  34.6  35.1  34.4   RDW  13.0  12.1  12.5  12.2   PLT  329  244  301  422     INR  Recent Labs   Lab Test  01/18/13   1033  01/14/13   1140   INR  0.82*  0.97   PTT  54*  55*       URINE STUDIES  Recent Labs   Lab Test  07/18/18   0940  07/10/18   1600  03/23/18   1103  09/13/17   2210  06/27/17   1604  06/06/17   1715   06/04/14   1600   COLOR  Yellow  Yellow  Lori  Yellow  Yellow  Yellow   < >  Yellow   APPEARANCE  Cloudy  Turbid  Slightly Cloudy  Slightly Cloudy  Clear  Clear   < >  Clear   URINEGLC  Negative  Negative  Negative  Negative  Negative  Negative   < >  Negative   URINEBILI  Negative  Negative  Negative  Negative  Negative  Negative   < >  Negative   URINEKETONE  Negative  Negative  Negative  Negative  Negative  Negative   < >  Negative   SG  1.012  1.020  1.031  1.025  1.010  1.020   < >  >1.030   UBLD  Negative  Moderate*  Negative  Negative  Small*  Negative   < >  Negative   URINEPH  8.0*  7.5*  5.0  5.0  7.0  5.5   < >  6.0   PROTEIN  100*  100*  30*  Negative  Negative  Negative   < >  Trace*   UROBILINOGEN   --   0.2   --    --   0.2  0.2   --   0.2   NITRITE  Negative  Negative  Negative  Negative  Negative  Negative   < >  Negative   LEUKEST  Large*  Large*  Small*  Trace*  Small*  Negative   < >  Negative   RBCU  9*  25-50*  1  2  O - 2   --    < >  O - 2   WBCU  >182*  >100*  27*  31*  2-5*   --    < >  O - 2    < > = values in this interval not displayed.     Urine microscopy performed myself: TNTC WBC.  No RBC or other casts seen.      Recent Labs   Lab Test  07/18/18   0940  07/10/18   1600  03/23/18   1103  06/27/17   1604   06/06/17   1715  02/08/17   1257  06/04/14   1600  04/28/14   1200  04/17/14   1514  03/27/14   1725  03/11/14   1100  02/03/14   1233  11/13/13   1509  10/01/13   1115  08/19/13   1346  05/30/13   1529  03/27/13   1540  02/26/13   1140  01/18/13   1230  01/14/13   0945   UTPG  0.96*  0.99*  0.08  Unable to calculate due to low value  0.12  0.08  0.03  <0.03  <0.06  0.39*  <0.06  0.06  <0.05  0.14  0.08  <0.03  0.39*  2.59*  3.22*  2.41*     [facility]  US RENAL AND BLADDER COMPLETE  5/18/2018 5:12 PM    INDICATION: Left flank and low back pain 13 weeks and 3 days pregnant, history  of lupus  TECHNIQUE: Routine kidneys and bladder.  COMPARISON: CT 09/07/2017    FINDINGS:  No ascites. Right kidney measures 12.3 x 5.5 x 6.6 cm and the left 11.0 x 4.6 x  5.1 cm. No hydronephrosis. No calculi. Bladder is unremarkable.    CONCLUSION:  1.  Normal ultrasound of kidneys.    This report was electronically interpreted by: Dr. Debra Mendes MD ON 05/18/2018 at 17:21      ASSESSMENT AND PLAN:   22 y.o.  female with a history of SLE and Class V nephritis, who is referred for evaluation of pyuria, dysuria, and increased proteinuria in the setting of pregnancy at 22 weeks.    The urine sediment would be very atypical for a relapse of lupus nephritis alone.  However, the extra renal manifestations, her serologies, and the increased proteinuria are concerning for a flare of SLE and likely relapse of lupus nephritis.  (her previous UPCRs were normal, making it unlikely that the proteinuria seen now is attributable to pregnancy alone)  The extent and severity of pyuria, in the setting of negative cultures (reportedly) raise the question of inflammatory cystitis, or non-bacterial/atypical bacterial infection.   Unfortunately, I have not yet been able to locate the reports of the latest renal ultrasound.  The latest urine culture (7/10/18) was negative, but I am not sure as to the chronologic relationship with  "her last dose of antibiotics.  At this point, I would suggest:  1- urogynecology evaluation of pyuria.    2- I concur with Dr Reina that starting azathioprine 50 mg daily is a reasonable approach given the evidence in support of relapsing SLE and lupus nephritis.   3- In addition to azathioprine, will also consider adding aspirine  mg daily to decrease the risk of preeclampsia (I do not currently have all the factors to estimate her risk of pre-ecclampsia according to the algorithm used in the  \"Combined Multimarker Screening and Randomized Patient Treatment with Aspirin for Evidence-Based Preeclampsia Prevention (ASPRE)\" trial. Https://[email or web address]/[email or web address]  4- verify anticardiolipin and YUMI antibodies  5- with C Pressure, SOB, and respiratory variation in HR, I would like to obtain a cardiac echo. (ordered)      Don Santana MD  July 18, 2018    "

## 2018-07-18 NOTE — PATIENT INSTRUCTIONS
"Following is some information about azathioprine     Azathioprine: Patient drug information  Access [employer] here.  Copyright 7692-1313 [employer] All rights reserved.  (For additional information see \"Azathioprine: Drug information\" and see \"Azathioprine: Pediatric drug information\")  Brand Names: [place]    Azasan;    Imuran  Brand Names: [place]    Imuran  Warning  .  Long-term use may raise your chance of cancer.  .  Lymphoma and other cancers have happened in people who take this drug or drugs like it. This has been deadly in some cases. Talk with the doctor.  .  A rare type of cancer called hepatosplenic T-cell lymphoma (HSTCL) has happened with this drug. These cases have been deadly. Most of the time, these cases happened in teenagers or young adults. Most of these patients were using this drug to treat certain types of bowel problems like Crohn disease and ulcerative colitis. This drug is not approved for use to treat bowel problems like these. Tell the doctor if you have ever had any type of cancer. Talk with the doctor.  What is this drug used for?  .  It is used to keep the body from turning down the kidney after a kidney transplant.  .  It is used to treat rheumatoid arthritis.  .  It may be given to you for other reasons. Talk with the doctor.  What do I need to tell my doctor BEFORE I take this drug?  .  For all uses of this drug:  .  If you have an allergy to azathioprine or any other part of this drug.  .  If you are allergic to any drugs like this one, any other drugs, foods, or other substances. Tell your doctor about the allergy and what signs you had, like rash; hives; itching; shortness of breath; wheezing; cough; swelling of face, lips, tongue, or throat; or any other signs.  .  If you have ever been treated with chlorambucil, cyclosporine, or melphalan in the past.  .  If you are breast-feeding or plan to breast-feed.  .  Rheumatoid arthritis patients:  .  If you are pregnant or may be " pregnant. Do not take this drug if you are pregnant.  .  This is not a list of all drugs or health problems that interact with this drug.  .  Tell your doctor and pharmacist about all of your drugs (prescription or OTC, natural products, vitamins) and health problems. You must check to make sure that it is safe for you to take this drug with all of your drugs and health problems. Do not start, stop, or change the dose of any drug without checking with your doctor.  What are some things I need to know or do while I take this drug?  .  Tell all of your health care providers that you take this drug. This includes your doctors, nurses, pharmacists, and dentists.  .  You may have more of a chance of getting an infection. Wash hands often. Stay away from people with infections, colds, or flu. Some infections have been very bad and even deadly.  .  You may bleed more easily. Be careful and avoid injury. Use a soft toothbrush and an electric razor.  .  If you have a thiopurine methyltransferase deficiency, talk with your doctor.  .  Have blood work checked as you have been told by the doctor. Talk with the doctor.  .  The chance of skin cancer may be raised. Avoid lots of sun, sunlamps, and tanning beds. Use sunscreen and wear clothing and eyewear that protects you from the sun.  .  You may need to have your skin checked while you take this drug. Talk with your doctor.  .  Talk with your doctor before getting any vaccines. Use with this drug may either raise the chance of an infection or make the vaccine not work as well.  .  If you are taking warfarin, talk with your doctor. You may need to have your blood work checked more closely while you are taking it with this drug.  .  This drug may cause harm to the unborn baby if you take it while you are pregnant.  .  Use birth control that you can trust to prevent pregnancy while taking this drug.  .  If you are pregnant or you get pregnant while taking this drug, call your  doctor right away.  What are some side effects that I need to call my doctor about right away?  .  WARNING/CAUTION: Even though it may be rare, some people may have very bad and sometimes deadly side effects when taking a drug. Tell your doctor or get medical help right away if you have any of the following signs or symptoms that may be related to a very bad side effect:  .  Signs of an allergic reaction, like rash; hives; itching; red, swollen, blistered, or peeling skin with or without fever; wheezing; tightness in the chest or throat; trouble breathing, swallowing, or talking; unusual hoarseness; or swelling of the mouth, face, lips, tongue, or throat.  .  Signs of infection like fever, chills, very bad sore throat, ear or sinus pain, cough, more sputum or change in color of sputum, pain with passing urine, mouth sores, or wound that will not heal.  .  Signs of bleeding like throwing up blood or throw up that looks like coffee grounds; coughing up blood; blood in the urine; black, red, or tarry stools; bleeding from the gums; vaginal bleeding that is not normal; bruises without a reason or that get bigger; or any bleeding that is very bad or that you cannot stop.  .  Signs of liver problems like dark urine, feeling tired, not hungry, upset stomach or stomach pain, light-colored stools, throwing up, or yellow skin or eyes.  .  Signs of a pancreas problem (pancreatitis) like very bad stomach pain, very bad back pain, or very bad upset stomach or throwing up.  .  Chest pain or pressure.  .  Very upset stomach or throwing up.  .  Feeling very tired or weak.  .  Very bad dizziness or passing out.  .  Loose stools (diarrhea).  .  Muscle pain or weakness.  .  Night sweats.  .  A big weight loss.  .  Fever that does not go away.  .  Change in color or size of a mole.  .  A skin lump or growth.  .  A very bad brain problem called progressive multifocal leukoencephalopathy (PML) has happened with this drug. It may cause  disability or can be deadly. Tell your doctor right away if you have signs like confusion, memory problems, low mood (depression), change in the way you act, change in strength on 1 side is greater than the other, trouble speaking or thinking, change in balance, or change in eyesight.  What are some other side effects of this drug?  .  All drugs may cause side effects. However, many people have no side effects or only have minor side effects. Call your doctor or get medical help if any of these side effects or any other side effects bother you or do not go away:  .  Upset stomach or throwing up.  .  These are not all of the side effects that may occur. If you have questions about side effects, call your doctor. Call your doctor for medical advice about side effects.  .  You may report side effects to your national health agency.  How is this drug best taken?  .  Use this drug as ordered by your doctor. Read all information given to you. Follow all instructions closely.  .  Tablets:  .  Take after meals unless your doctor says otherwise.  .  To gain the most benefit, do not miss doses.  .  Keep taking this drug as you have been told by your doctor or other health care provider, even if you feel well.  .  Injection:  .  It is given as an infusion into a vein over a period of time.  What do I do if I miss a dose?  .  Tablet:  .  Take a missed dose as soon as you think about it.  .  If it is close to the time for your next dose, skip the missed dose and go back to your normal time.  .  Do not take 2 doses at the same time or extra doses.  .  Injection:  .  Call your doctor to find out what to do.  How do I store and/or throw out this drug?  .  Tablet:  .  Store at room temperature.  .  Store in a dry place. Do not store in a bathroom.  .  Protect from light.  .  Injection:  .  If you need to store this drug at home, talk with your doctor, nurse, or pharmacist about how to store it.  .  All products:  .  Keep all drugs  in a safe place. Keep all drugs out of the reach of children and pets.  .  Throw away unused or  drugs. Do not flush down a toilet or pour down a drain unless you are told to do so. Check with your pharmacist if you have questions about the best way to throw out drugs. There may be drug take-back programs in your area.  General drug facts  .  If your symptoms or health problems do not get better or if they become worse, call your doctor.  .  Do not share your drugs with others and do not take anyone else's drugs.  .  Keep a list of all your drugs (prescription, natural products, vitamins, OTC) with you. Give this list to your doctor.  .  Talk with the doctor before starting any new drug, including prescription or OTC, natural products, or vitamins.  .  Some drugs may have another patient information leaflet. If you have any questions about this drug, please talk with your doctor, nurse, pharmacist, or other health care provider.  .  If you think there has been an overdose, call your poison control center or get medical care right away. Be ready to tell or show what was taken, how much, and when it happened.  Use of [employer] is subject to the Subscription and License Agreement.  Topic 08485 Version 124.0

## 2018-07-18 NOTE — MR AVS SNAPSHOT
"              After Visit Summary   7/18/2018    Monica Puckett    MRN: [id number]           Patient Information     Date Of Birth          1995        Visit Information        Provider Department      7/18/2018 8:30 AM oDn Santana MD [facility] Ne[facility]        Today's Diagnoses     High-risk pregnancy, unspecified trimester    -  1    Systemic lupus erythematosus with other organ involvement, unspecified SLE type (H)          Care Instructions    Following is some information about azathioprine     Azathioprine: Patient drug information  Access [employer] here.  Copyright 7713-8545 [employer] All rights reserved.  (For additional information see \"Azathioprine: Drug information\" and see \"Azathioprine: Pediatric drug information\")  Brand Names: [place]    Azasan;    Imuran  Brand Names: [place]    Imuran  Warning  .  Long-term use may raise your chance of cancer.  .  Lymphoma and other cancers have happened in people who take this drug or drugs like it. This has been deadly in some cases. Talk with the doctor.  .  A rare type of cancer called hepatosplenic T-cell lymphoma (HSTCL) has happened with this drug. These cases have been deadly. Most of the time, these cases happened in teenagers or young adults. Most of these patients were using this drug to treat certain types of bowel problems like Crohn disease and ulcerative colitis. This drug is not approved for use to treat bowel problems like these. Tell the doctor if you have ever had any type of cancer. Talk with the doctor.  What is this drug used for?  .  It is used to keep the body from turning down the kidney after a kidney transplant.  .  It is used to treat rheumatoid arthritis.  .  It may be given to you for other reasons. Talk with the doctor.  What do I need to tell my doctor BEFORE I take this drug?  .  For all uses of this drug:  .  If you have an allergy to azathioprine or any other part of this drug.  .  If you are allergic to any drugs " like this one, any other drugs, foods, or other substances. Tell your doctor about the allergy and what signs you had, like rash; hives; itching; shortness of breath; wheezing; cough; swelling of face, lips, tongue, or throat; or any other signs.  .  If you have ever been treated with chlorambucil, cyclosporine, or melphalan in the past.  .  If you are breast-feeding or plan to breast-feed.  .  Rheumatoid arthritis patients:  .  If you are pregnant or may be pregnant. Do not take this drug if you are pregnant.  .  This is not a list of all drugs or health problems that interact with this drug.  .  Tell your doctor and pharmacist about all of your drugs (prescription or OTC, natural products, vitamins) and health problems. You must check to make sure that it is safe for you to take this drug with all of your drugs and health problems. Do not start, stop, or change the dose of any drug without checking with your doctor.  What are some things I need to know or do while I take this drug?  .  Tell all of your health care providers that you take this drug. This includes your doctors, nurses, pharmacists, and dentists.  .  You may have more of a chance of getting an infection. Wash hands often. Stay away from people with infections, colds, or flu. Some infections have been very bad and even deadly.  .  You may bleed more easily. Be careful and avoid injury. Use a soft toothbrush and an electric razor.  .  If you have a thiopurine methyltransferase deficiency, talk with your doctor.  .  Have blood work checked as you have been told by the doctor. Talk with the doctor.  .  The chance of skin cancer may be raised. Avoid lots of sun, sunlamps, and tanning beds. Use sunscreen and wear clothing and eyewear that protects you from the sun.  .  You may need to have your skin checked while you take this drug. Talk with your doctor.  .  Talk with your doctor before getting any vaccines. Use with this drug may either raise the chance  of an infection or make the vaccine not work as well.  .  If you are taking warfarin, talk with your doctor. You may need to have your blood work checked more closely while you are taking it with this drug.  .  This drug may cause harm to the unborn baby if you take it while you are pregnant.  .  Use birth control that you can trust to prevent pregnancy while taking this drug.  .  If you are pregnant or you get pregnant while taking this drug, call your doctor right away.  What are some side effects that I need to call my doctor about right away?  .  WARNING/CAUTION: Even though it may be rare, some people may have very bad and sometimes deadly side effects when taking a drug. Tell your doctor or get medical help right away if you have any of the following signs or symptoms that may be related to a very bad side effect:  .  Signs of an allergic reaction, like rash; hives; itching; red, swollen, blistered, or peeling skin with or without fever; wheezing; tightness in the chest or throat; trouble breathing, swallowing, or talking; unusual hoarseness; or swelling of the mouth, face, lips, tongue, or throat.  .  Signs of infection like fever, chills, very bad sore throat, ear or sinus pain, cough, more sputum or change in color of sputum, pain with passing urine, mouth sores, or wound that will not heal.  .  Signs of bleeding like throwing up blood or throw up that looks like coffee grounds; coughing up blood; blood in the urine; black, red, or tarry stools; bleeding from the gums; vaginal bleeding that is not normal; bruises without a reason or that get bigger; or any bleeding that is very bad or that you cannot stop.  .  Signs of liver problems like dark urine, feeling tired, not hungry, upset stomach or stomach pain, light-colored stools, throwing up, or yellow skin or eyes.  .  Signs of a pancreas problem (pancreatitis) like very bad stomach pain, very bad back pain, or very bad upset stomach or throwing up.  .   Chest pain or pressure.  .  Very upset stomach or throwing up.  .  Feeling very tired or weak.  .  Very bad dizziness or passing out.  .  Loose stools (diarrhea).  .  Muscle pain or weakness.  .  Night sweats.  .  A big weight loss.  .  Fever that does not go away.  .  Change in color or size of a mole.  .  A skin lump or growth.  .  A very bad brain problem called progressive multifocal leukoencephalopathy (PML) has happened with this drug. It may cause disability or can be deadly. Tell your doctor right away if you have signs like confusion, memory problems, low mood (depression), change in the way you act, change in strength on 1 side is greater than the other, trouble speaking or thinking, change in balance, or change in eyesight.  What are some other side effects of this drug?  .  All drugs may cause side effects. However, many people have no side effects or only have minor side effects. Call your doctor or get medical help if any of these side effects or any other side effects bother you or do not go away:  .  Upset stomach or throwing up.  .  These are not all of the side effects that may occur. If you have questions about side effects, call your doctor. Call your doctor for medical advice about side effects.  .  You may report side effects to your national health agency.  How is this drug best taken?  .  Use this drug as ordered by your doctor. Read all information given to you. Follow all instructions closely.  .  Tablets:  .  Take after meals unless your doctor says otherwise.  .  To gain the most benefit, do not miss doses.  .  Keep taking this drug as you have been told by your doctor or other health care provider, even if you feel well.  .  Injection:  .  It is given as an infusion into a vein over a period of time.  What do I do if I miss a dose?  .  Tablet:  .  Take a missed dose as soon as you think about it.  .  If it is close to the time for your next dose, skip the missed dose and go back to your  normal time.  .  Do not take 2 doses at the same time or extra doses.  .  Injection:  .  Call your doctor to find out what to do.  How do I store and/or throw out this drug?  .  Tablet:  .  Store at room temperature.  .  Store in a dry place. Do not store in a bathroom.  .  Protect from light.  .  Injection:  .  If you need to store this drug at home, talk with your doctor, nurse, or pharmacist about how to store it.  .  All products:  .  Keep all drugs in a safe place. Keep all drugs out of the reach of children and pets.  .  Throw away unused or  drugs. Do not flush down a toilet or pour down a drain unless you are told to do so. Check with your pharmacist if you have questions about the best way to throw out drugs. There may be drug take-back programs in your area.  General drug facts  .  If your symptoms or health problems do not get better or if they become worse, call your doctor.  .  Do not share your drugs with others and do not take anyone else's drugs.  .  Keep a list of all your drugs (prescription, natural products, vitamins, OTC) with you. Give this list to your doctor.  .  Talk with the doctor before starting any new drug, including prescription or OTC, natural products, or vitamins.  .  Some drugs may have another patient information leaflet. If you have any questions about this drug, please talk with your doctor, nurse, pharmacist, or other health care provider.  .  If you think there has been an overdose, call your poison control center or get medical care right away. Be ready to tell or show what was taken, how much, and when it happened.  Use of UpTo[employer] is subject to the Subscription and License Agreement.  Topic 80025 Version 124.0            Follow-ups after your visit        Follow-up notes from your care team     Return in about 2 weeks (around 2018).      Your next 10 appointments already scheduled     Aug 08, 2018 11:15 AM CDT   (Arrive by 11:00 AM)   RETURN LUPUS with Parast[facility]  "MD Latosha   [facility] ([facility])    [address]  [address]  [place] [address] [phone]              Future tests that were ordered for you today     Open Future Orders        Priority Expected Expires Ordered    Echocardiogram Complete ASAP  7/18/2019 7/18/2018    Thiopurine Methyltransferase Genotyping Routine  1/17/2019 7/18/2018            Who to contact     If you have questions or need follow up information about today's clinic visit or your schedule please contact [facility] directly at [phone].  Normal or non-critical lab and imaging results will be communicated to you by FSLogixhart, letter or phone within 4 business days after the clinic has received the results. If you do not hear from us within 7 days, please contact the clinic through C[employer] or phone. If you have a critical or abnormal lab result, we will notify you by phone as soon as possible.  Submit refill requests through [employer] or call your pharmacy and they will forward the refill request to us. Please allow 3 business days for your refill to be completed.          Additional Information About Your Visit        FSLogixhart Information     [employer] gives you secure access to your electronic health record. If you see a primary care provider, you can also send messages to your care team and make appointments. If you have questions, please call your primary care clinic.  If you do not have a primary care provider, please call [phone] and they will assist you.        Care EveryWhere ID     This is your Care EveryWhere ID. This could be used by other organizations to access your [facility] medical records  [id number]        Your Vitals Were     Pulse Temperature Height Last Period Pulse Oximetry BMI (Body Mass Index)    85 98.1  F (36.7  C) (Oral) 1.676 m (5' 6\") 02/12/2018 97% 23.97 kg/m2       Blood Pressure from Last 3 Encounters:   07/18/18 105/69   07/11/18 110/68   03/23/18 " 119/76    Weight from Last 3 Encounters:   07/18/18 67.4 kg (148 lb 8 oz)   07/11/18 66.8 kg (147 lb 3.2 oz)   03/23/18 64 kg (141 lb 3.2 oz)              We Performed the Following     Anticardiolipin Ab, IgG/M, Qn ([facility])     Cardiolipin Ngoc IgG and IgM     Creatinine urine calculation only     YUMI antibody panel     Lupus Anticoagulant Panel     Protein  random urine with Creat Ratio     Routine UA with micro reflex to culture     Urine Culture Aerobic Bacterial        Primary Care Provider Office Phone # Fax #    Una Canales [phone]       [facility] [address]  [address] [place] [facility] [address]        Equal Access to Services     WILLIS THORNE : Hadii cheryle barajaso Sorachell, waaxda fab, qaybta kaalmada eliciayadeandre, kiesha molina . So [facility] [phone].    ATENCIÓN: Si habla español, tiene a napoles disposición servicios gratuitos de asistencia lingüística. Llame al [phone].    We comply with applicable federal civil rights laws and [place] laws. We do not discriminate on the basis of race, color, national origin, age, disability, sex, sexual orientation, or gender identity.            Thank you!     Thank you for choosing [facility]  for your care. Our goal is always to provide you with excellent care. Hearing back from our patients is one way we can continue to improve our services. Please take a few minutes to complete the written survey that you may receive in the mail after your visit with us. Thank you!             Your Updated Medication List - Protect others around you: Learn how to safely use, store and throw away your medicines at www.disposemymeds.org.          This list is accurate as of 7/18/18  5:33 PM.  Always use your most recent med list.                   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    Systemic lupus erythematosus, unspecified SLE type, unspecified organ involvement status (H)       * predniSONE 20 MG tablet    DELTASONE    30 tablet    Take 1 tablet (20 mg) by mouth daily    Systemic lupus erythematosus, unspecified SLE type, unspecified organ involvement status (H)       prenatal multivitamin plus iron 27-0.8 MG Tabs per tablet      Take 1 tablet by mouth daily        VALTREX PO      Take 500 mg by mouth as needed        * Notice:  This list has 4 medication(s) that are the same as other medications prescribed for you. Read the directions carefully, and ask your doctor or other care provider to review them with you.

## 2018-07-18 NOTE — LETTER
7/18/2018       RE: Monica Campbell  [address]  [facility] [address]       Nephrology Clinic    Monica Campbell MRN:[id number] YOB: 1995  Date of Service: 07/18/2018  Primary care provider: Una Canales  Requesting physician: Dr Chente Reina      REASON FOR CONSULT: Pyuria and possible lupus flare.    HISTORY OF PRESENT ILLNESS:   Monica Campbell is a 22 year old female who presents for evaluation of recurrent dysuria, pyuria, in setting of repeatedly negative urine cultures. Question whether these could be related to lupus nephritis or lupus cystitis.    SLE x 5 years, since age 17. 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     Got IV medrol + cyclophosphamide x 6 months -> MMF + MTX   -> MTX po weekly for about 3 months , then stopped because quiescence.    -> + RITUXIMAB every 8 months x 3 doses at which time she was on no other med.  Last dose beginning of 2016.  Preg  #1, delivery June 19.  Preg was uneventful, full term! Healthy Rogelio.  After delivery, had pain x 7 weeks but eventually cleared. Remained on no immunosuppressants other that HCQ.  Was asymptomatic other than urinary tract symptoms.    Has had hospitalized in mid June for flank pain, [facility] [place] [facility].  She was told she had increase protein, rbc, and wbc in urine, and concern about a flare of lupus nephritis.  She Reports having been told of hydronephrosis on the  Right side during her last hospitalization.  She Reportedly received  IV antibiotics x 3 days followed by oral Abx  (Cefaclor?) x 10 days.  Cultures were reportedly neg.  After an initial improvement, the symptoms returned  fast.  Prednisone started last week 20 mg daily    MS CAMPBELL IS CURRENTLY 22 WEEKS PREGNANT.  OB: Followed Dr Montana  At [facility].  Due for level II US today.    ROS  Positive for myalgias and arthralgias   Had oral ulcers better since on pred.  +SOB + BLACK.   C Pressure with difficulty breathing.   Heart racing  No Cough  Mild malar  rash started about 3 m ago.  + N, V - random, does not think it is related to pregnancy  Mild swelling in fingers.  Some Hair loss.      PAST MEDICAL HISTORY:  Past Medical History:   Diagnosis Date     Chronic kidney disease      Lupus      RA (rheumatoid arthritis) (H)      PAST SURGICAL HISTORY:  Past Surgical History:   Procedure Laterality Date     HC BIOPSY RENAL, PERCUTANEOUS  1/18/13     MEDICATIONS:  Prescription Medications as of 7/18/2018             Amphetamine-Dextroamphetamine (ADDERALL PO) Take 20 mg by mouth daily    Amphetamine-Dextroamphetamine (ADDERALL XR PO) Take 20 mg by mouth daily    doxylamine (UNISOM) 25 MG TABS tablet Take 25 mg by mouth nightly as needed    hydroxychloroquine (PLAQUENIL) 200 MG tablet Take 2 tablets (400 mg) by mouth daily    Omega-3 Fatty Acids (FISH OIL PO) Take 1 tablet by mouth daily Fish oil with DHA    ondansetron (ZOFRAN ODT) 4 MG ODT tab Take 1 tablet (4 mg) by mouth every 8 hours as needed for nausea    phenazopyridine (AZO) 97.5 MG tablet Take 2 tablets (195 mg) by mouth 3 times daily as needed for urinary tract discomfort    predniSONE (DELTASONE) 20 MG tablet Take 1 tablet (20 mg) by mouth daily         Prenatal Vit-Fe Fumarate-FA (PRENATAL MULTIVITAMIN  PLUS IRON) 27-0.8 MG TABS Take 1 tablet by mouth daily    ValACYclovir HCl (VALTREX PO) Take 500 mg by mouth as needed         ALLERGIES:    Allergies   Allergen Reactions     Estrogens Other (See Comments)     Lupus flares     No Clinical Screening - See Comments Rash     Gets very sick from flu vaccines     REVIEW OF SYSTEMS:  A comprehensive review of systems was performed and found to be negative except as described here or above.   SOCIAL HISTORY:   Social History     Social History     Marital status: Single     Spouse name: N/A     Number of children: N/A     Years of education: N/A     Occupational History     Not on file.     Social History Main Topics     Smoking status: Never Smoker     Smokeless  "tobacco: Never Used      Comment: no second hand smoke exposure at home     Alcohol use No     Drug use: No     Sexual activity: Yes     Other Topics Concern     Not on file     Social History Narrative     FAMILY MEDICAL HISTORY:   No family history on file.  PHYSICAL EXAM:   /69  Pulse 85  Temp 98.1  F (36.7  C) (Oral)  Ht 1.676 m (5' 6\")  Wt 67.4 kg (148 lb 8 oz)  LMP 02/12/2018  SpO2 97%  BMI 23.97 kg/m2  GENERAL APPEARANCE: alert and no distress  EYES: nonicteric  HENT: mouth without ulcers or lesions  NECK: supple, no adenopathy  RESP: lungs clear to auscultation   CV: 2/6 sys m no rub.  Increased HR  Respiratory variation   ABDOMEN: soft, nontender, normal bowel sounds, . Gravid uterus   Extremities: no clubbing, cyanosis, or edema  MS: no evidence of inflammation in joints, no muscle tenderness  SKIN: mild faint malar rash  NEURO: mentation intact and speech normal  PSYCH: affect normal/bright   LABS:   Recent Results (from the past 1008 hour(s))   ALT    Collection Time: 07/10/18  3:57 PM   Result Value Ref Range    ALT 18 0 - 50 U/L   Albumin level    Collection Time: 07/10/18  3:57 PM   Result Value Ref Range    Albumin 2.9 (L) 3.4 - 5.0 g/dL   AST    Collection Time: 07/10/18  3:57 PM   Result Value Ref Range    AST 15 0 - 45 U/L   CBC with platelets differential    Collection Time: 07/10/18  3:57 PM   Result Value Ref Range    WBC 10.0 4.0 - 11.0 10e9/L    RBC Count 3.53 (L) 3.8 - 5.2 10e12/L    Hemoglobin 11.2 (L) 11.7 - 15.7 g/dL    Hematocrit 33.6 (L) 35.0 - 47.0 %    MCV 95 78 - 100 fl    MCH 31.7 26.5 - 33.0 pg    MCHC 33.3 31.5 - 36.5 g/dL    RDW 13.0 10.0 - 15.0 %    Platelet Count 329 150 - 450 10e9/L    Diff Method Automated Method     % Neutrophils 73.1 %    % Lymphocytes 16.4 %    % Monocytes 7.5 %    % Eosinophils 2.9 %    % Basophils 0.1 %    Absolute Neutrophil 7.3 1.6 - 8.3 10e9/L    Absolute Lymphocytes 1.6 0.8 - 5.3 10e9/L    Absolute Monocytes 0.8 0.0 - 1.3 10e9/L    " Absolute Eosinophils 0.3 0.0 - 0.7 10e9/L    Absolute Basophils 0.0 0.0 - 0.2 10e9/L   Creatinine    Collection Time: 07/10/18  3:57 PM   Result Value Ref Range    Creatinine 0.52 0.52 - 1.04 mg/dL    GFR Estimate >90 >60 mL/min/1.7m2    GFR Estimate If Black >90 >60 mL/min/1.7m2   Complement C4    Collection Time: 07/10/18  3:57 PM   Result Value Ref Range    Complement C4 38 15 - 50 mg/dL   Complement C3    Collection Time: 07/10/18  3:57 PM   Result Value Ref Range    Complement C3 148 76 - 169 mg/dL   CRP inflammation    Collection Time: 07/10/18  3:57 PM   Result Value Ref Range    CRP Inflammation 13.0 (H) 0.0 - 8.0 mg/L   Erythrocyte sedimentation rate auto    Collection Time: 07/10/18  3:57 PM   Result Value Ref Range    Sed Rate 41 (H) 0 - 20 mm/h   DNA double stranded antibodies    Collection Time: 07/10/18  3:57 PM   Result Value Ref Range    DNA-ds 1 <10 IU/mL   Complement total    Collection Time: 07/10/18  3:57 PM   Result Value Ref Range    Complement CH50 Total 51 (L) 60 - 144  Units   Protein  random urine with Creat Ratio    Collection Time: 07/10/18  4:00 PM   Result Value Ref Range    Protein Random Urine 0.64 g/L    Protein Total Urine g/gr Creatinine 0.99 (H) 0 - 0.2 g/g Cr   *UA reflex to Microscopic and Culture ([place] and [facility] (except [place] and [place])    Collection Time: 07/10/18  4:00 PM   Result Value Ref Range    Color Urine Yellow     Appearance Urine Turbid     Glucose Urine Negative NEG^Negative mg/dL    Bilirubin Urine Negative NEG^Negative    Ketones Urine Negative NEG^Negative mg/dL    Specific Gravity Urine 1.020 1.003 - 1.035    Blood Urine Moderate (A) NEG^Negative    pH Urine 7.5 (H) 5.0 - 7.0 pH    Protein Albumin Urine 100 (A) NEG^Negative mg/dL    Urobilinogen Urine 0.2 0.2 - 1.0 EU/dL    Nitrite Urine Negative NEG^Negative    Leukocyte Esterase Urine Large (A) NEG^Negative    Source Midstream Urine    Creatinine urine calculation only    Collection  Time: 07/10/18  4:00 PM   Result Value Ref Range    Creatinine Urine 65 mg/dL   Urine Culture Aerobic Bacterial    Collection Time: 07/10/18  4:00 PM   Result Value Ref Range    Specimen Description Midstream Urine     Culture Micro No growth    Urine Microscopic    Collection Time: 07/10/18  4:00 PM   Result Value Ref Range    WBC Urine >100 (A) OTO5^0 - 5 /HPF    RBC Urine 25-50 (A) OTO2^O - 2 /HPF    Bacteria Urine Many (A) NEG^Negative /HPF     CMP  Recent Labs   Lab Test  07/10/18   1557  09/13/17   2115  06/27/17   1603  06/06/17   1712   01/21/17   2041  06/04/14   1600  04/28/14   1201  04/17/14   1515   03/11/14   1107   NA   --   140  142   --    --   140  142  141  142  143   < >  141   POTASSIUM   --   3.7  4.1   --    --   3.4  4.1  4.2  3.8   < >  4.5   CHLORIDE   --   107  107   --    --   109  101  102  104   < >  104   CO2   --   25  26   --    --   22  25  25  21   < >  26   ANIONGAP   --   8  9   --    --   9  16  13  17   < >  10.4   GLC   --   94  64*   --    --   67*  90  87  100*   < >  76   BUN   --   15  12   --    --   7  15  14  12   < >  14   CR  0.52  0.63  0.65  0.50*   --   0.53  0.74  0.63  0.53  0.62   < >  0.50   GFRESTIMATED  >90  >90  >90  Non African American GFR Calc    >90  Non  GFR Calc     --   >90  Non  GFR Calc    >90  >90  >90  >90   < >  >90   GFRESTBLACK  >90  >90  >90  African American GFR Calc    >90   GFR Calc     --   >90   GFR Calc    >90  >90  >90  >90   < >  >90   VIRGEN   --   9.1  9.6   --    --   8.2*  9.4  9.0  9.3   < >  9.4   PHOS   --    --    --    --    --    --   4.9*  3.4  4.8*   --   4.5   PROTTOTAL   --   7.3  7.0   --    --   6.0*   --    --   7.2   < >   --    ALBUMIN  2.9*  4.1  3.2*  2.9*   < >  3.2*  3.9  4.2  4.1  4.1   < >  4.0   BILITOTAL   --   0.5  0.2   --    --   0.3   --    --   0.3   < >   --    ALKPHOS   --   80  125   --    --   50   --    --   61   < >   --    AST  15   34  13  18   < >  14   --    --   29   < >   --    ALT  18  61*  17  16   < >  12   --    --   27   < >   --     < > = values in this interval not displayed.     CBC  Recent Labs   Lab Test  07/10/18   1557  03/23/18   1056  09/13/17   2115  06/27/17   1603   HGB  11.2*  12.6  13.2  12.4   WBC  10.0  4.9  5.7  10.2   RBC  3.53*  4.05  4.39  3.92   HCT  33.6*  36.4  37.6  36.0   MCV  95  90  86  92   MCH  31.7  31.1  30.1  31.6   MCHC  33.3  34.6  35.1  34.4   RDW  13.0  12.1  12.5  12.2   PLT  329  244  301  422     INR  Recent Labs   Lab Test  01/18/13   1033  01/14/13   1140   INR  0.82*  0.97   PTT  54*  55*       URINE STUDIES  Recent Labs   Lab Test  07/18/18   0940  07/10/18   1600  03/23/18   1103  09/13/17   2210  06/27/17   1604  06/06/17   1715   06/04/14   1600   COLOR  Yellow  Yellow  Lori  Yellow  Yellow  Yellow   < >  Yellow   APPEARANCE  Cloudy  Turbid  Slightly Cloudy  Slightly Cloudy  Clear  Clear   < >  Clear   URINEGLC  Negative  Negative  Negative  Negative  Negative  Negative   < >  Negative   URINEBILI  Negative  Negative  Negative  Negative  Negative  Negative   < >  Negative   URINEKETONE  Negative  Negative  Negative  Negative  Negative  Negative   < >  Negative   SG  1.012  1.020  1.031  1.025  1.010  1.020   < >  >1.030   UBLD  Negative  Moderate*  Negative  Negative  Small*  Negative   < >  Negative   URINEPH  8.0*  7.5*  5.0  5.0  7.0  5.5   < >  6.0   PROTEIN  100*  100*  30*  Negative  Negative  Negative   < >  Trace*   UROBILINOGEN   --   0.2   --    --   0.2  0.2   --   0.2   NITRITE  Negative  Negative  Negative  Negative  Negative  Negative   < >  Negative   LEUKEST  Large*  Large*  Small*  Trace*  Small*  Negative   < >  Negative   RBCU  9*  25-50*  1  2  O - 2   --    < >  O - 2   WBCU  >182*  >100*  27*  31*  2-5*   --    < >  O - 2    < > = values in this interval not displayed.     Urine microscopy performed myself: TNTC WBC.  No RBC or other casts seen.      Recent Labs    Lab Test  07/18/18   0940  07/10/18   1600  03/23/18   1103  06/27/17   1604  06/06/17   1715  02/08/17   1257  06/04/14   1600  04/28/14   1200  04/17/14   1514  03/27/14   1725  03/11/14   1100  02/03/14   1233  11/13/13   1509  10/01/13   1115  08/19/13   1346  05/30/13   1529  03/27/13   1540  02/26/13   1140  01/18/13   1230  01/14/13   0945   UTPG  0.96*  0.99*  0.08  Unable to calculate due to low value  0.12  0.08  0.03  <0.03  <0.06  0.39*  <0.06  0.06  <0.05  0.14  0.08  <0.03  0.39*  2.59*  3.22*  2.41*     [facility] [place]  US RENAL AND BLADDER COMPLETE  5/18/2018 5:12 PM    INDICATION: Left flank and low back pain 13 weeks and 3 days pregnant, history  of lupus  TECHNIQUE: Routine kidneys and bladder.  COMPARISON: CT 09/07/2017    FINDINGS:  No ascites. Right kidney measures 12.3 x 5.5 x 6.6 cm and the left 11.0 x 4.6 x  5.1 cm. No hydronephrosis. No calculi. Bladder is unremarkable.    CONCLUSION:  1.  Normal ultrasound of kidneys.    This report was electronically interpreted by: Dr. Debra Mendes MD ON 05/18/2018 at 17:21      ASSESSMENT AND PLAN:   22 y.o.  female with a history of SLE and Class V nephritis, who is referred for evaluation of pyuria, dysuria, and increased proteinuria in the setting of pregnancy at 22 weeks.    The urine sediment would be very atypical for a relapse of lupus nephritis alone.  However, the extra renal manifestations, her serologies, and the increased proteinuria are concerning for a flare of SLE and likely relapse of lupus nephritis.  (her previous UPCRs were normal, making it unlikely that the proteinuria seen now is attributable to pregnancy alone)  The extent and severity of pyuria, in the setting of negative cultures (reportedly) raise the question of inflammatory cystitis, or non-bacterial/atypical bacterial infection.   Unfortunately, I have not yet been able to locate the reports of the latest renal ultrasound.  The latest urine culture  "(7/10/18) was negative, but I am not sure as to the chronologic relationship with her last dose of antibiotics.  At this point, I would suggest:  1- urogynecology evaluation of pyuria.    2- I concur with Dr Reina that starting azathioprine 50 mg daily is a reasonable approach given the evidence in support of relapsing SLE and lupus nephritis.   3- In addition to azathioprine, will also consider adding aspirine  mg daily to decrease the risk of preeclampsia (I do not currently have all the factors to estimate her risk of pre-ecclampsia according to the algorithm used in the  \"Combined Multimarker Screening and Randomized Patient Treatment with Aspirin for Evidence-Based Preeclampsia Prevention (ASPRE)\" trial. Https://fetalmedicine.org/research/assess/preeclampsia  4- verify anticardiolipin and YUMI antibodies  5- with C Pressure, SOB, and respiratory variation in HR, I would like to obtain a cardiac echo. (ordered)      Don Santana MD  July 18, 2018      "

## 2018-07-18 NOTE — PROGRESS NOTES
High ESR/CRP, drop in CH50 and increased urine protein is concerning for lupus flare up. Has prednisone helped? Please keep your appointment with nephrology tomorrow to decide about prednisone taper.

## 2018-07-19 LAB
BACTERIA SPEC CULT: NO GROWTH
Lab: NORMAL
SPECIMEN SOURCE: NORMAL

## 2018-07-20 ENCOUNTER — TELEPHONE (OUTPATIENT)
Dept: RHEUMATOLOGY | Facility: CLINIC | Age: 23
End: 2018-07-20

## 2018-07-20 NOTE — TELEPHONE ENCOUNTER
----- Message from Chente Reina MD sent at 7/18/2018 10:32 PM CDT -----  Would you please do f/u with her? urogyn referral, TPMT and start AZA 50 mg every day if NL TPMT, will keep her on pred 20 till she starts AZA. Thanks

## 2018-08-01 NOTE — TELEPHONE ENCOUNTER
Called and spoke with pt regarding Dr. Reina's recommendations.  Discussed that she is still not feeling well, still has fatigue and pain when she urinates.  Provided number to urogyn clinic so she may reschedule.  She was also told to continue with prednisone at 20 mg until she is able to start the AZATHIOPRINE. Pt is aware that she needs to get TPMT.    Flavia Mayfield RN  Rheumatology Clinic

## 2018-09-05 ENCOUNTER — OFFICE VISIT (OUTPATIENT)
Dept: UROLOGY | Facility: CLINIC | Age: 23
End: 2018-09-05
Attending: OBSTETRICS & GYNECOLOGY
Payer: COMMERCIAL

## 2018-09-05 ENCOUNTER — MYC MEDICAL ADVICE (OUTPATIENT)
Dept: RHEUMATOLOGY | Facility: CLINIC | Age: 23
End: 2018-09-05

## 2018-09-05 VITALS — BODY MASS INDEX: 23.78 KG/M2 | WEIGHT: 148 LBS | HEIGHT: 66 IN

## 2018-09-05 DIAGNOSIS — M32.9 SYSTEMIC LUPUS ERYTHEMATOSUS, UNSPECIFIED SLE TYPE, UNSPECIFIED ORGAN INVOLVEMENT STATUS (H): Primary | ICD-10-CM

## 2018-09-05 ASSESSMENT — PAIN SCALES - GENERAL: PAINLEVEL: EXTREME PAIN (8)

## 2018-09-05 NOTE — MR AVS SNAPSHOT
After Visit Summary   9/5/2018    Monica Puckett    MRN: [id number]           Patient Information     Date Of Birth          1995        Visit Information        Provider Department      9/5/2018 10:30 AM Ismael Valadez MD [facility]        Today's Diagnoses     Systemic lupus erythematosus, unspecified SLE type, unspecified organ involvement status (H)    -  1       Follow-ups after your visit        Follow-up notes from your care team     Return for cystoscopy.      Your next 10 appointments already scheduled     Sep 07, 2018 10:30 AM CDT   Ech Complete with [address] Echo ([facility])    [address] [facility] [place]  [address] [facility] [id number] [phone]           1.  Please bring or wear a comfortable two-piece outfit. 2.  You may eat, drink and take your normal medicines. 3.  For any questions that cannot be answered, please contact the ordering physician 4.  Please do not wear perfumes or scented lotions on the day of your exam.            Sep 07, 2018  3:30 PM CDT   (Arrive by 3:15 PM)   Return Urogyn with Ismael Valadez MD   [facility] ([facility])    [address] [place] [address]  [address] [facility] [address] [phone]           Please call [facility] , [phone], with any questions or concerns you may have regarding your appointment            Dec 28, 2018  1:00 PM CST   (Arrive by 12:45 PM)   RETURN LUPUS with Chente Reina MD   [facility] ([facility])    [address] [place] [address]  [address]  [facility] [address] [phone]              Future tests that were ordered for you today     Open Future Orders        Priority Expected Expires Ordered    US Renal Complete Routine  9/5/2019 9/5/2018            Who to contact     Please call your clinic at [phone] to:    Ask questions  "about your health    Make or cancel appointments    Discuss your medicines    Learn about your test results    Speak to your doctor            Additional Information About Your Visit        meetsharNolio Information     [employer] gives you secure access to your electronic health record. If you see a primary care provider, you can also send messages to your care team and make appointments. If you have questions, please call your primary care clinic.  If you do not have a primary care provider, please call [phone] and they will assist you.      [employer] is an electronic gateway that provides easy, online access to your medical records. With [employer], you can request a clinic appointment, read your test results, renew a prescription or communicate with your care team.     To access your existing account, please contact your [facility] or call [phone] for assistance.        Care EveryWhere ID     This is your Care EveryWhere ID. This could be used by other organizations to access your [place] medical records  [id number]        Your Vitals Were     Height Last Period BMI (Body Mass Index)             1.676 m (5' 6\") 02/12/2018 23.89 kg/m2          Blood Pressure from Last 3 Encounters:   07/18/18 105/69   07/11/18 110/68   03/23/18 119/76    Weight from Last 3 Encounters:   09/05/18 67.1 kg (148 lb)   07/18/18 67.4 kg (148 lb 8 oz)   07/11/18 66.8 kg (147 lb 3.2 oz)                 Where to get your medicines      These medications were sent to [facility]/pharmacy #[id number] - [place], [place] - [address]  [address] , [facility] [address]     Phone:  [phone]     phenazopyridine 97.5 MG tablet          Primary Care Provider Office Phone # Fax #    Una Canales [phone]       [facility] [address] [facility] [address]        Equal Access to Services     WILLIS THORNE AH: Hadii cheryle barajaso Soomaali, waaxda luqadaha, qaybta kaalmada " kiesha avilabrigitte coronado'aan ah. So [facility] [phone].    ATENCIÓN: Si liborio lambert, tiene a napoles disposición servicios gratuitos de asistencia lingüística. Emily al [phone].    We comply with applicable federal civil rights laws and [place] laws. We do not discriminate on the basis of race, color, national origin, age, disability, sex, sexual orientation, or gender identity.            Thank you!     Thank you for choosing [facility]  for your care. Our goal is always to provide you with excellent care. Hearing back from our patients is one way we can continue to improve our services. Please take a few minutes to complete the written survey that you may receive in the mail after your visit with us. Thank you!             Your Updated Medication List - Protect others around you: Learn how to safely use, store and throw away your medicines at www.disposemymeds.org.          This list is accurate as of 9/5/18  3:45 PM.  Always use your most recent med list.                   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    Systemic lupus erythematosus, unspecified SLE type, unspecified organ involvement status (H)       * predniSONE 20 MG tablet    DELTASONE    30 tablet    Take 1 tablet (20 mg) by mouth daily    Systemic lupus erythematosus, unspecified SLE type, unspecified organ involvement status (H)       prenatal multivitamin plus iron 27-0.8 MG Tabs per tablet      Take 1 tablet by mouth daily        VALTREX PO      Take 500 mg by mouth as needed        * Notice:  This list has 4 medication(s) that are the same as other medications prescribed for you. Read the directions carefully, and ask your doctor or other care provider to review them with you.

## 2018-09-05 NOTE — PROGRESS NOTES
2018    Referring Provider: Chente Reina MD  [address] [place] [id number]    Primary Care Provider: Una Canales    CC: pelvic pain and dysuria    HPI:  Monica Puckett is a  22 year old female at 29 weeks with lupus nephritis who presents for evaluation of dysuria and pelvic pain.  She has experienced 8 out of 10 burning pain on urination throughout her pregnancy. She reports that the pain starts as soon as she feels the need to void, and intensifies when she initiates a stream. The pain lasts about 10 minutes post-void before it returns to 4 out of 10 pain. She says she occasionally has the feeling of an incomplete bladder, but not routinely. She notes that pain has caused her to visit the emergency room 6-7 times during her pregnancy. She has been managing her pain with hydrocodone and dilaudid prescribed in the ED when she visits. She uses it only at night to help her sleep through the pain. UAs repeatedly reveal pyuria without any culture growth (, ).    Monica saw Dr. Reina on  who believed her dysuria may be secondary to lupus cystitis, a rare manifestation of SLE. She saw Dr. Santana in nephrology on  who added azathioprine to manage the lupus nephritis flare.    On , she experienced contractions that she believed to be labor. She was also passing what she describes as nickel-sized clots. She went to the emergency room and was evaluated on L&D, who told her that it was not likely to be placental pathology and may instead be from her urethra.    Prolapse:  Do you feel a vaginal bulge? No                                    Pressure? Yes   Do you have to place your fingers in the vagina or in the rectum to have a bowel movement? No  Impact to quality of life? Moderate    Stress Incontinence:  Do you leak urine with cough, sneeze, exercise? Yes  How often do you leak with cough, sneeze, exercise? Weekly  How much do you usually leak? More than drops  Do  you wear a pad? Yes. If so; light  Impact to quality of life? Severe    Urge Incontinence:  Do you often get sudden urges to urinate? Yes  How often do have urges? Daily  If so, do you leak with these urges? Yes  How much do you usually leak? Drops or more  Impact to quality of life? Severe    Voids/day: 15+  Nocturia: 6+  Fluid intake: 2+ liters  Caffeine: 0    Urinating:  Difficulty starting urination or strain to void? Yes  Weak or intermittent stream? Yes  Incomplete emptying or dribbling? Yes  Pain or burning with urination? Yes  Any blood in your urine? Yes    GI:  Constipation? Yes  Frequency stools Daily    Straining for stools Yes  Stool consistency Loose  Ever leak stool (Accidental Bowel Leakage)? No      If so, how often?               N/A      If so, do you leak?             N/A      Soiling without sensation? No  History of irritable bowel or Crohn's? No    Sexual/Pain:  Are you currently having sex? No  Pain with sex?   No  Sexual Partner: Male  Do any of these symptoms interfere with sex? Yes  Impact to quality of life? Severe    Prior therapy:  Ever done pelvic floor physical therapy? No  Trial of medication? No  Have you ever tried a pessary? No    Medical History:  Do you have?   High Cholesterol? No  Diabetes? No  High Blood pressure? No  Recurrent UTIs? No  Sleep Apnea? No  Other medical problems: lupus, class V lupus nephritis (kidney biopsy in )    Surgical History:    Hysterectomy? No  Bladder Surgery? No   Other? Kidney biopsy     OB/Gyn History:  Pregnancies? 2  Deliveries? 1  Vaginal 1  Section 0  Current birth control? None (pregnant)  Periods? Irregular  When was the first day of your last period? 2/15/2018  Last Pap smear?  Any abnormal? No  Last mammogram? NA  Last colonoscopy? NA    Medications/Vitamins/Supplements:    Current Outpatient Prescriptions   Medication     hydroxychloroquine (PLAQUENIL) 200 MG tablet     Omega-3 Fatty Acids (FISH OIL PO)     Prenatal  Vit-Fe Fumarate-FA (PRENATAL MULTIVITAMIN  PLUS IRON) 27-0.8 MG TABS     ValACYclovir HCl (VALTREX PO)     Amphetamine-Dextroamphetamine (ADDERALL PO)     Amphetamine-Dextroamphetamine (ADDERALL XR PO)     doxylamine (UNISOM) 25 MG TABS tablet     ondansetron (ZOFRAN ODT) 4 MG ODT tab     predniSONE (DELTASONE) 20 MG tablet     predniSONE (DELTASONE) 5 MG tablet     No current facility-administered medications for this visit.        Drug Allergies: None  Latex Allergy: No  Iodine Allergy No    Family History: (list relationship and age at diagnosis)  Breast cancer? None  Ovarian cancer? None  Colon cancer? None  Other? Kidney stones    Social History:  Marital status: single  Do you/ have you ever smoke(d)  cigarettes? No  Drink more than 1 alcoholic beverage a day?  No  Occupation?     In the past 3 months have you regularly experienced:  Chest pain w/ walking/exercise? Yes  Unusual headaches? Yes  Leg pain w/ walking/exercise? No        Easy bruising? Yes  Difficulty breathing w/ walking/exercise? Yes  Problems with vision? No  Dizziness, falls, or fainting? Yes  Excessive bleeding from cuts, gums, surgery? No  Other: severe pain in bladder/urethra, lupus flare    Past Medical History:   Diagnosis Date     Chronic kidney disease      Lupus      RA (rheumatoid arthritis) (H)        Past Surgical History:   Procedure Laterality Date     HC BIOPSY RENAL, PERCUTANEOUS  1/18/13       Social History     Social History     Marital status: Single     Spouse name: N/A     Number of children: N/A     Years of education: N/A     Occupational History     Not on file.     Social History Main Topics     Smoking status: Never Smoker     Smokeless tobacco: Never Used      Comment: no second hand smoke exposure at home     Alcohol use No     Drug use: No     Sexual activity: Yes     Other Topics Concern     Not on file     Social History Narrative       No family history on file.    ROS    Allergies   Allergen  "Reactions     Estrogens Other (See Comments)     Lupus flares     No Clinical Screening - See Comments Other (See Comments) and Rash     She is immunosuppressed   Gets very sick from flu vaccines     mmr     Varicella Virus Vaccine Live Other (See Comments)     She is immunosuppressed        Current Outpatient Prescriptions   Medication     hydroxychloroquine (PLAQUENIL) 200 MG tablet     Omega-3 Fatty Acids (FISH OIL PO)     Prenatal Vit-Fe Fumarate-FA (PRENATAL MULTIVITAMIN  PLUS IRON) 27-0.8 MG TABS     ValACYclovir HCl (VALTREX PO)     Amphetamine-Dextroamphetamine (ADDERALL PO)     Amphetamine-Dextroamphetamine (ADDERALL XR PO)     doxylamine (UNISOM) 25 MG TABS tablet     ondansetron (ZOFRAN ODT) 4 MG ODT tab     predniSONE (DELTASONE) 20 MG tablet     predniSONE (DELTASONE) 5 MG tablet     No current facility-administered medications for this visit.        Ht 1.676 m (5' 6\")  Wt 67.1 kg (148 lb)  LMP 02/12/2018  BMI 23.89 kg/m2 Patient's last menstrual period was 02/12/2018. Body mass index is 23.89 kg/(m^2).  Monica is alert, in mild distress due to her pain, and tearful, with non-labored breathing.   Abdomen is soft, non-tender, non-distended, no CVAT.  Normal external female genitalia. The urethra was normal, without lesions, distal urethra not tender.  She has good support on supine strain.  Speculum and bimanual exam are remarkable for tenderness along the bladder base, with only minimal tenderness along the urethra    positive SST  VOID 400 ml  PVR 59 mL    A/P: Monica Puckett is a 22 year old F at 29 weeks gestation with lupus nephritis, experiencing significant dysuria suspected to be lupus cystitis. Will reattempt pyridium to reduce bladder spasms to attempt to alleviate bladder pain. Patient will return on Friday for cystoscopy to evaluate of bladder. Will evaluate for other causes of bladder pain, and assess for inflammation suggestive of lupus cystitis. Will schedule a kidney ultrasound to rule " out nephrolithiasis or other identifiable cause of pain. We discussed that any pain management will need to be conducted through either MFM or primary OB.    A total of 40 minutes were spent with the patient today, > 50% in counseling and coordination of care     Ismael Valadez MD  Professor, OB/GYN  Urogynecologist    CC  Patient Care Team:  Una Canales MD as PCP - General (Family Practice)  Vesta Yeh MD as MD (Pediatric Nephrology)  Chente Hughes MD as MD (Rheumatology)  Marycarmen Montana MD as MD (Speciality Unknown)  Ismael Valadez MD as MD (OB/Gyn)  CHENTE HUGHES

## 2018-09-05 NOTE — LETTER
2018     RE: Monica Puckett  [id number]  [facility] [address]     Dear Colleague,    Thank you for referring your patient, Monica Puckett, to the [facility] at [facility]. Please see a copy of my visit note below.    2018    Referring Provider: Chente Reina MD  [address] [place] [address]    Primary Care Provider: Una Canales    CC: pelvic pain and dysuria    HPI:  Monica Puckett is a  22 year old female at 29 weeks with lupus nephritis who presents for evaluation of dysuria and pelvic pain.  She has experienced 8 out of 10 burning pain on urination throughout her pregnancy. She reports that the pain starts as soon as she feels the need to void, and intensifies when she initiates a stream. The pain lasts about 10 minutes post-void before it returns to 4 out of 10 pain. She says she occasionally has the feeling of an incomplete bladder, but not routinely. She notes that pain has caused her to visit the emergency room 6-7 times during her pregnancy. She has been managing her pain with hydrocodone and dilaudid prescribed in the ED when she visits. She uses it only at night to help her sleep through the pain. UAs repeatedly reveal pyuria without any culture growth (, ).    Monica saw Dr. Reina on  who believed her dysuria may be secondary to lupus cystitis, a rare manifestation of SLE. She saw Dr. Santana in nephrology on  who added azathioprine to manage the lupus nephritis flare.    On , she experienced contractions that she believed to be labor. She was also passing what she describes as nickel-sized clots. She went to the emergency room and was evaluated on L&D, who told her that it was not likely to be placental pathology and may instead be from her urethra.    Prolapse:  Do you feel a vaginal bulge? No                                    Pressure? Yes   Do you have to place your  fingers in the vagina or in the rectum to have a bowel movement? No  Impact to quality of life? Moderate    Stress Incontinence:  Do you leak urine with cough, sneeze, exercise? Yes  How often do you leak with cough, sneeze, exercise? Weekly  How much do you usually leak? More than drops  Do you wear a pad? Yes. If so; light  Impact to quality of life? Severe    Urge Incontinence:  Do you often get sudden urges to urinate? Yes  How often do have urges? Daily  If so, do you leak with these urges? Yes  How much do you usually leak? Drops or more  Impact to quality of life? Severe    Voids/day: 15+  Nocturia: 6+  Fluid intake: 2+ liters  Caffeine: 0    Urinating:  Difficulty starting urination or strain to void? Yes  Weak or intermittent stream? Yes  Incomplete emptying or dribbling? Yes  Pain or burning with urination? Yes  Any blood in your urine? Yes    GI:  Constipation? Yes  Frequency stools Daily    Straining for stools Yes  Stool consistency Loose  Ever leak stool (Accidental Bowel Leakage)? No      If so, how often?               N/A      If so, do you leak?             N/A      Soiling without sensation? No  History of irritable bowel or Crohn's? No    Sexual/Pain:  Are you currently having sex? No  Pain with sex?   No  Sexual Partner: Male  Do any of these symptoms interfere with sex? Yes  Impact to quality of life? Severe    Prior therapy:  Ever done pelvic floor physical therapy? No  Trial of medication? No  Have you ever tried a pessary? No    Medical History:  Do you have?   High Cholesterol? No  Diabetes? No  High Blood pressure? No  Recurrent UTIs? No  Sleep Apnea? No  Other medical problems: lupus, class V lupus nephritis (kidney biopsy in )    Surgical History:    Hysterectomy? No  Bladder Surgery? No   Other? Kidney biopsy 2013    OB/Gyn History:  Pregnancies? 2  Deliveries? 1  Vaginal 1  Section 0  Current birth control? None (pregnant)  Periods? Irregular  When was the first day of your  last period? 2/15/2018  Last Pap smear? 2018 Any abnormal? No  Last mammogram? NA  Last colonoscopy? NA    Medications/Vitamins/Supplements:    Current Outpatient Prescriptions   Medication     hydroxychloroquine (PLAQUENIL) 200 MG tablet     Omega-3 Fatty Acids (FISH OIL PO)     Prenatal Vit-Fe Fumarate-FA (PRENATAL MULTIVITAMIN  PLUS IRON) 27-0.8 MG TABS     ValACYclovir HCl (VALTREX PO)     Amphetamine-Dextroamphetamine (ADDERALL PO)     Amphetamine-Dextroamphetamine (ADDERALL XR PO)     doxylamine (UNISOM) 25 MG TABS tablet     ondansetron (ZOFRAN ODT) 4 MG ODT tab     predniSONE (DELTASONE) 20 MG tablet     predniSONE (DELTASONE) 5 MG tablet     No current facility-administered medications for this visit.        Drug Allergies: None  Latex Allergy: No  Iodine Allergy No    Family History: (list relationship and age at diagnosis)  Breast cancer? None  Ovarian cancer? None  Colon cancer? None  Other? Kidney stones    Social History:  Marital status: single  Do you/ have you ever smoke(d)  cigarettes? No  Drink more than 1 alcoholic beverage a day?  No  Occupation?     In the past 3 months have you regularly experienced:  Chest pain w/ walking/exercise? Yes  Unusual headaches? Yes  Leg pain w/ walking/exercise? No        Easy bruising? Yes  Difficulty breathing w/ walking/exercise? Yes  Problems with vision? No  Dizziness, falls, or fainting? Yes  Excessive bleeding from cuts, gums, surgery? No  Other: severe pain in bladder/urethra, lupus flare    Past Medical History:   Diagnosis Date     Chronic kidney disease      Lupus      RA (rheumatoid arthritis) (H)        Past Surgical History:   Procedure Laterality Date     HC BIOPSY RENAL, PERCUTANEOUS  1/18/13       Social History     Social History     Marital status: Single     Spouse name: N/A     Number of children: N/A     Years of education: N/A     Occupational History     Not on file.     Social History Main Topics     Smoking status: Never  "Smoker     Smokeless tobacco: Never Used      Comment: no second hand smoke exposure at home     Alcohol use No     Drug use: No     Sexual activity: Yes     Other Topics Concern     Not on file     Social History Narrative       No family history on file.    ROS    Allergies   Allergen Reactions     Estrogens Other (See Comments)     Lupus flares     No Clinical Screening - See Comments Other (See Comments) and Rash     She is immunosuppressed   Gets very sick from flu vaccines     mmr     Varicella Virus Vaccine Live Other (See Comments)     She is immunosuppressed        Current Outpatient Prescriptions   Medication     hydroxychloroquine (PLAQUENIL) 200 MG tablet     Omega-3 Fatty Acids (FISH OIL PO)     Prenatal Vit-Fe Fumarate-FA (PRENATAL MULTIVITAMIN  PLUS IRON) 27-0.8 MG TABS     ValACYclovir HCl (VALTREX PO)     Amphetamine-Dextroamphetamine (ADDERALL PO)     Amphetamine-Dextroamphetamine (ADDERALL XR PO)     doxylamine (UNISOM) 25 MG TABS tablet     ondansetron (ZOFRAN ODT) 4 MG ODT tab     predniSONE (DELTASONE) 20 MG tablet     predniSONE (DELTASONE) 5 MG tablet     No current facility-administered medications for this visit.        Ht 1.676 m (5' 6\")  Wt 67.1 kg (148 lb)  LMP 02/12/2018  BMI 23.89 kg/m2 Patient's last menstrual period was 02/12/2018. Body mass index is 23.89 kg/(m^2).  Monica is alert, in mild distress due to her pain, and tearful, with non-labored breathing.   Abdomen is soft, non-tender, non-distended, no CVAT.  Normal external female genitalia. The urethra was normal, without lesions, distal urethra not tender.  She has good support on supine strain.  Speculum and bimanual exam are remarkable for tenderness along the bladder base, with only minimal tenderness along the urethra    positive SST  VOID 400 ml  PVR 59 mL    A/P: Monica Puckett is a 22 year old F at 29 weeks gestation with lupus nephritis, experiencing significant dysuria suspected to be lupus cystitis. Will reattempt " pyridium to reduce bladder spasms to attempt to alleviate bladder pain. Patient will return on Friday for cystoscopy to evaluate of bladder. Will evaluate for other causes of bladder pain, and assess for inflammation suggestive of lupus cystitis. Will schedule a kidney ultrasound to rule out nephrolithiasis or other identifiable cause of pain. We discussed that any pain management will need to be conducted through either MFM or primary OB.    A total of 40 minutes were spent with the patient today, > 50% in counseling and coordination of care     Ismael Valadez MD  Professor, OB/GYN  Urogynecologist    CC  Patient Care Team:  Una Canales MD as PCP - General (Family Practice)  Vesta Yeh MD as MD (Pediatric Nephrology)  Chente Hughes MD as MD (Rheumatology)  Marycarmen Montana MD as MD (Speciality Unknown)  Ismael Valadez MD as MD (OB/Gyn)  CHENTE HUGHES

## 2018-09-06 NOTE — TELEPHONE ENCOUNTER
Pt has been scheduled to see Dr. Reina on 9/7/18 at 2:30 pm.    Flavia Mayfield RN  Rheumatology Clinic

## 2018-09-07 ENCOUNTER — RADIANT APPOINTMENT (OUTPATIENT)
Dept: CARDIOLOGY | Facility: CLINIC | Age: 23
End: 2018-09-07
Payer: COMMERCIAL

## 2018-09-07 ENCOUNTER — OFFICE VISIT (OUTPATIENT)
Dept: RHEUMATOLOGY | Facility: CLINIC | Age: 23
End: 2018-09-07
Attending: INTERNAL MEDICINE
Payer: COMMERCIAL

## 2018-09-07 ENCOUNTER — OFFICE VISIT (OUTPATIENT)
Dept: UROLOGY | Facility: CLINIC | Age: 23
End: 2018-09-07
Payer: COMMERCIAL

## 2018-09-07 VITALS
HEART RATE: 98 BPM | WEIGHT: 155.6 LBS | BODY MASS INDEX: 25.01 KG/M2 | HEIGHT: 66 IN | TEMPERATURE: 98.2 F | OXYGEN SATURATION: 98 % | DIASTOLIC BLOOD PRESSURE: 71 MMHG | SYSTOLIC BLOOD PRESSURE: 114 MMHG

## 2018-09-07 DIAGNOSIS — M32.19 SYSTEMIC LUPUS ERYTHEMATOSUS WITH OTHER ORGAN INVOLVEMENT, UNSPECIFIED SLE TYPE (H): ICD-10-CM

## 2018-09-07 DIAGNOSIS — O09.90 HIGH-RISK PREGNANCY, UNSPECIFIED TRIMESTER: ICD-10-CM

## 2018-09-07 DIAGNOSIS — M32.9 SYSTEMIC LUPUS ERYTHEMATOSUS, UNSPECIFIED SLE TYPE, UNSPECIFIED ORGAN INVOLVEMENT STATUS (H): Primary | ICD-10-CM

## 2018-09-07 DIAGNOSIS — M32.9 SYSTEMIC LUPUS ERYTHEMATOSUS, UNSPECIFIED SLE TYPE, UNSPECIFIED ORGAN INVOLVEMENT STATUS (H): ICD-10-CM

## 2018-09-07 LAB
CARDIOLIPIN ANTIBODY IGG: <1.6 GPL-U/ML (ref 0–19.9)
CARDIOLIPIN ANTIBODY IGM: <0.2 MPL-U/ML (ref 0–19.9)
ENA RNP IGG SER IA-ACNC: 0.4 AI (ref 0–0.9)
ENA SCL70 IGG SER IA-ACNC: <0.2 AI (ref 0–0.9)
ENA SM IGG SER-ACNC: <0.2 AI (ref 0–0.9)
ENA SS-A IGG SER IA-ACNC: <0.2 AI (ref 0–0.9)
ENA SS-B IGG SER IA-ACNC: <0.2 AI (ref 0–0.9)

## 2018-09-07 PROCEDURE — 86235 NUCLEAR ANTIGEN ANTIBODY: CPT | Performed by: INTERNAL MEDICINE

## 2018-09-07 PROCEDURE — 86147 CARDIOLIPIN ANTIBODY EA IG: CPT | Performed by: INTERNAL MEDICINE

## 2018-09-07 PROCEDURE — 00000167 ZZHCL STATISTIC INR NC: Performed by: INTERNAL MEDICINE

## 2018-09-07 PROCEDURE — 85730 THROMBOPLASTIN TIME PARTIAL: CPT | Performed by: INTERNAL MEDICINE

## 2018-09-07 PROCEDURE — 00000401 ZZHCL STATISTIC THROMBIN TIME NC: Performed by: INTERNAL MEDICINE

## 2018-09-07 PROCEDURE — G0463 HOSPITAL OUTPT CLINIC VISIT: HCPCS | Mod: ZF

## 2018-09-07 PROCEDURE — 85613 RUSSELL VIPER VENOM DILUTED: CPT | Performed by: INTERNAL MEDICINE

## 2018-09-07 PROCEDURE — 81335 TPMT GENE COM VARIANTS: CPT | Performed by: INTERNAL MEDICINE

## 2018-09-07 ASSESSMENT — PAIN SCALES - GENERAL: PAINLEVEL: SEVERE PAIN (7)

## 2018-09-07 NOTE — MR AVS SNAPSHOT
After Visit Summary   9/7/2018    Monica Puckett    MRN: [id number]           Patient Information     Date Of Birth          1995        Visit Information        Provider Department      9/7/2018 3:30 PM Ismael Valadez MD [facility]        Today's Diagnoses     Systemic lupus erythematosus, unspecified SLE type, unspecified organ involvement status (H)    -  1       Follow-ups after your visit        Your next 10 appointments already scheduled     Dec 28, 2018  1:00 PM CST   (Arrive by 12:45 PM)   RETURN LUPUS with Chente Reina MD   [facility] ([facility])    [address]  [address]  [facility] [address] [phone]              Who to contact     Please call your clinic at [phone] to:    Ask questions about your health    Make or cancel appointments    Discuss your medicines    Learn about your test results    Speak to your doctor            Additional Information About Your Visit        MyChart Information     [employer] gives you secure access to your electronic health record. If you see a primary care provider, you can also send messages to your care team and make appointments. If you have questions, please call your primary care clinic.  If you do not have a primary care provider, please call [phone] and they will assist you.      [employer] is an electronic gateway that provides easy, online access to your medical records. With [employer], you can request a clinic appointment, read your test results, renew a prescription or communicate with your care team.     To access your existing account, please contact your [facility] or call [phone] for assistance.        Care EveryWhere ID     This is your Care EveryWhere ID. This could be used by other organizations to access your [facility] medical records  [id number]        Your Vitals Were     Last Period                    02/12/2018            Blood Pressure from Last 3 Encounters:   09/07/18 114/71   07/18/18 105/69   07/11/18 110/68    Weight from Last 3 Encounters:   09/07/18 70.6 kg (155 lb 9.6 oz)   09/05/18 67.1 kg (148 lb)   07/18/18 67.4 kg (148 lb 8 oz)              Today, you had the following     No orders found for display       Primary Care Provider Office Phone # Fax #    Una Canales [phone]       [facility] [address]  [address] [facility] [address]        Equal Access to Services     [facility]: Hadii aad ku hadasho Soomaali, waaxda luqadaha, qaybta kaalmada adeegyada, kiesha molina . So [facility] [phone].    ATENCIÓN: Si habla español, tiene a napoles disposición servicios gratuitos de asistencia lingüística. Llame al [phone].    We comply with applicable federal civil rights laws and [place] laws. We do not discriminate on the basis of race, color, national origin, age, disability, sex, sexual orientation, or gender identity.            Thank you!     Thank you for choosing [facility] UROLOG[facility] AND [facility]OLOG[facility]  for your care. Our goal is always to provide you with excellent care. Hearing back from our patients is one way we can continue to improve our services. Please take a few minutes to complete the written survey that you may receive in the mail after your visit with us. Thank you!             Your Updated Medication List - Protect others around you: Learn how to safely use, store and throw away your medicines at www.disposemymeds.org.          This list is accurate as of 9/7/18 11:59 PM.  Always use your most recent med list.                   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    Systemic lupus erythematosus, unspecified SLE type, unspecified organ involvement status (H)       * predniSONE 20 MG tablet    DELTASONE    30 tablet    Take 1 tablet (20 mg) by mouth daily    Systemic lupus erythematosus, unspecified SLE type, unspecified organ involvement status (H)       prenatal multivitamin plus iron 27-0.8 MG Tabs per tablet      Take 1 tablet by mouth daily        VALTREX PO      Take 500 mg by mouth as needed        * Notice:  This list has 4 medication(s) that are the same as other medications prescribed for you. Read the directions carefully, and ask your doctor or other care provider to review them with you.

## 2018-09-07 NOTE — NURSING NOTE
Lidocaine ndc [address]-2            Cystoscopy Invasive Procedure Safety Checklist:    Procedure:     Action: Complete sections and checkboxes as appropriate.    Pre-procedure:  1. Patient ID Verified with 2 identifiers (Adalgisa and  or MRN) : YES    2. Procedure and site verified with patient/designee (when able) : YES    3. Accurate consent documentation in medical record : YES    4. H&P (or appropriate assessment) documented in medical record : YES  H&P must be up to 30 days prior to procedure an updated within 24 hours of                 Procedure as applicable.     5. Relevant diagnostic and radiology test results appropriately labeled and displayed as applicable : YES    6. Blood products, implants, devices, and/or special equipment available for the procedure as applicable : YES    7. Procedure site(s) marked with provider initials [Exclusions:   ] :no     8. Marking not required. Reason : Yes  Procedure does not require site marking    Time Out:     Time-Out performed immediately prior to starting procedure, including verbal and active participation of all team members addressing: YES    1. Correct patient identity.  2. Confirmed that the correct side and site are marked.  3. An accurate procedure to be done.  4. Agreement on the procedure to be done.  5. Correct patient position.  6. Relevant images and results are properly labeled and appropriately displayed.  7. The need to administer antibiotics or fluids for irrigation purposes during the procedure as applicable.  8. Safety precautions based on patient history or medication use.    During Procedure: Verification of correct person, site, and procedure occurs any time the responsibility for care of the patient is transferred to another member of the care team.  today  Patient prepped ans lidocaine2% jelly inserted into urethra.

## 2018-09-07 NOTE — LETTER
9/7/2018       RE: Monica Puckett  [address]  [facility] [address]     Dear Colleague,    Thank you for referring your patient, Monica Puckett, to the [facility] UROLOGY AND INST FOR PROSTATE AND UROLOGIC CANCERS at [facility]. Please see a copy of my visit note below.    Reason for Visit:  Cystoscopy    Clinical Data: Ms. 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 She was seen by me in clinic on 9/5 with severe dysuria with a presumptive diagnosis of lupus cystitis. She presents for cystoscopy to rule out other causes and look for evidence suggestive of lupus cystitis.    Cystoscopy procedure:  Patient was consented and placed in the lithotomy position.  She was cleaned and prepared in the usual fashion.  Lidocaine gel was inserted into the urethra and given time to take effect.  A 16 fr flexible cystoscope was then inserted through the urethra and into the bladder.  The urethra was wnl.  The bladder was without.  No tumors, diverticulae, or stones.  Bladder showed diffuse hyperemia and erythema due to severe inflammation. No punctate hemorrhages were visualized, but visualization was limited. Bilateral u/o's were effluxing clear urine.  The cystoscope was then withdrawn.  The patient was able to complete the procedure, but was in significant distress due to the pain caused by the cystoscope. No pictures were taken at this time due to patient discomfort and attempts to shorten exam.    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    After conferring with Dr. Reina, we discussed that the cystoscopy suggested likely lupus cystitis. Review of the literature does not give guidance on treatment in pregnancy. The case reports reviewed reveal a significant response to symptoms with IV  methylprednisolone and cyclophosphamide, a category D drug. Response was also seen with CellCept, which (1)  the patient has not had success with in the past, and (2) is associated with congenital malformations and pregnancy loss.    Patient was given the opportunity to ask her questions.     Reiterated with patient that pain medications should be managed through primary OB/GYN. We also discussed the importance of continued management of her lupus flare, as a decrease in those symptoms are likely to assist in treatment of her lupus cystitis.    Plan:  - bladder instillation with steroids early next week or as soon as possible, to attempt to decrease inflammation locally and control symptoms until other treatments take effect  - consider cystoscopy under anesthesia to allow for more complete evaluation of the bladder. Cysto today was limited by large amount of inflammation and significant pt discomfort.  - would like to get renal US to evaluate for hydronephrosis, which the literature suggests may be present in up to 92% of cases  - follow with Dr. Reina to continue treatment for lupus flare, which will continue to manage symptoms    Thank you for allowing me to participate in the care of  Ms. Monica Puckett and I will keep you updated on her progress.    Again, thank you for allowing me to participate in the care of your patient.      Sincerely,    Ismael Valadez MD

## 2018-09-07 NOTE — MR AVS SNAPSHOT
After Visit Summary   9/7/2018    Monica Puckett    MRN: [id number]           Patient Information     Date Of Birth          1995        Visit Information        Provider Department      9/7/2018 2:30 PM Chente Reina MD [facility] Rheumatology        Today's Diagnoses     Systemic lupus erythematosus, unspecified SLE type, unspecified organ involvement status (H)    -  1      Care Instructions    Will call you with follow up appointment after cystoscopy              Follow-ups after your visit        Your next 10 appointments already scheduled     Oct 02, 2018  3:00 PM CDT   Nurse Visit with [facility] Nurse   [facility] ([facility])    [address]  [address]  [facility] [address] [phone]            Oct 09, 2018  8:00 AM CDT   NEW OB with Yamile Arias MD   [facility] ([facility])    [address] [address]  [address]  [facility] [address] [phone]            Oct 11, 2018  5:00 PM CDT   (Arrive by 4:30 PM)   RETURN GLOMERULAR with Don Santana MD   [facility] Nephrology ([facility])    [address]  [address]  [facility] [id number] [phone]            Oct 18, 2018   Procedure with Ismael Valadez MD   [facility] [place], Same Day Surgery (--)    [address]  [facility] [address]   [phone]            Nov 01, 2018  3:30 PM CDT   Return Urogyn with Ismael Valadez MD   [facility] ([facility])    [facility]  [address]  [facility] [address] [phone]           Please call [facility] , [phone], with any questions or concerns you may have regarding your appointment            Dec 28, 2018  1:00 PM CST   (Arrive by 12:45 PM)   RETURN LUPUS with Chente Reina MD   [facility] Rheumatology ([facility])    [address]  [address]  [place] [address] [phone]              Who to contact     If you have questions or need follow up information about today's clinic visit or your schedule please contact [facility] RHEUMATOLOGY directly at [phone].  Normal or non-critical lab and imaging results will be communicated to you by MyChart, letter or phone within 4 business days after the clinic has received the results. If you do not hear from us within 7 days, please contact the clinic through [employer] or phone. If you have a critical or abnormal lab result, we will notify you by phone as soon as possible.  Submit refill requests through [employer] or call your pharmacy and they will forward the refill request to us. Please allow 3 business days for your refill to be completed.          Additional Information About Your Visit        MyChart Information     [employer] gives you secure access to your electronic health record. If you see a primary care provider, you can also send messages to your care team and make appointments. If you have questions, please call your primary care clinic.  If you do not have a primary care provider, please call [phone] and they will assist you.        Care EveryWhere ID     This is your Care EveryWhere ID. This could be used by other organizations to access your [facility] medical records  [id number]        Your Vitals Were     Pulse Temperature Height Last Period Pulse Oximetry BMI (Body Mass Index)    98 98.2  F (36.8  C) (Oral) 1.676 m (5' 6\") 02/12/2018 98% 25.11 kg/m2       Blood Pressure from Last 3 Encounters:   09/26/18 111/67   09/14/18 105/56   09/07/18 114/71    Weight from Last 3 Encounters:   09/26/18 71.4 kg (157 lb 6.4 oz)   09/14/18 71.7 kg (158 lb 1.1 oz)   09/07/18 70.6 kg (155 lb 9.6 oz)              Today, you had the following     No orders found for display       Primary Care Provider Office Phone # Fax #    Una VALDEZ " MD Ally [phone]       [facility] [address]  [facility] [address]        Equal Access to Services     WILLIS THORNE : Hadpat cheryle swann millie Valenzuela, yisel trevorsuhasha, janny kacesar avila, kiesha wyattyoselin an. So [facility] [phone].    ATENCIÓN: Si habla español, tiene a napoles disposición servicios gratuitos de asistencia lingüística. Llame al [phone].    We comply with applicable federal civil rights laws and [place] laws. We do not discriminate on the basis of race, color, national origin, age, disability, sex, sexual orientation, or gender identity.            Thank you!     Thank you for choosing [facility] RHEUMATOLOGY  for your care. Our goal is always to provide you with excellent care. Hearing back from our patients is one way we can continue to improve our services. Please take a few minutes to complete the written survey that you may receive in the mail after your visit with us. Thank you!             Your Updated Medication List - Protect others around you: Learn how to safely use, store and throw away your medicines at www.disposemymeds.org.          This list is accurate as of 9/7/18 11:59 PM.  Always use your most recent med list.                   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    Systemic lupus erythematosus, unspecified SLE type, unspecified organ involvement status (H)       * predniSONE 20 MG tablet    DELTASONE    30 tablet    Take 1 tablet (20 mg) by mouth daily    Systemic lupus erythematosus, unspecified SLE type, unspecified organ involvement status (H)       prenatal multivitamin plus iron 27-0.8 MG Tabs per tablet      Take 1 tablet by mouth daily        VALTREX PO      Take 500 mg by mouth as needed        * Notice:  This list has 4 medication(s) that are the same as other medications prescribed for you. Read the directions carefully, and ask your doctor or other care provider to review them with you.

## 2018-09-07 NOTE — LETTER
9/7/2018      RE: Monica Puckett  [address]  [place] [address]       Reason for Visit:  Cystoscopy    Clinical Data: Ms. 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 She was seen by me in clinic on 9/5 with severe dysuria with a presumptive diagnosis of lupus cystitis. She presents for cystoscopy to rule out other causes and look for evidence suggestive of lupus cystitis.    Cystoscopy procedure:  Patient was consented and placed in the lithotomy position.  She was cleaned and prepared in the usual fashion.  Lidocaine gel was inserted into the urethra and given time to take effect.  A 16 fr flexible cystoscope was then inserted through the urethra and into the bladder.  The urethra was wnl.  The bladder was without.  No tumors, diverticulae, or stones.  Bladder showed diffuse hyperemia and erythema due to severe inflammation. No punctate hemorrhages were visualized, but visualization was limited. Bilateral u/o's were effluxing clear urine.  The cystoscope was then withdrawn.  The patient was able to complete the procedure, but was in significant distress due to the pain caused by the cystoscope. No pictures were taken at this time due to patient discomfort and attempts to shorten exam.    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    After conferring with Dr. Renia, we discussed that the cystoscopy suggested likely lupus cystitis. Review of the literature does not give guidance on treatment in pregnancy. The case reports reviewed reveal a significant response to symptoms with IV methylprednisolone and cyclophosphamide, a category D drug. Response was also seen with CellCept, which (1)  the patient has not had success with in the past, and (2) is associated with congenital malformations and pregnancy loss.    Patient was  given the opportunity to ask her questions.     Reiterated with patient that pain medications should be managed through primary OB/GYN. We also discussed the importance of continued management of her lupus flare, as a decrease in those symptoms are likely to assist in treatment of her lupus cystitis.      Plan:  - bladder instillation with steroids early next week or as soon as possible, to attempt to decrease inflammation locally and control symptoms until other treatments take effect  - consider cystoscopy under anesthesia to allow for more complete evaluation of the bladder. Cysto today was limited by large amount of inflammation and significant pt discomfort.  - would like to get renal US to evaluate for hydronephrosis, which the literature suggests may be present in up to 92% of cases  - follow with Dr. Reina to continue treatment for lupus flare, which will continue to manage symptoms    Thank you for allowing me to participate in the care of  Ms. Monica Puckett and I will keep you updated on her progress.      Ismael Valadez MD

## 2018-09-07 NOTE — LETTER
2018      RE: Monica Puckett  [id number] [facility] [address]       [facility] - Rheumatology Clinic Visit (urgent visit with concern for lupus cystitis, lupus flare during pregnancy)     Monica Puckett MRN# [id number]   YOB: 1995    Primary care provider: Una Canales  Date of last visit: 2018  Sep 7, 2018          Assessment and Plan:     Problem list:  # SLE w/ LN class V (diagnosed 2013 age 18 y/o), features of pleuritic chest pain, malar rash/photosensitivity, fatigue, myalgias, arthralgias, oral ulcers, significant proteinuria w/ total urine Pr/Cr to 5 (2013), with + AWILDA (1:160 speckled pattern, repeat 10/2016 neg), +RNP (48.7), previously +ds-DNA (10/2016 neg) and +Sm (10/2016 neg), normal C3/C4, chronic mild leukopenia  # perceived poor tolerance to cellcept - GI upset. Discontinued ~  2016 (about 2-3 months prior to conception)  # mild leukopenia (3.7), normal differential  # Prior treatment: high dose steroids (mostly off steroids since ), oral CYC (~2013 - 2013), HCQ (2016 - 2016, now 10/2016 - current), Cellcept (~2013 - 2016), RTX (?2014 and 2015)    Discussion:  Pt  with history of SLE with LN class V diagnosed 2013 most recently managed on HCQ. Her last visit UA shows >100 protein and as she complains of severe flank pain and pain with urination that has lead to> 4 ED visits in the last 5 months, there is concern of this being exacerbation of her lupus, also lupus nephritis. Her expected due date is 2018.     She also has a number of other symptoms, including extreme fatigue, upper respiratory symptoms, malar rash, knee pain, dizziness, memory fog, severe bilateral hand pain, constipation, and bladder spasms. She has continued taking her plaquenil 400 mg daily.    Several ED visits for severe dysuria, no confirmed UTI. Lupus cystitis is a rare manifestation of SLE but it's a possible explanation for  pt's sx.     At last visit in 7/2018, was treated with prednisone 20 mg every day taper which did not help with urge and dysuria but helped with flank pain. Was seen by nephrology, concern was more for lupus cystitis than nephritis. AZA was recommended both by me and nephrology but unfortunately Monica did not do her blood test for TPMT till today, missed her urology appointment ( was re-scheduled to today) and no showed at her f/u visit with me till we contacted her and set up appointments.    Plan:  - She is scheduled to have cystoscopy today for evaluation of lupus cystitis, will follow the results.  - Will follow up with TPMT, if NL will start AZA 50 mg every day. Risks were discussed. AZA is safe in pregnancy  -Was reminded to have annual eye exam on HCQ  -Recommend to be off work till pain is under better control    RTC 2 months                Active Problem List:     Patient Active Problem List    Diagnosis Date Noted     Long-term use of Plaquenil 10/19/2017     Priority: Medium     Systemic lupus erythematosus (H) 08/16/2013     Priority: Medium     Observation after surgery 01/18/2013     Priority: Medium     Pain in joint, shoulder region 01/14/2011     Priority: Medium   Acne  Dysmenorrhea           History of Present Illness:     Patient is a 21 year old female referred by rheumatologist Dr. Ambar Muniz from [facility] for SLE and high risk pregnancy. The patient has a history of SLE with stage V LN (diagnosed age 17).   She reportedly developed a flu-like illness 11/2012 and had several UC visits from 11/2012 to 1/2013. She presented to the ED in 1/2013 after waking up with an erythematous facial rash and swelling of her face which was initially thought to be angioedema. She notes at the time experiencing fevers, fatigue, oral ulcer, and pleuritic chest pain. Outpatient workup was initiated and she was found to have a +AWILDA, +ds-DNA, +Sm and significant proteinuria. She was hospitalized  "at [facility] and underwent a renal biopsy which reportedly showed LN class V. She was referred to pediatric rheumatology and has since followed with Dr. Wyatt from 1/2013 until last year. She was previously seen by Dr. Yeh at  Pediatric nephrology, but for the past 2 years or so she has not seen a nephrologist. She was initially treated with high dose steroids (solumedrol 1g, unclear how many days), HCQ, and prednisone and reports being placed on \"chemotherapy\" pill which she thinks was cyclophosphamide for the first 4-5 months (+ a GnRH agonist). Per media tab scanned notes it appears she was given oral cyclophosphamide 50 mg tablets, but unclear dose. Notes she was then put back on prednisone 75 mg daily and was transitioned to cellcept 1000 mg BID. Notes she received RTX infusions (unclear dose and number) for what sounds like 2 cycles, last cycle received around 1/2015. She states the RTX was given due to \"flares\" of her lupus which included significant arthralgias, fatigue, skin rashes, and \"abnormal blood tests.\" She thinks her last flare was at least 2 years ago and that her lupus has otherwise been very well controlled. She self-stopped both her HCQ and cellcept about 2-3 months ago due to some GI upset that got to be \"annyoing.\"     She had a pregnancy test that resulted positive and she was referred back to rheumatology. She was evaluated by Dr. Muniz from [facility] adult rheumatology where extensive workup including repeat blood tests w/ serology, kidney function, UA were obtained, which all resulted mostly normal other than +RNP. Her AWILDA, ds-DNA, C3/C4, SSA/SSB, Sm were all negative. UA was negative for protein, blood, or WBC. No total urine Pr/Cr ratio was obtained, but per chart review, this was last >0.5 g in 2/2013. She was told to restart her HCQ at 400 mg QD and was referred to lupus clinic here at the [facility].     She notes very intermittent arthralgias of her hands \"just " "here and there,\" some fatigue (which she attributes to being pregnant), and some lower abdominal cramping pain that is intermittent and severe for the past 2-3 weeks lasting seconds to 3 minutes occuring 2x per day. She had a TVUS yesterday which showed a normal gestational sac measuring around 5 weeks. She has not yet seen an OB. Estimated due date June 24/2016.     2/8/2017: Patient was hospitalized 4 weeks ago for bronchitis/severe cough that was unresponsive to antibiotics. There was some concern for DVT at that time but ultrasound was negative. Patient has been having intermittent malar rash along with intermittent swelling, myalgias and arthralgias that are predominantly in the hands but also in the ankles. Patient feels that her energy level has been about the same as previous. Chest pain is not bad currently, but has recently been increased, associated with cough. Negative for oral ulcers. Patient was recently seen several days ago in [facility] ED for medication overdose but she threw up most of the pills and was discharged to [facility] the same day. Patient has been taking her Plaquenil daily but has forgotten every once in a while.     6/2017: She delivered her son (arianna) on 6/19/2017, had NVD with no complications. She is doing both breast feeding and formula.   Reports throbbing pain over L flank since delivery. Pain is not improving, percocet helps to ease the pain.  Hands are puffy and swollen and gets gelling. AM stiffness is 10-15 minutes.  No oral ulcers. No skin rash today but had the malar rash intermittently.  No hair loss.  No pleuritic CP.  Ran out of HCQ 4 days ago.    3/2018: She reports she recently found out she was pregnant. Her LMP was 2/12 giving an estimated delivery date of 11/19/2018. She is currently ~5 weeks gestational age. She has been feeling lightheaded and fatigued. She had a runny nose and cough for the past two weeks, which she says is finally improved. She reports a mild rash on " her face. She says her joint pain fluctuates, today it is not too bad. She feels it most in her wrists and knees. She has felt some L abd pain. She denies dysuria.    7/2018: Today, Ms. Puckett is currently 22 weeks pregnant with a expected delivery date of November 20th, 2018. She complains of extreme pain with urination and right sided flank pain. The patient says the symptoms have been present for approximately 3 months. She has had 4 visits to the ED during this time for these symptoms, one of those times resulting in a 3 day admission where she was started on IV antibiotics and sent home with a catheter to use for a week. She feels the same symptoms now, except that the flank pain has began to appear in her left side as well, though not as excruciatingly painful as the right side. She says she has been given numerous antibiotics and the work up for this has been negative for UTI multiple times. There is concern for lupus cystitis from her OBGYN at this point as well. She also complains of DIP swelling, return of her malar rash, vomiting, low grade fever, increased sensation of bladder pressure, dizziness, URI-like symptoms, SOB due to pain with inhalation from her flank, and bladder spasms. She is crying currently from the pain at the time of this examination. Her UA from this visit shows a large number of proteins. UA also shows >100 WBC and many bacteria, there is a culture that is currently pending among other lab work.     Today:     29 wk pregnant with IVET of 11/20/2018. Continues to have severe pain on urination. Scheduled for cystoscopy today. Flanke pain improved after prednisone 20 mg every day taper.          Past Medical History:   PMH - acne, dysmenorrhea, SLE  PSH - wisdom tooth extraction, renal biopsy 2/2013  Injuries - prior fracture         Social History:     Social History     Occupational History     Not on file.     Social History Main Topics     Smoking status: Never Smoker     Smokeless  "tobacco: Never Used      Comment: no second hand smoke exposure at home     Alcohol use No     Drug use: No     Sexual activity: Yes   Working as a , currently does not qualify for FMLA as she just started in her position         Family History:   Mother - arthritis, likely OA  MGM - arthritis, likely OA         Allergies:   Estrogens  Influenza tri-split 08-09 vac  Measles/mumps/rubella vacc  Varicella virus vacc live         Medications:     Current Outpatient Prescriptions   Medication Sig Dispense Refill     doxylamine (UNISOM) 25 MG TABS tablet Take 25 mg by mouth nightly as needed       hydroxychloroquine (PLAQUENIL) 200 MG tablet Take 2 tablets (400 mg) by mouth daily 60 tablet 11     Omega-3 Fatty Acids (FISH OIL PO) Take 1 tablet by mouth daily Fish oil with DHA       phenazopyridine (AZO) 97.5 MG tablet Take 2 tablets (195 mg) by mouth 3 times daily as needed for urinary tract discomfort 12 tablet 0     Prenatal Vit-Fe Fumarate-FA (PRENATAL MULTIVITAMIN  PLUS IRON) 27-0.8 MG TABS Take 1 tablet by mouth daily       ValACYclovir HCl (VALTREX PO) Take 500 mg by mouth as needed       Amphetamine-Dextroamphetamine (ADDERALL PO) Take 20 mg by mouth daily       Amphetamine-Dextroamphetamine (ADDERALL XR PO) Take 20 mg by mouth daily       ondansetron (ZOFRAN ODT) 4 MG ODT tab Take 1 tablet (4 mg) by mouth every 8 hours as needed for nausea (Patient not taking: Reported on 9/5/2018) 10 tablet 0     predniSONE (DELTASONE) 20 MG tablet Take 1 tablet (20 mg) by mouth daily (Patient not taking: Reported on 9/5/2018) 30 tablet 1     predniSONE (DELTASONE) 5 MG tablet 4tab=20 mg qd x 5 days, 3tab=15 mg qd x 5 days, 2tab=10 mg qd x 5 days, 1 tab=5 mg qd x 5 days then stop. (Patient not taking: Reported on 9/5/2018) 50 tablet 0            Physical Exam:   Blood pressure 114/71, pulse 98, temperature 98.2  F (36.8  C), temperature source Oral, height 1.676 m (5' 6\"), weight 70.6 kg (155 lb 9.6 oz), last menstrual " period 02/12/2018, SpO2 98 %, not currently breastfeeding.  Wt Readings from Last 4 Encounters:   09/07/18 70.6 kg (155 lb 9.6 oz)   09/05/18 67.1 kg (148 lb)   07/18/18 67.4 kg (148 lb 8 oz)   07/11/18 66.8 kg (147 lb 3.2 oz)     BP Readings from Last 3 Encounters:   09/07/18 114/71   07/18/18 105/69   07/11/18 110/68       Constitutional: in pain, NAD  Eyes: PERRLA, normal conjunctiva, sclera  ENT: nl external ears, nose, lips. No mucous membrane lesions, normal saliva pool  Neck: no cervical or supraclavicular lymphadenopathy  Resp: Lungs clear to ausculation,  CV: RRR, no m/r/g, no LE edema  GI: did not allow examination due to L flank pain  Lymph: no cervical, supraclavicular nodes  MS: no active synovitis or joint tenderness or joint deformities,  Skin: +malar rash  Psych:  Nl affect         Data:     Component      Latest Ref Rng & Units 6/6/2017   WBC      4.0 - 11.0 10e9/L 9.2   RBC Count      3.8 - 5.2 10e12/L 3.58 (L)   Hemoglobin      11.7 - 15.7 g/dL 11.4 (L)   Hematocrit      35.0 - 47.0 % 33.6 (L)   MCV      78 - 100 fl 94   MCH      26.5 - 33.0 pg 31.8   MCHC      31.5 - 36.5 g/dL 33.9   RDW      10.0 - 15.0 % 12.5   Platelet Count      150 - 450 10e9/L 290   Diff Method       Automated Method   % Neutrophils      % 77.8   % Lymphocytes      % 14.9   % Monocytes      % 6.7   % Eosinophils      % 0.5   % Basophils      % 0.1   Absolute Neutrophil      1.6 - 8.3 10e9/L 7.1   Absolute Lymphocytes      0.8 - 5.3 10e9/L 1.4   Absolute Monocytes      0.0 - 1.3 10e9/L 0.6   Absolute Eosinophils      0.0 - 0.7 10e9/L 0.1   Absolute Basophils      0.0 - 0.2 10e9/L 0.0   Color Urine       Yellow   Appearance Urine       Clear   Glucose Urine      NEG mg/dL Negative   Bilirubin Urine      NEG Negative   Ketones Urine      NEG mg/dL Negative   Specific Gravity Urine      1.003 - 1.035 1.020   Blood Urine      NEG Negative   pH Urine      5.0 - 7.0 pH 5.5   Protein Albumin Urine      NEG mg/dL Negative    Urobilinogen Urine      0.2 - 1.0 EU/dL 0.2   Nitrite Urine      NEG Negative   Leukocyte Esterase Urine      NEG Negative   Source       Midstream Urine   Creatinine      0.52 - 1.04 mg/dL 0.50 (L)   GFR Estimate      >60 mL/min/1.7m2 >90 . . .   GFR Estimate If Black      >60 mL/min/1.7m2 >90 . . .   Protein Random Urine      g/L 0.14   Protein Total Urine g/gr Creatinine      0 - 0.2 g/g Cr 0.12   Albumin      3.4 - 5.0 g/dL 2.9 (L)   ALT      0 - 50 U/L 16   AST      0 - 45 U/L 18   Complement C3      76 - 169 mg/dL 153   Complement C4      15 - 50 mg/dL 29   CRP Inflammation      0.0 - 8.0 mg/L 3.2   Sed Rate      0 - 20 mm/h 47 (H)   DNA-ds      <10 IU/mL 2   Creatinine Urine Random      mg/dL 130   Uric Acid      2.6 - 6.0 mg/dL 3.1   Complement CH50 Total       38 (L)     Results for orders placed or performed in visit on 18   Echocardiogram Complete    Narrative    [id number]  [id number]  [id number]  [id number]^PATSY^AWILDA           [facility]  [facility]  [address] [place] [address]     Name: BABATUNDE CAMPBELL  MRN: [id number]  : 1995  Study Date: 2018 10:42 AM  Age: 22 yrs  Gender: Female  Patient Location: [facility]  Reason For Study: Systemic lupus erythematosus with other organ involvement  Ordering Physician: AWILDA GARNER  Referring Physician: AWILDA GARNER  Performed By: Angelic Morrison RDCS     BSA: 1.8 m2  Height: 66 in  Weight: 148 lb  HR: 77  BP: 105/69 mmHg  _____________________________________________________________________________  __        Procedure  Echocardiogram with two-dimensional, color and spectral Doppler performed.  _____________________________________________________________________________  __        Interpretation Summary  Global and regional left ventricular function is normal with an EF of 60-65%.  Global right ventricular function is normal.  No significant valvular  abnormalities were noted.  The inferior vena cava was normal in size with preserved respiratory  variability.  No pericardial effusion is present.  _____________________________________________________________________________  __        Left Ventricle  Left ventricular size is normal. Left ventricular wall thickness is normal.  Global and regional left ventricular function is normal with an EF of 60-65%.  Left ventricular diastolic function is normal. No regional wall motion  abnormalities are seen.     Right Ventricle  The right ventricle is normal size. Global right ventricular function is  normal.     Atria  Both atria appear normal. The atrial septum is intact as assessed by color  Doppler .     Mitral Valve  Mitral leaflet thickness is increased . Trace mitral insufficiency is present.        Aortic Valve  Aortic valve is normal in structure and function.     Tricuspid Valve  The tricuspid valve is normal. The peak velocity of the tricuspid regurgitant  jet is not obtainable. Pulmonary artery systolic pressure cannot be assessed.     Pulmonic Valve  The pulmonic valve is normal.     Vessels  The aorta root is normal. The inferior vena cava was normal in size with  preserved respiratory variability.     Pericardium  No pericardial effusion is present.        Compared to Previous Study  Previous study not available for comparison.  _____________________________________________________________________________  __  MMode/2D Measurements & Calculations  IVSd: 0.79 cm     LVIDd: 5.1 cm  LVIDs: 3.5 cm  LVPWd: 0.83 cm  FS: 31.4 %  LV mass(C)d: 142.7 grams  LV mass(C)dI: 81.1 grams/m2  asc Aorta Diam: 2.6 cm  LVOT diam: 2.1 cm  LVOT area: 3.6 cm2  LA Volume (BP): 44.2 ml  LA Volume Index (BP): 25.1 ml/m2  RWT: 0.33           Doppler Measurements & Calculations  MV E max alberto: 79.5 cm/sec  MV A max alberto: 48.5 cm/sec  MV E/A: 1.6  MV dec slope: 389.5 cm/sec2  MV dec time: 0.20 sec  PA V2 max: 105.7 cm/sec  PA max P.5  mmHg  E/E' av.2  Lateral E/e': 7.9  Medial E/e': 8.5     _____________________________________________________________________________  __           Report approved by: Leon Reaves 2018 12:18 PM        US OB 10/25/2016:  1. Gestational sac present measuring 5 weeks and 6 days..  2. No adnexal masses are seen.    Prior workup through [facility] reviewed 10/26/2016:  Positive/abnormal:  AWILDA 2013 1:160 speckled, repeat AWILDA neg 10/24/2016  RNP 48.7  CBC - WBC 3.7 w/ normal diff  ESR 45 (2013), no repeat  Negative/normal:  Bustos (reportedly initially +)  SSA, SSB  ANCA  Lupus anticoagulant screen abnormal, but confirmatory testing negative  Cardiolipin IgA/G/M neg  Beta 2 GP1 Ab IgA/G/M neg  C3/C4 (never abnormal)  Cr - 0.68, lytes normal   CK  HCV  HIV  HLA-B27  LFT's normal  Quant gold negative  RF negative x2  Anti-CCP negative x2  TSH   RPR  Prior Lyme  TTG  Ds-DNA neg x6 (reportedly initially +)  UA - trace LE, otherwise negative for protein/blood/WBC  Hgb, plts  CRP < 2.9          Chente Reina MD

## 2018-09-07 NOTE — PROGRESS NOTES
[facility] - Rheumatology Clinic Visit (urgent visit with concern for lupus cystitis, lupus flare during pregnancy)     Monica Puckett MRN# [id number]   YOB: 1995    Primary care provider: Una Canales  Date of last visit: 2018  Sep 7, 2018          Assessment and Plan:     Problem list:  # SLE w/ LN class V (diagnosed 2013 age 16 y/o), features of pleuritic chest pain, malar rash/photosensitivity, fatigue, myalgias, arthralgias, oral ulcers, significant proteinuria w/ total urine Pr/Cr to 5 (2013), with + AWILDA (1:160 speckled pattern, repeat 10/2016 neg), +RNP (48.7), previously +ds-DNA (10/2016 neg) and +Sm (10/2016 neg), normal C3/C4, chronic mild leukopenia  # perceived poor tolerance to cellcept - GI upset. Discontinued ~  2016 (about 2-3 months prior to conception)  # mild leukopenia (3.7), normal differential  # Prior treatment: high dose steroids (mostly off steroids since ), oral CYC (~2013 - 2013), HCQ (2016 - 2016, now 10/2016 - current), Cellcept (~2013 - 2016), RTX (?2014 and 2015)    Discussion:  Pt  with history of SLE with LN class V diagnosed 2013 most recently managed on HCQ. Her last visit UA shows >100 protein and as she complains of severe flank pain and pain with urination that has lead to> 4 ED visits in the last 5 months, there is concern of this being exacerbation of her lupus, also lupus nephritis. Her expected due date is 2018.     She also has a number of other symptoms, including extreme fatigue, upper respiratory symptoms, malar rash, knee pain, dizziness, memory fog, severe bilateral hand pain, constipation, and bladder spasms. She has continued taking her plaquenil 400 mg daily.    Several ED visits for severe dysuria, no confirmed UTI. Lupus cystitis is a rare manifestation of SLE but it's a possible explanation for pt's sx.     At last visit in 2018, was treated with prednisone 20 mg every day  taper which did not help with urge and dysuria but helped with flank pain. Was seen by nephrology, concern was more for lupus cystitis than nephritis. AZA was recommended both by me and nephrology but unfortunately Monica did not do her blood test for TPMT till today, missed her urology appointment ( was re-scheduled to today) and no showed at her f/u visit with me till we contacted her and set up appointments.    Plan:  - She is scheduled to have cystoscopy today for evaluation of lupus cystitis, will follow the results.  - Will follow up with TPMT, if NL will start AZA 50 mg every day. Risks were discussed. AZA is safe in pregnancy  -Was reminded to have annual eye exam on HCQ  -Recommend to be off work till pain is under better control    RTC 2 months                Active Problem List:     Patient Active Problem List    Diagnosis Date Noted     Long-term use of Plaquenil 10/19/2017     Priority: Medium     Systemic lupus erythematosus (H) 08/16/2013     Priority: Medium     Observation after surgery 01/18/2013     Priority: Medium     Pain in joint, shoulder region 01/14/2011     Priority: Medium   Acne  Dysmenorrhea           History of Present Illness:     Patient is a 21 year old female referred by rheumatologist Dr. Ambar Muniz from [facility] for SLE and high risk pregnancy. The patient has a history of SLE with stage V LN (diagnosed age 17).   She reportedly developed a flu-like illness 11/2012 and had several UC visits from 11/2012 to 1/2013. She presented to the ED in 1/2013 after waking up with an erythematous facial rash and swelling of her face which was initially thought to be angioedema. She notes at the time experiencing fevers, fatigue, oral ulcer, and pleuritic chest pain. Outpatient workup was initiated and she was found to have a +AWILDA, +ds-DNA, +Sm and significant proteinuria. She was hospitalized at [facility] and underwent a renal biopsy which reportedly showed LN  "class V. She was referred to pediatric rheumatology and has since followed with Dr. Wyatt from 1/2013 until last year. She was previously seen by Dr. Yeh at  Pediatric nephrology, but for the past 2 years or so she has not seen a nephrologist. She was initially treated with high dose steroids (solumedrol 1g, unclear how many days), HCQ, and prednisone and reports being placed on \"chemotherapy\" pill which she thinks was cyclophosphamide for the first 4-5 months (+ a GnRH agonist). Per media tab scanned notes it appears she was given oral cyclophosphamide 50 mg tablets, but unclear dose. Notes she was then put back on prednisone 75 mg daily and was transitioned to cellcept 1000 mg BID. Notes she received RTX infusions (unclear dose and number) for what sounds like 2 cycles, last cycle received around 1/2015. She states the RTX was given due to \"flares\" of her lupus which included significant arthralgias, fatigue, skin rashes, and \"abnormal blood tests.\" She thinks her last flare was at least 2 years ago and that her lupus has otherwise been very well controlled. She self-stopped both her HCQ and cellcept about 2-3 months ago due to some GI upset that got to be \"annyoing.\"     She had a pregnancy test that resulted positive and she was referred back to rheumatology. She was evaluated by Dr. Muniz from [facility] adult rheumatology where extensive workup including repeat blood tests w/ serology, kidney function, UA were obtained, which all resulted mostly normal other than +RNP. Her AWILDA, ds-DNA, C3/C4, SSA/SSB, Sm were all negative. UA was negative for protein, blood, or WBC. No total urine Pr/Cr ratio was obtained, but per chart review, this was last >0.5 g in 2/2013. She was told to restart her HCQ at 400 mg QD and was referred to lupus clinic here at the [facility].     She notes very intermittent arthralgias of her hands \"just here and there,\" some fatigue (which she attributes to being pregnant), and some " lower abdominal cramping pain that is intermittent and severe for the past 2-3 weeks lasting seconds to 3 minutes occuring 2x per day. She had a TVUS yesterday which showed a normal gestational sac measuring around 5 weeks. She has not yet seen an OB. Estimated due date June 24/2016.     2/8/2017: Patient was hospitalized 4 weeks ago for bronchitis/severe cough that was unresponsive to antibiotics. There was some concern for DVT at that time but ultrasound was negative. Patient has been having intermittent malar rash along with intermittent swelling, myalgias and arthralgias that are predominantly in the hands but also in the ankles. Patient feels that her energy level has been about the same as previous. Chest pain is not bad currently, but has recently been increased, associated with cough. Negative for oral ulcers. Patient was recently seen several days ago in [facility] ED for medication overdose but she threw up most of the pills and was discharged to [facility] the same day. Patient has been taking her Plaquenil daily but has forgotten every once in a while.     6/2017: She delivered her son (arianna) on 6/19/2017, had NVD with no complications. She is doing both breast feeding and formula.   Reports throbbing pain over L flank since delivery. Pain is not improving, percocet helps to ease the pain.  Hands are puffy and swollen and gets gelling. AM stiffness is 10-15 minutes.  No oral ulcers. No skin rash today but had the malar rash intermittently.  No hair loss.  No pleuritic CP.  Ran out of HCQ 4 days ago.    3/2018: She reports she recently found out she was pregnant. Her LMP was 2/12 giving an estimated delivery date of 11/19/2018. She is currently ~5 weeks gestational age. She has been feeling lightheaded and fatigued. She had a runny nose and cough for the past two weeks, which she says is finally improved. She reports a mild rash on her face. She says her joint pain fluctuates, today it is not too bad. She feels it  most in her wrists and knees. She has felt some L abd pain. She denies dysuria.    7/2018: Today, Ms. Puckett is currently 22 weeks pregnant with a expected delivery date of November 20th, 2018. She complains of extreme pain with urination and right sided flank pain. The patient says the symptoms have been present for approximately 3 months. She has had 4 visits to the ED during this time for these symptoms, one of those times resulting in a 3 day admission where she was started on IV antibiotics and sent home with a catheter to use for a week. She feels the same symptoms now, except that the flank pain has began to appear in her left side as well, though not as excruciatingly painful as the right side. She says she has been given numerous antibiotics and the work up for this has been negative for UTI multiple times. There is concern for lupus cystitis from her OBGYN at this point as well. She also complains of DIP swelling, return of her malar rash, vomiting, low grade fever, increased sensation of bladder pressure, dizziness, URI-like symptoms, SOB due to pain with inhalation from her flank, and bladder spasms. She is crying currently from the pain at the time of this examination. Her UA from this visit shows a large number of proteins. UA also shows >100 WBC and many bacteria, there is a culture that is currently pending among other lab work.     Today:     29 wk pregnant with IVET of 11/20/2018. Continues to have severe pain on urination. Scheduled for cystoscopy today. Flanke pain improved after prednisone 20 mg every day taper.        Review of Systems:   Complete ROS negative except for symptoms mentioned in the HPI   General: fatigue, generalized aches and pains throughout her body, low grade fevers  Eye: normal  ENT: URI-like symptoms  CV: pleuritic chest pain, no palpitations  Resp: SOB from inhalation due to the flank pain  GI: Constipation- last BM was yesterday, but the one before that was 3 days  prior  : severe right flank pain and pain/blood with urination. She also has 2 week history of left sided flank pain  MSK: generalized pain thoughout all of her joints. Swelling of her DIP's bilaterally. Knee pain. There is also numbness of her 1st-3rd digits in both hands bilaterally  Skin: malar rash reappearing on her face, but no other rashes  Endocrine: increased fatigues to the point she can only function 2 hours out of the day.  Neuro: dizziness, numbness of the 1st-3rd digits in both hands bilaterally  Psych: anxiety, depression           Past Medical History:   PMH - acne, dysmenorrhea, SLE  PSH - wisdom tooth extraction, renal biopsy 2/2013  Injuries - prior fracture         Social History:     Social History     Occupational History     Not on file.     Social History Main Topics     Smoking status: Never Smoker     Smokeless tobacco: Never Used      Comment: no second hand smoke exposure at home     Alcohol use No     Drug use: No     Sexual activity: Yes   Working as a , currently does not qualify for FMLA as she just started in her position         Family History:   Mother - arthritis, likely OA  MGM - arthritis, likely OA         Allergies:   Estrogens  Influenza tri-split 08-09 vac  Measles/mumps/rubella vacc  Varicella virus vacc live         Medications:     Current Outpatient Prescriptions   Medication Sig Dispense Refill     doxylamine (UNISOM) 25 MG TABS tablet Take 25 mg by mouth nightly as needed       hydroxychloroquine (PLAQUENIL) 200 MG tablet Take 2 tablets (400 mg) by mouth daily 60 tablet 11     Omega-3 Fatty Acids (FISH OIL PO) Take 1 tablet by mouth daily Fish oil with DHA       phenazopyridine (AZO) 97.5 MG tablet Take 2 tablets (195 mg) by mouth 3 times daily as needed for urinary tract discomfort 12 tablet 0     Prenatal Vit-Fe Fumarate-FA (PRENATAL MULTIVITAMIN  PLUS IRON) 27-0.8 MG TABS Take 1 tablet by mouth daily       ValACYclovir HCl (VALTREX PO) Take 500 mg by  "mouth as needed       Amphetamine-Dextroamphetamine (ADDERALL PO) Take 20 mg by mouth daily       Amphetamine-Dextroamphetamine (ADDERALL XR PO) Take 20 mg by mouth daily       ondansetron (ZOFRAN ODT) 4 MG ODT tab Take 1 tablet (4 mg) by mouth every 8 hours as needed for nausea (Patient not taking: Reported on 9/5/2018) 10 tablet 0     predniSONE (DELTASONE) 20 MG tablet Take 1 tablet (20 mg) by mouth daily (Patient not taking: Reported on 9/5/2018) 30 tablet 1     predniSONE (DELTASONE) 5 MG tablet 4tab=20 mg qd x 5 days, 3tab=15 mg qd x 5 days, 2tab=10 mg qd x 5 days, 1 tab=5 mg qd x 5 days then stop. (Patient not taking: Reported on 9/5/2018) 50 tablet 0            Physical Exam:   Blood pressure 114/71, pulse 98, temperature 98.2  F (36.8  C), temperature source Oral, height 1.676 m (5' 6\"), weight 70.6 kg (155 lb 9.6 oz), last menstrual period 02/12/2018, SpO2 98 %, not currently breastfeeding.  Wt Readings from Last 4 Encounters:   09/07/18 70.6 kg (155 lb 9.6 oz)   09/05/18 67.1 kg (148 lb)   07/18/18 67.4 kg (148 lb 8 oz)   07/11/18 66.8 kg (147 lb 3.2 oz)     BP Readings from Last 3 Encounters:   09/07/18 114/71   07/18/18 105/69   07/11/18 110/68       Constitutional: in pain, NAD  Eyes: PERRLA, normal conjunctiva, sclera  ENT: nl external ears, nose, lips. No mucous membrane lesions, normal saliva pool  Neck: no cervical or supraclavicular lymphadenopathy  Resp: Lungs clear to ausculation,  CV: RRR, no m/r/g, no LE edema  GI: did not allow examination due to L flank pain  Lymph: no cervical, supraclavicular nodes  MS: no active synovitis or joint tenderness or joint deformities,  Skin: +malar rash  Psych:  Nl affect         Data:     Component      Latest Ref Rng & Units 6/6/2017   WBC      4.0 - 11.0 10e9/L 9.2   RBC Count      3.8 - 5.2 10e12/L 3.58 (L)   Hemoglobin      11.7 - 15.7 g/dL 11.4 (L)   Hematocrit      35.0 - 47.0 % 33.6 (L)   MCV      78 - 100 fl 94   MCH      26.5 - 33.0 pg 31.8   MCHC    "   31.5 - 36.5 g/dL 33.9   RDW      10.0 - 15.0 % 12.5   Platelet Count      150 - 450 10e9/L 290   Diff Method       Automated Method   % Neutrophils      % 77.8   % Lymphocytes      % 14.9   % Monocytes      % 6.7   % Eosinophils      % 0.5   % Basophils      % 0.1   Absolute Neutrophil      1.6 - 8.3 10e9/L 7.1   Absolute Lymphocytes      0.8 - 5.3 10e9/L 1.4   Absolute Monocytes      0.0 - 1.3 10e9/L 0.6   Absolute Eosinophils      0.0 - 0.7 10e9/L 0.1   Absolute Basophils      0.0 - 0.2 10e9/L 0.0   Color Urine       Yellow   Appearance Urine       Clear   Glucose Urine      NEG mg/dL Negative   Bilirubin Urine      NEG Negative   Ketones Urine      NEG mg/dL Negative   Specific Gravity Urine      1.003 - 1.035 1.020   Blood Urine      NEG Negative   pH Urine      5.0 - 7.0 pH 5.5   Protein Albumin Urine      NEG mg/dL Negative   Urobilinogen Urine      0.2 - 1.0 EU/dL 0.2   Nitrite Urine      NEG Negative   Leukocyte Esterase Urine      NEG Negative   Source       Midstream Urine   Creatinine      0.52 - 1.04 mg/dL 0.50 (L)   GFR Estimate      >60 mL/min/1.7m2 >90 . . .   GFR Estimate If Black      >60 mL/min/1.7m2 >90 . . .   Protein Random Urine      g/L 0.14   Protein Total Urine g/gr Creatinine      0 - 0.2 g/g Cr 0.12   Albumin      3.4 - 5.0 g/dL 2.9 (L)   ALT      0 - 50 U/L 16   AST      0 - 45 U/L 18   Complement C3      76 - 169 mg/dL 153   Complement C4      15 - 50 mg/dL 29   CRP Inflammation      0.0 - 8.0 mg/L 3.2   Sed Rate      0 - 20 mm/h 47 (H)   DNA-ds      <10 IU/mL 2   Creatinine Urine Random      mg/dL 130   Uric Acid      2.6 - 6.0 mg/dL 3.1   Complement CH50 Total       38 (L)     Results for orders placed or performed in visit on 09/07/18   Echocardiogram Complete    Narrative    [id number]  [id number]  [id number]  [id number]^PATSY^AWILDA           [facility]  Diagnostic and Treamtent-3rd Floor  [address] [place] [address]     Name:  BABATUNDE CAMPBELL  MRN: [id number]  : 1995  Study Date: 2018 10:42 AM  Age: 22 yrs  Gender: Female  Patient Location: [facility]  Reason For Study: Systemic lupus erythematosus with other organ involvement  Ordering Physician: AWILDA GARNER  Referring Physician: AWILDA GARNER  Performed By: Angelic Morrison RDCS     BSA: 1.8 m2  Height: 66 in  Weight: 148 lb  HR: 77  BP: 105/69 mmHg  _____________________________________________________________________________  __        Procedure  Echocardiogram with two-dimensional, color and spectral Doppler performed.  _____________________________________________________________________________  __        Interpretation Summary  Global and regional left ventricular function is normal with an EF of 60-65%.  Global right ventricular function is normal.  No significant valvular abnormalities were noted.  The inferior vena cava was normal in size with preserved respiratory  variability.  No pericardial effusion is present.  _____________________________________________________________________________  __        Left Ventricle  Left ventricular size is normal. Left ventricular wall thickness is normal.  Global and regional left ventricular function is normal with an EF of 60-65%.  Left ventricular diastolic function is normal. No regional wall motion  abnormalities are seen.     Right Ventricle  The right ventricle is normal size. Global right ventricular function is  normal.     Atria  Both atria appear normal. The atrial septum is intact as assessed by color  Doppler .     Mitral Valve  Mitral leaflet thickness is increased . Trace mitral insufficiency is present.        Aortic Valve  Aortic valve is normal in structure and function.     Tricuspid Valve  The tricuspid valve is normal. The peak velocity of the tricuspid regurgitant  jet is not obtainable. Pulmonary artery systolic pressure cannot be assessed.     Pulmonic Valve  The pulmonic valve is normal.      Vessels  The aorta root is normal. The inferior vena cava was normal in size with  preserved respiratory variability.     Pericardium  No pericardial effusion is present.        Compared to Previous Study  Previous study not available for comparison.  _____________________________________________________________________________  __  MMode/2D Measurements & Calculations  IVSd: 0.79 cm     LVIDd: 5.1 cm  LVIDs: 3.5 cm  LVPWd: 0.83 cm  FS: 31.4 %  LV mass(C)d: 142.7 grams  LV mass(C)dI: 81.1 grams/m2  asc Aorta Diam: 2.6 cm  LVOT diam: 2.1 cm  LVOT area: 3.6 cm2  LA Volume (BP): 44.2 ml  LA Volume Index (BP): 25.1 ml/m2  RWT: 0.33           Doppler Measurements & Calculations  MV E max alberto: 79.5 cm/sec  MV A max alberto: 48.5 cm/sec  MV E/A: 1.6  MV dec slope: 389.5 cm/sec2  MV dec time: 0.20 sec  PA V2 max: 105.7 cm/sec  PA max P.5 mmHg  E/E' av.2  Lateral E/e': 7.9  Medial E/e': 8.5     _____________________________________________________________________________  __           Report approved by: Leon Reaves 2018 12:18 PM        US OB 10/25/2016:  1. Gestational sac present measuring 5 weeks and 6 days..  2. No adnexal masses are seen.    Prior workup through [facility] reviewed 10/26/2016:  Positive/abnormal:  AWILDA 2013 1:160 speckled, repeat AWILDA neg 10/24/2016  RNP 48.7  CBC - WBC 3.7 w/ normal diff  ESR 45 (2013), no repeat  Negative/normal:  Bustos (reportedly initially +)  SSA, SSB  ANCA  Lupus anticoagulant screen abnormal, but confirmatory testing negative  Cardiolipin IgA/G/M neg  Beta 2 GP1 Ab IgA/G/M neg  C3/C4 (never abnormal)  Cr - 0.68, lytes normal   CK  HCV  HIV  HLA-B27  LFT's normal  Quant gold negative  RF negative x2  Anti-CCP negative x2  TSH   RPR  Prior Lyme  TTG  Ds-DNA neg x6 (reportedly initially +)  UA - trace LE, otherwise negative for protein/blood/WBC  Hgb, plts  CRP < 2.9

## 2018-09-07 NOTE — PROGRESS NOTES
Reason for Visit:  Cystoscopy    Clinical Data: Ms. 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 She was seen by me in clinic on 9/5 with severe dysuria with a presumptive diagnosis of lupus cystitis. She presents for cystoscopy to rule out other causes and look for evidence suggestive of lupus cystitis.    Cystoscopy procedure:  Patient was consented and placed in the lithotomy position.  She was cleaned and prepared in the usual fashion.  Lidocaine gel was inserted into the urethra and given time to take effect.  A 16 fr flexible cystoscope was then inserted through the urethra and into the bladder.  The urethra was wnl.  The bladder was without.  No tumors, diverticulae, or stones.  Bladder showed diffuse hyperemia and erythema due to severe inflammation. No punctate hemorrhages were visualized, but visualization was limited. Bilateral u/o's were effluxing clear urine.  The cystoscope was then withdrawn.  The patient was able to complete the procedure, but was in significant distress due to the pain caused by the cystoscope. No pictures were taken at this time due to patient discomfort and attempts to shorten exam.    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    After conferring with Dr. Reina, we discussed that the cystoscopy suggested likely lupus cystitis. Review of the literature does not give guidance on treatment in pregnancy. The case reports reviewed reveal a significant response to symptoms with IV methylprednisolone and cyclophosphamide, a category D drug. Response was also seen with CellCept, which (1)  the patient has not had success with in the past, and (2) is associated with congenital malformations and pregnancy loss.    Patient was given the opportunity to ask her questions.     Reiterated with patient that pain  medications should be managed through primary OB/GYN. We also discussed the importance of continued management of her lupus flare, as a decrease in those symptoms are likely to assist in treatment of her lupus cystitis.    Plan:  - bladder instillation with steroids early next week or as soon as possible, to attempt to decrease inflammation locally and control symptoms until other treatments take effect  - consider cystoscopy under anesthesia to allow for more complete evaluation of the bladder. Cysto today was limited by large amount of inflammation and significant pt discomfort.  - would like to get renal US to evaluate for hydronephrosis, which the literature suggests may be present in up to 92% of cases  - follow with Dr. Reina to continue treatment for lupus flare, which will continue to manage symptoms    Thank you for allowing me to participate in the care of  Ms. Monica Puckett and I will keep you updated on her progress.

## 2018-09-10 LAB — LA PPP-IMP: NEGATIVE

## 2018-09-11 ENCOUNTER — TELEPHONE (OUTPATIENT)
Dept: OBGYN | Facility: CLINIC | Age: 23
End: 2018-09-11

## 2018-09-11 DIAGNOSIS — M32.19 SYSTEMIC LUPUS ERYTHEMATOSUS WITH OTHER ORGAN INVOLVEMENT, UNSPECIFIED SLE TYPE (H): Primary | ICD-10-CM

## 2018-09-11 LAB
SPECIMEN SOURCE: NORMAL
TPMT GENE MUT ANL BLD/T: NORMAL
TPMT GENE MUT ANL BLD/T: NORMAL

## 2018-09-11 RX ORDER — PHENAZOPYRIDINE HYDROCHLORIDE 100 MG/1
200 TABLET, FILM COATED ORAL ONCE
Status: CANCELLED | OUTPATIENT
Start: 2018-09-11 | End: 2018-09-11

## 2018-09-11 RX ORDER — CEFAZOLIN SODIUM 1 G/3ML
1 INJECTION, POWDER, FOR SOLUTION INTRAMUSCULAR; INTRAVENOUS SEE ADMIN INSTRUCTIONS
Status: CANCELLED | OUTPATIENT
Start: 2018-09-11 | End: 2019-09-11

## 2018-09-11 NOTE — TELEPHONE ENCOUNTER
Called patient to see how she was doing after her cystoscopy on Friday afternoon and to discuss potential treatment options. Pt is back at her baseline of bladder pain secondary to her lupus cystitis. She had no further questions about the procedure.    Discussed option of bladder installation versus bladder wall injections. After discussion, pt would prefer to undergo bladder wall injection under cystoscopic guidance and under anesthesia. Will schedule asap.

## 2018-09-12 ENCOUNTER — TELEPHONE (OUTPATIENT)
Dept: OBGYN | Facility: CLINIC | Age: 23
End: 2018-09-12

## 2018-09-12 ENCOUNTER — HOSPITAL ENCOUNTER (OUTPATIENT)
Facility: AMBULATORY SURGERY CENTER | Age: 23
End: 2018-09-12
Attending: OBSTETRICS & GYNECOLOGY
Payer: COMMERCIAL

## 2018-09-13 ENCOUNTER — MYC MEDICAL ADVICE (OUTPATIENT)
Dept: RHEUMATOLOGY | Facility: CLINIC | Age: 23
End: 2018-09-13

## 2018-09-13 DIAGNOSIS — M32.9 SYSTEMIC LUPUS ERYTHEMATOSUS (H): Primary | ICD-10-CM

## 2018-09-13 NOTE — TELEPHONE ENCOUNTER
Dr. Latosha Chávez has placed a referral for you to be seen by the OB clinic for high risk pregnancy. They should be contacting you by next week at the latest to schedule an appointment. If you would like to reach out to the OB clinic yourself to make your appointment, please call the [facility] at [phone]. Let us know if you have any further questions or concerns.         Regards,         Shanta Argueta RN, BSN, PHN  [facility]   RN Care Coordinator Medicine Specialty Nurse   (Nephrology, Rheumatology, Infectious Disease & Hepatology)  [phone]

## 2018-09-14 ENCOUNTER — ANESTHESIA (OUTPATIENT)
Dept: SURGERY | Facility: CLINIC | Age: 23
End: 2018-09-14
Payer: COMMERCIAL

## 2018-09-14 ENCOUNTER — HOSPITAL ENCOUNTER (OUTPATIENT)
Facility: CLINIC | Age: 23
Discharge: HOME | End: 2018-09-14
Attending: OBSTETRICS & GYNECOLOGY | Admitting: OBSTETRICS & GYNECOLOGY
Payer: COMMERCIAL

## 2018-09-14 ENCOUNTER — SURGERY (OUTPATIENT)
Age: 23
End: 2018-09-14

## 2018-09-14 ENCOUNTER — ANESTHESIA EVENT (OUTPATIENT)
Dept: SURGERY | Facility: CLINIC | Age: 23
End: 2018-09-14
Payer: COMMERCIAL

## 2018-09-14 VITALS
OXYGEN SATURATION: 94 % | TEMPERATURE: 98.4 F | WEIGHT: 158.07 LBS | RESPIRATION RATE: 14 BRPM | SYSTOLIC BLOOD PRESSURE: 105 MMHG | HEIGHT: 66 IN | BODY MASS INDEX: 25.4 KG/M2 | HEART RATE: 87 BPM | DIASTOLIC BLOOD PRESSURE: 56 MMHG

## 2018-09-14 DIAGNOSIS — Z79.899 HIGH RISK MEDICATIONS (NOT ANTICOAGULANTS) LONG-TERM USE: ICD-10-CM

## 2018-09-14 DIAGNOSIS — M32.9 SYSTEMIC LUPUS ERYTHEMATOSUS, UNSPECIFIED SLE TYPE, UNSPECIFIED ORGAN INVOLVEMENT STATUS (H): Primary | ICD-10-CM

## 2018-09-14 LAB — GLUCOSE BLDC GLUCOMTR-MCNC: 84 MG/DL (ref 70–99)

## 2018-09-14 PROCEDURE — 82962 GLUCOSE BLOOD TEST: CPT

## 2018-09-14 PROCEDURE — 25000128 H RX IP 250 OP 636: Performed by: OBSTETRICS & GYNECOLOGY

## 2018-09-14 PROCEDURE — 37000008 ZZH ANESTHESIA TECHNICAL FEE, 1ST 30 MIN: Performed by: OBSTETRICS & GYNECOLOGY

## 2018-09-14 PROCEDURE — 71000027 ZZH RECOVERY PHASE 2 EACH 15 MINS: Performed by: OBSTETRICS & GYNECOLOGY

## 2018-09-14 PROCEDURE — 25000128 H RX IP 250 OP 636: Performed by: NURSE ANESTHETIST, CERTIFIED REGISTERED

## 2018-09-14 PROCEDURE — 25000128 H RX IP 250 OP 636: Performed by: ANESTHESIOLOGY

## 2018-09-14 PROCEDURE — 25000125 ZZHC RX 250: Performed by: NURSE ANESTHETIST, CERTIFIED REGISTERED

## 2018-09-14 PROCEDURE — 25000132 ZZH RX MED GY IP 250 OP 250 PS 637: Performed by: OBSTETRICS & GYNECOLOGY

## 2018-09-14 PROCEDURE — 25000125 ZZHC RX 250: Performed by: OBSTETRICS & GYNECOLOGY

## 2018-09-14 PROCEDURE — 36000051 ZZH SURGERY LEVEL 2 1ST 30 MIN - UMMC: Performed by: OBSTETRICS & GYNECOLOGY

## 2018-09-14 PROCEDURE — 36000053 ZZH SURGERY LEVEL 2 EA 15 ADDTL MIN - UMMC: Performed by: OBSTETRICS & GYNECOLOGY

## 2018-09-14 PROCEDURE — 27210794 ZZH OR GENERAL SUPPLY STERILE: Performed by: OBSTETRICS & GYNECOLOGY

## 2018-09-14 PROCEDURE — 40000170 ZZH STATISTIC PRE-PROCEDURE ASSESSMENT II: Performed by: OBSTETRICS & GYNECOLOGY

## 2018-09-14 PROCEDURE — 37000009 ZZH ANESTHESIA TECHNICAL FEE, EACH ADDTL 15 MIN: Performed by: OBSTETRICS & GYNECOLOGY

## 2018-09-14 PROCEDURE — 25000132 ZZH RX MED GY IP 250 OP 250 PS 637: Performed by: STUDENT IN AN ORGANIZED HEALTH CARE EDUCATION/TRAINING PROGRAM

## 2018-09-14 RX ORDER — AZATHIOPRINE 50 MG/1
50 TABLET ORAL DAILY
Qty: 30 TABLET | Refills: 0 | Status: SHIPPED | OUTPATIENT
Start: 2018-09-14 | End: 2018-11-14

## 2018-09-14 RX ORDER — CEFAZOLIN SODIUM 1 G/3ML
1 INJECTION, POWDER, FOR SOLUTION INTRAMUSCULAR; INTRAVENOUS SEE ADMIN INSTRUCTIONS
Status: DISCONTINUED | OUTPATIENT
Start: 2018-09-14 | End: 2018-09-14 | Stop reason: HOSPADM

## 2018-09-14 RX ORDER — OXYCODONE HYDROCHLORIDE 5 MG/1
5 TABLET ORAL
Status: COMPLETED | OUTPATIENT
Start: 2018-09-14 | End: 2018-09-14

## 2018-09-14 RX ORDER — CEFAZOLIN SODIUM 2 G/100ML
2 INJECTION, SOLUTION INTRAVENOUS
Status: COMPLETED | OUTPATIENT
Start: 2018-09-14 | End: 2018-09-14

## 2018-09-14 RX ORDER — ONDANSETRON 4 MG/1
4 TABLET, ORALLY DISINTEGRATING ORAL EVERY 30 MIN PRN
Status: DISCONTINUED | OUTPATIENT
Start: 2018-09-14 | End: 2018-09-14 | Stop reason: HOSPADM

## 2018-09-14 RX ORDER — LIDOCAINE HYDROCHLORIDE 20 MG/ML
INJECTION, SOLUTION INFILTRATION; PERINEURAL PRN
Status: DISCONTINUED | OUTPATIENT
Start: 2018-09-14 | End: 2018-09-14

## 2018-09-14 RX ORDER — PHENAZOPYRIDINE HYDROCHLORIDE 200 MG/1
200 TABLET, FILM COATED ORAL ONCE
Status: COMPLETED | OUTPATIENT
Start: 2018-09-14 | End: 2018-09-14

## 2018-09-14 RX ORDER — PROPOFOL 10 MG/ML
INJECTION, EMULSION INTRAVENOUS CONTINUOUS PRN
Status: DISCONTINUED | OUTPATIENT
Start: 2018-09-14 | End: 2018-09-14

## 2018-09-14 RX ORDER — FENTANYL CITRATE 50 UG/ML
25-50 INJECTION, SOLUTION INTRAMUSCULAR; INTRAVENOUS
Status: DISCONTINUED | OUTPATIENT
Start: 2018-09-14 | End: 2018-09-14 | Stop reason: HOSPADM

## 2018-09-14 RX ORDER — PROPOFOL 10 MG/ML
INJECTION, EMULSION INTRAVENOUS PRN
Status: DISCONTINUED | OUTPATIENT
Start: 2018-09-14 | End: 2018-09-14

## 2018-09-14 RX ORDER — MEPERIDINE HYDROCHLORIDE 25 MG/ML
12.5 INJECTION INTRAMUSCULAR; INTRAVENOUS; SUBCUTANEOUS
Status: DISCONTINUED | OUTPATIENT
Start: 2018-09-14 | End: 2018-09-14 | Stop reason: HOSPADM

## 2018-09-14 RX ORDER — ONDANSETRON 2 MG/ML
4 INJECTION INTRAMUSCULAR; INTRAVENOUS EVERY 30 MIN PRN
Status: DISCONTINUED | OUTPATIENT
Start: 2018-09-14 | End: 2018-09-14 | Stop reason: HOSPADM

## 2018-09-14 RX ORDER — SODIUM CHLORIDE, SODIUM LACTATE, POTASSIUM CHLORIDE, CALCIUM CHLORIDE 600; 310; 30; 20 MG/100ML; MG/100ML; MG/100ML; MG/100ML
INJECTION, SOLUTION INTRAVENOUS CONTINUOUS
Status: DISCONTINUED | OUTPATIENT
Start: 2018-09-14 | End: 2018-09-14 | Stop reason: HOSPADM

## 2018-09-14 RX ORDER — NALOXONE HYDROCHLORIDE 0.4 MG/ML
.1-.4 INJECTION, SOLUTION INTRAMUSCULAR; INTRAVENOUS; SUBCUTANEOUS
Status: DISCONTINUED | OUTPATIENT
Start: 2018-09-14 | End: 2018-09-14 | Stop reason: HOSPADM

## 2018-09-14 RX ORDER — FENTANYL CITRATE 50 UG/ML
INJECTION, SOLUTION INTRAMUSCULAR; INTRAVENOUS PRN
Status: DISCONTINUED | OUTPATIENT
Start: 2018-09-14 | End: 2018-09-14

## 2018-09-14 RX ORDER — SODIUM CHLORIDE, SODIUM LACTATE, POTASSIUM CHLORIDE, CALCIUM CHLORIDE 600; 310; 30; 20 MG/100ML; MG/100ML; MG/100ML; MG/100ML
INJECTION, SOLUTION INTRAVENOUS CONTINUOUS PRN
Status: DISCONTINUED | OUTPATIENT
Start: 2018-09-14 | End: 2018-09-14

## 2018-09-14 RX ORDER — ACETAMINOPHEN 325 MG/1
650 TABLET ORAL
Status: DISCONTINUED | OUTPATIENT
Start: 2018-09-14 | End: 2018-09-14 | Stop reason: HOSPADM

## 2018-09-14 RX ADMIN — LIDOCAINE HYDROCHLORIDE 10 ML: 20 JELLY TOPICAL at 10:58

## 2018-09-14 RX ADMIN — SODIUM CHLORIDE, POTASSIUM CHLORIDE, SODIUM LACTATE AND CALCIUM CHLORIDE: 600; 310; 30; 20 INJECTION, SOLUTION INTRAVENOUS at 10:29

## 2018-09-14 RX ADMIN — PROPOFOL 20 MG: 10 INJECTION, EMULSION INTRAVENOUS at 10:36

## 2018-09-14 RX ADMIN — FENTANYL CITRATE 25 MCG: 50 INJECTION INTRAMUSCULAR; INTRAVENOUS at 11:53

## 2018-09-14 RX ADMIN — FENTANYL CITRATE 25 MCG: 50 INJECTION INTRAMUSCULAR; INTRAVENOUS at 11:46

## 2018-09-14 RX ADMIN — PROPOFOL 150 MCG/KG/MIN: 10 INJECTION, EMULSION INTRAVENOUS at 10:35

## 2018-09-14 RX ADMIN — PROPOFOL 20 MG: 10 INJECTION, EMULSION INTRAVENOUS at 10:52

## 2018-09-14 RX ADMIN — LIDOCAINE HYDROCHLORIDE 80 MG: 20 INJECTION, SOLUTION INFILTRATION; PERINEURAL at 10:35

## 2018-09-14 RX ADMIN — CEFAZOLIN SODIUM 2 G: 2 INJECTION, SOLUTION INTRAVENOUS at 10:40

## 2018-09-14 RX ADMIN — PHENAZOPYRIDINE HYDROCHLORIDE 200 MG: 200 TABLET, COATED ORAL at 09:48

## 2018-09-14 RX ADMIN — OXYCODONE HYDROCHLORIDE 5 MG: 5 TABLET ORAL at 11:56

## 2018-09-14 RX ADMIN — FENTANYL CITRATE 50 MCG: 50 INJECTION, SOLUTION INTRAMUSCULAR; INTRAVENOUS at 10:57

## 2018-09-14 RX ADMIN — ACETAMINOPHEN 650 MG: 325 TABLET, FILM COATED ORAL at 11:55

## 2018-09-14 RX ADMIN — GENTAMICIN SULFATE 30 ML GIVEN: 40 INJECTION, SOLUTION INTRAMUSCULAR; INTRAVENOUS at 11:17

## 2018-09-14 RX ADMIN — FENTANYL CITRATE 50 MCG: 50 INJECTION, SOLUTION INTRAMUSCULAR; INTRAVENOUS at 10:53

## 2018-09-14 RX ADMIN — PROPOFOL 50 MG: 10 INJECTION, EMULSION INTRAVENOUS at 10:35

## 2018-09-14 RX ADMIN — LIDOCAINE HYDROCHLORIDE 10 ML: 20 JELLY TOPICAL at 11:17

## 2018-09-14 NOTE — IP AVS SNAPSHOT
UR PREOP/PHASE II    [address] [facility] [address]    [facility] [address]    Phone:  [phone]                                       After Visit Summary   9/14/2018    Monica Puckett    MRN: [id number]           After Visit Summary Signature Page     I have received my discharge instructions, and my questions have been answered. I have discussed any challenges I see with this plan with the nurse or doctor.    ..........................................................................................................................................  Patient/Patient Representative Signature      ..........................................................................................................................................  Patient Representative Print Name and Relationship to Patient    ..................................................               ................................................  Date                                   Time    ..........................................................................................................................................  Reviewed by Signature/Title    ...................................................              ..............................................  Date                                               Time          22EPIC Rev 08/18

## 2018-09-14 NOTE — TELEPHONE ENCOUNTER
Imuran cued up for provider.  Pt is in hospital today for procedure.  Will follow up this afternoon.    Flavia Mayfield RN  Rheumatology Clinic

## 2018-09-14 NOTE — OP NOTE
SURGICAL OPERATIVE REPORT    Monica Puckett  [id number]     Date of Surgery: 2018     Surgeon: Dr. Valadez     Assistants: Haley Melgar, PGY4     Pre-operative Diagnoses:   - Suspect lupus cystitis  - persistent dysuria  - history of SLE with lupus nephritis  - 29w6d IUP     Post-operative Diagnoses:   - same as above     Procedure: Cystoscopy with Kenalog bladder wall injection     Anesthesia: monitored anesthesia care     EBL: less than 5cc  IVF: 500cc  UO: not measured  Fluid deficit: 0     Complications: none apparent     Findings: On EUA, normal appearing external genitalia and urethra.  On cystoscopy, erythematous and edematous bladder wall globally.  Inflammation noted surrounding ureteral orifices.  Bilateral ureteral jets visualized. Minor oozing noted from Kenalog injection sites. Mixture injection of 200mg of kenalog, 5U heparin, 2% lidocaine and Gentamicin.   2g ancef given for infection prophylaxis.      FHT - pre-op - 130s bpm baseline, moderate variability, present accelerations, absent decelerations  At end of procedure - same without accelerations present  Tocometer - contractions 1/30mins     SVE at end of procedure - closed/long/high per Haley Melgar, PGY4 (OB/GYN)     Specimens: none apparent    Indication:  Monica Puckett is a 22 year old  at 29w6d by 5week US who was referred to Urogynecology clinic for management of persistent dysuria.  On office cystoscopy, she was noted to have poor visualization, intolerance of exam given bladder inflammation, as well as concern for lupus cystitis.  She was recommended to undergo exam and cystoscopy under anesthesia as well as steroid bladder wall injections to assist with management of presumed lupus cystitis.  The risks, benefits, and alternatives to the procedure were discussed with the patient who agreed to proceed.  Written consent was signed.     Procedure:  The patient was taken to the operating room with IVF running. She was given IV  ancef for infection prophylaxis.  Monitored anesthesia care was induced and found to be adequate.  She was placed in a dorsal lithotomy position using yellow fin stirrups. Exam under anesthesia revealed the above listed findings. She was then prepped and draped in usual sterile fashion. After time out was completed, the cystoscope was assembled and inserted into the bladder with the findings above.  Given extensive debris and hematuria., the bladder was drained three times prior to visualization of the posterior wall of the bladder.  The bladder was examined in a circumferential fashion and bilateral ureteral orifices identified.  Pictures taken.  The cystoscope sheath was then exchanged for an operative sheath and the lan needle was placed in the sheath.  The bladder was drained and refilled and visualization was achieved.  Using the lan needle, a total of 30mL of kenalog mixture was injected globally throughout the posterior wall of the bladder injecting around 1mL at each injection site.  Following injection, the operative sheath was removed and the diagnostic sheath was replaced.  Again the bladder was drained and refilled and global visualization of the bladder was noted with the findings above.  Bladder was at last drained and cystoscope removed.      The patient underwent continuous fetal monitoring throughout the procedure with the FHT noted above.  Given irregular contractions noted on the monitor, her cervix was checked at the completion of the procedure and noted to be closed.  The patient tolerated the procedure well. She was awoken from anesthesia and and brought to the PACU in stable condition.      Dr. Valadez was scrubbed and present for the entire duration of the procedure.    Haley Melgar MD MPH  OB/GYN, PGY4  Pager: [phone]  9/14/2018  11:24 AM

## 2018-09-14 NOTE — ANESTHESIA PREPROCEDURE EVALUATION
Anesthesia Evaluation     . Pt has had prior anesthetic. Type: General           ROS/MED HX    ENT/Pulmonary:       Neurologic:       Cardiovascular:     (+) ----. : . . . :. . Previous cardiac testing Echodate:2018results:Interpretation Summary  Global and regional left ventricular function is normal with an EF of 60-65%.  Global right ventricular function is normal.  No significant valvular abnormalities were noted.  The inferior vena cava was normal in size with preserved respiratory  variability.  No pericardial effusion is present.date: results:ECG reviewed date:2017 results:SR with sinus arrhythmia, rate 89 bpm date: results:          METS/Exercise Tolerance:     Hematologic:         Musculoskeletal: Comment: Hx of RA.  Has bilateral shoulder pain.        GI/Hepatic:         Renal/Genitourinary: Comment: Hx of nephrotic syndrome in the past.  Has chronic renal disease.        Endo: Comment: Hx of Systemic Lupus on steriods.        Psychiatric: Comment: Hx of ADHD.  Has been off Adderal since becoming pregnant.        Infectious Disease:         Malignancy:         Other:                     Physical Exam  Normal systems: cardiovascular, pulmonary and dental    Airway   Mallampati: II  TM distance: >3 FB  Neck ROM: full    Dental     Cardiovascular       Pulmonary     Other findings:     Pt is  at 29 weeks gestation with suspected Lupus Cystitis.                Anesthesia Plan      History & Physical Review  History and physical reviewed and following examination; no interval change.    ASA Status:  2 .    NPO Status:  > 6 hours    Plan for General and MAC with Propofol induction. Maintenance will be TIVA.  Reason for MAC:  Deep or markedly invasive procedure (G8)  PONV prophylaxis:  Ondansetron (or other 5HT-3)              Postoperative Care  Postoperative pain management:  IV analgesics.      Consents  Anesthetic plan, risks, benefits and alternatives discussed with:  Patient..                           .          Plan  -  MAC/GEN with native airway.  May need supplemental steroid coverage.  Will require perioperative fetal monitoring.        The risks, benefits and possible complications of MAC, including the use of anesthetic agents and possible transfer to the fetus, discussed in full with the patient.  We also discussed conversion to GETA if necessary. She voices understanding and wishes to proceed.      Dr. Peace Wilcox MD  Anesthesia  09/14/2018 @ 09 am

## 2018-09-14 NOTE — IP AVS SNAPSHOT
MRN:[id number]                      After Visit Summary   9/14/2018    Monica Puckett    MRN: [id number]           Thank you!     Thank you for choosing [facility] for your care. Our goal is always to provide you with excellent care. Hearing back from our patients is one way we can continue to improve our services. Please take a few minutes to complete the written survey that you may receive in the mail after you visit with us. Thank you!        Patient Information     Date Of Birth          1995        About your hospital stay     You were admitted on:  September 14, 2018 You last received care in the:  UR PREOP/PHASE II    You were discharged on:  September 14, 2018       Who to Call     For medical emergencies, please call 911.  For non-urgent questions about your medical care, please call your primary care provider or clinic, [phone]  For questions related to your surgery, please call your surgery clinic        Attending Provider     Provider Specialty    Ismael Valadez MD OB/Gyn       Primary Care Provider Office Phone # Fax #    Marycarmen JOSÉ MIGUEL [place] [phone]      After Care Instructions     Discharge Instructions       Resume pre procedure diet            Discharge Instructions       Patient to arrange follow up appointment in 1 week            No alcohol       NO ALCOHOL for 24 hours post procedure            No driving or operating machinery       No driving or operating machinery until day after procedure                  Your next 10 appointments already scheduled     Sep 19, 2018 11:00 AM CDT   Return Urogyn with Ismael Valadez MD   [facility] ([facility])    [facility]  [address]  [facility] [address] [phone]           Please call [facility] , [phone], with any questions or concerns you may have regarding your appointment            Oct 02, 2018  3:00 PM  CDT   Nurse Visit with [facility] Nurse   [facility] ([facility])    [facility] [address]  [address]  [facility] [address]   [phone]            Oct 02, 2018  3:30 PM CDT   NEW OB with Yuliya Judge MD   [facility] ([facility])    [facility] [address]  [address]  [facility] [address]   [phone]            Oct 11, 2018  5:00 PM CDT   (Arrive by 4:30 PM)   RETURN GLOMERULAR with Don Santana MD   [facility] Nephrology ([facility])    [address]  [address]  [facility] [address] [phone]            Dec 28, 2018  1:00 PM CST   (Arrive by 12:45 PM)   RETURN LUPUS with Chente Reina MD   [facility] Rheumatology ([facility])    [address]  [address]  [facility] [address] [phone]              Further instructions from your care team       [facility]  Same-Day Surgery   Adult Discharge Orders & Instructions     For 24 hours after surgery    1. Get plenty of rest.  A responsible adult must stay with you for at least 24 hours after you leave the hospital.   2. Do not drive or use heavy equipment.  If you have weakness or tingling, don't drive or use heavy equipment until this feeling goes away.  3. Do not drink alcohol.  4. Avoid strenuous or risky activities.  Ask for help when climbing stairs.   5. You may feel lightheaded.  IF so, sit for a few minutes before standing.  Have someone help you get up.   6. If you have nausea (feel sick to your stomach): Drink only clear liquids such as apple juice, ginger ale, broth or 7-Up.  Rest may also help.  Be sure to drink enough fluids.  Move to a regular diet as you feel able.  7. You may have a slight fever. Call the doctor if your fever is over 100 F (37.7 C) (taken under the tongue) or lasts longer than 24  hours.  8. You may have a dry mouth, a sore throat, muscle aches or trouble sleeping.  These should go away after 24 hours.  9. Do not make important or legal decisions.   Call your doctor for any of the followin.  Signs of infection (fever, growing tenderness at the surgery site, a large amount of drainage or bleeding, severe pain, foul-smelling drainage, redness, swelling).    2. It has been over 8 to 10 hours since surgery and you are still not able to urinate (pass water).    3.  Headache for over 24 hours.    4.  Numbness, tingling or weakness the day after surgery (if you had spinal anesthesia).  To contact a doctor, call ________________________________________ or:        [phone] and ask for the resident on call for   ______________________________________________ (answered 24 hours a day)      Emergency Department:    [facility]: [phone]       (TTY for hearing impaired: [phone])    [facility]: [phone]       (TTY for hearing impaired: [phone])    Discharge Instructions: Following a Cystoscopy/Stent and/or Ureteroscopy    Drainage:    Urine may be slightly bloody but should taper off in a few days.    You may have some frequency and urgency for a few days.    Comfort:    A burning sensation when urinating is common. Drinking extra fluids helps decrease this burning feeling and also helps to clear the bloody urine.    Mild abdominal cramps may occur for a few days.    Baths:    Daily tub baths aide in passing urine.    Frequent use of bubble bath is discouraged since it encourages urinary tract infections.    Report to your doctor at once if you experience:    Inability to pass your urine    Continuous or heavy bleeding    Severe Pain    Elevated temperature > than 101.5 for 24 hours    Home Activity:    The day of surgery spend a quiet evening at home.    Increase activity as tolerated.    Rev.       Pending Results     No orders found from 2018 to  "9/15/2018.            Admission Information     Date & Time Provider Department Dept. Phone    9/14/2018 Ismael Valadez MD UR PREOP/PHASE -[phone]      Your Vitals Were     Blood Pressure Pulse Temperature Respirations Height Weight    99/39 87 98.4  F (36.9  C) (Axillary) 16 1.676 m (5' 5.98\") 71.7 kg (158 lb 1.1 oz)    Last Period Pulse Oximetry BMI (Body Mass Index)             02/12/2018 98% 25.53 kg/m2         MyChart Information     [employer] gives you secure access to your electronic health record. If you see a primary care provider, you can also send messages to your care team and make appointments. If you have questions, please call your primary care clinic.  If you do not have a primary care provider, please call [phone] and they will assist you.        Care EveryWhere ID     This is your Care EveryWhere ID. This could be used by other organizations to access your [facility] medical records  [id number]        Equal Access to Services     WILLIS THORNE AH: Hadii aad ku hadasho Soericali, waaxda luqadaha, qaybta kaalmada adeegyada, kiesha molina . So [facility] [phone].    ATENCIÓN: Si habla español, tiene a napoles disposición servicios gratuitos de asistencia lingüística. [facility] [phone].    We comply with applicable federal civil rights laws and [place] laws. We do not discriminate on the basis of race, color, national origin, age, disability, sex, sexual orientation, or gender identity.               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   Quantity:  50 tablet   Refills:  0       * predniSONE 20 MG tablet   Commonly known as:  DELTASONE   Used for:  Systemic lupus erythematosus, unspecified SLE type, unspecified organ involvement status (H)        Dose:  20 mg   Take 1 tablet (20 mg) by mouth daily   Quantity:  30 tablet   Refills:  1       prenatal multivitamin plus iron 27-0.8 MG Tabs per tablet        Dose:  1 tablet   Take 1 tablet by mouth daily   Refills:  0       VALTREX PO        Dose:  500 mg   Take 500 mg by mouth as needed   Refills:  0       * Notice:  This list has 4 medication(s) that are the same as other medications prescribed for you. Read the directions carefully, and ask your doctor or other care provider to review them with you.             Protect others around you: Learn how to safely use, store and throw away your medicines at www.disposemymeds.org.             Medication List: This is a list of all your medications and when to take them. Check marks below indicate your daily home schedule. Keep this list as a reference.      Medications           Morning Afternoon Evening Bedtime As Needed    * ADDERALL PO   Take 20 mg by mouth daily                                * ADDERALL XR PO   Take 20 mg by mouth daily                                 FISH OIL PO   Take 1 tablet by mouth daily Fish oil with DHA                                hydroxychloroquine 200 MG tablet   Commonly known as:  PLAQUENIL   Take 2 tablets (400 mg) by mouth daily                                ondansetron 4 MG ODT tab   Commonly known as:  ZOFRAN ODT   Take 1 tablet (4 mg) by mouth every 8 hours as needed for nausea                                * predniSONE 5 MG tablet   Commonly known as:  DELTASONE   4tab=20 mg qd x 5 days, 3tab=15 mg qd x 5 days, 2tab=10 mg qd x 5 days, 1 tab=5 mg qd x 5 days then stop.                                * predniSONE 20 MG tablet   Commonly known as:  DELTASONE   Take 1 tablet (20 mg) by mouth daily                                prenatal multivitamin plus iron 27-0.8 MG Tabs per tablet   Take 1 tablet by mouth daily                                VALTREX PO   Take 500 mg by mouth as needed                                * Notice:  This list has 4 medication(s) that are the same as other medications prescribed for you. Read the directions carefully, and ask your doctor or other care provider to review them with you.

## 2018-09-14 NOTE — BRIEF OP NOTE
Gynecology Brief Op Note    Monica Puckett  [id number]    Date of Surgery: 9/14/2018     Surgeon: Dr. Valadez     Assistants: Haley Melgar, PGY4     Pre-operative Diagnoses:   - Suspect lupus cystitis  - persistent dysuria  - history of SLE with lupus nephritis  - 29w6d IUP     Post-operative Diagnoses:   - same as above     Procedure: Cystoscopy with Kenalog bladder wall injection     Anesthesia: monitored anesthesia care     EBL: less than 5cc  IVF: 500cc  UO: not measured  Fluid deficit: 0     Complications: none apparent     Findings: On EUA, normal appearing external genitalia and urethra.  On cystoscopy, erythematous and edematous bladder wall globally.  Inflammation noted surrounding ureteral orifices.  Bilateral ureteral jets visualized. Minor oozing noted from Kenalog injection sites. Mixture injection of 200mg of kenalog, 5U heparin, 2% lidocaine and Gentamicin.   2g ancef given for infection prophylaxis.      FHT - pre-op - 130s bpm baseline, moderate variability, present accelerations, absent decelerations  At end of procedure - same without accelerations present  Tocometer - contractions 1/30mins     SVE at end of procedure - closed/long/high per Haley Melgar, PGY4 (OB/GYN)     Specimens: none apparent      Haley Melgar MD MPH  OB/GYN, PGY4  Pager: [phone]  9/14/2018  11:29 AM

## 2018-09-14 NOTE — ANESTHESIA POSTPROCEDURE EVALUATION
Patient: Monica Puckett    Procedure(s):  Cystoscopy and Bladder Wall Injection    ([facility] Anesthesia) - Wound Class: II-Clean Contaminated    Diagnosis:Lupus Cystitis  Diagnosis Additional Information: No value filed.    Anesthesia Type:  General, MAC    Note:  Anesthesia Post Evaluation    Patient location during evaluation: Phase 2  Patient participation: Able to fully participate in evaluation  Level of consciousness: awake and alert  Pain management: adequate  Airway patency: patent  Cardiovascular status: acceptable  Respiratory status: acceptable  Hydration status: acceptable  PONV: none     Anesthetic complications: None    Comments:           Fully awake and alert in NAD.  VSS.  Comfortable, voicing no complaints.  FHT's 120's -130's at the bedside.  Anticipate discharge soon.        Dr. Peace Wilcox MD  Anesthesia  09/14/2018 @ 1222 pm.        Last vitals:  Vitals:    09/14/18 1130 09/14/18 1145 09/14/18 1200   BP: 96/56 107/65 (!) 99/39   Pulse:      Resp: 20 20 16   Temp:      SpO2: 98% 97% 98%         Electronically Signed By: Peace Wilcox MD  September 14, 2018  12:21 PM

## 2018-09-14 NOTE — ANESTHESIA CARE TRANSFER NOTE
Patient: Monica Puckett    Procedure(s):  Cystoscopy and Bladder Wall Injection    (Choice Anesthesia) - Wound Class: II-Clean Contaminated    Diagnosis: Lupus Cystitis  Diagnosis Additional Information: No value filed.    Anesthesia Type:   General, MAC     Note:  Airway :Face Mask  Patient transferred to:Phase II  Handoff Report: Identifed the Patient, Identified the Reponsible Provider, Reviewed the pertinent medical history, Discussed the surgical course, Reviewed Intra-OP anesthesia mangement and issues during anesthesia, Set expectations for post-procedure period and Allowed opportunity for questions and acknowledgement of understanding      Vitals: (Last set prior to Anesthesia Care Transfer)    CRNA VITALS  9/14/2018 1055 - 9/14/2018 1132      9/14/2018             Resp Rate (observed): (!)  2                Electronically Signed By: JAMIE Davis CRNA  September 14, 2018  11:32 AM

## 2018-09-14 NOTE — DISCHARGE INSTRUCTIONS
[facility]  Same-Day Surgery   Adult Discharge Orders & Instructions     For 24 hours after surgery    1. Get plenty of rest.  A responsible adult must stay with you for at least 24 hours after you leave the hospital.   2. Do not drive or use heavy equipment.  If you have weakness or tingling, don't drive or use heavy equipment until this feeling goes away.  3. Do not drink alcohol.  4. Avoid strenuous or risky activities.  Ask for help when climbing stairs.   5. You may feel lightheaded.  IF so, sit for a few minutes before standing.  Have someone help you get up.   6. If you have nausea (feel sick to your stomach): Drink only clear liquids such as apple juice, ginger ale, broth or 7-Up.  Rest may also help.  Be sure to drink enough fluids.  Move to a regular diet as you feel able.  7. You may have a slight fever. Call the doctor if your fever is over 100 F (37.7 C) (taken under the tongue) or lasts longer than 24 hours.  8. You may have a dry mouth, a sore throat, muscle aches or trouble sleeping.  These should go away after 24 hours.  9. Do not make important or legal decisions.   Call your doctor for any of the followin.  Signs of infection (fever, growing tenderness at the surgery site, a large amount of drainage or bleeding, severe pain, foul-smelling drainage, redness, swelling).    2. It has been over 8 to 10 hours since surgery and you are still not able to urinate (pass water).    3.  Headache for over 24 hours.    4.  Numbness, tingling or weakness the day after surgery (if you had spinal anesthesia).  To contact a doctor, call ________________________________________ or:        [phone] and ask for the resident on call for   ______________________________________________ (answered 24 hours a day)      Emergency Department:    [facility]: [phone]       (TTY for hearing impaired: [phone])    [facility]: [phone]       (TTY for  hearing impaired: [phone])    Discharge Instructions: Following a Cystoscopy/Stent and/or Ureteroscopy    Drainage:    Urine may be slightly bloody but should taper off in a few days.    You may have some frequency and urgency for a few days.    Comfort:    A burning sensation when urinating is common. Drinking extra fluids helps decrease this burning feeling and also helps to clear the bloody urine.    Mild abdominal cramps may occur for a few days.    Baths:    Daily tub baths aide in passing urine.    Frequent use of bubble bath is discouraged since it encourages urinary tract infections.    Report to your doctor at once if you experience:    Inability to pass your urine    Continuous or heavy bleeding    Severe Pain    Elevated temperature > than 101.5 for 24 hours    Home Activity:    The day of surgery spend a quiet evening at home.    Increase activity as tolerated.    Rev. 5/12

## 2018-09-14 NOTE — OR NURSING
Pt admit to phase 2 from or   Wakens to touch  Ob rn here to monitor fetal heart tonez     Clear deeep anterior breathsounds  Denies discomfort    sedated

## 2018-09-17 ENCOUNTER — TELEPHONE (OUTPATIENT)
Dept: OBGYN | Facility: CLINIC | Age: 23
End: 2018-09-17

## 2018-09-17 DIAGNOSIS — M32.19 SYSTEMIC LUPUS ERYTHEMATOSUS WITH OTHER ORGAN INVOLVEMENT, UNSPECIFIED SLE TYPE (H): Primary | ICD-10-CM

## 2018-09-17 DIAGNOSIS — M32.9 SYSTEMIC LUPUS ERYTHEMATOSUS, UNSPECIFIED SLE TYPE, UNSPECIFIED ORGAN INVOLVEMENT STATUS (H): Primary | ICD-10-CM

## 2018-09-26 ENCOUNTER — OFFICE VISIT (OUTPATIENT)
Dept: UROLOGY | Facility: CLINIC | Age: 23
End: 2018-09-26
Attending: OBSTETRICS & GYNECOLOGY
Payer: COMMERCIAL

## 2018-09-26 VITALS
WEIGHT: 157.4 LBS | HEIGHT: 66 IN | BODY MASS INDEX: 25.3 KG/M2 | DIASTOLIC BLOOD PRESSURE: 67 MMHG | HEART RATE: 94 BPM | SYSTOLIC BLOOD PRESSURE: 111 MMHG

## 2018-09-26 DIAGNOSIS — O23.13: ICD-10-CM

## 2018-09-26 DIAGNOSIS — O09.90 HIGH-RISK PREGNANCY, UNSPECIFIED TRIMESTER: Primary | ICD-10-CM

## 2018-09-26 DIAGNOSIS — M32.19 OTHER SYSTEMIC LUPUS ERYTHEMATOSUS WITH OTHER ORGAN INVOLVEMENT (H): ICD-10-CM

## 2018-09-26 PROCEDURE — G0463 HOSPITAL OUTPT CLINIC VISIT: HCPCS | Mod: ZF

## 2018-09-26 RX ORDER — PHENAZOPYRIDINE HYDROCHLORIDE 200 MG/1
200 TABLET, FILM COATED ORAL ONCE
Status: CANCELLED | OUTPATIENT
Start: 2018-09-26 | End: 2018-09-26

## 2018-09-26 RX ORDER — CEFAZOLIN SODIUM 2 G/100ML
2 INJECTION, SOLUTION INTRAVENOUS
Status: CANCELLED | OUTPATIENT
Start: 2018-09-26

## 2018-09-26 RX ORDER — CEFAZOLIN SODIUM 1 G/3ML
1 INJECTION, POWDER, FOR SOLUTION INTRAMUSCULAR; INTRAVENOUS SEE ADMIN INSTRUCTIONS
Status: CANCELLED | OUTPATIENT
Start: 2018-09-26

## 2018-09-26 ASSESSMENT — PAIN SCALES - GENERAL
PAINLEVEL: MODERATE PAIN (5)
PAINLEVEL: MODERATE PAIN (5)

## 2018-09-26 NOTE — MR AVS SNAPSHOT
After Visit Summary   9/26/2018    Monica Puckett    MRN: [id number]           Patient Information     Date Of Birth          1995        Visit Information        Provider Department      9/26/2018 3:30 PM Ismael Valadez MD [facility]        Today's Diagnoses     High-risk pregnancy, unspecified trimester    -  1    Other systemic lupus erythematosus with other organ involvement (H)        Cystitis of pregnancy in third trimester           Follow-ups after your visit        Additional Services     MAT FETAL MED CTR REFERRAL-PRECONCEPTION       >> Patient may proceed with recommendations for further testing as directed by the Maternal Fetal Medicine Specialist >>    Dear Patient:   Please be aware that coverage of these services is subject to the terms and limitations of your health insurance plan.  Call member services at your health plan with any benefit or coverage questions.      Please bring the following to your appointment:    >>  Any x-rays, CTs or MRIs which have been performed.  Contact the facility where they were done to arrange for  prior to your scheduled appointment.  Any new CT, MRI or other procedures ordered by your specialist must be performed at a [place] facility or coordinated by your clinic's referral office.  >>  List of current medications   >>  This referral request   >>  Any documents/labs given to you for this referral                    Your next 10 appointments already scheduled     Oct 02, 2018  3:00 PM CDT   Nurse Visit with [facility] Nurse   [facility] ([facility])    [facility] [address]  [address]  [facility] [address]   [phone]            Oct 09, 2018  8:00 AM CDT   NEW OB with Yamile Arias MD   [facility] ([facility])    [facility] [address]  [address] [facility] [address]  "  [phone]            Oct 11, 2018  5:00 PM CDT   (Arrive by 4:30 PM)   RETURN GLOMERULAR with Don Santana MD   [facility] Nephrology ([facility])    [address] [place] [address]  [address]  [facility] [address] [phone]            Dec 28, 2018  1:00 PM CST   (Arrive by 12:45 PM)   RETURN LUPUS with Chente Reina MD   [facility] Rheumatology ([facility])    [address] [place] [address]  [address]  [facility] [address] [phone]              Who to contact     Please call your clinic at [phone] to:    Ask questions about your health    Make or cancel appointments    Discuss your medicines    Learn about your test results    Speak to your doctor            Additional Information About Your Visit        [employer] Information     [employer] gives you secure access to your electronic health record. If you see a primary care provider, you can also send messages to your care team and make appointments. If you have questions, please call your primary care clinic.  If you do not have a primary care provider, please call [phone] and they will assist you.      [employer] is an electronic gateway that provides easy, online access to your medical records. With [employer], you can request a clinic appointment, read your test results, renew a prescription or communicate with your care team.     To access your existing account, please contact your [facility] or call [phone] for assistance.        Care EveryWhere ID     This is your Care EveryWhere ID. This could be used by other organizations to access your [facility] medical records  [id number]        Your Vitals Were     Pulse Height Last Period BMI (Body Mass Index)          94 1.676 m (5' 5.98\") 02/12/2018 25.42 kg/m2         Blood Pressure from Last 3 Encounters:   09/26/18 111/67   09/14/18 105/56   09/07/18 114/71    Weight from Last 3 Encounters:   09/26/18 71.4 kg (157 lb 6.4 " oz)   09/14/18 71.7 kg (158 lb 1.1 oz)   09/07/18 70.6 kg (155 lb 9.6 oz)              We Performed the Following     MAT FETAL MED CTR REFERRAL-PRECONCEPTION     Michelle-Operative Worksheet (WHS)        Primary Care Provider Office Phone # Fax #    Marycarmen Montana [phone]       [facility] [address]  [place] [address]        Equal Access to Services     LYNN THORNE : Hadii aad ku hadasho Soomaali, waaxda luqadaha, qaybta kaalmada adeegyada, waxay idiin hayaan adeeg kharash la'aan . So [facility] [phone].    ATENCIÓN: Si habla español, tiene a napoles disposición servicios gratuitos de asistencia lingüística. [facility] [phone].    We comply with applicable federal civil rights laws and [place] laws. We do not discriminate on the basis of race, color, national origin, age, disability, sex, sexual orientation, or gender identity.            Thank you!     Thank you for choosing [facility]  for your care. Our goal is always to provide you with excellent care. Hearing back from our patients is one way we can continue to improve our services. Please take a few minutes to complete the written survey that you may receive in the mail after your visit with us. Thank you!             Your Updated Medication List - Protect others around you: Learn how to safely use, store and throw away your medicines at www.disposemymeds.org.          This list is accurate as of 9/26/18  9:40 PM.  Always use your most recent med list.                   Brand Name Dispense Instructions for use Diagnosis    * ADDERALL PO      Take 20 mg by mouth daily        * ADDERALL XR PO      Take 20 mg by mouth daily        azaTHIOprine 50 MG tablet    IMURAN    30 tablet    Take 1 tablet (50 mg) by mouth daily Labs due in 4 weeks    Systemic lupus erythematosus, unspecified SLE type, unspecified organ involvement status (H)       ferrous sulfate Dried 160 (50 Fe) MG tablet      Take 1 tablet  by mouth 2 times daily        FISH OIL PO      Take 1 tablet by mouth daily Fish oil with DHA        hydroxychloroquine 200 MG tablet    PLAQUENIL    60 tablet    Take 2 tablets (400 mg) by mouth daily    Systemic lupus erythematosus, unspecified SLE type, unspecified organ involvement status (H), 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    Systemic lupus erythematosus, unspecified SLE type, unspecified organ involvement status (H)       * predniSONE 20 MG tablet    DELTASONE    30 tablet    Take 1 tablet (20 mg) by mouth daily    Systemic lupus erythematosus, unspecified SLE type, unspecified organ involvement status (H)       prenatal multivitamin plus iron 27-0.8 MG Tabs per tablet      Take 1 tablet by mouth daily        VALTREX PO      Take 500 mg by mouth as needed        VITAMIN D (CHOLECALCIFEROL) PO      Take 2,000 Units by mouth daily        * Notice:  This list has 4 medication(s) that are the same as other medications prescribed for you. Read the directions carefully, and ask your doctor or other care provider to review them with you.

## 2018-09-26 NOTE — LETTER
"9/26/2018       RE: Monica Puckett  [address]  [facility] [address]     Dear Colleague,    Thank you for referring your patient, Monica Puckett, to the [facility] at [facility]. Please see a copy of my visit note below.    September 26, 2018    Return visit    Patient returns today two weeks s/p kenalog bladder wall injections for treatment of lupus cystitis. Monica initially reported significant improvement in her dysuria and pelvic pain after the injections on 9/14. She reports two days post procedure, she was symptom free and without any pain. We communicated on three days post procedure and she reported about 60% symptomatic improvement. After the first three days, she started to experience additional pain.    She rates her pre-injection pain as 8/10. Today, she rates her pain as a 5-6/10. She has started imuran/azathioprine with her rheumatologist for her lupus flare. She has not noticed any symptomatic improvement, but reports she has only been on it for one week.    Monica is also transferring her care from [facility] to the [facility].    /67  Pulse 94  Ht 1.676 m (5' 5.98\")  Wt 71.4 kg (157 lb 6.4 oz)  LMP 02/12/2018  BMI 25.42 kg/m2  Exam deferred.    A/P: 22 year old F with lupus cystitis at 31 weeks and 5 days gestation who is 12-days s/p bladder wall injections of heparin, lidocaine, gentamicin, and kenalog.    Discussed with Monica her progress and the treatment options available to her. We had previously discussed via MyChart that the bladder wall injections are likely to provide a more lasting benefit to her than the bladder instillations. She was understanding of this. We also discussed how the utility of drugs used to treat IC, namely elmiron, may not have the same effect in her case of lupus cystitis. Elmiron also can take 4-6 weeks, at a minimum, to take effect. At this time, continued bladder wall injections would " likely provide her the most benefit.    Monica was agreeable with the plan to proceed with bladder wall injections. She expressed her preference to wait two more weeks (when she is nearing 34 weeks gestation), to undergo another procedure, in the event of fetal distress and the potential for an emergency delivery.     Plan to schedule repeat bladder wall injections and cystoscopy in two weeks, when Monica is 34 weeks.    Monica was given the opportunity to ask questions, which were answered. I also encouraged her to reach out via [employer] if she had additional questions or concerns. We discussed making sure she also sees an MFM at the [facility], to help manage her lupus during pregnancy.    A total of 20 minutes were spent with the patient today, > 50% in counseling and coordination of care    Ismael Valadez MD  Professor, OB/GYN  Urogynecologist    CC  Patient Care Team:  Marycarmen Montana MD as PCP - General (Speciality Unknown)  Vesta Yeh MD as MD (Pediatric Nephrology)  Chente Reina MD as MD (Rheumatology)  Marycarmen Montana MD as MD (Speciality Unknown)  Ismael Valadez MD as MD (OB/Gyn)  SELF, REFERRED

## 2018-09-26 NOTE — PROGRESS NOTES
"September 26, 2018    Return visit    Patient returns today two weeks s/p kenalog bladder wall injections for treatment of lupus cystitis. Monica initially reported significant improvement in her dysuria and pelvic pain after the injections on 9/14. She reports two days post procedure, she was symptom free and without any pain. We communicated on three days post procedure and she reported about 60% symptomatic improvement. After the first three days, she started to experience additional pain.    She rates her pre-injection pain as 8/10. Today, she rates her pain as a 5-6/10. She has started imuran/azathioprine with her rheumatologist for her lupus flare. She has not noticed any symptomatic improvement, but reports she has only been on it for one week.    Monica is also transferring her care from [facility] to the [facility].    /67  Pulse 94  Ht 1.676 m (5' 5.98\")  Wt 71.4 kg (157 lb 6.4 oz)  LMP 02/12/2018  BMI 25.42 kg/m2  Exam deferred.    A/P: 22 year old F with lupus cystitis at 31 weeks and 5 days gestation who is 12-days s/p bladder wall injections of heparin, lidocaine, gentamicin, and kenalog.    Discussed with Monica her progress and the treatment options available to her. We had previously discussed via MyChart that the bladder wall injections are likely to provide a more lasting benefit to her than the bladder instillations. She was understanding of this. We also discussed how the utility of drugs used to treat IC, namely elmiron, may not have the same effect in her case of lupus cystitis. Elmiron also can take 4-6 weeks, at a minimum, to take effect. At this time, continued bladder wall injections would likely provide her the most benefit.    Monica was agreeable with the plan to proceed with bladder wall injections. She expressed her preference to wait two more weeks (when she is nearing 34 weeks gestation), to undergo another procedure, in the event of fetal distress and the potential for an " emergency delivery.     Plan to schedule repeat bladder wall injections and cystoscopy in two weeks, when Monica is 34 weeks.    Monica was given the opportunity to ask questions, which were answered. I also encouraged her to reach out via [employer] if she had additional questions or concerns. We discussed making sure she also sees an MFM at the [facility], to help manage her lupus during pregnancy.    A total of 20 minutes were spent with the patient today, > 50% in counseling and coordination of care    Ismael Valadez MD  Professor, OB/GYN  Urogynecologist    CC  Patient Care Team:  Marycarmen Montana MD as PCP - General (Speciality Unknown)  Vesta Yeh MD as MD (Pediatric Nephrology)  Chente Reina MD as MD (Rheumatology)  Marycarmen Montana MD as MD (Speciality Unknown)  Ismael Valadez MD as MD (OB/Gyn)  SELF, REFERRED

## 2018-09-28 ENCOUNTER — TELEPHONE (OUTPATIENT)
Dept: OBGYN | Facility: CLINIC | Age: 23
End: 2018-09-28

## 2018-09-28 NOTE — TELEPHONE ENCOUNTER
Confirmed surgery date 10/18/18 with arrival time at 9:15a.m with nothing to eat eight hours before scheduled surgery time and clear liquids up to two hours before scheduled surgery time, informed patient that a new surgery and map letter will be mailed out.     to complete the following fields:            CHECKLIST     Google Calendar : Yes     Resident notified: Not Applicable     Clinic schedule blocked:  Not Applicable    Patient notified:Yes      Pre op information sent: Yes     Given to patient over the phone.Yes    Comments:

## 2018-10-01 ENCOUNTER — PRE VISIT (OUTPATIENT)
Dept: MATERNAL FETAL MEDICINE | Facility: CLINIC | Age: 23
End: 2018-10-01

## 2018-10-01 DIAGNOSIS — M32.9 SYSTEMIC LUPUS ERYTHEMATOSUS (H): Primary | ICD-10-CM

## 2018-10-01 DIAGNOSIS — M32.14 LUPUS NEPHRITIS (H): ICD-10-CM

## 2018-10-03 ENCOUNTER — ANESTHESIA EVENT (OUTPATIENT)
Dept: SURGERY | Facility: CLINIC | Age: 23
End: 2018-10-03
Payer: COMMERCIAL

## 2018-10-03 NOTE — PROGRESS NOTES
Maternal-Fetal Medicine Consultation    Monica Puckett  : 1995  MRN: [id number]    REFERRAL:  Monica Puckett is a 22 year old sent by Dr. Valadez for SLE in pregnancy.    HPI:  Monica Puckett is a 22 year old  at 32w4d by 5w6d US presenting with concerns regarding Lupus cystitis. She began having urinary symptoms very early in pregnancy. Symptoms include urinary urgency, frequency, dysuria, and bladder pain. She has had multiple urinary cultures without growth and has started multiple courses of antibiotics without improvement. She underwent Kenalog injections to the bladder on  under anesthesia. She noted approximately 5 days of improvement before pain returned. She declined repeat of the procedure until later in pregnancy fearing that she may have a complication that would require her to deliver early. She is taking occasional Roxicodone 5mg for pain. She notes an increase in anxiety symptoms with new onset panic attacks and wonders if Azathioprine could be causing this, although she also associates panic attacks with increased stress. She is not willing to take medications for depression or anxiety at this time. She reports worsening heartburn but does not want to take any medications to treat. She denies history of elevated blood pressure. Denies HA, changes in vision, N/V, or RUQ pain.    She is planning on initiating care with Dr. Arias at [facility] on .    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 [place] from [facility].    Dating:    LMP: 2018  Dating ultrasound: 5w6d date 3/30/2018  Assisted reproduction: No  Assigned EDC by 5w6d US    Obstetrics History:  Obstetric History       T1      L1     SAB0   TAB0   Ectopic0   Multiple0   Live Births1       # Outcome Date GA Lbr  Flaco/2nd Weight Sex Delivery Anes PTL Lv   2 Current            1 Term 17        ANDRE        Gynecologic History:  - Last Pap: 2018 NIL  - Denies any history of abnormal pap smears  - Denies prior cervical surgery or procedures    Past Medical History:  Past Medical History:   Diagnosis Date     Chronic kidney disease      H/O suicide attempt      Lupus      RA (rheumatoid arthritis) (H)      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)        Past Surgical History:  Past Surgical History:   Procedure Laterality Date     CYSTOSCOPY N/A 2018    Procedure: CYSTOSCOPY;  Cystoscopy and Bladder Wall Injection;  Surgeon: Ismael Valadez MD;  Location: [facility] OR     HC BIOPSY RENAL, PERCUTANEOUS  13       Current Medications:  Prior to Admission medications    Medication Sig Last Dose Taking? Auth Provider   Amphetamine-Dextroamphetamine (ADDERALL PO) Take 20 mg by mouth daily Taking  Reported, Patient   Amphetamine-Dextroamphetamine (ADDERALL XR PO) Take 20 mg by mouth daily More than a month at Unknown time  Reported, Patient   azaTHIOprine (IMURAN) 50 MG tablet Take 1 tablet (50 mg) by mouth daily Labs due in 4 weeks Taking  Chente Reina MD   ferrous sulfate Dried 160 (50 Fe) MG tablet Take 1 tablet by mouth 2 times daily   Reported, Patient   hydroxychloroquine (PLAQUENIL) 200 MG tablet Take 2 tablets (400 mg) by mouth daily Taking  Chente Reina MD   Omega-3 Fatty Acids (FISH OIL PO) Take 1 tablet by mouth daily Fish oil with DHA Taking  Reported, Patient   ondansetron (ZOFRAN ODT) 4 MG ODT tab Take 1 tablet (4 mg) by mouth every 8 hours as needed for nausea More than a month at Unknown time  Paul Francois MD   predniSONE (DELTASONE) 20 MG tablet Take 1 tablet (20 mg) by mouth daily Taking  Chente Reina MD   predniSONE (DELTASONE) 5 MG tablet 4tab=20 mg qd x 5 days, 3tab=15 mg qd x 5 days, 2tab=10 mg qd x 5 days, 1 tab=5 mg qd x 5 days then stop. Taking  Chente Reina MD   Prenatal Vit-Fe  Fumarate-FA (PRENATAL MULTIVITAMIN  PLUS IRON) 27-0.8 MG TABS Take 1 tablet by mouth daily Taking  Reported, Patient   ValACYclovir HCl (VALTREX PO) Take 500 mg by mouth as needed Taking  Reported, Patient   VITAMIN D, CHOLECALCIFEROL, PO Take 2,000 Units by mouth daily   Reported, Patient     Allergies:  Estrogens; No clinical screening - see comments; and Varicella virus vaccine live    Social History:   Social History     Social History     Marital status: Single     Spouse name: N/A     Number of children: N/A     Years of education: N/A     Occupational History     Not on file.     Social History Main Topics     Smoking status: Never Smoker     Smokeless tobacco: Never Used      Comment: no second hand smoke exposure at home     Alcohol use No     Drug use: No     Sexual activity: Yes     Other Topics Concern     Not on file     Social History Narrative     ROS:  10-point ROS negative except as in HPI     PHYSICAL EXAM:  LMP 2018    Gen: NAD, well appearing  Chest: Non-labored breathing  Abdomen: gravid     Labs:      Lab Results   Component Value Date    ABO O 2013    RH  Pos 2013    AS Neg 2013    CHPCRT Negative 2017    GCPCRT Negative 2017    HGB 11.2 (L) 07/10/2018        PAP: NIL 18    Date: 18  ABO/Rh: Opos  ABS: neg   Hgb: 10.4 Plt: 268    18:  Hep BsAg NR  HIV: NR  Rubella: Immune  Varicella: Immune  3/30/18: GC/Chlam: neg  Urine Cx 18: no growth    GCT: 97      18: AST: 14 ALT: 8  Cr: 0.52  UPC 18: 0.96       Ultrasounds:   DATE  GA  ASSESSMENT  3/30/18  5w6d  Dating  18  11w6d  Nuchal Translucency  18  21w4d  Anatomy  18  28w5d  BPP 8/8  18  30w4d  BPP 8/8  10/04/18 32w5d  Comp, EFW 58%, anatomy WNL    ASSESSMENT:  22 year old y.o.  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 Valadez for management of Lupus Cystitis. S/p Kenalog injections with option to repeat. Patient prefers to delay repeat until 35 weeks. Reassured patient that procedure is low-risk and would be safe to pursue further treatments if needed.   -Patient follows with nephrology, Dr. Santana. Recommendation was made for patient to start ASA for prevention of preeclampsia, however patient declined.   -SLE managed by Dr. Reina, Rheumatology. Patient started Azathioprine approximately 1 week ago. Will continue to monitor for improvement in symptoms.  -Increased risk of preeclampsia in the setting of SLE and LN. Recommend delivery prior to 40w due to increased risk of preeclampsia as well as fetal demise. Patient strongly desires to avoid induction and would prefer to go into labor on her own. Recommend close monitoring of fetal wellbeing with weekly BPP and fetal growth every 3 weeks.     #Depression/Anxiety  -Patient declines medications. Highly encouraged patient to seek counseling for additional coping measures. Patient at risk for postpartum depression given history of depression, anxiety, and suicide attempt in 2017. Recommend close follow-up during the postpartum period.     I acted as a scribe for Dr. Rauk Alicia Myhre, DO, MS  Obstetrics and Gyncology, PGY-2  October 3, 2018 , 12:08 PM      Please see \"Imaging\" tab under \"Chart Review\" for details of today's US at the [facility].  Total time spent in face-to-face counseling was 15 minutes (>50% for medical management discussion and plan of care). A  copy of this consult was sent to your office.    This note, as scribed, accurately reflects the examination, my impressions and plan as discussed with the patient.    Anam Weems MD  Maternal-Fetal Medicine              "

## 2018-10-04 ENCOUNTER — HOSPITAL ENCOUNTER (OUTPATIENT)
Dept: ULTRASOUND IMAGING | Facility: CLINIC | Age: 23
Discharge: HOME | End: 2018-10-04
Attending: OBSTETRICS & GYNECOLOGY | Admitting: OBSTETRICS & GYNECOLOGY
Payer: COMMERCIAL

## 2018-10-04 ENCOUNTER — OFFICE VISIT (OUTPATIENT)
Dept: MATERNAL FETAL MEDICINE | Facility: CLINIC | Age: 23
End: 2018-10-04
Attending: OBSTETRICS & GYNECOLOGY
Payer: COMMERCIAL

## 2018-10-04 DIAGNOSIS — M32.9 SYSTEMIC LUPUS ERYTHEMATOSUS AFFECTING PREGNANCY IN THIRD TRIMESTER (H): ICD-10-CM

## 2018-10-04 DIAGNOSIS — M32.9 SYSTEMIC LUPUS ERYTHEMATOSUS (H): ICD-10-CM

## 2018-10-04 DIAGNOSIS — M32.14 LUPUS NEPHRITIS (H): ICD-10-CM

## 2018-10-04 DIAGNOSIS — O99.891 SYSTEMIC LUPUS ERYTHEMATOSUS AFFECTING PREGNANCY IN THIRD TRIMESTER (H): ICD-10-CM

## 2018-10-04 PROCEDURE — 76819 FETAL BIOPHYS PROFIL W/O NST: CPT | Performed by: OBSTETRICS & GYNECOLOGY

## 2018-10-04 PROCEDURE — 76811 OB US DETAILED SNGL FETUS: CPT

## 2018-10-04 NOTE — MR AVS SNAPSHOT
After Visit Summary   10/4/2018    Monica Puckett    MRN: [id number]           Patient Information     Date Of Birth          1995        Visit Information        Provider Department      10/4/2018 8:45 AM Anam Weems MD [facility]        Today's Diagnoses     Systemic lupus erythematosus affecting pregnancy in third trimester (H)        Lupus nephritis (H)           Follow-ups after your visit        Your next 10 appointments already scheduled     Oct 09, 2018  8:00 AM CDT   NEW OB with Yamile Arias MD   [facility] ([facility])    [facility] [address]  [address]  [address]  [facility] [address]   [phone]            Oct 09, 2018  8:45 AM CDT   M BPP SINGLE with [id number]   [facility] ([facility])    [address]  [facility] [address] [phone]            Oct 09, 2018  9:15 AM CDT   Radiology MD with JOSE DANIEL MIMS MD   [facility] ([facility])    [address]  [facility] [address]   [phone]           Please arrive at the time given for your first appointment. This visit is used internally to schedule the physician's time during your ultrasound.            Oct 11, 2018  5:00 PM CDT   (Arrive by 4:30 PM)   RETURN GLOMERULAR with Don Santana MD   [facility] ([facility] [place])    [address] [place] [address]  [address]  [facility] [address] [phone]            Oct 16, 2018  8:45 AM CDT   MFM BPP SINGLE with [id number]   [facility] ([facility])    [address]  [facility] [address] [phone]            Oct 16, 2018  9:15 AM CDT   Radiology MD with JOSE DANIEL MIMS MD   [facility] [place] ([facility])    [address]  [facility] [address]   [phone]           Please arrive at the time given for your first appointment. This visit is used internally to schedule the physician's time during your ultrasound.            Oct 18, 2018   Procedure with Ismael Valadez MD   [facility], [place], Same Day Surgery (--)    [address] [place] [address]  [facility] [address]   [phone]            Oct 23, 2018  1:30 PM CDT   MFM US COMPRE SINGLE F/U with URMFMUSR2   [facility] Ultrasound - [place] ([facility])    [address]  [facility] [address] [phone]           Wear comfortable clothes and leave your valuables at home.            Oct 23, 2018  2:00 PM CDT   Radiology MD with [facility] FELICITA DAMON   [facility] Maternal Fetal Medicine - [place] ([facility])    [address]  [facility] [address]   [phone]           Please arrive at the time given for your first appointment. This visit is used internally to schedule the physician's time during your ultrasound.            Nov 01, 2018  3:30 PM CDT   Return Urogyn with Ismael Valadez MD   [facility] ([facility])    [place] [facility]  [address], [address]  [facility] [address] [phone]           Please call [facility] , [phone], with any questions or concerns you may have regarding your appointment              Future tests that were ordered for you today     Open Future Orders        Priority Expected Expires Ordered    Maternal Fetal BPP Single Routine 10/11/2018 10/4/2019 10/4/2018    Maternal Fetal BPP Single Routine 10/18/2018 10/4/2019 10/4/2018    Maternal Fetal US Comprehensive Sngle FU Routine 10/25/2018 10/4/2019 10/4/2018            Who to contact     If you have questions or need follow up information about today's  clinic visit or your schedule please contact [facility] MATERNAL FETAL MEDICINE [facility] directly at [phone].  Normal or non-critical lab and imaging results will be communicated to you by MyChart, letter or phone within 4 business days after the clinic has received the results. If you do not hear from us within 7 days, please contact the clinic through [employer] or phone. If you have a critical or abnormal lab result, we will notify you by phone as soon as possible.  Submit refill requests through "[employer] or call your pharmacy and they will forward the refill request to us. Please allow 3 business days for your refill to be completed.          Additional Information About Your Visit        [employer] Information     "[employer] gives you secure access to your electronic health record. If you see a primary care provider, you can also send messages to your care team and make appointments. If you have questions, please call your primary care clinic.  If you do not have a primary care provider, please call [phone] and they will assist you.        Care EveryWhere ID     This is your Care EveryWhere ID. This could be used by other organizations to access your [place] medical records  [id number]        Your Vitals Were     Last Period                   02/11/2018            Blood Pressure from Last 3 Encounters:   09/26/18 111/67   09/14/18 105/56   09/07/18 114/71    Weight from Last 3 Encounters:   09/26/18 71.4 kg (157 lb 6.4 oz)   09/14/18 71.7 kg (158 lb 1.1 oz)   09/07/18 70.6 kg (155 lb 9.6 oz)              We Performed the Following     MFM Office Visit        Primary Care Provider Office Phone # Fax #    Marycarmen Montana [phone]       [facility] [address]  [place] [address]        Equal Access to Services     WILLIS THORNE : Caprice Valenzuela, yisel sanon, janny avila, kiesha nolan. So [facility]  [phone].    ATENCIÓN: Si liborio lambert, tiene a napoles disposición servicios gratuitos de asistencia lingüística. Emily dawn [phone].    We comply with applicable federal civil rights laws and [place] laws. We do not discriminate on the basis of race, color, national origin, age, disability, sex, sexual orientation, or gender identity.            Thank you!     Thank you for choosing [facility]  for your care. Our goal is always to provide you with excellent care. Hearing back from our patients is one way we can continue to improve our services. Please take a few minutes to complete the written survey that you may receive in the mail after your visit with us. Thank you!             Your Updated Medication List - Protect others around you: Learn how to safely use, store and throw away your medicines at www.disposemymeds.org.          This list is accurate as of 10/4/18 11:21 AM.  Always use your most recent med list.                   Brand Name Dispense Instructions for use Diagnosis    * ADDERALL PO      Take 20 mg by mouth daily        * ADDERALL XR PO      Take 20 mg by mouth daily        azaTHIOprine 50 MG tablet    IMURAN    30 tablet    Take 1 tablet (50 mg) by mouth daily Labs due in 4 weeks    Systemic lupus erythematosus, unspecified SLE type, unspecified organ involvement status (H)       ferrous sulfate Dried 160 (50 Fe) MG tablet      Take 1 tablet by mouth 2 times daily        FISH OIL PO      Take 1 tablet by mouth daily Fish oil with DHA        hydroxychloroquine 200 MG tablet    PLAQUENIL    60 tablet    Take 2 tablets (400 mg) by mouth daily    Systemic lupus erythematosus, unspecified SLE type, unspecified organ involvement status (H), 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    Systemic lupus erythematosus, unspecified SLE type, unspecified organ involvement status (H)       * predniSONE 20 MG tablet    DELTASONE    30 tablet    Take 1 tablet (20 mg) by mouth daily    Systemic lupus erythematosus, unspecified SLE type, unspecified organ involvement status (H)       prenatal multivitamin plus iron 27-0.8 MG Tabs per tablet      Take 1 tablet by mouth daily        VALTREX PO      Take 500 mg by mouth as needed        VITAMIN D (CHOLECALCIFEROL) PO      Take 2,000 Units by mouth daily        * Notice:  This list has 4 medication(s) that are the same as other medications prescribed for you. Read the directions carefully, and ask your doctor or other care provider to review them with you.

## 2018-10-16 ENCOUNTER — OFFICE VISIT (OUTPATIENT)
Dept: MATERNAL FETAL MEDICINE | Facility: CLINIC | Age: 23
End: 2018-10-16
Attending: OBSTETRICS & GYNECOLOGY
Payer: COMMERCIAL

## 2018-10-16 ENCOUNTER — HOSPITAL ENCOUNTER (OUTPATIENT)
Dept: ULTRASOUND IMAGING | Facility: CLINIC | Age: 23
Discharge: HOME | End: 2018-10-16
Attending: OBSTETRICS & GYNECOLOGY | Admitting: OBSTETRICS & GYNECOLOGY
Payer: COMMERCIAL

## 2018-10-16 DIAGNOSIS — M32.14 LUPUS NEPHRITIS (H): ICD-10-CM

## 2018-10-16 DIAGNOSIS — O99.891 SYSTEMIC LUPUS ERYTHEMATOSUS AFFECTING PREGNANCY IN THIRD TRIMESTER (H): Primary | ICD-10-CM

## 2018-10-16 DIAGNOSIS — M32.9 SYSTEMIC LUPUS ERYTHEMATOSUS AFFECTING PREGNANCY IN THIRD TRIMESTER (H): ICD-10-CM

## 2018-10-16 DIAGNOSIS — M32.9 SYSTEMIC LUPUS ERYTHEMATOSUS AFFECTING PREGNANCY IN THIRD TRIMESTER (H): Primary | ICD-10-CM

## 2018-10-16 DIAGNOSIS — O99.891 SYSTEMIC LUPUS ERYTHEMATOSUS AFFECTING PREGNANCY IN THIRD TRIMESTER (H): ICD-10-CM

## 2018-10-16 PROCEDURE — 76819 FETAL BIOPHYS PROFIL W/O NST: CPT

## 2018-10-16 NOTE — PROGRESS NOTES
"Please see \"Imaging\" tab under \"Chart Review\" for details of today's US at the [facility].    Anam Weems MD  Maternal-Fetal Medicine      "

## 2018-10-16 NOTE — MR AVS SNAPSHOT
After Visit Summary   10/16/2018    Monica Puckett    MRN: [id number]           Patient Information     Date Of Birth          1995        Visit Information        Provider Department      10/16/2018 9:15 AM Anam Weems MD [facility] Maternal Fetal Medicine [facility]        Today's Diagnoses     Systemic lupus erythematosus affecting pregnancy in third trimester (H)    -  1       Follow-ups after your visit        Your next 10 appointments already scheduled     Oct 18, 2018   Procedure with Ismael Valadez MD   [facility], [place], Same Day Surgery (--)    [address]  [facility] [address] [phone]            Oct 23, 2018  1:30 PM CDT   MFM US COMPRE SINGLE F/U with [id number]   [facility] Maternal Fetal Medicine Ultrasound - [facility])    [address]  [facility] [address] [phone]           Wear comfortable clothes and leave your valuables at home.            Oct 23, 2018  2:00 PM CDT   Radiology MD with UR FELICITA DAMON   [facility] - [facility])    [address]  [facility] [address] [phone]           Please arrive at the time given for your first appointment. This visit is used internally to schedule the physician's time during your ultrasound.            Nov 01, 2018  3:30 PM CDT   Return Urogyn with Ismael Valadez MD   [facility] ([facility])    [place] [facility]  [address]  [facility] [address] [phone]           Please call [facility] , [phone], with any questions or concerns you may have regarding your appointment            Dec 28, 2018  1:00 PM CST   (Arrive by 12:45 PM)   RETURN LUPUS with Chente Reina MD   [facility] Rheumatology ([facility])    [address] [facility] [address]  [address]  [facility] [address]    [phone]              Who to contact     If you have questions or need follow up information about today's clinic visit or your schedule please contact [facility] MATERNAL FETAL MEDICINE [facility] directly at [phone].  Normal or non-critical lab and imaging results will be communicated to you by MyChart, letter or phone within 4 business days after the clinic has received the results. If you do not hear from us within 7 days, please contact the clinic through MyChart or phone. If you have a critical or abnormal lab result, we will notify you by phone as soon as possible.  Submit refill requests through [email or web address] or call your pharmacy and they will forward the refill request to us. Please allow 3 business days for your refill to be completed.          Additional Information About Your Visit        [employer] Information     [email or web address] gives you secure access to your electronic health record. If you see a primary care provider, you can also send messages to your care team and make appointments. If you have questions, please call your primary care clinic.  If you do not have a primary care provider, please call [phone] and they will assist you.        Care EveryWhere ID     This is your Care EveryWhere ID. This could be used by other organizations to access your [place] medical records  [id number]        Your Vitals Were     Last Period                   02/11/2018            Blood Pressure from Last 3 Encounters:   09/26/18 111/67   09/14/18 105/56   09/07/18 114/71    Weight from Last 3 Encounters:   09/26/18 71.4 kg (157 lb 6.4 oz)   09/14/18 71.7 kg (158 lb 1.1 oz)   09/07/18 70.6 kg (155 lb 9.6 oz)              Today, you had the following     No orders found for display       Primary Care Provider Office Phone # Fax #    Marycarmen [place] [phone]       [facility] [address]  [place] [address]        Equal Access to Services     WILLIS THORNE AH: Caprice pinto  Angerachell, shirada luqadaha, qaelliotta kacesar avila, kiesha rubi lidyaevangelina lamayiyoselin an. So [facility] [phone].    ATENCIÓN: Si liborio lambert, tiene a napoles disposición servicios gratuitos de asistencia lingüística. Emily al [phone].    We comply with applicable federal civil rights laws and [place] laws. We do not discriminate on the basis of race, color, national origin, age, disability, sex, sexual orientation, or gender identity.            Thank you!     Thank you for choosing [facility]  for your care. Our goal is always to provide you with excellent care. Hearing back from our patients is one way we can continue to improve our services. Please take a few minutes to complete the written survey that you may receive in the mail after your visit with us. Thank you!             Your Updated Medication List - Protect others around you: Learn how to safely use, store and throw away your medicines at www.disposemymeds.org.          This list is accurate as of 10/16/18  9:16 AM.  Always use your most recent med list.                   Brand Name Dispense Instructions for use Diagnosis    * ADDERALL PO      Take 20 mg by mouth daily        * ADDERALL XR PO      Take 20 mg by mouth daily        azaTHIOprine 50 MG tablet    IMURAN    30 tablet    Take 1 tablet (50 mg) by mouth daily Labs due in 4 weeks    Systemic lupus erythematosus, unspecified SLE type, unspecified organ involvement status (H)       ferrous sulfate Dried 160 (50 Fe) MG tablet      Take 1 tablet by mouth 2 times daily        FISH OIL PO      Take 1 tablet by mouth daily Fish oil with DHA        hydroxychloroquine 200 MG tablet    PLAQUENIL    60 tablet    Take 2 tablets (400 mg) by mouth daily    Systemic lupus erythematosus, unspecified SLE type, unspecified organ involvement status (H), 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    Systemic lupus erythematosus, unspecified SLE type, unspecified organ involvement status (H)       * predniSONE 20 MG tablet    DELTASONE    30 tablet    Take 1 tablet (20 mg) by mouth daily    Systemic lupus erythematosus, unspecified SLE type, unspecified organ involvement status (H)       prenatal multivitamin plus iron 27-0.8 MG Tabs per tablet      Take 1 tablet by mouth daily        VALTREX PO      Take 500 mg by mouth as needed        VITAMIN D (CHOLECALCIFEROL) PO      Take 2,000 Units by mouth daily        * Notice:  This list has 4 medication(s) that are the same as other medications prescribed for you. Read the directions carefully, and ask your doctor or other care provider to review them with you.

## 2018-10-18 ENCOUNTER — ANESTHESIA (OUTPATIENT)
Dept: SURGERY | Facility: CLINIC | Age: 23
End: 2018-10-18
Payer: COMMERCIAL

## 2018-10-18 ENCOUNTER — SURGERY (OUTPATIENT)
Age: 23
End: 2018-10-18

## 2018-10-18 ENCOUNTER — HOSPITAL ENCOUNTER (OUTPATIENT)
Facility: CLINIC | Age: 23
Setting detail: OBSERVATION
Discharge: HOME | End: 2018-10-18
Attending: OBSTETRICS & GYNECOLOGY | Admitting: OBSTETRICS & GYNECOLOGY
Payer: COMMERCIAL

## 2018-10-18 VITALS
WEIGHT: 160.94 LBS | DIASTOLIC BLOOD PRESSURE: 56 MMHG | RESPIRATION RATE: 18 BRPM | HEART RATE: 78 BPM | TEMPERATURE: 98.2 F | BODY MASS INDEX: 25.86 KG/M2 | SYSTOLIC BLOOD PRESSURE: 109 MMHG | OXYGEN SATURATION: 95 % | HEIGHT: 66 IN

## 2018-10-18 DIAGNOSIS — N30.00 ACUTE CYSTITIS WITHOUT HEMATURIA: Primary | ICD-10-CM

## 2018-10-18 DIAGNOSIS — O47.00 PRETERM CONTRACTIONS: ICD-10-CM

## 2018-10-18 LAB
ABO + RH BLD: NORMAL
ABO + RH BLD: NORMAL
BLD GP AB SCN SERPL QL: NORMAL
BLOOD BANK CMNT PATIENT-IMP: NORMAL
GLUCOSE SERPL-MCNC: 83 MG/DL (ref 70–99)
SPECIMEN EXP DATE BLD: NORMAL

## 2018-10-18 PROCEDURE — 86900 BLOOD TYPING SEROLOGIC ABO: CPT | Performed by: ANESTHESIOLOGY

## 2018-10-18 PROCEDURE — 25000132 ZZH RX MED GY IP 250 OP 250 PS 637: Performed by: ANESTHESIOLOGY

## 2018-10-18 PROCEDURE — 25000128 H RX IP 250 OP 636: Performed by: OBSTETRICS & GYNECOLOGY

## 2018-10-18 PROCEDURE — 36000053 ZZH SURGERY LEVEL 2 EA 15 ADDTL MIN - UMMC: Performed by: OBSTETRICS & GYNECOLOGY

## 2018-10-18 PROCEDURE — 71000027 ZZH RECOVERY PHASE 2 EACH 15 MINS: Performed by: OBSTETRICS & GYNECOLOGY

## 2018-10-18 PROCEDURE — 86901 BLOOD TYPING SEROLOGIC RH(D): CPT | Performed by: ANESTHESIOLOGY

## 2018-10-18 PROCEDURE — 25000132 ZZH RX MED GY IP 250 OP 250 PS 637: Performed by: STUDENT IN AN ORGANIZED HEALTH CARE EDUCATION/TRAINING PROGRAM

## 2018-10-18 PROCEDURE — 25000128 H RX IP 250 OP 636: Performed by: ANESTHESIOLOGY

## 2018-10-18 PROCEDURE — 40000170 ZZH STATISTIC PRE-PROCEDURE ASSESSMENT II: Performed by: OBSTETRICS & GYNECOLOGY

## 2018-10-18 PROCEDURE — 86850 RBC ANTIBODY SCREEN: CPT | Performed by: ANESTHESIOLOGY

## 2018-10-18 PROCEDURE — 25000128 H RX IP 250 OP 636: Performed by: NURSE ANESTHETIST, CERTIFIED REGISTERED

## 2018-10-18 PROCEDURE — 37000008 ZZH ANESTHESIA TECHNICAL FEE, 1ST 30 MIN: Performed by: OBSTETRICS & GYNECOLOGY

## 2018-10-18 PROCEDURE — 82947 ASSAY GLUCOSE BLOOD QUANT: CPT | Performed by: ANESTHESIOLOGY

## 2018-10-18 PROCEDURE — 36000051 ZZH SURGERY LEVEL 2 1ST 30 MIN - UMMC: Performed by: OBSTETRICS & GYNECOLOGY

## 2018-10-18 PROCEDURE — G0463 HOSPITAL OUTPT CLINIC VISIT: HCPCS | Mod: 25

## 2018-10-18 PROCEDURE — 36415 COLL VENOUS BLD VENIPUNCTURE: CPT | Performed by: ANESTHESIOLOGY

## 2018-10-18 PROCEDURE — 25000125 ZZHC RX 250: Performed by: OBSTETRICS & GYNECOLOGY

## 2018-10-18 PROCEDURE — 27210794 ZZH OR GENERAL SUPPLY STERILE: Performed by: OBSTETRICS & GYNECOLOGY

## 2018-10-18 PROCEDURE — 25000125 ZZHC RX 250: Performed by: NURSE ANESTHETIST, CERTIFIED REGISTERED

## 2018-10-18 PROCEDURE — G0378 HOSPITAL OBSERVATION PER HR: HCPCS

## 2018-10-18 PROCEDURE — 25000132 ZZH RX MED GY IP 250 OP 250 PS 637: Performed by: OBSTETRICS & GYNECOLOGY

## 2018-10-18 PROCEDURE — 37000009 ZZH ANESTHESIA TECHNICAL FEE, EACH ADDTL 15 MIN: Performed by: OBSTETRICS & GYNECOLOGY

## 2018-10-18 RX ORDER — ONDANSETRON 4 MG/1
4 TABLET, ORALLY DISINTEGRATING ORAL EVERY 30 MIN PRN
Status: DISCONTINUED | OUTPATIENT
Start: 2018-10-18 | End: 2018-10-18

## 2018-10-18 RX ORDER — PHENAZOPYRIDINE HYDROCHLORIDE 200 MG/1
200 TABLET, FILM COATED ORAL ONCE
Status: COMPLETED | OUTPATIENT
Start: 2018-10-18 | End: 2018-10-18

## 2018-10-18 RX ORDER — AZATHIOPRINE 50 MG/1
50 TABLET ORAL DAILY
Status: DISCONTINUED | OUTPATIENT
Start: 2018-10-19 | End: 2018-10-19 | Stop reason: HOSPADM

## 2018-10-18 RX ORDER — ACETAMINOPHEN 325 MG/1
650 TABLET ORAL
Status: DISCONTINUED | OUTPATIENT
Start: 2018-10-18 | End: 2018-10-18

## 2018-10-18 RX ORDER — CEFAZOLIN SODIUM 1 G/3ML
1 INJECTION, POWDER, FOR SOLUTION INTRAMUSCULAR; INTRAVENOUS SEE ADMIN INSTRUCTIONS
Status: DISCONTINUED | OUTPATIENT
Start: 2018-10-18 | End: 2018-10-18

## 2018-10-18 RX ORDER — LIDOCAINE HYDROCHLORIDE 20 MG/ML
INJECTION, SOLUTION INFILTRATION; PERINEURAL PRN
Status: DISCONTINUED | OUTPATIENT
Start: 2018-10-18 | End: 2018-10-18

## 2018-10-18 RX ORDER — FENTANYL CITRATE 50 UG/ML
25 INJECTION, SOLUTION INTRAMUSCULAR; INTRAVENOUS EVERY 10 MIN PRN
Status: DISCONTINUED | OUTPATIENT
Start: 2018-10-18 | End: 2018-10-18

## 2018-10-18 RX ORDER — PROPOFOL 10 MG/ML
INJECTION, EMULSION INTRAVENOUS CONTINUOUS PRN
Status: DISCONTINUED | OUTPATIENT
Start: 2018-10-18 | End: 2018-10-18

## 2018-10-18 RX ORDER — CEFAZOLIN SODIUM 2 G/100ML
2 INJECTION, SOLUTION INTRAVENOUS
Status: DISCONTINUED | OUTPATIENT
Start: 2018-10-18 | End: 2018-10-18

## 2018-10-18 RX ORDER — AMOXICILLIN 250 MG
2 CAPSULE ORAL 2 TIMES DAILY
Status: DISCONTINUED | OUTPATIENT
Start: 2018-10-18 | End: 2018-10-19 | Stop reason: HOSPADM

## 2018-10-18 RX ORDER — HYDROXYZINE HYDROCHLORIDE 25 MG/1
50-100 TABLET, FILM COATED ORAL
Qty: 30 TABLET | Refills: 0 | Status: SHIPPED | OUTPATIENT
Start: 2018-10-18 | End: 2022-12-20

## 2018-10-18 RX ORDER — SODIUM CHLORIDE, SODIUM LACTATE, POTASSIUM CHLORIDE, CALCIUM CHLORIDE 600; 310; 30; 20 MG/100ML; MG/100ML; MG/100ML; MG/100ML
INJECTION, SOLUTION INTRAVENOUS CONTINUOUS
Status: DISCONTINUED | OUTPATIENT
Start: 2018-10-18 | End: 2018-10-18

## 2018-10-18 RX ORDER — LIDOCAINE 40 MG/G
CREAM TOPICAL
Status: DISCONTINUED | OUTPATIENT
Start: 2018-10-18 | End: 2018-10-19 | Stop reason: HOSPADM

## 2018-10-18 RX ORDER — HYDROXYZINE HYDROCHLORIDE 25 MG/1
50-100 TABLET, FILM COATED ORAL
Qty: 30 TABLET | Refills: 0 | Status: SHIPPED | OUTPATIENT
Start: 2018-10-18 | End: 2018-10-18

## 2018-10-18 RX ORDER — AMOXICILLIN 250 MG
1 CAPSULE ORAL 2 TIMES DAILY
Status: DISCONTINUED | OUTPATIENT
Start: 2018-10-18 | End: 2018-10-19 | Stop reason: HOSPADM

## 2018-10-18 RX ORDER — BETAMETHASONE SODIUM PHOSPHATE AND BETAMETHASONE ACETATE 3; 3 MG/ML; MG/ML
12 INJECTION, SUSPENSION INTRA-ARTICULAR; INTRALESIONAL; INTRAMUSCULAR; SOFT TISSUE EVERY 24 HOURS
Status: CANCELLED | OUTPATIENT
Start: 2018-10-18 | End: 2018-10-20

## 2018-10-18 RX ORDER — PROPOFOL 10 MG/ML
INJECTION, EMULSION INTRAVENOUS PRN
Status: DISCONTINUED | OUTPATIENT
Start: 2018-10-18 | End: 2018-10-18

## 2018-10-18 RX ORDER — MEPERIDINE HYDROCHLORIDE 25 MG/ML
12.5 INJECTION INTRAMUSCULAR; INTRAVENOUS; SUBCUTANEOUS
Status: DISCONTINUED | OUTPATIENT
Start: 2018-10-18 | End: 2018-10-18

## 2018-10-18 RX ORDER — OXYCODONE HYDROCHLORIDE 5 MG/1
2.5 TABLET ORAL EVERY 4 HOURS PRN
Status: ON HOLD | COMMUNITY
Start: 2018-09-15 | End: 2018-11-04

## 2018-10-18 RX ORDER — OXYCODONE HYDROCHLORIDE 5 MG/1
5 TABLET ORAL
Status: COMPLETED | OUTPATIENT
Start: 2018-10-18 | End: 2018-10-18

## 2018-10-18 RX ORDER — HYDROXYCHLOROQUINE SULFATE 200 MG/1
400 TABLET, FILM COATED ORAL DAILY
Status: DISCONTINUED | OUTPATIENT
Start: 2018-10-19 | End: 2018-10-19 | Stop reason: HOSPADM

## 2018-10-18 RX ORDER — CEFAZOLIN SODIUM 2 G/100ML
2 INJECTION, SOLUTION INTRAVENOUS ONCE
Status: COMPLETED | OUTPATIENT
Start: 2018-10-18 | End: 2018-10-18

## 2018-10-18 RX ORDER — HYDROXYZINE HYDROCHLORIDE 50 MG/1
50-100 TABLET, FILM COATED ORAL ONCE
Status: COMPLETED | OUTPATIENT
Start: 2018-10-18 | End: 2018-10-18

## 2018-10-18 RX ORDER — NALOXONE HYDROCHLORIDE 0.4 MG/ML
.1-.4 INJECTION, SOLUTION INTRAMUSCULAR; INTRAVENOUS; SUBCUTANEOUS
Status: DISCONTINUED | OUTPATIENT
Start: 2018-10-18 | End: 2018-10-18

## 2018-10-18 RX ORDER — ONDANSETRON 2 MG/ML
4 INJECTION INTRAMUSCULAR; INTRAVENOUS EVERY 6 HOURS PRN
Status: DISCONTINUED | OUTPATIENT
Start: 2018-10-18 | End: 2018-10-19 | Stop reason: HOSPADM

## 2018-10-18 RX ORDER — FENTANYL CITRATE 50 UG/ML
INJECTION, SOLUTION INTRAMUSCULAR; INTRAVENOUS PRN
Status: DISCONTINUED | OUTPATIENT
Start: 2018-10-18 | End: 2018-10-18

## 2018-10-18 RX ORDER — ONDANSETRON 2 MG/ML
4 INJECTION INTRAMUSCULAR; INTRAVENOUS EVERY 30 MIN PRN
Status: DISCONTINUED | OUTPATIENT
Start: 2018-10-18 | End: 2018-10-18

## 2018-10-18 RX ADMIN — CEFAZOLIN SODIUM 2 G: 2 INJECTION, SOLUTION INTRAVENOUS at 12:01

## 2018-10-18 RX ADMIN — HYDROXYZINE HYDROCHLORIDE 100 MG: 50 TABLET, FILM COATED ORAL at 19:48

## 2018-10-18 RX ADMIN — PHENAZOPYRIDINE HYDROCHLORIDE 200 MG: 200 TABLET, FILM COATED ORAL at 10:43

## 2018-10-18 RX ADMIN — LIDOCAINE HYDROCHLORIDE 40 MG: 20 INJECTION, SOLUTION INFILTRATION; PERINEURAL at 11:57

## 2018-10-18 RX ADMIN — FENTANYL CITRATE 25 MCG: 50 INJECTION, SOLUTION INTRAMUSCULAR; INTRAVENOUS at 14:39

## 2018-10-18 RX ADMIN — PROPOFOL 100 MCG/KG/MIN: 10 INJECTION, EMULSION INTRAVENOUS at 11:57

## 2018-10-18 RX ADMIN — FENTANYL CITRATE 25 MCG: 50 INJECTION, SOLUTION INTRAMUSCULAR; INTRAVENOUS at 12:12

## 2018-10-18 RX ADMIN — OXYCODONE HYDROCHLORIDE 5 MG: 5 TABLET ORAL at 14:51

## 2018-10-18 RX ADMIN — WATER 900 ML: 100 IRRIGANT IRRIGATION at 12:31

## 2018-10-18 RX ADMIN — FENTANYL CITRATE 25 MCG: 50 INJECTION, SOLUTION INTRAMUSCULAR; INTRAVENOUS at 14:12

## 2018-10-18 RX ADMIN — LIDOCAINE HYDROCHLORIDE 20 ML: 20 JELLY TOPICAL at 12:31

## 2018-10-18 RX ADMIN — FENTANYL CITRATE 25 MCG: 50 INJECTION, SOLUTION INTRAMUSCULAR; INTRAVENOUS at 12:20

## 2018-10-18 RX ADMIN — GENTAMICIN SULFATE 33 ML GIVEN: 40 INJECTION, SOLUTION INTRAMUSCULAR; INTRAVENOUS at 12:34

## 2018-10-18 RX ADMIN — FENTANYL CITRATE 25 MCG: 50 INJECTION, SOLUTION INTRAMUSCULAR; INTRAVENOUS at 12:02

## 2018-10-18 RX ADMIN — PROPOFOL 30 MG: 10 INJECTION, EMULSION INTRAVENOUS at 12:14

## 2018-10-18 RX ADMIN — SODIUM CHLORIDE, POTASSIUM CHLORIDE, SODIUM LACTATE AND CALCIUM CHLORIDE: 600; 310; 30; 20 INJECTION, SOLUTION INTRAVENOUS at 11:45

## 2018-10-18 RX ADMIN — OXYCODONE HYDROCHLORIDE 2.5 MG: 5 TABLET ORAL at 19:48

## 2018-10-18 ASSESSMENT — ACTIVITIES OF DAILY LIVING (ADL)
AMBULATION: 0-->INDEPENDENT
DRESS: 0-->INDEPENDENT
FALL_HISTORY_WITHIN_LAST_SIX_MONTHS: NO
BATHING: 0-->INDEPENDENT
AMBULATION: 0 - INDEPENDENT
TOILETING: 0-->INDEPENDENT
COMMUNICATION: 0 - UNDERSTANDS/COMMUNICATES WITHOUT DIFFICULTY
RETIRED_COMMUNICATION: 0-->UNDERSTANDS/COMMUNICATES WITHOUT DIFFICULTY
TRANSFERRING: 0 - INDEPENDENT
COGNITION: 0 - NO COGNITION ISSUES REPORTED
BATHING: 0 - INDEPENDENT
SWALLOWING: 0 - SWALLOWS FOODS/LIQUIDS WITHOUT DIFFICULTY
SWALLOWING: 0-->SWALLOWS FOODS/LIQUIDS WITHOUT DIFFICULTY
EATING: 0 - INDEPENDENT
DRESS: 0 - INDEPENDENT
TOILETING: 0 - INDEPENDENT
CHANGE_IN_FUNCTIONAL_STATUS_SINCE_ONSET_OF_CURRENT_ILLNESS/INJURY: NO
TRANSFERRING: 0-->INDEPENDENT
RETIRED_EATING: 0-->INDEPENDENT

## 2018-10-18 NOTE — H&P
[facility]  OB History and Physical      Monica Puckett MRN# [id number]   Age: 22 year old YOB: 1995     CC:  Contractions    HPI:  Ms. Monica Puckett is a 22 year old  at 34w5d, POD#0 s/p cystoscopy with Kenalog bladder injections for lupus cystitis, who presented in PACU with contractions. She reports she has been jose for weeks, but these contractions are more intense, and taking her breath away. Denies LOF or VB. Good FM. She reports she is also having bladder spasm and urethral pressure.     Pregnancy Complications:  1.  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 Currently on AZA and plaquenil, has been on prednisone tapers throughout pregnancy but is not currently on prednisone. Intermittent oxycodone for pain flares.    2. Rheumatoid arthritis    3. MDD with history of suicide attempt, 2/15/17    Prenatal Labs:   Lab Results   Component Value Date    ABO O 10/18/2018    RH Pos 10/18/2018    AS Neg 10/18/2018    CHPCRT Negative 2017    GCPCRT Negative 2017    HGB 11.2 (L) 07/10/2018       Ultrasounds  1. 10/16/18 US - BPP   2. 10/3/18 2202g, EFW 58%     OB History  Obstetric History       T1      L1     SAB0   TAB0   Ectopic0   Multiple0   Live Births1       # Outcome Date GA Lbr Flaco/2nd Weight Sex Delivery Anes PTL Lv   2 Current            1 Term 17        ANDRE        PMHx:   Past Medical History:   Diagnosis Date     Chronic kidney disease      H/O suicide attempt      Lupus      Lupus      RA (rheumatoid arthritis) (H)      Rheumatoid arthritis (H)      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)      PSHx:   Past Surgical History:   Procedure Laterality Date     CYSTOSCOPY N/A 2018     Procedure: CYSTOSCOPY;  Cystoscopy and Bladder Wall Injection;  Surgeon: Ismael Valadez MD;  Location: [facility] OR     HC BIOPSY RENAL, PERCUTANEOUS  1/18/13     Meds:   Prescriptions Prior to Admission   Medication Sig Dispense Refill Last Dose     azaTHIOprine (IMURAN) 50 MG tablet Take 1 tablet (50 mg) by mouth daily Labs due in 4 weeks 30 tablet 0 10/18/2018 at 0400     hydroxychloroquine (PLAQUENIL) 200 MG tablet Take 2 tablets (400 mg) by mouth daily 60 tablet 11 10/18/2018 at 0400     Omega-3 Fatty Acids (FISH OIL PO) Take 1 tablet by mouth daily Fish oil with DHA   10/17/2018 at 0800     oxyCODONE IR (ROXICODONE) 5 MG tablet Take 2.5 mg by mouth every 4 hours as needed   10/17/2018 at 2200     Prenatal Vit-Fe Fumarate-FA (PRENATAL MULTIVITAMIN  PLUS IRON) 27-0.8 MG TABS Take 1 tablet by mouth daily   10/17/2018 at 0800     VITAMIN D, CHOLECALCIFEROL, PO Take 2,000 Units by mouth daily   10/17/2018 at 0800     Amphetamine-Dextroamphetamine (ADDERALL PO) Take 20 mg by mouth daily   More than a month at Unknown time     Amphetamine-Dextroamphetamine (ADDERALL XR PO) Take 20 mg by mouth daily   More than a month at Unknown time     ferrous sulfate Dried 160 (50 Fe) MG tablet Take 1 tablet by mouth 2 times daily   Unknown at Unknown time     ondansetron (ZOFRAN ODT) 4 MG ODT tab Take 1 tablet (4 mg) by mouth every 8 hours as needed for nausea 10 tablet 0 More than a month at Unknown time     predniSONE (DELTASONE) 20 MG tablet Take 1 tablet (20 mg) by mouth daily 30 tablet 1 More than a month at Unknown time     predniSONE (DELTASONE) 5 MG tablet 4tab=20 mg qd x 5 days, 3tab=15 mg qd x 5 days, 2tab=10 mg qd x 5 days, 1 tab=5 mg qd x 5 days then stop. 50 tablet 0 More than a month at Unknown time     ValACYclovir HCl (VALTREX PO) Take 500 mg by mouth as needed   More than a month at Unknown time   Currently only taking:  - Imuran  - Plaquenil  - Prenatal vitamins  - PRN oxycodone    Allergies:    Allergies   Allergen  Reactions     Estrogens Other (See Comments)     Lupus flares     No Clinical Screening - See Comments Other (See Comments) and Rash     She is immunosuppressed   Gets very sick from flu vaccines (or any vaccine)     mmr     Varicella Virus Vaccine Live Other (See Comments)     She is immunosuppressed       FmHx: History reviewed. No pertinent family history.  SocHx: She denies any tobacco, alcohol, or other drug use during this pregnancy.    ROS:   Complete 10-point ROS negative except as noted in HPI. She denies headache, blurry vision, chest pain, shortness of breath, RUQ pain, nausea, vomiting, dysuria, hematuria or extremity edema.    PE:  Vit: Patient Vitals for the past 4 hrs:   BP Temp Temp src Resp SpO2   10/18/18 1558 115/72 - - - -   10/18/18 1540 - 97.5  F (36.4  C) Oral 18 95 %   10/18/18 1530 - - - 18 94 %   10/18/18 1515 108/63 - - 18 96 %   10/18/18 1500 104/61 - - 16 94 %   10/18/18 1445 102/62 - - 16 94 %   10/18/18 1430 110/59 - - 18 94 %   10/18/18 1415 105/60 - - - 95 %   10/18/18 1317 114/67 - - - 98 %   10/18/18 1315 - - - 15 -   10/18/18 1300 108/55 - - 15 96 %   10/18/18 1257 - - - - 96 %   10/18/18 1245 - 97.7  F (36.5  C) Oral 15 -      Gen: Well-appearing, NAD, comfortable   CV: RRR, no mrg   Pulm: CTAB, no wheezes or crackles   Abd: Soft, gravid, non-tender, +BS  Ext: trace LE edema b/l  Cx: 1/2cm long (30%)/-2, soft, midposition    Pres:  Cephalic by Exam  Membranes: Intact      FHT: Baseline 130s, mod variability, present accelerations, absent decelerations   [place]: 3-4 contractions in 10 minutes      Creatinine   Date Value Ref Range Status   07/10/2018 0.52 0.52 - 1.04 mg/dL Final     O pos, Ngoc neg, Hgb 10.4, Plt 268  Hep BsAg NR, HIV NR  Rubella Imm, Varicella Imm  GC/Chlam Neg, NCx no growth  GCT 97  AST 14  ALT 8  UPC 0.96 (7/18/18)                   Ultrasounds:   DATE                                     GA                                         ASSESSMENT  3/30/18                                   5w6d                                      Dating  18                                    11w6d                                    Nuchal Translucency  18                                  21w4d                                    Anatomy  18                                    28w5d                                    BPP 8/8  18                                  30w4d                                    BPP 8/8  10/04/18                        32w5d                                    Comp, EFW 58%, anatomy WNL    Assessment  Ms. Monica Puckett is a 22 year old , at 34w5d, who presents with  contractions, concern for  labor, POD#0 s/p cystoscopy with bladder wall kenalog injections for lupus cystitis. Will admit for observation, BMZ if indicated for  labor.     Plan  Admit to L&D for observation.  PTL: No infections symptoms, no indication for UA or vaginal cultures. Suspect irritation due to recent instrumentation of bladder. S/p 1L bolus in preoperative unit, will continue MIVF and encourage PO hydration. Expectant management, repeat SVE prn and prior to discharge.   SLE:  Continue home meds. BP's normal with no evidence of worsening nephritis or evolving pre eclampsia.   FWB: Category I FHT.  Continuous EFM and toco. Will give BMZ for FLM as indicated with cervical change.  Pain: S/p 10mg oxycodone in   PNC: Rh pos, Rubella immune, GBS unk - will collect if labors, GCT nl, Placenta posterior  Fen/GI: Regular diet, IVF  PPH Risk: Moderate, type and screen    The patient was discussed with Dr. Bocanegra and Dr. Ramesh who are in agreement with the treatment plan.    Diana Heredia MD  OB-GYN PGY-4  10/18/18  4:08 PM    Appreciate note by Dr. Heredia. Patient has been seen and examined by me separate from the resident, agree with above note.     Jennie Ramesh MD  7:55 PM

## 2018-10-18 NOTE — ANESTHESIA CARE TRANSFER NOTE
Patient: Monica Puckett    Procedure(s):  Cystoscopy, Guided Bladder Wall Injection       Diagnosis: Lupus Cystitis   Diagnosis Additional Information: No value filed.    Anesthesia Type:   General, MAC     Note:  Airway :Nasal Cannula  Patient transferred to:Phase II  Comments: Pt sleepy but arouses easily to voice, nasal cannula oxygen, toco monitor per OB nurse, report to Phase 2 RN.Handoff Report: Identifed the Patient, Identified the Reponsible Provider, Reviewed the pertinent medical history, Discussed the surgical course, Reviewed Intra-OP anesthesia mangement and issues during anesthesia, Set expectations for post-procedure period and Allowed opportunity for questions and acknowledgement of understanding      Vitals: (Last set prior to Anesthesia Care Transfer)    CRNA VITALS  10/18/2018 1211 - 10/18/2018 1311      10/18/2018             Pulse: 102    SpO2: 97 %                Electronically Signed By: JAMIE Espinal CRNA  October 18, 2018  2:03 PM

## 2018-10-18 NOTE — ANESTHESIA PREPROCEDURE EVALUATION
Anesthesia Evaluation     . Pt has had prior anesthetic. Type: General    No history of anesthetic complications          ROS/MED HX    ENT/Pulmonary:  - neg pulmonary ROS     Neurologic:  - neg neurologic ROS     Cardiovascular:  - neg cardiovascular ROS   (+) ----. : . . . :. . Previous cardiac testing Echodate:2018results:Interpretation Summary  Global and regional left ventricular function is normal with an EF of 60-65%.  Global right ventricular function is normal.  No significant valvular abnormalities were noted.  The inferior vena cava was normal in size with preserved respiratory  variability.  No pericardial effusion is present.date: results:ECG reviewed date:2017 results:SR with sinus arrhythmia, rate 89 bpm date: results:          METS/Exercise Tolerance:     Hematologic:  - neg hematologic  ROS       Musculoskeletal: Comment: Hx of RA.  Has bilateral shoulder pain. - neg musculoskeletal ROS       GI/Hepatic:        (-) GERD   Renal/Genitourinary: Comment: Hx of nephrotic syndrome in the past.  Has chronic renal disease.    (+) chronic renal disease, Pt does not require dialysis,       Endo: Comment: Hx of Systemic Lupus.  Last dose of steroids was prior to 2018, none since.        Psychiatric: Comment: Hx of ADHD.  Has been off Adderal since becoming pregnant.        Infectious Disease:  - neg infectious disease ROS       Malignancy:         Other: Comment: Pt is 35 weeks pregnant.  Fetus will be monitored throughout the perioperative period by L and D.  Pt was seen by OB prior to going to the OR.                    Physical Exam  Normal systems: cardiovascular, pulmonary and dental    Airway   Mallampati: II  TM distance: >3 FB  Neck ROM: full    Dental     Cardiovascular       Pulmonary     Other findings:     Pt is  at 29 weeks gestation with suspected Lupus Cystitis.                    Anesthesia Plan      History & Physical Review  History and physical reviewed and  following examination; no interval change.    ASA Status:  2 .    NPO Status:  > 6 hours    Plan for General and MAC with Propofol induction. Maintenance will be TIVA.  Reason for MAC:  Deep or markedly invasive procedure (G8)  PONV prophylaxis:  Ondansetron (or other 5HT-3)  Dr. Valadez initially ordered antibiotics to be given preop.  Pt declined the antibiotics so the order was cancelled by Dr. Valadez.  L and D nursing staff nicely got the pt to agree to the preop antibiotics, so I wrote the same order that Dr. Valadez had previously cancelled.      Reviewed with the pt sedation and GA risks including possible intraop awareness with sedation, and GA risks of sore throat, N/V, tooth damage, heart and lung issues.    Fetal monitoring will be done throughout the perioperative period, preop FHR variable between 130s and 140s.            Postoperative Care  Postoperative pain management:  IV analgesics.      Consents  Anesthetic plan, risks, benefits and alternatives discussed with:  Patient..                          .          Plan  -  MAC/GEN with native airway.  May need supplemental steroid coverage.  Will require perioperative fetal monitoring.        The risks, benefits and possible complications of MAC, including the use of anesthetic agents and possible transfer to the fetus, discussed in full with the patient.  We also discussed conversion to GETA if necessary. She voices understanding and wishes to proceed.      Dr. Peace Wilcox MD  Anesthesia  09/14/2018 @ 0945 am

## 2018-10-18 NOTE — PROVIDER NOTIFICATION
Pt. Feels intense pressure when she has a contraction.  She states it feels like she has to push as if she were gertrudis to have the baby. She has had fentanyl x2 and voided x2.  UC lave lessened in frequency

## 2018-10-18 NOTE — OR NURSING
"MASSIMO Elizondo discussed plan of care with patient regarding fetal monitoring in Preop/OR/PACU. Patient hand wrote a \"birth plan\" if needed in an emergency. Patient's mother at bedside and acknowledge \"birth plan\". \"Birth plan\" hand written note labeled, signed by patient, date and place in chart. Patient declines antibiotics and Dr. Valadez notified to discuss with patient. Antibiotic order discontinued by RN. Also patient states \"unable\" to remove nose ring from nose piercing and MASSIMO Elizondo aware.   "

## 2018-10-18 NOTE — IP AVS SNAPSHOT
MRN:[id number]                      After Visit Summary   10/18/2018    Monica Puckett    MRN: [id number]           Thank you!     Thank you for choosing [facility] for your care. Our goal is always to provide you with excellent care. Hearing back from our patients is one way we can continue to improve our services. Please take a few minutes to complete the written survey that you may receive in the mail after you visit with us. Thank you!        Patient Information     Date Of Birth          1995        Designated Caregiver       Most Recent Value    Caregiver    Will someone help with your care after discharge? no    Name of designated caregiver NA    Phone number of caregiver NA      About your hospital stay     You were admitted on:  October 18, 2018 You last received care in the:  [facility]    You were discharged on:  October 18, 2018        Reason for your hospital stay       Monitoring following your cystoscopy and bladder injection                  Who to Call     For medical emergencies, please call 911.  For non-urgent questions about your medical care, please call your primary care provider or clinic, [phone]  For questions related to your surgery, please call your surgery clinic        Attending Provider     Provider Specialty    Ismael Valadez MD OB/Gyn    Ramesh, Jennie Singh MD OB/Gyn       Primary Care Provider Office Phone # Fax #    Marycarmen Montana [phone]      After Care Instructions     Diet       Follow this diet upon discharge: Regular            Discharge Instructions       Patient to arrange follow up appointment in 2  weeks            Discharge Instructions       Call your health care provider if you have any of the following: Fever above 100.4 F; vaginal bleeding; malodorous vaginal discharge, increasingly painful contraction with more than 6 per hour, nausea and vomiting, severe headaches, changes in vision, calf swelling or pain,  shortness of breath, problems coping with sadness, anxiety, or depression. You are encouraged to call with questions or concerns after you return home.            Ice to affected area       PRN as tolerated            No driving or operating machinery       No driving or operating machinery until day after procedure            Shower        Shower on Post-op day  1                  Your next 10 appointments already scheduled     Oct 23, 2018  1:30 PM CDT   MFM US COMPRE SINGLE F/U with [id number]   [facility] [place] ([facility])    [address]  [facility] [id number] [phone]           Wear comfortable clothes and leave your valuables at home.            Oct 23, 2018  2:00 PM CDT   Radiology MD with [facility] FELICITA DAMON   [facility] - [facility])    [address]  [facility] [address] [phone]           Please arrive at the time given for your first appointment. This visit is used internally to schedule the physician's time during your ultrasound.            Oct 26, 2018  1:00 PM CDT   Nurse Visit with [facility] Nurse   [facility] ([facility])    [place] [facility] [address]  [address]  [facility] [address]   [phone]            Oct 26, 2018  1:15 PM CDT   NEW OB with Amy Schumer, MD   [facility] ([facility])    [place] [facility] [address]  [address]  [facility] [address]   [phone]            Oct 30, 2018  9:15 AM CDT   RETURN OB with Yamile Arias MD   [facility] ([facility])    [place] [facility] [address]  [address]  [facility] [address]   [phone]            Nov 01, 2018  3:30 PM CDT   Return Urogyn with Ismael Valadez MD   [facility]  ([facility])    [facility]  [address], [address]  [facility] [id number] [phone]           Please call [facility] , [phone], with any questions or concerns you may have regarding your appointment            Nov 06, 2018  9:15 AM CST   RETURN OB with Yamile Arias MD   [facility] ([facility])    [facility] [address]  [address]  [address]  [facility] [address]   [phone]            Nov 13, 2018  9:15 AM CST   RETURN OB with Yamile Arias MD   [facility] ([facility])    [facility] [address]  [address]  [address]  [facility] [address] [phone]            Nov 20, 2018  9:15 AM CST   RETURN OB with Yamile Arias MD   [facility] ([facility])    [facility] [address]  [address]  [address]  [facility] [address] [phone]            Dec 28, 2018  1:00 PM CST   (Arrive by 12:45 PM)   RETURN LUPUS with Chente Reina MD   [facility] ([facility])    [address] [place] [address]  [address]  [facility] [id number] [phone]              Further instructions from your care team       [facility]   Adult Discharge Orders & Instructions     For 24 hours after surgery    1. Get plenty of rest.  A responsible adult must stay with you for at least 24 hours after you leave the hospital.   2. Do not drive or use heavy equipment.  If you have weakness or tingling, don't drive or use heavy equipment until this feeling goes away.  3. Do not drink alcohol.  4. Avoid strenuous or risky activities.  Ask for help when climbing stairs.   5. You may feel lightheaded.  IF so, sit for a few minutes before standing.  Have someone help you get up.   6. If you have nausea (feel sick to your stomach): Drink only clear liquids such as  apple juice, ginger ale, broth or 7-Up.  Rest may also help.  Be sure to drink enough fluids.  Move to a regular diet as you feel able.  7. You may have a slight fever. Call the doctor if your fever is over 100 F (37.7 C) (taken under the tongue) or lasts longer than 24 hours.  8. You may have a dry mouth, a sore throat, muscle aches or trouble sleeping.  These should go away after 24 hours.  9. Do not make important or legal decisions.   Call your doctor for any of the followin.  Signs of infection (fever, growing tenderness at the surgery site, a large amount of drainage or bleeding, severe pain, foul-smelling drainage, redness, swelling).    2. It has been over 8 to 10 hours since surgery and you are still not able to urinate (pass water).    3.  Headache for over 24 hours.    4.  Numbness, tingling or weakness the day after surgery (if you had spinal anesthesia).  To contact a doctor, call Dr. Valadez's clinic # [phone] and ask for the Resident On Call for: Urology or OB/GYN labor and delivery depends on issue (answered 24 hours a day)      Emergency Department:  [place]: [phone]  [place] [facility]: [phone]    Discharge Instructions: Following a Cystoscopy/Stent and/or Ureteroscopy    Drainage:    Urine may be slightly bloody but should taper off in a few days.    You may have some frequency and urgency for a few days.    Comfort:    A burning sensation when urinating is common. Drinking extra fluids helps decrease this burning feeling and also helps to clear the bloody urine.    Mild abdominal cramps may occur for a few days.    Baths:    Daily tub baths aide in passing urine.    Frequent use of bubble bath is discouraged since it encourages urinary tract infections.    Report to your doctor at once if you experience:    Inability to pass your urine    Continuous or heavy bleeding    Severe Pain    Elevated temperature > than 101.5 for  24 hours    Home Activity:    The day of surgery spend a quiet evening at home.    Increase activity as tolerated.    Rev. 5/12Discharge Instruction for Undelivered Patients      You were seen for: Labor assessment  We Consulted: Dr. Blakely and Dr. Ramesh  You had (Test or Medicine): Fetal non stress test    Diet:   Drink 8 to 12 glasses of liquids (milk, juice, water) every day.  You may eat meals and snacks.     Activity:  Count fetal kicks everyday (see handout)  Call your doctor or nurse midwife if your baby is moving less than usual.     Call your provider if you notice:  Swelling in your face or increased swelling in your hands or legs.  Headaches that are not relieved by Tylenol (acetaminophen).  Changes in your vision (blurring: seeing spots or stars.)  Nausea (sick to your stomach) and vomiting (throwing up).   Weight gain of 5 pounds or more per week.  Heartburn that doesn't go away.  Signs of bladder infection: pain when you urinate (use the toilet), need to go more often and more urgently.  The bag of elkins (rupture of membranes) breaks, or you notice leaking in your underwear.  Bright red blood in your underwear.  Abdominal (lower belly) or stomach pain.  For first baby: Contractions (tightening) less than 5 minutes apart for one hour or more.  For second plus baby: Contractions less than 10 minutes apart or getting stronger.  *If less than 34 weeks: Contractions (tightenings) more than 6 times in one hour.  Increase or change in vaginal discharge (note the color and amount)      Follow-up:  As scheduled in the clinic          Pending Results     No orders found from 10/16/2018 to 10/19/2018.            Statement of Approval     Ordered          10/18/18 2115  I have reviewed and agree with all the recommendations and orders detailed in this document.  EFFECTIVE NOW     Approved and electronically signed by:  Melissa Blakely MD             Admission Information     Date & Time Provider  "Department Dept. Phone    10/18/2018 Jennie Ramesh MD  [facility] [phone]      Your Vitals Were     Blood Pressure Pulse Temperature Respirations Height Weight    109/56 78 98.2  F (36.8  C) (Oral) 18 1.676 m (5' 5.98\") 73 kg (160 lb 15 oz)    Last Period Pulse Oximetry BMI (Body Mass Index)             2018 95% 25.99 kg/m2         [email or web address] Information     [employer] gives you secure access to your electronic health record. If you see a primary care provider, you can also send messages to your care team and make appointments. If you have questions, please call your primary care clinic.  If you do not have a primary care provider, please call [phone] and they will assist you.        Care EveryWhere ID     This is your Care EveryWhere ID. This could be used by other organizations to access your [facility] medical records  [id number]        Equal Access to Services     WILLIS THORNE AH: Caprice Valenzuela, yisel sanon, janny kaalvaleria avila, kiesha molina . So [facility] [phone].    ATENCIÓN: Si habla español, tiene a napoles disposición servicios gratuitos de asistencia lingüística. Llame al [phone].    We comply with applicable federal civil rights laws and [place] laws. We do not discriminate on the basis of race, color, national origin, age, disability, sex, sexual orientation, or gender identity.               Review of your medicines      START taking        Dose / Directions    hydrOXYzine 25 MG tablet   Commonly known as:  ATARAX   Used for:  Acute cystitis without hematuria,  contractions        Dose:   mg   Take 2-4 tablets ( mg) by mouth nightly as needed for other (pain adjunct) Can take 25-50mg once during day but can make you sleepy   Quantity:  30 tablet   Refills:  0         CONTINUE these medicines which have NOT CHANGED        Dose / Directions    * ADDERALL PO        Dose:  20 mg   Take 20 mg by mouth daily   Refills:  0 "       * ADDERALL XR PO        Dose:  20 mg   Take 20 mg by mouth daily   Refills:  0       azaTHIOprine 50 MG tablet   Commonly known as:  IMURAN   Used for:  Systemic lupus erythematosus, unspecified SLE type, unspecified organ involvement status (H)        Dose:  50 mg   Take 1 tablet (50 mg) by mouth daily Labs due in 4 weeks   Quantity:  30 tablet   Refills:  0       ferrous sulfate Dried 160 (50 Fe) MG tablet        Dose:  1 tablet   Take 1 tablet by mouth 2 times daily   Refills:  0       FISH OIL PO        Dose:  1 tablet   Take 1 tablet by mouth daily Fish oil with DHA   Refills:  0       hydroxychloroquine 200 MG tablet   Commonly known as:  PLAQUENIL   Used for:  Systemic lupus erythematosus, unspecified SLE type, unspecified organ involvement status (H), 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        Dose:  400 mg   Take 2 tablets (400 mg) by mouth daily   Quantity:  60 tablet   Refills:  11       ondansetron 4 MG ODT tab   Commonly known as:  ZOFRAN ODT        Dose:  4 mg   Take 1 tablet (4 mg) by mouth every 8 hours as needed for nausea   Quantity:  10 tablet   Refills:  0       oxyCODONE IR 5 MG tablet   Commonly known as:  ROXICODONE        Dose:  2.5 mg   Take 2.5 mg by mouth every 4 hours as needed   Refills:  0       * predniSONE 5 MG tablet   Commonly known as:  DELTASONE   Used for:  Systemic lupus erythematosus, unspecified SLE type, unspecified organ involvement status (H)        4tab=20 mg qd x 5 days, 3tab=15 mg qd x 5 days, 2tab=10 mg qd x 5 days, 1 tab=5 mg qd x 5 days then stop.   Quantity:  50 tablet   Refills:  0       * predniSONE 20 MG tablet   Commonly known as:  DELTASONE   Used for:  Systemic lupus erythematosus, unspecified SLE type, unspecified organ involvement status (H)        Dose:  20 mg   Take 1 tablet (20 mg) by mouth daily   Quantity:  30 tablet   Refills:  1       prenatal multivitamin plus iron 27-0.8 MG Tabs per tablet        Dose:  1 tablet    Take 1 tablet by mouth daily   Refills:  0       VALTREX PO        Dose:  500 mg   Take 500 mg by mouth as needed   Refills:  0       VITAMIN D (CHOLECALCIFEROL) PO        Dose:  2000 Units   Take 2,000 Units by mouth daily   Refills:  0       * Notice:  This list has 4 medication(s) that are the same as other medications prescribed for you. Read the directions carefully, and ask your doctor or other care provider to review them with you.         Where to get your medicines      These medications were sent to [facility]/pharmacy #[id number] - [place], [place] - [address]  [address]  [address] [address], [facility] [address]     Phone:  [phone]     hydrOXYzine 25 MG tablet                Protect others around you: Learn how to safely use, store and throw away your medicines at www.disposemymeds.org.        Information about OPIOIDS     PRESCRIPTION OPIOIDS: WHAT YOU NEED TO KNOW   We gave you an opioid (narcotic) pain medicine. It is important to manage your pain, but opioids are not always the best choice. You should first try all the other options your care team gave you. Take this medicine for as short a time (and as few doses) as possible.    Some activities can increase your pain, such as bandage changes or therapy sessions. It may help to take your pain medicine 30 to 60 minutes before these activities. Reduce your stress by getting enough sleep, working on hobbies you enjoy and practicing relaxation or meditation. Talk to your care team about ways to manage your pain beyond prescription opioids.    These medicines have risks:    DO NOT drive when on new or higher doses of pain medicine. These medicines can affect your alertness and reaction times, and you could be arrested for driving under the influence (DUI). If you need to use opioids long-term, talk to your care team about driving.    DO NOT operate heavy machinery    DO NOT do any other dangerous activities while taking these medicines.    DO NOT drink any alcohol  while taking these medicines.     If the opioid prescribed includes acetaminophen, DO NOT take with any other medicines that contain acetaminophen. Read all labels carefully. Look for the word  acetaminophen  or  Tylenol.  Ask your pharmacist if you have questions or are unsure.    You can get addicted to pain medicines, especially if you have a history of addiction (chemical, alcohol or substance dependence). Talk to your care team about ways to reduce this risk.    All opioids tend to cause constipation. Drink plenty of water and eat foods that have a lot of fiber, such as fruits, vegetables, prune juice, apple juice and high-fiber cereal. Take a laxative (Miralax, milk of magnesia, Colace, Senna) if you don t move your bowels at least every other day. Other side effects include upset stomach, sleepiness, dizziness, throwing up, tolerance (needing more of the medicine to have the same effect), physical dependence and slowed breathing.    Store your pills in a secure place, locked if possible. We will not replace any lost or stolen medicine. If you don t finish your medicine, please throw away (dispose) as directed by your pharmacist. The [place] Pollution Control Agency has more information about safe disposal: https://www.pca.[facility].[place].us/living-green/managing-unwanted-medications             Medication List: This is a list of all your medications and when to take them. Check marks below indicate your daily home schedule. Keep this list as a reference.      Medications           Morning Afternoon Evening Bedtime As Needed    * ADDERALL PO   Take 20 mg by mouth daily                                * ADDERALL XR PO   Take 20 mg by mouth daily                                azaTHIOprine 50 MG tablet   Commonly known as:  IMURAN   Take 1 tablet (50 mg) by mouth daily Labs due in 4 weeks                                ferrous sulfate Dried 160 (50 Fe) MG tablet   Take 1 tablet by mouth 2 times daily                                 FISH OIL PO   Take 1 tablet by mouth daily Fish oil with DHA                                hydroxychloroquine 200 MG tablet   Commonly known as:  PLAQUENIL   Take 2 tablets (400 mg) by mouth daily                                hydrOXYzine 25 MG tablet   Commonly known as:  ATARAX   Take 2-4 tablets ( mg) by mouth nightly as needed for other (pain adjunct) Can take 25-50mg once during day but can make you sleepy   Last time this was given:  100 mg on 10/18/2018  7:48 PM                                ondansetron 4 MG ODT tab   Commonly known as:  ZOFRAN ODT   Take 1 tablet (4 mg) by mouth every 8 hours as needed for nausea                                oxyCODONE IR 5 MG tablet   Commonly known as:  ROXICODONE   Take 2.5 mg by mouth every 4 hours as needed   Last time this was given:  2.5 mg on 10/18/2018  7:48 PM                                * predniSONE 5 MG tablet   Commonly known as:  DELTASONE   4tab=20 mg qd x 5 days, 3tab=15 mg qd x 5 days, 2tab=10 mg qd x 5 days, 1 tab=5 mg qd x 5 days then stop.                                * predniSONE 20 MG tablet   Commonly known as:  DELTASONE   Take 1 tablet (20 mg) by mouth daily                                prenatal multivitamin plus iron 27-0.8 MG Tabs per tablet   Take 1 tablet by mouth daily                                VALTREX PO   Take 500 mg by mouth as needed                                VITAMIN D (CHOLECALCIFEROL) PO   Take 2,000 Units by mouth daily                                * Notice:  This list has 4 medication(s) that are the same as other medications prescribed for you. Read the directions carefully, and ask your doctor or other care provider to review them with you.              More Information        Kick Counts    It s normal to worry about your baby s health. One way you can know your baby s doing well is to record the baby s movements once a day. This is called a kick count. Remember to take your kick count  records to all your appointments with your healthcare provider.  How to count kicks  Here are tips for counting kicks:    Choose a time when the baby is active, such as after a meal.     Sit comfortably or lie on your side.     The first time the baby moves, write down the time.     Count each movement until the baby has moved 10 times. This can take from 20 minutes to 2 hours.     Try to do it at the same time each day.  When to call your healthcare provider  Call your healthcare provider right away if you notice any of the following:    Your baby moves fewer than 10 times in 2 hours while you re doing kick counts.    Your baby moves much less often than on the days before.    You have not felt your baby move all day.  Date Last Reviewed: 12/1/2017 2000-2017 [employer]. [address] [place] [address], [place] [address]. All rights reserved. This information is not intended as a substitute for professional medical care. Always follow your healthcare professional's instructions.

## 2018-10-18 NOTE — PROGRESS NOTES
Pt. Tearful with pain in her vagina.  She states it feels like pressure.  Pt. turned to her LL position with a pillow between her legs.  PACU RN states that is isn't unusal to have bladder spasms or to feel vaginal like pain after a cysto.  Pt. Is declining pain meds at this time

## 2018-10-18 NOTE — ANESTHESIA POSTPROCEDURE EVALUATION
Patient: Monica Puckett    Procedure(s):  Cystoscopy, Guided Bladder Wall Injection       Diagnosis:Lupus Cystitis   Diagnosis Additional Information: No value filed.    Anesthesia Type:  General, MAC    Note:  Anesthesia Post Evaluation    Patient location during evaluation: PACU and Bedside  Patient participation: Able to fully participate in evaluation  Level of consciousness: awake and alert  Pain management: adequate  Airway patency: patent  Cardiovascular status: acceptable  Respiratory status: acceptable  Hydration status: balanced  PONV: none     Anesthetic complications: None    Comments: FHR monitored throughput the perioperative events.  Post op FHR 120s-140s.         Last vitals:  Vitals:    10/18/18 1515 10/18/18 1530 10/18/18 1540   BP: 108/63     Pulse:      Resp: 18 18 18   Temp:      SpO2: 96% 94% 95%         Electronically Signed By: Kathi Elizondo MD  October 18, 2018  3:45 PM

## 2018-10-18 NOTE — PROGRESS NOTES
Pt tearful and complains of pressure in vaginal/urethra area. Up to commode to void mod amt of cherry red urine without clots. Pt back to bed on left side and administered 25mcg Fentanyl at 1415. Will continue to monitor. Transfer of care report given to Jasmeet BENITEZ RN.

## 2018-10-18 NOTE — OP NOTE
SURGICAL OPERATIVE REPORT     Monica Puckett  [id number]      Date of Surgery: 10/18/2018       Surgeon: Dr. Valadez      Assistants: Agustina Valentin, PGY4      Pre-operative Diagnoses:   - Suspect lupus cystitis  - persistent dysuria  - history of SLE with lupus nephritis  - 34w5d  IUP      Post-operative Diagnoses:   - same as above      Procedure: Cystoscopy with Kenalog bladder wall injection      Anesthesia: monitored anesthesia care      EBL: less than 5cc  IVF: 500cc  UO: not measured  Fluid deficit: 0      Complications: none apparent      Findings: On EUA, normal appearing external genitalia and urethra.  On cystoscopy, erythematous and edematous bladder wall globally.  Inflammation noted surrounding ureteral orifices.  Not significantly improved/changed from previous. Bilateral ureteral jets visualized. Minor oozing noted from Kenalog injection sites. Mixture injection of 200mg of kenalog, 10kU heparin, 16 mL 2% lidocaine and 80 mg Gentamicin.   2g ancef given for infection prophylaxis.       FHT - pre-op - 130s bpm baseline, moderate variability, present accelerations, absent decelerations  At end of procedure - same without accelerations present  Tocometer - irritable        Specimens: none apparent     Indication:  Monica Puckett is a 22 year old  at 34w5d by 5week US who was referred to Urogynecology clinic for management of persistent dysuria.  On office cystoscopy, she was noted to have poor visualization, intolerance of exam given bladder inflammation, as well as concern for lupus cystitis.  She was recommended to undergo exam and cystoscopy under anesthesia as well as steroid bladder wall injections to assist with management of presumed lupus cystitis. Injections were modestly successful and thus patient agreed to repeat injections today. The risks, benefits, and alternatives to the procedure were discussed with the patient who agreed to proceed.  Written consent was signed.       Procedure:  The patient was taken to the operating room with IVF running. She was given IV ancef for infection prophylaxis.  Monitored anesthesia care was induced and found to be adequate.  She was placed in a dorsal lithotomy position using yellow fin stirrups. She was then prepped and draped in usual sterile fashion. After time out was completed, the cystoscope was assembled and inserted into the bladder with the findings above.  Given extensive debris and hematuria., the bladder was drained several times for adequate visualization. The bladder was examined in a circumferential fashion and bilateral ureteral orifices identified.  Pictures taken.  The  operative sheath was used and the lan needle was placed in the sheath.  The bladder was drained and refilled and visualization was achieved.  Using the lan needle, a total of 30mL of kenalog mixture was injected globally throughout the posterior wall of the bladder injecting around 1mL at each injection site.  Bladder was at last drained and cystoscope removed.       The patient underwent continuous fetal monitoring throughout the procedure with the FHT noted above.   The patient tolerated the procedure well. She was awoken from anesthesia and and brought to the PACU in stable condition.       Dr. Valadez was scrubbed and present for the entire duration of the procedure.      Agustina Valentin PGY4  10/18/2018 12:37 PM

## 2018-10-18 NOTE — OR NURSING
Face-to-face Handoff given to Angelic Glez RN L&D.  Plan of care for Observation and fetal monitoring, OB Resident to place obs orders. L&D Rn took patient via wheelchair to L&D. Belongings with patient.

## 2018-10-18 NOTE — IP AVS SNAPSHOT
[facility]    [address]    [facility] [address]    Phone:  [phone]                                       After Visit Summary   10/18/2018    Monica Puckett    MRN: [id number]           After Visit Summary Signature Page     I have received my discharge instructions, and my questions have been answered. I have discussed any challenges I see with this plan with the nurse or doctor.    ..........................................................................................................................................  Patient/Patient Representative Signature      ..........................................................................................................................................  Patient Representative Print Name and Relationship to Patient    ..................................................               ................................................  Date                                   Time    ..........................................................................................................................................  Reviewed by Signature/Title    ...................................................              ..............................................  Date                                               Time          22EPIC Rev 08/18

## 2018-10-18 NOTE — DISCHARGE INSTRUCTIONS
[facility]   Adult Discharge Orders & Instructions     For 24 hours after surgery    1. Get plenty of rest.  A responsible adult must stay with you for at least 24 hours after you leave the hospital.   2. Do not drive or use heavy equipment.  If you have weakness or tingling, don't drive or use heavy equipment until this feeling goes away.  3. Do not drink alcohol.  4. Avoid strenuous or risky activities.  Ask for help when climbing stairs.   5. You may feel lightheaded.  IF so, sit for a few minutes before standing.  Have someone help you get up.   6. If you have nausea (feel sick to your stomach): Drink only clear liquids such as apple juice, ginger ale, broth or 7-Up.  Rest may also help.  Be sure to drink enough fluids.  Move to a regular diet as you feel able.  7. You may have a slight fever. Call the doctor if your fever is over 100 F (37.7 C) (taken under the tongue) or lasts longer than 24 hours.  8. You may have a dry mouth, a sore throat, muscle aches or trouble sleeping.  These should go away after 24 hours.  9. Do not make important or legal decisions.   Call your doctor for any of the followin.  Signs of infection (fever, growing tenderness at the surgery site, a large amount of drainage or bleeding, severe pain, foul-smelling drainage, redness, swelling).    2. It has been over 8 to 10 hours since surgery and you are still not able to urinate (pass water).    3.  Headache for over 24 hours.    4.  Numbness, tingling or weakness the day after surgery (if you had spinal anesthesia).  To contact a doctor, call Dr. Valadez's clinic # [phone] and ask for the Resident On Call for: Urology or OB/GYN labor and delivery depends on issue (answered 24 hours a day)      Emergency Department:  [facility]: [phone]  [facility]: [phone]    Discharge Instructions: Following a Cystoscopy/Stent and/or  Ureteroscopy    Drainage:    Urine may be slightly bloody but should taper off in a few days.    You may have some frequency and urgency for a few days.    Comfort:    A burning sensation when urinating is common. Drinking extra fluids helps decrease this burning feeling and also helps to clear the bloody urine.    Mild abdominal cramps may occur for a few days.    Baths:    Daily tub baths aide in passing urine.    Frequent use of bubble bath is discouraged since it encourages urinary tract infections.    Report to your doctor at once if you experience:    Inability to pass your urine    Continuous or heavy bleeding    Severe Pain    Elevated temperature > than 101.5 for 24 hours    Home Activity:    The day of surgery spend a quiet evening at home.    Increase activity as tolerated.    Rev. 5/12Discharge Instruction for Undelivered Patients      You were seen for: Labor assessment  We Consulted: Dr. Blakely and Dr. Ramesh  You had (Test or Medicine): Fetal non stress test    Diet:   Drink 8 to 12 glasses of liquids (milk, juice, water) every day.  You may eat meals and snacks.     Activity:  Count fetal kicks everyday (see handout)  Call your doctor or nurse midwife if your baby is moving less than usual.     Call your provider if you notice:  Swelling in your face or increased swelling in your hands or legs.  Headaches that are not relieved by Tylenol (acetaminophen).  Changes in your vision (blurring: seeing spots or stars.)  Nausea (sick to your stomach) and vomiting (throwing up).   Weight gain of 5 pounds or more per week.  Heartburn that doesn't go away.  Signs of bladder infection: pain when you urinate (use the toilet), need to go more often and more urgently.  The bag of elkins (rupture of membranes) breaks, or you notice leaking in your underwear.  Bright red blood in your underwear.  Abdominal (lower belly) or stomach pain.  For first baby: Contractions (tightening) less than 5 minutes apart for one  hour or more.  For second plus baby: Contractions less than 10 minutes apart or getting stronger.  *If less than 34 weeks: Contractions (tightenings) more than 6 times in one hour.  Increase or change in vaginal discharge (note the color and amount)      Follow-up:  As scheduled in the clinic

## 2018-10-18 NOTE — PROGRESS NOTES
Dr. Ramesh at bedside to eval monitoring strip.  She will return shortly to reevaluate it.  Pt. Is having UC but did have them prior

## 2018-10-18 NOTE — PLAN OF CARE
Problem: Patient Care Overview  Goal: Plan of Care/Patient Progress Review  Outcome: Improving  Pt continued to have regular uterine contractions after PACU discharge criteria were met. Cervix examined by Dr. Dick and found to be 1/30/-2. Decision made to keep patient overnight in observation status on L & D due to concern of persistent contractions. Pt transferred to L & D at 1600 to room 479 via wheelchair. Pt received oxycodone and IV Fentanyl in PACU prior to transfer, now reports a decrease in pain/pressure. Continues to have regular uterine contractions but general appearance seems more comfortable. Remains on continuous EFM. Will continue to closely monitor and support.

## 2018-10-18 NOTE — PROGRESS NOTES
[place] and US placed in the Pre-OP area prior to her cysto.  Pt. States she feels jailene hick as tightening in her lower abd and into her back.  1115, hand off to Dr. eKlly who will monitor the baby in the OR during the procedure.  Dr. Kelly will call me for post op FHR monitoring.

## 2018-10-19 NOTE — PROGRESS NOTES
Brief Progress Note    S: Patient states this is the first she slept since her procedure. Now much more comfortable, no longer feeling contractions. Is comfortable discharging home.    O:  Patient Vitals for the past 6 hrs:   BP Temp Temp src Resp SpO2   10/18/18 1953 109/56 98.2  F (36.8  C) Oral 18 -   10/18/18 1745 107/57 98.6  F (37  C) Oral 18 -   10/18/18 1558 115/72 - - - -   10/18/18 1540 - 97.5  F (36.4  C) Oral 18 95 %   10/18/18 1530 - - - 18 94 %     Gen: lying in bed, sleeping on side, easily aroused    SVE: /-2  (1600, 1900)    FHT: , moderate satinder, + accels, no decels  [place]: initially ctx q3min now minimally irritable    A/P: 23yo  @ 34+5 POD#0 s/p cysto and kenalog injection for cystitis in setting of lupus nephritis. Given frequent painful contractions post-op was kept for IV hydration and monitoring. Cervical exam stable and patient now comfortable with no contractions s/p 2.5mg oxycodone (takes this at home PRN) and 100mg vistaril. Irritability likely due to bladder procedure. Encourage adequate PO intake. Safe to discharge. Knows when to call or come in for ctx, LOF, decreased FM, VB.    D/w Dr Ramesh.    Melissa Blakely MD  PGY3  10/18/18 9:19 PM

## 2018-10-19 NOTE — PLAN OF CARE
Problem: Patient Care Overview  Goal: Plan of Care/Patient Progress Review  Outcome: Improving  Data: [facility] at 1600, after Cystoscopy Procedure in Main OR.   Reason for maternal/fetal assessment per patient is Rule Out Labor    . Patient is a . Prenatal record reviewed.      Obstetric History       T1      L1     SAB0   TAB0   Ectopic0   Multiple0   Live Births1       # Outcome Date GA Lbr Flaco/2nd Weight Sex Delivery Anes PTL Lv   2 Current            1 Term 17        ANDRE         Medical History:   Past Medical History:   Diagnosis Date     Chronic kidney disease      H/O suicide attempt      Lupus      Lupus      RA (rheumatoid arthritis) (H)      Rheumatoid arthritis (H)      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)    Gestational Age 34w5d. VSS. Cervix: /2 at 1906 by Dr. Blakely, which was unchanged.  Fetal movement present. EFM Category 1, [place] shows 2-3 uc's per hour, which patient denies feeling currently. Patient denies cramping, backache, vaginal discharge, pelvic pressure, GI problems, bloody show, vaginal bleeding, edema, headache, visual disturbances, epigastric or URQ pain, abdominal pain, rupture of membranes. Support person Zak present.  Action: Patient education pamphlets given on fetal movement counts and . Patient instructed to report change in fetal movement, vaginal leaking of fluid or bleeding, abdominal pain, or any concerns related to the pregnancy to her nurse/physician.   Response: Dr. Blakely sent prescription for Vistaril for patient to obtain at [facility] in [place]. Patient verbalized understanding of education and verbalized agreement with plan. Discharged in wheelchair, accompanied by RN and patient's fibenignoe, with all belongings, at 2150.

## 2018-10-19 NOTE — PROVIDER NOTIFICATION
10/18/18 2110   Provider Notification   Provider Name/Title Dr. Blakely   Method of Notification At Bedside   Notification Reason Status Update   Dr. Blakely at bedside reviewing EFM and toco tracing. Patient reports pain relief currently and states she was able to sleep after Oxycodone and Vistaril given.  Plan per MD that patient may discharge to home, and patient and her fiancee verbalizing agreement with plan.

## 2018-10-19 NOTE — PROVIDER NOTIFICATION
10/18/18 1840   Provider Notification   Provider Name/Title Dr. Blakely   Method of Notification Electronic Page   Request Evaluate - Remote   Notification Reason Pain  (patient requesting another dose of oxycodone for pain)

## 2018-10-19 NOTE — DISCHARGE SUMMARY
OB/Gyn DISCHARGE SUMMARY  [facility]  Monica Puckett  [id number]    Date of Admission: 10/18/2018  Date of Discharge: 10/18/18     Admission Diagnoses:   - S/p cystoscopy  - IUP at 34+5  - SLE  -  contractions  - Cystitis  Discharge Diagnoses:   - S/p cystoscopy  - IUP at 34+5  - SLE  -  contractions  - Cystitis    Provider for admission: Jennie Ramesh MD  Provider for discharge: Jennie Ramesh MD    Procedures: cystoscopy, kenalog injection, Fetal and uterine monitoring    Admission/Hospital Course: Monica Puckett is a 22 year oldyo  who presented at 34w5d gestational age and was admitted for monitoring following cystoscopy and kenalog injection due to frequent uterine contractions. For pain after her procedure she received 5mg oxycodone and then later 2.5mg oxycodone and 100mg vistaril. Over more than 4 hours of monitoring her contractions improved to uterine irritability. Her cervix was stable at 1/40/-2 over a 3hr time period. She was deemed stable for discharge.    Discharge Instructions:  - Follow up for prenatal care appointment as scheduled on 10/26 with Dr Schumer Franks, [place]   [facility] Medication Instructions ELIZABETH:[id number]    Printed on:10/18/18 2129   Medication Information                      Amphetamine-Dextroamphetamine (ADDERALL PO)  Take 20 mg by mouth daily             Amphetamine-Dextroamphetamine (ADDERALL XR PO)  Take 20 mg by mouth daily             azaTHIOprine (IMURAN) 50 MG tablet  Take 1 tablet (50 mg) by mouth daily Labs due in 4 weeks             ferrous sulfate Dried 160 (50 Fe) MG tablet  Take 1 tablet by mouth 2 times daily             hydroxychloroquine (PLAQUENIL) 200 MG tablet  Take 2 tablets (400 mg) by mouth daily             hydrOXYzine (ATARAX) 25 MG tablet  Take 2-4 tablets ( mg) by mouth nightly as needed for other (pain adjunct) Can take 25-50mg once during day but can make you sleepy             Omega-3 Fatty  Acids (FISH OIL PO)  Take 1 tablet by mouth daily Fish oil with DHA             ondansetron (ZOFRAN ODT) 4 MG ODT tab  Take 1 tablet (4 mg) by mouth every 8 hours as needed for nausea             oxyCODONE IR (ROXICODONE) 5 MG tablet  Take 2.5 mg by mouth every 4 hours as needed             predniSONE (DELTASONE) 20 MG tablet  Take 1 tablet (20 mg) by mouth daily             predniSONE (DELTASONE) 5 MG tablet  4tab=20 mg qd x 5 days, 3tab=15 mg qd x 5 days, 2tab=10 mg qd x 5 days, 1 tab=5 mg qd x 5 days then stop.             Prenatal Vit-Fe Fumarate-FA (PRENATAL MULTIVITAMIN  PLUS IRON) 27-0.8 MG TABS  Take 1 tablet by mouth daily             ValACYclovir HCl (VALTREX PO)  Take 500 mg by mouth as needed             VITAMIN D, CHOLECALCIFEROL, PO  Take 2,000 Units by mouth daily                 Melissa Blakely MD  OB/GYN PGY3          Appreciate note by Dr. Blakely. Patient has been seen and examined by me separate from the resident, agree with above note.     Jennie Ramesh MD  10:06 PM

## 2018-10-23 ENCOUNTER — TELEPHONE (OUTPATIENT)
Dept: OBGYN | Facility: CLINIC | Age: 23
End: 2018-10-23

## 2018-10-23 ENCOUNTER — HOSPITAL ENCOUNTER (OUTPATIENT)
Dept: ULTRASOUND IMAGING | Facility: CLINIC | Age: 23
Discharge: HOME | End: 2018-10-23
Attending: OBSTETRICS & GYNECOLOGY | Admitting: SOCIAL WORKER
Payer: COMMERCIAL

## 2018-10-23 ENCOUNTER — OFFICE VISIT (OUTPATIENT)
Dept: MATERNAL FETAL MEDICINE | Facility: CLINIC | Age: 23
End: 2018-10-23
Attending: OBSTETRICS & GYNECOLOGY
Payer: COMMERCIAL

## 2018-10-23 DIAGNOSIS — M32.14 LUPUS NEPHRITIS (H): ICD-10-CM

## 2018-10-23 DIAGNOSIS — M32.9 SYSTEMIC LUPUS ERYTHEMATOSUS, UNSPECIFIED SLE TYPE, UNSPECIFIED ORGAN INVOLVEMENT STATUS (H): Primary | ICD-10-CM

## 2018-10-23 DIAGNOSIS — O99.891 SYSTEMIC LUPUS ERYTHEMATOSUS AFFECTING PREGNANCY IN THIRD TRIMESTER (H): ICD-10-CM

## 2018-10-23 DIAGNOSIS — M32.9 SYSTEMIC LUPUS ERYTHEMATOSUS AFFECTING PREGNANCY IN THIRD TRIMESTER (H): ICD-10-CM

## 2018-10-23 PROCEDURE — 40000068 ZZH STATISTIC EVAL-PTS ADMITTED TO OTHER DEPTS: Mod: ZF

## 2018-10-23 PROCEDURE — 76819 FETAL BIOPHYS PROFIL W/O NST: CPT | Performed by: OBSTETRICS & GYNECOLOGY

## 2018-10-23 PROCEDURE — 76816 OB US FOLLOW-UP PER FETUS: CPT

## 2018-10-23 NOTE — TELEPHONE ENCOUNTER
"Pt sent \"MY Chart\" message saying that she had blood in her urine and pain with urination. Called pt to dsicuss her symptoms. Urine is blood stained and she has dysuria. She has an MFM appointment today. Advised pt to get UA C&S today. Prefer cath specimen if possible.    Also discussed if injection relieved her pain. This time there was no response or improvement in her pain.        "

## 2018-10-23 NOTE — NURSING NOTE
D: Patient told Dr. De La Fuente that she was having bleeding and watery discharge. Recommendation per Dr. De La Fuente was patient to be seen in labor and delivery for assessment. Labor and delivery charge nurse and Dr. Caicedo aware that patient would be coming over.  When writer went to give patient instructions on how to report to labor and delivery she stated that she really needed to go home and  her child. Patient states she lives 45 minutes to a hour away. States she will get her son, wait for her boyfriend to get home from work and then report to labor and delivery. She states that the bleeding and leaking fluid that she had told Dr. De La Fuente about is no longer present. Encouraged patient to report to labor and delivery when she is able. Patient states understanding of where labor and delivery is.  Update Labor and delivery charge nurse and Dr. Caicedo of change in plan. Patient left clinic ambulatory.   Bette Louie RN

## 2018-10-23 NOTE — PROGRESS NOTES
Please see ultrasound report under imaging tab for details on ultrasound performed today.    Louann De La Fuente MD  , OB/GYN  Maternal-Fetal Medicine  [email or web address]  [phone] (Academic office)  [phone] (Pager)

## 2018-10-23 NOTE — MR AVS SNAPSHOT
After Visit Summary   10/23/2018    Monica Puckett    MRN: [id number]           Patient Information     Date Of Birth          1995        Visit Information        Provider Department      10/23/2018 2:00 PM Louann De La Fuente MD [facility] [place]        Today's Diagnoses     Systemic lupus erythematosus, unspecified SLE type, unspecified organ involvement status (H)    -  1       Follow-ups after your visit        Your next 10 appointments already scheduled     Oct 26, 2018  1:00 PM CDT   Nurse Visit with [facility] Nurse   [facility] ([facility])    [facility] [address]  [address]  [address]  [facility] [address]   [phone]            Oct 26, 2018  1:15 PM CDT   NEW OB with Amy Schumer, MD   [facility] ([facility])    [facility] [facility] [address]  [address]  [facility] [address]   [phone]            Oct 30, 2018  9:15 AM CDT   RETURN OB with Yamile Arias MD   [facility] ([facility])    [facility] [address]  [address]  [facility] [address]   [phone]            Nov 01, 2018  3:30 PM CDT   Return Urogyn with Ismael Valadez MD   [facility] ([facility])    [facility]  [address], [address]  [facility] [address]   [phone]           Please call [facility] , [phone], with any questions or concerns you may have regarding your appointment            Nov 06, 2018  9:15 AM CST   RETURN OB with Yamile Arias MD   [facility] ([facility])    [facility] [address]  [address]  [facility] [address] [phone]            Nov 13, 2018  9:15 AM CST   RETURN OB with Yamile Arias MD   [facility]  ([facility])    [place] [facility] [address]  [address]  [address]  [facility] [address]   [phone]            Nov 20, 2018  9:15 AM CST   RETURN OB with Yamile Arias MD   [facility] ([facility])    [place] [facility] [address]  [address]  [facility] [address]   [phone]            Dec 28, 2018  1:00 PM CST   (Arrive by 12:45 PM)   RETURN LUPUS with Chente Reina MD   [facility]umatology ([facility])    [address]  [address]  [facility] [address] [phone]              Future tests that were ordered for you today     Open Future Orders        Priority Expected Expires Ordered    M BPP Single Routine 11/6/2018 8/23/2019 10/23/2018    MFM BPP Single Routine 10/30/2018 8/23/2019 10/23/2018            Who to contact     If you have questions or need follow up information about today's clinic visit or your schedule please contact [facility] directly at [phone].  Normal or non-critical lab and imaging results will be communicated to you by MyChart, letter or phone within 4 business days after the clinic has received the results. If you do not hear from us within 7 days, please contact the clinic through Cazoom[facility]art or phone. If you have a critical or abnormal lab result, we will notify you by phone as soon as possible.  Submit refill requests through [employer] or call your pharmacy and they will forward the refill request to us. Please allow 3 business days for your refill to be completed.          Additional Information About Your Visit        CazoomiharDoochoo Information     [employer] gives you secure access to your electronic health record. If you see a primary care provider, you can also send messages to your care team and make appointments. If you have questions, please call your primary care clinic.  If you do not have a primary care provider,  please call [phone] and they will assist you.        Care EveryWhere ID     This is your Care EveryWhere ID. This could be used by other organizations to access your [place] medical records  [id number]        Your Vitals Were     Last Period                   02/11/2018            Blood Pressure from Last 3 Encounters:   10/18/18 109/56   09/26/18 111/67   09/14/18 105/56    Weight from Last 3 Encounters:   10/18/18 73 kg (160 lb 15 oz)   09/26/18 71.4 kg (157 lb 6.4 oz)   09/14/18 71.7 kg (158 lb 1.1 oz)               Primary Care Provider Office Phone # Fax #    Marycarmen [place] [phone]       [facility] [address]  [place] [address]        Equal Access to Services     [facility] : Hadii aad ku hadasho Soomaali, waaxda luqadaha, qaybta kaalmada adeegyada, kiesha molina . So [facility] [phone].    ATENCIÓN: Si habla español, tiene a napoles disposición servicios gratuitos de asistencia lingüística. Emily al [phone].    We comply with applicable federal civil rights laws and [place] laws. We do not discriminate on the basis of race, color, national origin, age, disability, sex, sexual orientation, or gender identity.            Thank you!     Thank you for choosing [facility]  for your care. Our goal is always to provide you with excellent care. Hearing back from our patients is one way we can continue to improve our services. Please take a few minutes to complete the written survey that you may receive in the mail after your visit with us. Thank you!             Your Updated Medication List - Protect others around you: Learn how to safely use, store and throw away your medicines at www.disposemymeds.org.          This list is accurate as of 10/23/18  3:13 PM.  Always use your most recent med list.                   Brand Name Dispense Instructions for use Diagnosis    * ADDERALL PO      Take 20 mg by  mouth daily        * ADDERALL XR PO      Take 20 mg by mouth daily        azaTHIOprine 50 MG tablet    IMURAN    30 tablet    Take 1 tablet (50 mg) by mouth daily Labs due in 4 weeks    Systemic lupus erythematosus, unspecified SLE type, unspecified organ involvement status (H)       ferrous sulfate Dried 160 (50 Fe) MG tablet      Take 1 tablet by mouth 2 times daily        FISH OIL PO      Take 1 tablet by mouth daily Fish oil with DHA        hydroxychloroquine 200 MG tablet    PLAQUENIL    60 tablet    Take 2 tablets (400 mg) by mouth daily    Systemic lupus erythematosus, unspecified SLE type, unspecified organ involvement status (H), 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       hydrOXYzine 25 MG tablet    ATARAX    30 tablet    Take 2-4 tablets ( mg) by mouth nightly as needed for other (pain adjunct) Can take 25-50mg once during day but can make you sleepy    Acute cystitis without hematuria,  contractions       ondansetron 4 MG ODT tab    ZOFRAN ODT    10 tablet    Take 1 tablet (4 mg) by mouth every 8 hours as needed for nausea        oxyCODONE IR 5 MG tablet    ROXICODONE     Take 2.5 mg by mouth every 4 hours as needed        * predniSONE 5 MG tablet    DELTASONE    50 tablet    4tab=20 mg qd x 5 days, 3tab=15 mg qd x 5 days, 2tab=10 mg qd x 5 days, 1 tab=5 mg qd x 5 days then stop.    Systemic lupus erythematosus, unspecified SLE type, unspecified organ involvement status (H)       * predniSONE 20 MG tablet    DELTASONE    30 tablet    Take 1 tablet (20 mg) by mouth daily    Systemic lupus erythematosus, unspecified SLE type, unspecified organ involvement status (H)       prenatal multivitamin plus iron 27-0.8 MG Tabs per tablet      Take 1 tablet by mouth daily        VALTREX PO      Take 500 mg by mouth as needed        VITAMIN D (CHOLECALCIFEROL) PO      Take 2,000 Units by mouth daily        * Notice:  This list has 4 medication(s) that are the same as other  medications prescribed for you. Read the directions carefully, and ask your doctor or other care provider to review them with you.

## 2018-10-26 ENCOUNTER — APPOINTMENT (OUTPATIENT)
Dept: OBGYN | Facility: CLINIC | Age: 23
End: 2018-10-26
Attending: OBSTETRICS & GYNECOLOGY
Payer: COMMERCIAL

## 2018-10-26 VITALS
WEIGHT: 160 LBS | DIASTOLIC BLOOD PRESSURE: 74 MMHG | SYSTOLIC BLOOD PRESSURE: 110 MMHG | HEART RATE: 94 BPM | HEIGHT: 66 IN | BODY MASS INDEX: 25.71 KG/M2

## 2018-10-26 DIAGNOSIS — M06.9 RHEUMATOID ARTHRITIS OF HAND, UNSPECIFIED LATERALITY, UNSPECIFIED RHEUMATOID FACTOR PRESENCE: ICD-10-CM

## 2018-10-26 DIAGNOSIS — O09.93 HIGH-RISK PREGNANCY IN THIRD TRIMESTER: Primary | ICD-10-CM

## 2018-10-26 DIAGNOSIS — M32.14 LUPUS NEPHRITIS, ISN/RPS CLASS V (H): ICD-10-CM

## 2018-10-26 DIAGNOSIS — F32.9 MAJOR DEPRESSIVE DISORDER, REMISSION STATUS UNSPECIFIED, UNSPECIFIED WHETHER RECURRENT: ICD-10-CM

## 2018-10-26 PROCEDURE — 87653 STREP B DNA AMP PROBE: CPT | Performed by: STUDENT IN AN ORGANIZED HEALTH CARE EDUCATION/TRAINING PROGRAM

## 2018-10-26 PROCEDURE — G0463 HOSPITAL OUTPT CLINIC VISIT: HCPCS | Mod: ZF

## 2018-10-26 NOTE — PROGRESS NOTES
"[facility]  New Obstetrics Visit - Transfer of Care    HPI: 23 year old  at 35w6d by 5w6d US here today for transfer of OB care from [facility]. Pt reports significant bladder pain and has mostly been stuck laying on the cough all day. Mood is good. Denies any suicidal ideations.  Reports good support from mother and fiance. Denies contractions, vaginal bleeding. Normal fetal movement.    Pregnancy is complicated by:  - stage V lupus nephritis  - lupus cystitis  - rheumatoid arthritis  - major depressive disorder  - history of suicide attempt in previous pregnancy, took bottle of unisom before calling for help  - ADHD  -varicella non-immune    OBHx  Obstetric History       T1      L1     SAB0   TAB0   Ectopic0   Multiple0   Live Births1       # Outcome Date GA Lbr Flaco/2nd Weight Sex Delivery Anes PTL Lv   2 Current            1 Term 17 39w5d  3.912 kg (8 lb 10 oz) M  EPI  ANDRE      Name: arianna          GYN History  - LMP: Patient's last menstrual period was 2018.  - Pap Smears: NIL, 2018    PMHx: Stage V lupus nephritis, lupus cystitis, rheumatoid arthritis, major depressive disorder, ADHD  PSHx: Renal biopsy , wisdom teeth, colonoscopy  Meds: Azathrioprine, plaquenil, vitamin D, PNV  Allergies:  Estrogens, live vaccines    SocHx: Has a 2 year old son. Good support from fiance and mother.    ROS: 10-Point ROS negative except as noted in HPI  FHT: 120  Fundal height: 35 cm    Preventative Health  Feelings of depression: denies any symptoms of SI today, coping well through pretty miserable given pain    Physical Exam  /74  Pulse 94  Ht 1.676 m (5' 6\")  Wt 72.6 kg (160 lb)  LMP 2018  BMI 25.82 kg/m2  Gen: Well-appearing, NAD  HEENT: Normocephalic, atraumatic  CV:  Regular rate  Pulm: Unlabored breathing  Abd: Soft, gravid, non-tender  Ext: Extremities warm and well perfused    --Ideal BMI: 18.5-24.9  Current BMI: Body mass index is 25.82 kg/(m^2).  --Underweight " = <18.5  --Normal weight = 18.5-24.9  --Overweight = 25-29.9  --Obesity = >30       Labs:  GBS collected today    Assessment/Plan:  Ms. Monica Puckett is a 23 year old  at 35w6d by 5w6d US here for transfer of care Ob visit.    Stage V lupus nephritis:  - Controlled on AZA, plaquenil  - Follow up with nephrology postpartum in December, plans to start chemo at that time, possible egg retrieval with cryopreservation prior to this  - Weekly BPP, Q3 weeks growth US, last BPP 10/23 8/8, repeat next week  - last UPC 0.96 on 18> 1.7 on 18, creatinine 0.60 on 18    Lupus cystitis:  - Followed by Dr. Valadez, s/p cystoscopy on 10/18, pt reports no symptomatic relief following procedure this time    MDD:  - Denies SI at this time  - Plan for close follow up in postpartum period    Prenatal labs:  - GBS collected today  - Pt declined Tdap, influenza vaccines as she reports worsening of her lupus with any vaccinations  - GC, rachell negative, Hep B Ag NR, Hep C Ab NR, HIV NR, rubella immune    Varicella non-immune  - pt doesn't tolerate live vaccinations, NTD in postpartum period    Delivery  - hopes for a natural birth, initially during this pregnancy was resistant to induction of labor but is now open to it. [facility] recommended IOL at 39 weeks. Pt now requesting IOL at 37 weeks due to significant bladder pain that patient attributes to her lupus cystitis. Message sent to Dr. Weems in [facility] regarding timing of delivery.     Follow up in 1 week for JADE and BPP.    Discussed with Dr. Vela.    Amy Schumer, MD  Obstetrics and Gynecology PGY-2  10/26/18    The Patient was seen in [facility] by SCHUMER, AMY.  I reviewed the history & exam.  Dr. Schumer reviewed patient's case with Dr. Weems who agrees with IOL at 37 weeks.  Assessment and plan were jointly made.    Kathi Vela MD

## 2018-10-26 NOTE — MR AVS SNAPSHOT
After Visit Summary   10/26/2018    Monica Puckett    MRN: [id number]           Patient Information     Date Of Birth          1995        Visit Information        Provider Department      10/26/2018 1:15 PM Schumer, Amy, MD [facility]        Today's Diagnoses     High-risk pregnancy in third trimester    -  1    Lupus nephritis, ISN/RPS class V (H)        Major depressive disorder, remission status unspecified, unspecified whether recurrent        Rheumatoid arthritis of hand, unspecified laterality, unspecified rheumatoid factor presence (H)           Follow-ups after your visit        Follow-up notes from your care team     Return in about 1 week (around 11/2/2018).      Your next 10 appointments already scheduled     Oct 31, 2018  9:00 AM CDT   (Arrive by 8:45 AM)   RETURN LUPUS with Chente Reina MD   [facility] Rheumatology ([facility])    [address]  [address]  [facility] [address] [phone]            Nov 01, 2018  3:30 PM CDT   Return Urogyn with Ismael Valadez MD   [facility] ([facility])    [facility]  [address] [place] [address], [place] [address] [place] [address] [phone]           Please call [facility] , [phone], with any questions or concerns you may have regarding your appointment            Nov 02, 2018  1:30 PM CDT   (Arrive by 1:15 PM)   US FETAL BIOPHYS PROFILE W/O NON STRESS TEST with [id number]   [facility] ([facility])    [facility]  [address] [facility]  [address] [place] [address] [phone]           How do I prepare for my exam? (Food and drink instructions) Drink four 8-ounce glasses of fluid an hour before your exam. If you need to empty your bladder before your exam, try to release only a little urine. Then, drink another glass of fluid.  How do I  prepare for my exam? (Other instructions) You may have up to two family members in the exam room. If you bring a small child, an adult must be there to care for him or her. No video or camera photography during the procedure.  What should I wear: Wear comfortable clothes.  How long does the exam take: Most ultrasounds take 30 to 60 minutes.  What should I bring: Bring a list of your medicines, including vitamins, minerals and over-the-counter drugs. It is safest to leave personal items at home.  Do I need a :  No  is needed.  What do I need to tell my doctor: Tell your doctor about any allergies you may have.  What should I do after the exam: No restrictions, You may resume normal activities.  What is this test: An ultrasound uses sound waves to make pictures of the body. Sound waves do not cause pain. The only discomfort may be the pressure of the wand against your skin or full bladder.  Who should I call with questions: If you have any questions, please call the Imaging Department where you will have your exam. Directions, parking instructions, and other information is available on our website, [email or web address].            Nov 02, 2018  2:00 PM CDT   RETURN OB with Amy Schumer, MD   [facility] ([facility])    [facility]  [address] [facility] [address]  [address] [place] [address] [phone]            Dec 28, 2018  1:00 PM CST   (Arrive by 12:45 PM)   RETURN LUPUS with Chente Reina MD   [facility] ([facility])    [address]  [address]  [facility] [id number] [phone]              Who to contact     Please call your clinic at [phone] to:    Ask questions about your health    Make or cancel appointments    Discuss your medicines    Learn about your test results    Speak to your doctor            Additional Information About Your Visit        MyChart Information     Tehart gives you  "secure access to your electronic health record. If you see a primary care provider, you can also send messages to your care team and make appointments. If you have questions, please call your primary care clinic.  If you do not have a primary care provider, please call [phone] and they will assist you.      [employer] is an electronic gateway that provides easy, online access to your medical records. With [employer], you can request a clinic appointment, read your test results, renew a prescription or communicate with your care team.     To access your existing account, please contact your [facility] or call [phone] for assistance.        Care EveryWhere ID     This is your Care EveryWhere ID. This could be used by other organizations to access your [place] medical records  [id number]        Your Vitals Were     Pulse Height Last Period BMI (Body Mass Index)          94 1.676 m (5' 6\") 02/11/2018 25.82 kg/m2         Blood Pressure from Last 3 Encounters:   10/26/18 110/74   10/18/18 109/56   09/26/18 111/67    Weight from Last 3 Encounters:   10/26/18 72.6 kg (160 lb)   10/18/18 73 kg (160 lb 15 oz)   09/26/18 71.4 kg (157 lb 6.4 oz)              We Performed the Following     Group B strep PCR        Primary Care Provider Office Phone # Fax #    Marycarmen JOSÉ MIGUEL [place] [phone]       [facility] [address]  [place] [address]        Equal Access to Services     [facility]: Hadii aad ku hadasho Soomaali, waaxda luqadaha, qaybta kaalmada adeegyada, kiesha molina . So [facility] [phone].    ATENCIÓN: Si habla español, tiene a napoles disposición servicios gratuitos de asistencia lingüística. Llame al [phone].    We comply with applicable federal civil rights laws and [place] laws. We do not discriminate on the basis of race, color, national origin, age, disability, sex, sexual orientation, or gender identity.          "   Thank you!     Thank you for choosing [facility]  for your care. Our goal is always to provide you with excellent care. Hearing back from our patients is one way we can continue to improve our services. Please take a few minutes to complete the written survey that you may receive in the mail after your visit with us. Thank you!             Your Updated Medication List - Protect others around you: Learn how to safely use, store and throw away your medicines at www.disposemymeds.org.          This list is accurate as of 10/26/18 11:59 PM.  Always use your most recent med list.                   Brand Name Dispense Instructions for use Diagnosis    * ADDERALL PO      Take 20 mg by mouth daily        * ADDERALL XR PO      Take 20 mg by mouth daily        azaTHIOprine 50 MG tablet    IMURAN    30 tablet    Take 1 tablet (50 mg) by mouth daily Labs due in 4 weeks    Systemic lupus erythematosus, unspecified SLE type, unspecified organ involvement status (H)       ferrous sulfate Dried 160 (50 Fe) MG tablet      Take 1 tablet by mouth 2 times daily        FISH OIL PO      Take 1 tablet by mouth daily Fish oil with DHA        hydroxychloroquine 200 MG tablet    PLAQUENIL    60 tablet    Take 2 tablets (400 mg) by mouth daily    Systemic lupus erythematosus, unspecified SLE type, unspecified organ involvement status (H), 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    Systemic  lupus erythematosus, unspecified SLE type, unspecified organ involvement status (H)       * predniSONE 20 MG tablet    DELTASONE    30 tablet    Take 1 tablet (20 mg) by mouth daily    Systemic lupus erythematosus, unspecified SLE type, unspecified organ involvement status (H)       prenatal multivitamin plus iron 27-0.8 MG Tabs per tablet      Take 1 tablet by mouth daily        VALTREX PO      Take 500 mg by mouth as needed        VITAMIN D (CHOLECALCIFEROL) PO      Take 2,000 Units by mouth daily        * Notice:  This list has 4 medication(s) that are the same as other medications prescribed for you. Read the directions carefully, and ask your doctor or other care provider to review them with you.

## 2018-10-26 NOTE — LETTER
"10/26/2018       RE: Monica Puckett  [address]  [facility] [address]     Dear Colleague,    Thank you for referring your patient, Monica Puckett, to the [facility] at [facility]. Please see a copy of my visit note below.    Chief Complaint   Patient presents with     Prenatal Care     35 weeks 6 days     [facility]  New Obstetrics Visit - Transfer of Care    HPI: 23 year old  at 35w6d by 5w6d US here today for transfer of OB care from [facility]. Pt reports significant bladder pain and has mostly been stuck laying on the cough all day. Mood is good. Denies any suicidal ideations.  Reports good support from mother and fiance. Denies contractions, vaginal bleeding. Normal fetal movement.    Pregnancy is complicated by:  - stage V lupus nephritis  - lupus cystitis  - rheumatoid arthritis  - major depressive disorder  - history of suicide attempt in previous pregnancy, took bottle of unisom before calling for help  - ADHD  -varicella non-immune    OBHx  Obstetric History       T1      L1     SAB0   TAB0   Ectopic0   Multiple0   Live Births1       # Outcome Date GA Lbr Flaco/2nd Weight Sex Delivery Anes PTL Lv   2 Current            1 Term 17 39w5d  3.912 kg (8 lb 10 oz) M  EPI  ANDRE      Name: arianna          GYN History  - LMP: Patient's last menstrual period was 2018.  - Pap Smears: NIL,     PMHx: Stage V lupus nephritis, lupus cystitis, rheumatoid arthritis, major depressive disorder, ADHD  PSHx: Renal biopsy , wisdom teeth, colonoscopy  Meds: Azathrioprine, plaquenil, vitamin D, PNV  Allergies:  Estrogens, live vaccines    SocHx: Has a 2 year old son. Good support from fiance and mother.    FHT: 120  Fundal height: 35 cm    Preventative Health  Feelings of depression: denies any symptoms of SI today, coping well through pretty miserable given pain    Physical Exam  /74  Pulse 94  Ht 1.676 m (5' 6\") "  Wt 72.6 kg (160 lb)  LMP 2018  BMI 25.82 kg/m2  Gen: Well-appearing, NAD  HEENT: Normocephalic, atraumatic  CV:  Regular rate  Pulm: Unlabored breathing  Abd: Soft, gravid, non-tender  Ext: Extremities warm and well perfused    --Ideal BMI: 18.5-24.9  Current BMI: Body mass index is 25.82 kg/(m^2).  --Underweight = <18.5  --Normal weight = 18.5-24.9  --Overweight = 25-29.9  --Obesity = >30       Labs:  GBS collected today    Assessment/Plan:  Ms. Monica Puckett is a 23 year old  at 35w6d by 5w6d US here for transfer of care Ob visit.    Stage V lupus nephritis:  - Controlled on AZA, plaquenil  - Follow up with nephrology postpartum in December, plans to start chemo at that time, possible egg retrieval with cryopreservation prior to this  - Weekly BPP, Q3 weeks growth US, last BPP 10/23 8/8, repeat next week  - last UPC 0.96 on 18> 1.7 on 18, creatinine 0.60 on 18    Lupus cystitis:  - Followed by Dr. Valadez, s/p cystoscopy on 10/18, pt reports no symptomatic relief following procedure this time    MDD:  - Denies SI at this time  - Plan for close follow up in postpartum period    Prenatal labs:  - GBS collected today  - Pt declined Tdap, influenza vaccines as she reports worsening of her lupus with any vaccinations  - GC, rachell negative, Hep B Ag NR, Hep C Ab NR, HIV NR, rubella immune    Varicella non-immune  - pt doesn't tolerate live vaccinations, NTD in postpartum period    Delivery  - hopes for a natural birth, initially during this pregnancy was resistant to induction of labor but is now open to it. [facility] recommended IOL at 39 weeks. Pt now requesting IOL at 37 weeks due to significant bladder pain that patient attributes to her lupus cystitis. Message sent to Dr. Weems in [facility] regarding timing of delivery.     Follow up in 1 week for JADE and BPP.    Discussed with Dr. Vela.    Amy Schumer, MD  Obstetrics and Gynecology PGY-2  10/26/18    The Patient was seen in [facility] by SCHUMER, AMY.  I reviewed the history & exam.  Dr. Schumer reviewed patient's case with Dr. Weems who agrees with IOL at 37 weeks.  Assessment and plan were jointly made.    Kathi Vela MD

## 2018-10-27 LAB
GP B STREP DNA SPEC QL NAA+PROBE: NEGATIVE
SPECIMEN SOURCE: NORMAL

## 2018-10-30 ENCOUNTER — TELEPHONE (OUTPATIENT)
Dept: OBGYN | Facility: CLINIC | Age: 23
End: 2018-10-30

## 2018-10-30 NOTE — PATIENT INSTRUCTIONS
Called patient after discussion with Dr. Weems from [facility].  Given patient's lupus nephritis and severe symptoms, Dr. Weems agreed that IOL after 34xfykk3 days is indicated. Called pt to update on timing of induction of labor. Plan for IOL 11/9/18 at 0800.  L&D informed.    Amy Schumer, MD  Obstetrics and Gynecology, PGY-2

## 2018-10-31 ENCOUNTER — PATIENT OUTREACH (OUTPATIENT)
Dept: CARE COORDINATION | Facility: CLINIC | Age: 23
End: 2018-10-31

## 2018-11-02 ENCOUNTER — HOSPITAL ENCOUNTER (INPATIENT)
Facility: CLINIC | Age: 23
LOS: 2 days | Discharge: HOME | End: 2018-11-04
Attending: OBSTETRICS & GYNECOLOGY | Admitting: OBSTETRICS & GYNECOLOGY
Payer: COMMERCIAL

## 2018-11-02 DIAGNOSIS — N30.01 ACUTE CYSTITIS WITH HEMATURIA: Primary | ICD-10-CM

## 2018-11-02 PROBLEM — Z36.89 ENCOUNTER FOR TRIAGE IN PREGNANT PATIENT: Status: ACTIVE | Noted: 2018-11-02

## 2018-11-02 LAB
ABO + RH BLD: NORMAL
ABO + RH BLD: NORMAL
ALBUMIN UR-MCNC: 300 MG/DL
ALT SERPL W P-5'-P-CCNC: 12 U/L (ref 0–50)
AMPHETAMINES UR QL SCN: NEGATIVE
APPEARANCE UR: ABNORMAL
APTT PPP: 30 SEC (ref 22–37)
AST SERPL W P-5'-P-CCNC: 10 U/L (ref 0–45)
BILIRUB UR QL STRIP: NEGATIVE
BLD GP AB SCN SERPL QL: NORMAL
BLOOD BANK CMNT PATIENT-IMP: NORMAL
CANNABINOIDS UR QL: NEGATIVE
COCAINE UR QL: NEGATIVE
COLOR UR AUTO: ABNORMAL
CREAT SERPL-MCNC: 0.54 MG/DL (ref 0.52–1.04)
ERYTHROCYTE [DISTWIDTH] IN BLOOD BY AUTOMATED COUNT: 13.9 % (ref 10–15)
FIBRINOGEN PPP-MCNC: 527 MG/DL (ref 200–420)
GFR SERPL CREATININE-BSD FRML MDRD: >90 ML/MIN/1.7M2
GLUCOSE UR STRIP-MCNC: NEGATIVE MG/DL
HCT VFR BLD AUTO: 34.5 % (ref 35–47)
HGB BLD-MCNC: 10.9 G/DL (ref 11.7–15.7)
HGB F BLD QL KLEIH BETKE: NORMAL
HGB UR QL STRIP: ABNORMAL
INR PPP: 0.9 (ref 0.86–1.14)
KETONES UR STRIP-MCNC: NEGATIVE MG/DL
LEUKOCYTE ESTERASE UR QL STRIP: ABNORMAL
MCH RBC QN AUTO: 28.5 PG (ref 26.5–33)
MCHC RBC AUTO-ENTMCNC: 31.6 G/DL (ref 31.5–36.5)
MCV RBC AUTO: 90 FL (ref 78–100)
NITRATE UR QL: NEGATIVE
OPIATES UR QL SCN: NEGATIVE
PCP UR QL SCN: NEGATIVE
PH UR STRIP: 7.5 PH (ref 5–7)
PLATELET # BLD AUTO: 293 10E9/L (ref 150–450)
RBC # BLD AUTO: 3.82 10E12/L (ref 3.8–5.2)
RBC #/AREA URNS AUTO: >182 /HPF (ref 0–2)
SOURCE: ABNORMAL
SP GR UR STRIP: 1.02 (ref 1–1.03)
SPECIMEN EXP DATE BLD: NORMAL
T PALLIDUM AB SER QL: NONREACTIVE
TRANS CELLS #/AREA URNS HPF: 24 /HPF (ref 0–1)
UROBILINOGEN UR STRIP-MCNC: NORMAL MG/DL (ref 0–2)
WBC # BLD AUTO: 10.3 10E9/L (ref 4–11)
WBC #/AREA URNS AUTO: 4952 /HPF (ref 0–5)

## 2018-11-02 PROCEDURE — 85730 THROMBOPLASTIN TIME PARTIAL: CPT | Performed by: STUDENT IN AN ORGANIZED HEALTH CARE EDUCATION/TRAINING PROGRAM

## 2018-11-02 PROCEDURE — 82565 ASSAY OF CREATININE: CPT | Performed by: STUDENT IN AN ORGANIZED HEALTH CARE EDUCATION/TRAINING PROGRAM

## 2018-11-02 PROCEDURE — 36415 COLL VENOUS BLD VENIPUNCTURE: CPT | Performed by: STUDENT IN AN ORGANIZED HEALTH CARE EDUCATION/TRAINING PROGRAM

## 2018-11-02 PROCEDURE — 25000131 ZZH RX MED GY IP 250 OP 636 PS 637: Performed by: STUDENT IN AN ORGANIZED HEALTH CARE EDUCATION/TRAINING PROGRAM

## 2018-11-02 PROCEDURE — 25000125 ZZHC RX 250: Performed by: STUDENT IN AN ORGANIZED HEALTH CARE EDUCATION/TRAINING PROGRAM

## 2018-11-02 PROCEDURE — 3E033VJ INTRODUCTION OF OTHER HORMONE INTO PERIPHERAL VEIN, PERCUTANEOUS APPROACH: ICD-10-PCS | Performed by: OBSTETRICS & GYNECOLOGY

## 2018-11-02 PROCEDURE — 85027 COMPLETE CBC AUTOMATED: CPT | Performed by: STUDENT IN AN ORGANIZED HEALTH CARE EDUCATION/TRAINING PROGRAM

## 2018-11-02 PROCEDURE — 86901 BLOOD TYPING SEROLOGIC RH(D): CPT | Performed by: STUDENT IN AN ORGANIZED HEALTH CARE EDUCATION/TRAINING PROGRAM

## 2018-11-02 PROCEDURE — 86900 BLOOD TYPING SEROLOGIC ABO: CPT | Performed by: STUDENT IN AN ORGANIZED HEALTH CARE EDUCATION/TRAINING PROGRAM

## 2018-11-02 PROCEDURE — 25000128 H RX IP 250 OP 636: Performed by: STUDENT IN AN ORGANIZED HEALTH CARE EDUCATION/TRAINING PROGRAM

## 2018-11-02 PROCEDURE — 86780 TREPONEMA PALLIDUM: CPT | Performed by: STUDENT IN AN ORGANIZED HEALTH CARE EDUCATION/TRAINING PROGRAM

## 2018-11-02 PROCEDURE — 12000032 ZZH R&B OB CRITICAL UMMC

## 2018-11-02 PROCEDURE — 25000132 ZZH RX MED GY IP 250 OP 250 PS 637: Performed by: STUDENT IN AN ORGANIZED HEALTH CARE EDUCATION/TRAINING PROGRAM

## 2018-11-02 PROCEDURE — 84460 ALANINE AMINO (ALT) (SGPT): CPT | Performed by: STUDENT IN AN ORGANIZED HEALTH CARE EDUCATION/TRAINING PROGRAM

## 2018-11-02 PROCEDURE — 85610 PROTHROMBIN TIME: CPT | Performed by: STUDENT IN AN ORGANIZED HEALTH CARE EDUCATION/TRAINING PROGRAM

## 2018-11-02 PROCEDURE — 85460 HEMOGLOBIN FETAL: CPT | Performed by: OBSTETRICS & GYNECOLOGY

## 2018-11-02 PROCEDURE — 84450 TRANSFERASE (AST) (SGOT): CPT | Performed by: STUDENT IN AN ORGANIZED HEALTH CARE EDUCATION/TRAINING PROGRAM

## 2018-11-02 PROCEDURE — 81001 URINALYSIS AUTO W/SCOPE: CPT | Performed by: STUDENT IN AN ORGANIZED HEALTH CARE EDUCATION/TRAINING PROGRAM

## 2018-11-02 PROCEDURE — 80307 DRUG TEST PRSMV CHEM ANLYZR: CPT | Performed by: STUDENT IN AN ORGANIZED HEALTH CARE EDUCATION/TRAINING PROGRAM

## 2018-11-02 PROCEDURE — 85384 FIBRINOGEN ACTIVITY: CPT | Performed by: STUDENT IN AN ORGANIZED HEALTH CARE EDUCATION/TRAINING PROGRAM

## 2018-11-02 PROCEDURE — 87086 URINE CULTURE/COLONY COUNT: CPT | Performed by: OBSTETRICS & GYNECOLOGY

## 2018-11-02 PROCEDURE — 86850 RBC ANTIBODY SCREEN: CPT | Performed by: STUDENT IN AN ORGANIZED HEALTH CARE EDUCATION/TRAINING PROGRAM

## 2018-11-02 PROCEDURE — 72200001 ZZH LABOR CARE VAGINAL DELIVERY SINGLE

## 2018-11-02 RX ORDER — HYDROXYZINE HYDROCHLORIDE 50 MG/1
50-100 TABLET, FILM COATED ORAL
Status: DISCONTINUED | OUTPATIENT
Start: 2018-11-02 | End: 2018-11-04 | Stop reason: HOSPADM

## 2018-11-02 RX ORDER — METHYLERGONOVINE MALEATE 0.2 MG/ML
200 INJECTION INTRAVENOUS
Status: DISCONTINUED | OUTPATIENT
Start: 2018-11-02 | End: 2018-11-03

## 2018-11-02 RX ORDER — OXYCODONE AND ACETAMINOPHEN 5; 325 MG/1; MG/1
1 TABLET ORAL
Status: COMPLETED | OUTPATIENT
Start: 2018-11-02 | End: 2018-11-03

## 2018-11-02 RX ORDER — OXYTOCIN/0.9 % SODIUM CHLORIDE 30/500 ML
100-340 PLASTIC BAG, INJECTION (ML) INTRAVENOUS CONTINUOUS PRN
Status: DISCONTINUED | OUTPATIENT
Start: 2018-11-02 | End: 2018-11-03

## 2018-11-02 RX ORDER — ONDANSETRON 2 MG/ML
4 INJECTION INTRAMUSCULAR; INTRAVENOUS EVERY 6 HOURS PRN
Status: DISCONTINUED | OUTPATIENT
Start: 2018-11-02 | End: 2018-11-03

## 2018-11-02 RX ORDER — HYDROXYCHLOROQUINE SULFATE 200 MG/1
400 TABLET, FILM COATED ORAL DAILY
Status: DISCONTINUED | OUTPATIENT
Start: 2018-11-02 | End: 2018-11-04 | Stop reason: HOSPADM

## 2018-11-02 RX ORDER — IBUPROFEN 800 MG/1
800 TABLET, FILM COATED ORAL
Status: COMPLETED | OUTPATIENT
Start: 2018-11-02 | End: 2018-11-03

## 2018-11-02 RX ORDER — CITRIC ACID/SODIUM CITRATE 334-500MG
30 SOLUTION, ORAL ORAL ONCE
Status: DISCONTINUED | OUTPATIENT
Start: 2018-11-02 | End: 2018-11-03

## 2018-11-02 RX ORDER — FENTANYL CITRATE 50 UG/ML
50-100 INJECTION, SOLUTION INTRAMUSCULAR; INTRAVENOUS
Status: DISCONTINUED | OUTPATIENT
Start: 2018-11-02 | End: 2018-11-03

## 2018-11-02 RX ORDER — SODIUM CHLORIDE, SODIUM LACTATE, POTASSIUM CHLORIDE, CALCIUM CHLORIDE 600; 310; 30; 20 MG/100ML; MG/100ML; MG/100ML; MG/100ML
INJECTION, SOLUTION INTRAVENOUS CONTINUOUS
Status: DISCONTINUED | OUTPATIENT
Start: 2018-11-02 | End: 2018-11-03

## 2018-11-02 RX ORDER — OXYTOCIN/0.9 % SODIUM CHLORIDE 30/500 ML
1-24 PLASTIC BAG, INJECTION (ML) INTRAVENOUS CONTINUOUS
Status: DISCONTINUED | OUTPATIENT
Start: 2018-11-02 | End: 2018-11-03

## 2018-11-02 RX ORDER — NALOXONE HYDROCHLORIDE 0.4 MG/ML
.1-.4 INJECTION, SOLUTION INTRAMUSCULAR; INTRAVENOUS; SUBCUTANEOUS
Status: DISCONTINUED | OUTPATIENT
Start: 2018-11-02 | End: 2018-11-03

## 2018-11-02 RX ORDER — AZATHIOPRINE 50 MG/1
50 TABLET ORAL DAILY
Status: DISCONTINUED | OUTPATIENT
Start: 2018-11-02 | End: 2018-11-04 | Stop reason: HOSPADM

## 2018-11-02 RX ORDER — LIDOCAINE 40 MG/G
CREAM TOPICAL
Status: DISCONTINUED | OUTPATIENT
Start: 2018-11-02 | End: 2018-11-03

## 2018-11-02 RX ORDER — TERBUTALINE SULFATE 1 MG/ML
0.25 INJECTION, SOLUTION SUBCUTANEOUS
Status: DISCONTINUED | OUTPATIENT
Start: 2018-11-02 | End: 2018-11-03

## 2018-11-02 RX ORDER — MISOPROSTOL 100 UG/1
25 TABLET ORAL
Status: DISCONTINUED | OUTPATIENT
Start: 2018-11-02 | End: 2018-11-03

## 2018-11-02 RX ORDER — PHENAZOPYRIDINE HYDROCHLORIDE 100 MG/1
100 TABLET, FILM COATED ORAL 3 TIMES DAILY PRN
Status: DISCONTINUED | OUTPATIENT
Start: 2018-11-02 | End: 2018-11-04 | Stop reason: HOSPADM

## 2018-11-02 RX ORDER — ACETAMINOPHEN 325 MG/1
650 TABLET ORAL EVERY 4 HOURS PRN
Status: DISCONTINUED | OUTPATIENT
Start: 2018-11-02 | End: 2018-11-03

## 2018-11-02 RX ORDER — CARBOPROST TROMETHAMINE 250 UG/ML
250 INJECTION, SOLUTION INTRAMUSCULAR
Status: DISCONTINUED | OUTPATIENT
Start: 2018-11-02 | End: 2018-11-03

## 2018-11-02 RX ORDER — OXYTOCIN 10 [USP'U]/ML
10 INJECTION, SOLUTION INTRAMUSCULAR; INTRAVENOUS
Status: DISCONTINUED | OUTPATIENT
Start: 2018-11-02 | End: 2018-11-03

## 2018-11-02 RX ADMIN — HYDROXYCHLOROQUINE SULFATE 400 MG: 200 TABLET, FILM COATED ENTERAL at 10:03

## 2018-11-02 RX ADMIN — FENTANYL CITRATE 50 MCG: 50 INJECTION, SOLUTION INTRAMUSCULAR; INTRAVENOUS at 22:30

## 2018-11-02 RX ADMIN — FENTANYL CITRATE 50 MCG: 50 INJECTION, SOLUTION INTRAMUSCULAR; INTRAVENOUS at 23:24

## 2018-11-02 RX ADMIN — OXYTOCIN-SODIUM CHLORIDE 0.9% IV SOLN 30 UNIT/500ML 2 MILLI-UNITS/MIN: 30-0.9/5 SOLUTION at 16:15

## 2018-11-02 RX ADMIN — SODIUM CHLORIDE, POTASSIUM CHLORIDE, SODIUM LACTATE AND CALCIUM CHLORIDE: 600; 310; 30; 20 INJECTION, SOLUTION INTRAVENOUS at 16:15

## 2018-11-02 RX ADMIN — Medication 50 MG: at 10:03

## 2018-11-02 RX ADMIN — FENTANYL CITRATE 50 MCG: 50 INJECTION, SOLUTION INTRAMUSCULAR; INTRAVENOUS at 21:37

## 2018-11-02 RX ADMIN — Medication 25 MCG: at 10:03

## 2018-11-02 RX ADMIN — LIDOCAINE: 40 CREAM TOPICAL at 10:03

## 2018-11-02 RX ADMIN — Medication 25 MCG: at 12:58

## 2018-11-02 ASSESSMENT — ACTIVITIES OF DAILY LIVING (ADL)
DRESS: 0-->INDEPENDENT
RETIRED_EATING: 0-->INDEPENDENT
SWALLOWING: 0-->SWALLOWS FOODS/LIQUIDS WITHOUT DIFFICULTY
COGNITION: 0 - NO COGNITION ISSUES REPORTED
AMBULATION: 0-->INDEPENDENT
BATHING: 0-->INDEPENDENT
TOILETING: 0-->INDEPENDENT
TRANSFERRING: 0-->INDEPENDENT
RETIRED_COMMUNICATION: 0-->UNDERSTANDS/COMMUNICATES WITHOUT DIFFICULTY
FALL_HISTORY_WITHIN_LAST_SIX_MONTHS: NO

## 2018-11-02 NOTE — PROGRESS NOTES
"[facility]  OB Gyn Progress Note    S: Intermittent bladder spasms or lower abdominal pain, ongoing bleeding from her bladder. Normal fetal movement, no contractions she doesn't think.    O:   Patient Vitals for the past 4 hrs:   BP Temp Temp src Resp Height Weight   18 0806 122/53 98  F (36.7  C) Oral 20 - -   18 0545 - - - - 1.676 m (5' 6\") 72.6 kg (160 lb)   18 0544 119/56 98.4  F (36.9  C) Oral 20 - -     SVE: 1.5/50/-2    FHT: Baseline 135, mod variability, + accelerations, no decelerations  [place]: rare contractions    A/P:  Ms. Monica Puckett is a 23 year old  at 36w6d by 5+6wk US, here for evaluation of hematuria, ruled out vaginal/uterine source of bleeding.    Hematuria in setting up lupus cystitis: Discussed with urogyn and MFM and given ongoing bladder pain in near term pregnancy recommend IOL. Discussed with patient, she is amenable  - Admit to L&D  - start IOL with PO miso. Discussed avoid dominguez if possible as likely to irritate bladder but is an option for ripening  - cystitis pain: oxy 2.5mg with early ripening, pyridium PRN  - monitor bleeding w/ pad weights until ROM, Hgb 10.9, recheck PRN    Birth plan: Pt had wanted water birth, realizes this is likely not an option any more  - would like to avoid pain meds as much as possible    Lupus nephritis, stage V :   Cont azithioprine 50mg daily, plaquinel 400mg daily  - Baseline U P:C 0.08 (3/23/18) > 0.96 (18)  - will get baseline HELLP labs and U P:C, normal BPs  - plans to f/u w/ nephrology postpartum, possible oocyte cryopreservation prior to cyclophosphamide    Depression, h/o suicide attempt in previous pregnancy: States mood is good, feels like she is coping well    FWB: Category 1 FHT  - GBS neg  - Placenta posterior, EFW 6.5#, vtx by BSUS    PNC: NOB labs: Rh pos, GC, rachell negative, Hep B Ag NR, Hep C Ab NR, HIV NR, rubella immune  -  Pt declined Tdap, influenza vaccines  - anatomy wnl  - GCT wnl  - " varicella nonimmune, offer vaccine postpartum    Melissa Blakely MD  Ob/Gyn, PGY-3  11/2/2018, 8:24 AM      Yamile Arias MD  11/2/2018

## 2018-11-02 NOTE — IP AVS SNAPSHOT
MRN:[id number]                      After Visit Summary   11/2/2018    Monica Puckett    MRN: [id number]           Thank you!     Thank you for choosing [facility] for your care. Our goal is always to provide you with excellent care. Hearing back from our patients is one way we can continue to improve our services. Please take a few minutes to complete the written survey that you may receive in the mail after you visit with us. Thank you!        Patient Information     Date Of Birth          1995        Designated Caregiver       Most Recent Value    Caregiver    Will someone help with your care after discharge? yes    Name of designated caregiver GRAHAM    Phone number of caregiver [phone]    Caregiver address [place]      About your hospital stay     You were admitted on:  November 2, 2018 You last received care in the:  [facility]    You were discharged on:  November 4, 2018        Reason for your hospital stay       Maternity care                  Who to Call     For medical emergencies, please call 911.  For non-urgent questions about your medical care, please call your primary care provider or clinic, [phone]          Attending Provider     Provider Specialty    Jennie Ramesh MD OB/Gyn    Juana, Yamile Dyson MD OB/Gyn    Dane, Kathi Keith MD OB/Gyn       Primary Care Provider Office Phone # Fax #    Amy Schumer, [phone]      After Care Instructions     Activity       Review discharge instructions            Diet       Resume previous diet            Discharge Instructions - Postpartum visit       Schedule postpartum visit with your provider and return to clinic in 1 week for a mood check and in 6 weeks for a postpartum visit                  Follow-up Appointments     Follow Up and recommended labs and tests       Follow up with nephrology  Follow up in 1 week with OB for a mood check  Follow up in 6 weeks with OB for a postpartum visit                   Your next 10 appointments already scheduled     Nov 21, 2018  1:00 PM CST   Return Urogyn with Ismael Valadez MD   [facility] ([facility])    [facility]  [address] [place], [place] [address]e [address], [address]  [facility] [address] [phone]           Please call [facility] , [phone], with any questions or concerns you may have regarding your appointment            Dec 28, 2018  1:00 PM CST   (Arrive by 12:45 PM)   RETURN LUPUS with Chente Reina MD   [facility] ([facility])    [address] [facility]  [address]  [facility] [address] [phone]              Further instructions from your care team       Postpartum Vaginal Delivery Instructions    Activity       Ask family and friends for help when you need it.    Do not place anything in your vagina for 6 weeks.    You are not restricted on other activities, but take it easy for a few weeks to allow your body to recover from delivery.  You are able to do any activities you feel up to that point.    No driving until you have stopped taking your pain medications (usually two weeks after delivery).     Call your health care provider if you have any of these symptoms:       Increased pain, swelling, redness, or fluid around your stiches from an episiotomy or perineal tear.    A fever above 100.4 F (38 C) with or without chills when placing a thermometer under your tongue.    You soak a sanitary pad with blood within 1 hour, or you see blood clots larger than a golf ball.    Bleeding that lasts more than 6 weeks.    Vaginal discharge that smells bad.    Severe pain, cramping or tenderness in your lower belly area.    A need to urinate more frequently (use the toilet more often), more urgently (use the toilet very quickly), or it burns when you urinate.    Nausea and vomiting.    Redness, swelling or pain around a vein in your leg.    Problems breastfeeding or a red or  "painful area on your breast.    Chest pain and cough or are gasping for air.    Problems coping with sadness, anxiety, or depression.  If you have any concerns about hurting yourself or the baby, call your provider immediately.     You have questions or concerns after you return home.     Keep your hands clean:  Always wash your hands before touching your perineal area and stitches.  This helps reduce your risk of infection.  If your hands aren't dirty, you may use an alcohol hand-rub to clean your hands. Keep your nails clean and short.        Pending Results     No orders found from 10/31/2018 to 11/3/2018.            Admission Information     Date & Time Provider Department Dept. Phone    11/2/2018 Kathi Vela MD [facility] [phone]      Your Vitals Were     Blood Pressure Pulse Temperature Respirations Height Weight    100/49 75 98  F (36.7  C) (Oral) 16 1.676 m (5' 6\") 72.6 kg (160 lb)    Last Period Pulse Oximetry BMI (Body Mass Index)             02/11/2018 97% 25.82 kg/m2         [employer] Information     [employer] gives you secure access to your electronic health record. If you see a primary care provider, you can also send messages to your care team and make appointments. If you have questions, please call your primary care clinic.  If you do not have a primary care provider, please call [phone] and they will assist you.        Care EveryWhere ID     This is your Care EveryWhere ID. This could be used by other organizations to access your [facility] medical records  [id number]        Equal Access to Services     [facility] : Hadii cheryle swann hadasho Soericali, waaxda luqadaha, qaybta kaalmada adeegyada, kiesha molina . So [facility] [place] [facility] [phone].    ATENCIÓN: Si habla español, tiene a napoles disposición servicios gratuitos de asistencia lingüística. Llame al [phone].    We comply with applicable federal civil rights laws and [place] laws. We do not discriminate on the basis of " race, color, national origin, age, disability, sex, sexual orientation, or gender identity.               Review of your medicines      START taking        Dose / Directions    ibuprofen 600 MG tablet   Commonly known as:  ADVIL/MOTRIN        Dose:  600 mg   Take 1 tablet (600 mg) by mouth every 6 hours as needed for moderate pain   Quantity:  30 tablet   Refills:  0       phenazopyridine 100 MG tablet   Commonly known as:  PYRIDIUM   Used for:  Acute cystitis with hematuria        Dose:  100 mg   Take 1 tablet (100 mg) by mouth 3 times daily as needed for urinary tract discomfort   Quantity:  15 tablet   Refills:  1       senna-docusate 8.6-50 MG per tablet   Commonly known as:  SENOKOT-S;PERICOLACE        Dose:  1 tablet   Take 1 tablet by mouth 2 times daily as needed for constipation   Quantity:  30 tablet   Refills:  0         CONTINUE these medicines which may have CHANGED, or have new prescriptions. If we are uncertain of the size of tablets/capsules you have at home, strength may be listed as something that might have changed.        Dose / Directions    oxyCODONE IR 5 MG tablet   Commonly known as:  ROXICODONE   This may have changed:  reasons to take this   Used for:  Acute cystitis with hematuria        Dose:  2.5 mg   Take 0.5 tablets (2.5 mg) by mouth every 4 hours as needed for severe pain or breakthrough pain   Quantity:  30 tablet   Refills:  0         CONTINUE these medicines which have NOT CHANGED        Dose / Directions    * ADDERALL PO        Dose:  20 mg   Take 20 mg by mouth daily   Refills:  0       * ADDERALL XR PO        Dose:  20 mg   Take 20 mg by mouth daily   Refills:  0       azaTHIOprine 50 MG tablet   Commonly known as:  IMURAN   Used for:  Systemic lupus erythematosus, unspecified SLE type, unspecified organ involvement status (H)        Dose:  50 mg   Take 1 tablet (50 mg) by mouth daily Labs due in 4 weeks   Quantity:  30 tablet   Refills:  0       ferrous sulfate Dried 160 (50  Fe) MG tablet        Dose:  1 tablet   Take 1 tablet by mouth 2 times daily   Refills:  0       FISH OIL PO        Dose:  1 tablet   Take 1 tablet by mouth daily Fish oil with DHA   Refills:  0       hydroxychloroquine 200 MG tablet   Commonly known as:  PLAQUENIL   Used for:  Systemic lupus erythematosus, unspecified SLE type, unspecified organ involvement status (H), 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        Dose:  400 mg   Take 2 tablets (400 mg) by mouth daily   Quantity:  60 tablet   Refills:  11       hydrOXYzine 25 MG tablet   Commonly known as:  ATARAX   Used for:  Acute cystitis without hematuria,  contractions        Dose:   mg   Take 2-4 tablets ( mg) by mouth nightly as needed for other (pain adjunct) Can take 25-50mg once during day but can make you sleepy   Quantity:  30 tablet   Refills:  0       * predniSONE 5 MG tablet   Commonly known as:  DELTASONE   Used for:  Systemic lupus erythematosus, unspecified SLE type, unspecified organ involvement status (H)        4tab=20 mg qd x 5 days, 3tab=15 mg qd x 5 days, 2tab=10 mg qd x 5 days, 1 tab=5 mg qd x 5 days then stop.   Quantity:  50 tablet   Refills:  0       * predniSONE 20 MG tablet   Commonly known as:  DELTASONE   Used for:  Systemic lupus erythematosus, unspecified SLE type, unspecified organ involvement status (H)        Dose:  20 mg   Take 1 tablet (20 mg) by mouth daily   Quantity:  30 tablet   Refills:  1       prenatal multivitamin plus iron 27-0.8 MG Tabs per tablet        Dose:  1 tablet   Take 1 tablet by mouth daily   Refills:  0       VALTREX PO        Dose:  500 mg   Take 500 mg by mouth as needed   Refills:  0       VITAMIN D (CHOLECALCIFEROL) PO        Dose:  2000 Units   Take 2,000 Units by mouth daily   Refills:  0       * Notice:  This list has 4 medication(s) that are the same as other medications prescribed for you. Read the directions carefully, and ask your doctor or other care  provider to review them with you.         Where to get your medicines      These medications were sent to [facility] [place], [place] - [address]  [address], [facility] [address]     Phone:  [phone]     ibuprofen 600 MG tablet    phenazopyridine 100 MG tablet    senna-docusate 8.6-50 MG per tablet         Some of these will need a paper prescription and others can be bought over the counter. Ask your nurse if you have questions.     Bring a paper prescription for each of these medications     oxyCODONE IR 5 MG tablet                Protect others around you: Learn how to safely use, store and throw away your medicines at www.disposemymeds.org.        Information about OPIOIDS     PRESCRIPTION OPIOIDS: WHAT YOU NEED TO KNOW   We gave you an opioid (narcotic) pain medicine. It is important to manage your pain, but opioids are not always the best choice. You should first try all the other options your care team gave you. Take this medicine for as short a time (and as few doses) as possible.    Some activities can increase your pain, such as bandage changes or therapy sessions. It may help to take your pain medicine 30 to 60 minutes before these activities. Reduce your stress by getting enough sleep, working on hobbies you enjoy and practicing relaxation or meditation. Talk to your care team about ways to manage your pain beyond prescription opioids.    These medicines have risks:    DO NOT drive when on new or higher doses of pain medicine. These medicines can affect your alertness and reaction times, and you could be arrested for driving under the influence (DUI). If you need to use opioids long-term, talk to your care team about driving.    DO NOT operate heavy machinery    DO NOT do any other dangerous activities while taking these medicines.    DO NOT drink any alcohol while taking these medicines.     If the opioid prescribed includes acetaminophen, DO NOT take with any  other medicines that contain acetaminophen. Read all labels carefully. Look for the word  acetaminophen  or  Tylenol.  Ask your pharmacist if you have questions or are unsure.    You can get addicted to pain medicines, especially if you have a history of addiction (chemical, alcohol or substance dependence). Talk to your care team about ways to reduce this risk.    All opioids tend to cause constipation. Drink plenty of water and eat foods that have a lot of fiber, such as fruits, vegetables, prune juice, apple juice and high-fiber cereal. Take a laxative (Miralax, milk of magnesia, Colace, Senna) if you don t move your bowels at least every other day. Other side effects include upset stomach, sleepiness, dizziness, throwing up, tolerance (needing more of the medicine to have the same effect), physical dependence and slowed breathing.    Store your pills in a secure place, locked if possible. We will not replace any lost or stolen medicine. If you don t finish your medicine, please throw away (dispose) as directed by your pharmacist. The [place] Pollution Control Agency has more information about safe disposal: https://www.[employer].[facility].[place].us/living-green/managing-unwanted-medications             Medication List: This is a list of all your medications and when to take them. Check marks below indicate your daily home schedule. Keep this list as a reference.      Medications           Morning Afternoon Evening Bedtime As Needed    * ADDERALL PO   Take 20 mg by mouth daily                                * ADDERALL XR PO   Take 20 mg by mouth daily                                azaTHIOprine 50 MG tablet   Commonly known as:  IMURAN   Take 1 tablet (50 mg) by mouth daily Labs due in 4 weeks   Last time this was given:  50 mg on 11/4/2018  8:27 AM                                ferrous sulfate Dried 160 (50 Fe) MG tablet   Take 1 tablet by mouth 2 times daily                                FISH OIL PO   Take 1 tablet by  mouth daily Fish oil with DHA                                hydroxychloroquine 200 MG tablet   Commonly known as:  PLAQUENIL   Take 2 tablets (400 mg) by mouth daily   Last time this was given:  400 mg on 11/4/2018  8:21 AM                                hydrOXYzine 25 MG tablet   Commonly known as:  ATARAX   Take 2-4 tablets ( mg) by mouth nightly as needed for other (pain adjunct) Can take 25-50mg once during day but can make you sleepy                                ibuprofen 600 MG tablet   Commonly known as:  ADVIL/MOTRIN   Take 1 tablet (600 mg) by mouth every 6 hours as needed for moderate pain   Last time this was given:  800 mg on 11/4/2018 12:09 PM                                oxyCODONE IR 5 MG tablet   Commonly known as:  ROXICODONE   Take 0.5 tablets (2.5 mg) by mouth every 4 hours as needed for severe pain or breakthrough pain   Last time this was given:  2.5 mg on 11/4/2018  1:53 PM                                phenazopyridine 100 MG tablet   Commonly known as:  PYRIDIUM   Take 1 tablet (100 mg) by mouth 3 times daily as needed for urinary tract discomfort                                * predniSONE 5 MG tablet   Commonly known as:  DELTASONE   4tab=20 mg qd x 5 days, 3tab=15 mg qd x 5 days, 2tab=10 mg qd x 5 days, 1 tab=5 mg qd x 5 days then stop.                                * predniSONE 20 MG tablet   Commonly known as:  DELTASONE   Take 1 tablet (20 mg) by mouth daily                                prenatal multivitamin plus iron 27-0.8 MG Tabs per tablet   Take 1 tablet by mouth daily                                senna-docusate 8.6-50 MG per tablet   Commonly known as:  SENOKOT-S;PERICOLACE   Take 1 tablet by mouth 2 times daily as needed for constipation   Last time this was given:  2 tablets on 11/4/2018  8:24 AM                                VALTREX PO   Take 500 mg by mouth as needed                                VITAMIN D (CHOLECALCIFEROL) PO   Take 2,000 Units by mouth daily                                 * Notice:  This list has 4 medication(s) that are the same as other medications prescribed for you. Read the directions carefully, and ask your doctor or other care provider to review them with you.

## 2018-11-02 NOTE — PROGRESS NOTES
[facility]  OB Gyn Progress Note    S: Felt a little bit of ctx earlier but not painful, not feeling as much now    O:   Patient Vitals for the past 4 hrs:   BP   18 1408 129/66   18 1341 119/60   18 1257 129/59   18 1159 114/68     SVE: 3/50/-2    FHT: Baseline 145, mod variability, + accelerations, no decelerations  [place]: Ctx q3min    A/P:  Ms. Monica Puckett is a 23 year old  at 36w6d by 5+6wk US, here for IOL d/t oma hematuria in the setting of lupus cystitis.    IOL: S/p PO miso x2, last 1500, ctx regular - start pit    Hematuria in setting up lupus cystitis:   - cystitis pain: oxy 2.5mg with early ripening, pyridium PRN  - monitor bleeding w/ pad weights until ROM, Hgb 10.9, recheck PRN    FWB: Category 1 FHT  - GBS neg  - Placenta posterior, EFW 6.5#, vtx by BSUS    PNC: NOB labs: Rh pos, GC, rachell negative, Hep B Ag NR, Hep C Ab NR, HIV NR, rubella immune  -  Pt declined Tdap, influenza vaccines  - anatomy wnl  - GCT wnl  - varicella nonimmune, offer vaccine postpartum    Melissa Blakely MD  Ob/Gyn, PGY-3  2018, 3:19 PM

## 2018-11-02 NOTE — PROVIDER NOTIFICATION
11/02/18 0817   Provider Notification   Provider Name/Title Dr. Blakely, Dr. Arias   Method of Notification At Bedside   Request Evaluate in Person   Notification Reason Status Update   MDs in unit, notified of pt concerns of having rheumatology appointment later today. Team going into pt room shortly to discuss possible IOL. Will notify rheumatology re: pt status.

## 2018-11-02 NOTE — PROGRESS NOTES
Urogyn Courtesy Visit    Monica Puckett is  23 year old  at 36w6d by 5w6d US with stage V lupus nephritis who presented for urinary bleeding overnight. Patient states that she was feeling at her baseline yesterday but overnight developed more significant bleeding and clots that persisted even between trips to the restroom. She states that she became concerned for vaginal bleeding and subsequently presented. Discussed with patient that given her worsening symptoms despite recent cystoscopy with Kenalog injections, that IOL may be warranted. This has been discussed with [facility] who recommended IOL as patient now 36w6d. After our discussion patient is agreeable to IOL. [facility] resident at bedside and will discuss with L&D staff to begin. Recommended that she follow up 2-3 weeks after delivery to see how her symptoms are doing. Reviewed that it is possible that her symptoms may improve after resolution of pregnancy. If not, will discuss further treatment strategies, in conjunction with rheum, postpartum.     Agustina Valentin PGY4  2018 9:07 AM

## 2018-11-02 NOTE — IP AVS SNAPSHOT
[facility]    [address] [address]    Phone:  [phone]                                       After Visit Summary   11/2/2018    Monica Puckett    MRN: [id number]           After Visit Summary Signature Page     I have received my discharge instructions, and my questions have been answered. I have discussed any challenges I see with this plan with the nurse or doctor.    ..........................................................................................................................................  Patient/Patient Representative Signature      ..........................................................................................................................................  Patient Representative Print Name and Relationship to Patient    ..................................................               ................................................  Date                                   Time    ..........................................................................................................................................  Reviewed by Signature/Title    ...................................................              ..............................................  Date                                               Time          22EPIC Rev 08/18

## 2018-11-02 NOTE — H&P
"[facility]  OB History and Physical      Monica Puckett MRN# [id number]   Age: 23 year old YOB: 1995     CC:  Bleeding    HPI:  Ms. Monica Puckett is a 23 year old  at 36w6d by 5w6d US, who presents with bleeding first noted when she used the toilet. She notes the toilet filled with blood. She wiped a few times and noted clot on the tissue. She feels that the bleeding came from her vagina. She also notes \"knife like\" pain in lower abdomen for the last two days, radiating to her lower back. Denies burning with urination. No cramping pain, no LOF. Good fetal movement.  Denies fevers, chills. Denies symptoms of HA, vision changes, SOB, chest pain, fevers, chills, palpitations, nausea, vomiting, RUQ pain, dysuria, constipation, diarrhea.     Pregnancy Complications:  Pregnancy is complicated by:  - stage V lupus nephritis  - lupus cystitis  - rheumatoid arthritis  - major depressive disorder  - history of suicide attempt in previous pregnancy, took bottle of unisom before calling for help  - ADHD  -varicella non-immune    Prenatal Labs:   Lab Results   Component Value Date    ABO O 10/18/2018    RH Pos 10/18/2018    AS Neg 10/18/2018    CHPCRT Negative 2017    GCPCRT Negative 2017    HGB 10.9 (L) 2018       GBS Status:   Lab Results   Component Value Date    GBS Negative 10/26/2018         OB History  Obstetric History       T1      L1     SAB0   TAB0   Ectopic0   Multiple0   Live Births1       # Outcome Date GA Lbr Flaco/2nd Weight Sex Delivery Anes PTL Lv   2 Current            1 Term 17 39w5d  3.912 kg (8 lb 10 oz) M  EPI  ANDRE      Name: arianna          PMHx:   Past Medical History:   Diagnosis Date     Chronic kidney disease      H/O suicide attempt      Lupus      Lupus      RA (rheumatoid arthritis) (H)      Rheumatoid arthritis (H)      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)      PSHx:   Past Surgical History:   Procedure " Laterality Date     CYSTOSCOPY N/A 9/14/2018    Procedure: CYSTOSCOPY;  Cystoscopy and Bladder Wall Injection;  Surgeon: Ismael Valadez MD;  Location: [facility] OR     CYSTOSCOPY, INTRAVESICAL INJECTION N/A 10/18/2018    Procedure: Cystoscopy, Guided Bladder Wall Injection   ;  Surgeon: Ismael Valadez MD;  Location: [facility] OR     HC BIOPSY RENAL, PERCUTANEOUS  1/18/13     Meds:   Prescriptions Prior to Admission   Medication Sig Dispense Refill Last Dose     azaTHIOprine (IMURAN) 50 MG tablet Take 1 tablet (50 mg) by mouth daily Labs due in 4 weeks 30 tablet 0 Past Week at Unknown time     ferrous sulfate Dried 160 (50 Fe) MG tablet Take 1 tablet by mouth 2 times daily   Past Week at Unknown time     hydroxychloroquine (PLAQUENIL) 200 MG tablet Take 2 tablets (400 mg) by mouth daily 60 tablet 11 11/1/2018 at Unknown time     Omega-3 Fatty Acids (FISH OIL PO) Take 1 tablet by mouth daily Fish oil with DHA   11/1/2018 at Unknown time     oxyCODONE IR (ROXICODONE) 5 MG tablet Take 2.5 mg by mouth every 4 hours as needed   Past Month at Unknown time     Prenatal Vit-Fe Fumarate-FA (PRENATAL MULTIVITAMIN  PLUS IRON) 27-0.8 MG TABS Take 1 tablet by mouth daily   11/1/2018 at Unknown time     ValACYclovir HCl (VALTREX PO) Take 500 mg by mouth as needed   11/1/2018 at Unknown time     VITAMIN D, CHOLECALCIFEROL, PO Take 2,000 Units by mouth daily   11/1/2018 at Unknown time     Amphetamine-Dextroamphetamine (ADDERALL PO) Take 20 mg by mouth daily   More than a month at Unknown time     Amphetamine-Dextroamphetamine (ADDERALL XR PO) Take 20 mg by mouth daily   More than a month at Unknown time     hydrOXYzine (ATARAX) 25 MG tablet Take 2-4 tablets ( mg) by mouth nightly as needed for other (pain adjunct) Can take 25-50mg once during day but can make you sleepy (Patient not taking: Reported on 10/26/2018) 30 tablet 0 More than a month at Unknown time     predniSONE (DELTASONE) 20 MG tablet Take 1 tablet (20 mg) by mouth daily  "(Patient not taking: Reported on 10/26/2018) 30 tablet 1 More than a month at Unknown time     predniSONE (DELTASONE) 5 MG tablet 4tab=20 mg qd x 5 days, 3tab=15 mg qd x 5 days, 2tab=10 mg qd x 5 days, 1 tab=5 mg qd x 5 days then stop. (Patient not taking: Reported on 10/26/2018) 50 tablet 0 More than a month at Unknown time     Allergies:    Allergies   Allergen Reactions     Estrogens Other (See Comments)     Lupus flares     No Clinical Screening - See Comments Other (See Comments) and Rash     She is immunosuppressed   Gets very sick from flu vaccines (or any vaccine)     mmr     Varicella Virus Vaccine Live Other (See Comments)     She is immunosuppressed       FmHx: No family history on file.  SocHx: She denies any tobacco, alcohol, or other drug use during this pregnancy.    ROS:   Complete 10-point ROS negative except as noted in HPI. She denies headache, blurry vision, chest pain, shortness of breath, RUQ pain, nausea, vomiting, dysuria, hematuria or extremity edema.    PE:  Vit: Patient Vitals for the past 4 hrs:   BP Temp Temp src Resp Height Weight   18 0545 - - - - 1.676 m (5' 6\") 72.6 kg (160 lb)   18 0544 119/56 98.4  F (36.9  C) Oral 20 - -      Gen: Well-appearing, NAD, comfortable   CV: Regular rate  Pulm: Ctab, no wheezes or crackles   Abd: Soft, gravid, non-tender  Ext: No LE edema b/l  SSE:    Normal external genitalia, no blood on perineum, no blood in vagina, no blood from cervix on valsalva, cervix visually closed     Memb: Intact               FHT: Baseline 135bpm, moderate variability, + accelerations, no decelerations   [place]: 0 contractions in 10 minutes      Results for orders placed or performed during the hospital encounter of 18 (from the past 24 hour(s))   Drug abuse scrn 7 UR (/) (RH, SH, UR)   Result Value Ref Range    Amphetamine Qual Urine Negative NEG^Negative    Cannabinoids Qual Urine Negative NEG^Negative    Cocaine Qual Urine Negative " NEG^Negative    Opiates Qualitative Urine Negative NEG^Negative    Pcp Qual Urine Negative NEG^Negative   UA with Microscopic reflex to Culture   Result Value Ref Range    Color Urine Red     Appearance Urine Cloudy     Glucose Urine Negative NEG^Negative mg/dL    Bilirubin Urine Negative NEG^Negative    Ketones Urine Negative NEG^Negative mg/dL    Specific Gravity Urine 1.025 1.003 - 1.035    Blood Urine Large (A) NEG^Negative    pH Urine 7.5 (H) 5.0 - 7.0 pH    Protein Albumin Urine 300 (A) NEG^Negative mg/dL    Urobilinogen mg/dL Normal 0.0 - 2.0 mg/dL    Nitrite Urine Negative NEG^Negative    Leukocyte Esterase Urine Small (A) NEG^Negative    Source Midstream Urine     WBC Urine 4952 (H) 0 - 5 /HPF    RBC Urine >182 (H) 0 - 2 /HPF    Transitional Epi 24 (H) 0 - 1 /HPF   CBC with platelets   Result Value Ref Range    WBC 10.3 4.0 - 11.0 10e9/L    RBC Count 3.82 3.8 - 5.2 10e12/L    Hemoglobin 10.9 (L) 11.7 - 15.7 g/dL    Hematocrit 34.5 (L) 35.0 - 47.0 %    MCV 90 78 - 100 fl    MCH 28.5 26.5 - 33.0 pg    MCHC 31.6 31.5 - 36.5 g/dL    RDW 13.9 10.0 - 15.0 %    Platelet Count 293 150 - 450 10e9/L   Fibrinogen activity   Result Value Ref Range    Fibrinogen 527 (H) 200 - 420 mg/dL   INR   Result Value Ref Range    INR 0.90 0.86 - 1.14   Partial thromboplastin time   Result Value Ref Range    PTT 30 22 - 37 sec           Assessment  Ms. Monica Puckett is a 23 year old , at 36w6d by 5w6d US, who presents with bleeding, likely secondary to hemorrhagic cystitis of unclear etiology. Abruption remains on DDX.     Plan  - Bleeding unlikely obstetric given normal SSE, will obtain abruption labs for complete evaluation.   - Hemorrhagic cystitis: UA notable for significant RBCs. Will discuss findings with Dr. Valadez.   - Lupus nephritis: Delivery has been recommended at 37w per MFM. Will discuss with patient.   - Fetal Well Being   - Category I FHT. Reactive and reassuring   - Continue EFM and [place]    The patient was  discussed with Dr. Ramesh who is in agreement with the treatment plan.    The patient was signed out to the oncoming team for further evaluation and treatment.     Maria Morris MD, MHS  OB/GYN Resident, PGY-3  11/2/2018 6:45 AM        [facility] staff:    Appreciate note by Dr. Morris.  I have seen and examined the patient without the resident. I have reviewed, edited, and agree with the note.    After discussion with Dr. Valadez and MFM, recommend proceeding with IOL as will be 37 weeks tomorrow and will be able to better evaluate bladder condition after delivery. Low suspicion for obstetric etiology of bleeding. Will likely begin IOL with misoprostol and attempt to avoid balloon due to possibility of further bladder irritation. Patient in agreement with IOL.   [facility] currently CAT1    Yamile Arias MD  11/2/2018  9:33 AM

## 2018-11-02 NOTE — PLAN OF CARE
Data: [facility]   Reason for maternal/fetal assessment per patient is Vaginal Bleeding  .  Patient is a . Prenatal record reviewed.      Obstetric History       T1      L1     SAB0   TAB0   Ectopic0   Multiple0   Live Births1       # Outcome Date GA Lbr Flaco/2nd Weight Sex Delivery Anes PTL Lv   2 Current            1 Term 17 39w5d  3.912 kg (8 lb 10 oz) M  EPI  ANDRE      Name: arianna      . Medical history:   Past Medical History:   Diagnosis Date     Chronic kidney disease      H/O suicide attempt      Lupus      Lupus      RA (rheumatoid arthritis) (H)      Rheumatoid arthritis (H)      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)    . Gestational Age 36w6d. VSS. Fetal movement present. Patient denies  backache, pelvic pressure, UTI symptoms, GI problems, bloody show, vaginal bleeding, edema, headache, visual disturbances, epigastric or URQ pain, abdominal pain, rupture of membranes. Support persons are present. Reports large amt of vaginal bleeding @ home et occasional cramping.  Action: Verbal consent for EFM. Triage assessment completed. EFM applied for uterine/fetal assessment..   Fetal assessment: Presumed adequate fetal oxygenation documented (see flow record).   Response: Dr. Morris informed of 0535. Plan per provider is labs. Patient verbalized agreement with plan.  oriented to room and call light..

## 2018-11-02 NOTE — PLAN OF CARE
Pt requiring PIV placement for IOL. Per pt and mom, pt is historically a very difficult stick, has used nitrous and j-tip in past for PIV insertion. J-tip not available on adult service; LMX applied to several sites and allowed to sit for about 50 min. This writer attempted IV x1 in right lower lateral forearm; able to get blood return but saw catheter bend when attempted to thread.     Pt very upset, crying after this attempt. Pt mom asking about j-tip, having someone else come look. Writer contacted peds vascular access; will come and see pt shortly. Pt and mom updated. Heat applied to right arm/previous IV site as well for comfort.

## 2018-11-02 NOTE — PLAN OF CARE
Problem: Labor (Cervical Ripen, Induct, Augment) (Adult,Obstetrics,Pediatric)  Goal: Signs and Symptoms of Listed Potential Problems Will be Absent, Minimized or Managed (Labor)  Signs and symptoms of listed potential problems will be absent, minimized or managed by discharge/transition of care (reference Labor (Cervical Ripen, Induct, Augment) (Adult,Obstetrics,Pediatric) CPG).   Outcome: Improving  Pt VSS, see flowsheet. -135, moderate variability, accels, no decels. Initially pt had intermittent ctx, PO miso given X2 per hospital policy. Pt currently ctx too much to get another dose. Pt currently watching TV, family and significant other at bedside. Continue to anticipate .

## 2018-11-03 ENCOUNTER — ANESTHESIA EVENT (OUTPATIENT)
Dept: OBGYN | Facility: CLINIC | Age: 23
End: 2018-11-03
Payer: COMMERCIAL

## 2018-11-03 ENCOUNTER — ANESTHESIA (OUTPATIENT)
Dept: OBGYN | Facility: CLINIC | Age: 23
End: 2018-11-03
Payer: COMMERCIAL

## 2018-11-03 LAB
BACTERIA SPEC CULT: NORMAL
Lab: NORMAL
SPECIMEN SOURCE: NORMAL

## 2018-11-03 PROCEDURE — 10907ZC DRAINAGE OF AMNIOTIC FLUID, THERAPEUTIC FROM PRODUCTS OF CONCEPTION, VIA NATURAL OR ARTIFICIAL OPENING: ICD-10-PCS | Performed by: OBSTETRICS & GYNECOLOGY

## 2018-11-03 PROCEDURE — 3E0R3BZ INTRODUCTION OF ANESTHETIC AGENT INTO SPINAL CANAL, PERCUTANEOUS APPROACH: ICD-10-PCS | Performed by: ANESTHESIOLOGY

## 2018-11-03 PROCEDURE — 25000131 ZZH RX MED GY IP 250 OP 636 PS 637: Performed by: STUDENT IN AN ORGANIZED HEALTH CARE EDUCATION/TRAINING PROGRAM

## 2018-11-03 PROCEDURE — 25000128 H RX IP 250 OP 636: Performed by: STUDENT IN AN ORGANIZED HEALTH CARE EDUCATION/TRAINING PROGRAM

## 2018-11-03 PROCEDURE — 12000032 ZZH R&B OB CRITICAL UMMC

## 2018-11-03 PROCEDURE — 25000132 ZZH RX MED GY IP 250 OP 250 PS 637: Performed by: STUDENT IN AN ORGANIZED HEALTH CARE EDUCATION/TRAINING PROGRAM

## 2018-11-03 PROCEDURE — 25000125 ZZHC RX 250

## 2018-11-03 PROCEDURE — 72200001 ZZH LABOR CARE VAGINAL DELIVERY SINGLE

## 2018-11-03 PROCEDURE — 0KQM0ZZ REPAIR PERINEUM MUSCLE, OPEN APPROACH: ICD-10-PCS | Performed by: OBSTETRICS & GYNECOLOGY

## 2018-11-03 PROCEDURE — 25000125 ZZHC RX 250: Performed by: ANESTHESIOLOGY

## 2018-11-03 PROCEDURE — 25000128 H RX IP 250 OP 636: Performed by: ANESTHESIOLOGY

## 2018-11-03 PROCEDURE — 00HU33Z INSERTION OF INFUSION DEVICE INTO SPINAL CANAL, PERCUTANEOUS APPROACH: ICD-10-PCS | Performed by: ANESTHESIOLOGY

## 2018-11-03 RX ORDER — HYDROCORTISONE 2.5 %
CREAM (GRAM) TOPICAL 3 TIMES DAILY PRN
Status: DISCONTINUED | OUTPATIENT
Start: 2018-11-03 | End: 2018-11-04 | Stop reason: HOSPADM

## 2018-11-03 RX ORDER — FENTANYL/BUPIVACAINE/NS/PF 2-1250MCG
PLASTIC BAG, INJECTION (ML) INJECTION
Status: COMPLETED
Start: 2018-11-03 | End: 2018-11-03

## 2018-11-03 RX ORDER — OXYTOCIN/0.9 % SODIUM CHLORIDE 30/500 ML
PLASTIC BAG, INJECTION (ML) INTRAVENOUS
Status: COMPLETED
Start: 2018-11-03 | End: 2018-11-03

## 2018-11-03 RX ORDER — LIDOCAINE HYDROCHLORIDE 10 MG/ML
INJECTION, SOLUTION INFILTRATION; PERINEURAL
Status: DISCONTINUED
Start: 2018-11-03 | End: 2018-11-03 | Stop reason: HOSPADM

## 2018-11-03 RX ORDER — OXYTOCIN 10 [USP'U]/ML
10 INJECTION, SOLUTION INTRAMUSCULAR; INTRAVENOUS
Status: DISCONTINUED | OUTPATIENT
Start: 2018-11-03 | End: 2018-11-04 | Stop reason: HOSPADM

## 2018-11-03 RX ORDER — AMOXICILLIN 250 MG
1 CAPSULE ORAL 2 TIMES DAILY
Status: DISCONTINUED | OUTPATIENT
Start: 2018-11-03 | End: 2018-11-04 | Stop reason: HOSPADM

## 2018-11-03 RX ORDER — AMOXICILLIN 250 MG
2 CAPSULE ORAL 2 TIMES DAILY
Status: DISCONTINUED | OUTPATIENT
Start: 2018-11-03 | End: 2018-11-04 | Stop reason: HOSPADM

## 2018-11-03 RX ORDER — MISOPROSTOL 200 UG/1
800 TABLET ORAL
Status: DISCONTINUED | OUTPATIENT
Start: 2018-11-03 | End: 2018-11-04 | Stop reason: HOSPADM

## 2018-11-03 RX ORDER — LIDOCAINE HYDROCHLORIDE 20 MG/ML
INJECTION, SOLUTION EPIDURAL; INFILTRATION; INTRACAUDAL; PERINEURAL PRN
Status: DISCONTINUED | OUTPATIENT
Start: 2018-11-03 | End: 2018-11-03

## 2018-11-03 RX ORDER — OXYTOCIN/0.9 % SODIUM CHLORIDE 30/500 ML
340 PLASTIC BAG, INJECTION (ML) INTRAVENOUS CONTINUOUS PRN
Status: DISCONTINUED | OUTPATIENT
Start: 2018-11-03 | End: 2018-11-04 | Stop reason: HOSPADM

## 2018-11-03 RX ORDER — ACETAMINOPHEN 325 MG/1
650 TABLET ORAL EVERY 4 HOURS PRN
Status: DISCONTINUED | OUTPATIENT
Start: 2018-11-03 | End: 2018-11-04 | Stop reason: HOSPADM

## 2018-11-03 RX ORDER — LIDOCAINE HYDROCHLORIDE AND EPINEPHRINE 15; 5 MG/ML; UG/ML
INJECTION, SOLUTION EPIDURAL PRN
Status: DISCONTINUED | OUTPATIENT
Start: 2018-11-03 | End: 2018-11-04 | Stop reason: HOSPADM

## 2018-11-03 RX ORDER — BISACODYL 10 MG
10 SUPPOSITORY, RECTAL RECTAL DAILY PRN
Status: DISCONTINUED | OUTPATIENT
Start: 2018-11-05 | End: 2018-11-04 | Stop reason: HOSPADM

## 2018-11-03 RX ORDER — IBUPROFEN 800 MG/1
800 TABLET, FILM COATED ORAL EVERY 6 HOURS PRN
Status: DISCONTINUED | OUTPATIENT
Start: 2018-11-03 | End: 2018-11-04 | Stop reason: HOSPADM

## 2018-11-03 RX ORDER — MISOPROSTOL 200 UG/1
TABLET ORAL
Status: DISCONTINUED
Start: 2018-11-03 | End: 2018-11-03 | Stop reason: HOSPADM

## 2018-11-03 RX ORDER — LANOLIN 100 %
OINTMENT (GRAM) TOPICAL
Status: DISCONTINUED | OUTPATIENT
Start: 2018-11-03 | End: 2018-11-04 | Stop reason: HOSPADM

## 2018-11-03 RX ORDER — OXYTOCIN/0.9 % SODIUM CHLORIDE 30/500 ML
100 PLASTIC BAG, INJECTION (ML) INTRAVENOUS CONTINUOUS
Status: DISCONTINUED | OUTPATIENT
Start: 2018-11-03 | End: 2018-11-04 | Stop reason: HOSPADM

## 2018-11-03 RX ORDER — NALOXONE HYDROCHLORIDE 0.4 MG/ML
.1-.4 INJECTION, SOLUTION INTRAMUSCULAR; INTRAVENOUS; SUBCUTANEOUS
Status: DISCONTINUED | OUTPATIENT
Start: 2018-11-03 | End: 2018-11-04 | Stop reason: HOSPADM

## 2018-11-03 RX ORDER — FENTANYL/BUPIVACAINE/NS/PF 2-1250MCG
PLASTIC BAG, INJECTION (ML) INJECTION CONTINUOUS PRN
Status: DISCONTINUED | OUTPATIENT
Start: 2018-11-03 | End: 2018-11-04 | Stop reason: HOSPADM

## 2018-11-03 RX ADMIN — Medication 5 ML: at 10:33

## 2018-11-03 RX ADMIN — Medication 10 ML/HR: at 10:34

## 2018-11-03 RX ADMIN — Medication 50 MG: at 08:19

## 2018-11-03 RX ADMIN — OXYTOCIN-SODIUM CHLORIDE 0.9% IV SOLN 30 UNIT/500ML: 30-0.9/5 SOLUTION at 15:10

## 2018-11-03 RX ADMIN — IBUPROFEN 800 MG: 800 TABLET ORAL at 13:48

## 2018-11-03 RX ADMIN — FENTANYL CITRATE 100 MCG: 50 INJECTION, SOLUTION INTRAMUSCULAR; INTRAVENOUS at 09:31

## 2018-11-03 RX ADMIN — SENNOSIDES AND DOCUSATE SODIUM 1 TABLET: 8.6; 5 TABLET ORAL at 21:14

## 2018-11-03 RX ADMIN — OXYCODONE HYDROCHLORIDE AND ACETAMINOPHEN 1 TABLET: 5; 325 TABLET ORAL at 16:15

## 2018-11-03 RX ADMIN — ACETAMINOPHEN 650 MG: 325 TABLET, FILM COATED ORAL at 19:14

## 2018-11-03 RX ADMIN — IBUPROFEN 800 MG: 800 TABLET ORAL at 19:14

## 2018-11-03 RX ADMIN — SODIUM CHLORIDE, POTASSIUM CHLORIDE, SODIUM LACTATE AND CALCIUM CHLORIDE: 600; 310; 30; 20 INJECTION, SOLUTION INTRAVENOUS at 10:15

## 2018-11-03 RX ADMIN — FENTANYL CITRATE 100 MCG: 50 INJECTION, SOLUTION INTRAMUSCULAR; INTRAVENOUS at 07:09

## 2018-11-03 RX ADMIN — LIDOCAINE HYDROCHLORIDE,EPINEPHRINE BITARTRATE 3 ML: 15; .005 INJECTION, SOLUTION EPIDURAL; INFILTRATION; INTRACAUDAL; PERINEURAL at 10:24

## 2018-11-03 RX ADMIN — HYDROXYCHLOROQUINE SULFATE 400 MG: 200 TABLET, FILM COATED ENTERAL at 08:19

## 2018-11-03 RX ADMIN — FENTANYL CITRATE 100 MCG: 50 INJECTION, SOLUTION INTRAMUSCULAR; INTRAVENOUS at 08:13

## 2018-11-03 RX ADMIN — Medication 5 ML: at 10:28

## 2018-11-03 NOTE — PLAN OF CARE
Problem: Patient Care Overview  Goal: Plan of Care/Patient Progress Review  Outcome: Completed Date Met: 18   1301 baby boy. Spontaneous cry dried on maternal abd.  2 degree lac. Motrin given, baby to breast

## 2018-11-03 NOTE — PROGRESS NOTES
[facility]   Post-partum Note    Name:  Monica Puckett  MRN: [id number]    S: Patient is doing well this morning.  Pain is mostly controlled, she is having some additional pain due to her lupus cystitis.  Denies dizziness, chest pain, SOB, nausea or vomiting. Tolerating regular diet without nausea or vomiting.  Ambulating without dizziness.  Spontaneously voiding. Reports flatus and a bowel movement.  Lochia is a little more than menses.  Breast feeding.      O:   Patient Vitals for the past 24 hrs:   BP Temp Temp src Pulse Heart Rate Resp SpO2   11/04/18 0307 104/61 97.6  F (36.4  C) Oral 75 - 16 -   11/03/18 2114 114/56 97.8  F (36.6  C) Oral 79 - 16 -   11/03/18 1558 114/64 98.5  F (36.9  C) - - 77 18 97 %   11/03/18 1520 107/55 - - - - - -   11/03/18 1510 106/58 - - - - 16 -   11/03/18 1450 112/61 - - - - 16 97 %   11/03/18 1440 116/63 - - - - 20 97 %   11/03/18 1420 117/67 - - - - 18 98 %   11/03/18 1400 116/68 - - - - 16 99 %   11/03/18 1350 119/71 - - - - 20 -   11/03/18 1330 - - - - - - 100 %   11/03/18 1315 107/60 - - - - 18 99 %   11/03/18 1300 - - - - - - 99 %   11/03/18 1238 - - - - - - 97 %   11/03/18 1130 - - - - - - 94 %   11/03/18 1110 108/57 - - - - - 97 %   11/03/18 1105 109/56 - - - - - 97 %   11/03/18 1100 105/55 97.7  F (36.5  C) Oral - - - 96 %   11/03/18 1055 107/59 - - - - - 97 %   11/03/18 1050 107/58 - - - - - -   11/03/18 1045 110/55 - - - - - -   11/03/18 1043 109/59 - - - - - -   11/03/18 1041 107/56 - - - - - -   11/03/18 1039 110/57 - - - - - -   11/03/18 1037 104/58 - - - - - 98 %   11/03/18 1035 94/51 - - - - - -   11/03/18 1033 97/47 - - - - - -   11/03/18 1031 137/57 - - - - - -   11/03/18 1029 116/59 - - - - - -   11/03/18 1027 115/55 - - - - - 100 %   11/03/18 0816 - - - - - 24 -   11/03/18 0735 114/65 98.6  F (37  C) Oral - - 24 -     Gen:  Resting comfortably, NAD  CV:  RRR, well perfused  Pulm:  No increased work of breathing  Abd:  Soft,  appropriately ttp, non-distended.Fundus at the umbilicus, firm and non-tender.  Ext:  non-tender, trace LE edema b/l    I/O last 3 completed shifts:  In: 403 [P.O.:400; I.V.:3]  Out: 100 [Blood:100]    Hgb:   Hemoglobin   Date Value Ref Range Status   2018 10.9 (L) 11.7 - 15.7 g/dL Final       Assessment/Plan:  Monica Puckett is a 23 year old  on PPD #1 s/p . Her pregnancy is notable for lupus cystitis, ADHD, rheumatoid arthritis and depression.     Rheumatoid arthritis and Lupus nephritis and cystitis:  - PTA plaquenil, azathioprine continued  - pyridium prn  - oxycodone 2.5 mg   - plan to follow up with nephrology postpartum    Depression with previous pregnancy  - SW consult  - plan 1 week mood check    Postpartum cares:  - continue with routine postpartum management  Pain: Well-controlled with ibuprofen and tylenol  Hgb: 10.9>> AM hgb pending. Vitals are reassuring and patient is asymptomatic from acute blood loss anemia.   Rh: Positive, no rhogam indicated  Rubella: immune (result in care everywhere)  Feed: breast  BC: Planning egg retrieval prior to chemo  Dispo: DC pending clinical course, likely tomorrow    Yuli Evangelista MD PhD  Ob/Gyn PGY-3  2018 7:13 AM    OB/GYN Staff -- Pt seen and examined by me. Agree with note as above.  MD Mireya

## 2018-11-03 NOTE — PLAN OF CARE
Problem: Patient Care Overview  Goal: Plan of Care/Patient Progress Review  Outcome: No Change  VSS.  FHT- category 1.  Having contractions every 2-3 minutes.  Oxytocin infusing at 10 units/hr.   Planning on using fentanyl to control pain.  Had little sleep in overnight.  Anticipate .

## 2018-11-03 NOTE — PROGRESS NOTES
"  Labor Progress Note    Monica Puckett      MRN: [id number]   Age: 23 year old YOB: 1995   Date of Admission: 2018    Subjective:  Starting to feel stronger contractions. Requesting fentanyl to try and get some rest.     Objective:  Patient Vitals for the past 24 hrs:   BP Temp Temp src Resp Height Weight   18 113/62 99  F (37.2  C) Oral 16 - -   18 1810 115/59 98.4  F (36.9  C) Oral 16 - -   18 1545 - 98.7  F (37.1  C) Oral 16 - -   18 1525 126/67 - - - - -   18 1451 124/62 - - - - -   18 1408 129/66 - - - - -   18 1341 119/60 - - - - -   18 1257 129/59 - - - - -   18 1159 114/68 98.2  F (36.8  C) Oral 20 - -   18 1150 122/60 - - - - -   18 1120 121/67 - - - - -   18 1029 112/63 - - - - -   18 1000 118/59 - - - - -   18 0806 122/53 98  F (36.7  C) Oral 20 - -   18 0545 - - - - 1.676 m (5' 6\") 72.6 kg (160 lb)   18 0544 119/56 98.4  F (36.9  C) Oral 20 - -     Cvx: 3/70/-2    FHT:  bpm, moderate variability, + accels, no decels  [place]: 3/10 contractions    Assessment/Plan:  Ms. Monica Puckett is a 23 year old  at 36w6d by 5+6wk US, here for IOL d/t oma hematuria in the setting of lupus cystitis.     IOL: S/p PO miso x2. On pitocin, currently at 2. Plan for AROM when appropriate.     Hematuria in setting up lupus cystitis:   - cystitis pain: oxy 2.5mg with early ripening, pyridium PRN  - monitor bleeding w/ pad weights until ROM, Hgb 10.9, recheck PRN     FWB: Category 1 FHT  - GBS neg  - Placenta posterior, EFW 6.5#, vtx by BSUS     PNC: NOB labs: Rh pos, GC, rachell negative, Hep B Ag NR, Hep C Ab NR, HIV NR, rubella immune  -  Pt declined Tdap, influenza vaccines  - anatomy wnl  - GCT wnl  - varicella nonimmune, offer vaccine postpartum      Yina Odell MD PGY3  OB/GYN  2018 9:32 PM  "

## 2018-11-03 NOTE — PROVIDER NOTIFICATION
11/03/18 1838   Provider Notification   Provider Name/Title G 3 resident    Method of Notification Electronic Page   Request Evaluate-Remote   Notification Reason Medication Request   patient complaining perineum pain, had Percocet onetime earlier and requested another dose, also does not have ibuprofen ordered.. thanks

## 2018-11-03 NOTE — PLAN OF CARE
Problem: Patient Care Overview  Goal: Plan of Care/Patient Progress Review  Outcome: Improving  No change of condition. Currently resting.

## 2018-11-03 NOTE — PROGRESS NOTES
[facility]  Labor Progress Note      Subjective:  Patient is feeling more uncomfortable with contractions. She would like another dose a fentanyl. She is agreeable to a cervical exam.    Objective:   Patient Vitals for the past 4 hrs:   BP Temp Temp src Resp   18 0816 - - - 24   18 0735 114/65 98.6  F (37  C) Oral 24   18 0603 115/67 98.7  F (37.1  C) Oral 18     SVE: 3.5/85/-2    FHT: Baseline 145, moderate variability, + accelerations, intermittent and late decelerations  [place]: 3-4 contractions in 10 minutes    Assessment/Plan:  Ms. Monica Puckett is a 23 year old  at 37w0d by 5+6wk US, here for IOL d/t oma hematuria in the setting of lupus cystitis.      IOL:   - s/p PO miso x2. On pitocin, currently at 10. S/p AROM with clear fluid. Will continue pitocin augmentation  - declines epidural for now, recently s/p fentanyl       Hematuria in setting up lupus cystitis:   - cystitis pain: oxy 2.5mg with early ripening, pyridium PRN  - monitor bleeding w/ pad weights until ROM, Hgb 10.9, recheck PRN      FWB: Category 2 FHT d/ intermittent variable and late decelerations. Overall reactive and reassuring with moderate variability  - GBS neg  - Placenta posterior, EFW 6.5#, vtx by BSUS      PNC: NOB labs: Rh pos, GC, rachell negative, Hep B Ag NR, Hep C Ab NR, HIV NR, rubella immune  -  Pt declined Tdap, influenza vaccines  - anatomy wnl  - GCT wnl  - varicella nonimmune, offer vaccine postpartum    Yuli Evangelista MD PhD  OB/GYN Resident, PGY-3  11/3/2018 8:39 AM      Appreciate Dr. Evangelista's note above, patient also seen and examined by me. I agree with the note above.   Kathi Vela MD

## 2018-11-03 NOTE — PLAN OF CARE
Patient arrived to [facility] unit via wheel chair  ,with belongings, accompanied by RN, with infant in arms. Got report from Elizabeth LYLES and checked bands. Unit and room orientation done. No concerns a this time. Continue with plan of care.

## 2018-11-03 NOTE — PLAN OF CARE
Problem: Patient Care Overview  Goal: Plan of Care/Patient Progress Review  Outcome: Therapy, progress toward functional goals as expected  Data: Monica Puckett transferred to [facility] rm 7136 via wheelchair at 1540. Baby transferred via parent's arms.  Action: Receiving unit notified of transfer: Yes. Patient and family notified of room change. Report given to DARREL Giraldo by DARREL Davis at 1515. Belongings sent to receiving unit. Accompanied by Registered Nurse. Oriented patient to surroundings. Call light within reach. ID bands double-checked with receiving RN.  Response: Patient tolerated transfer and is stable.

## 2018-11-03 NOTE — L&D DELIVERY NOTE
CNM Delivery Note    Labor Course: Monica Puckett is a 23 year old  admited at 36+6 for bleeding. MFM recommended IOL. She received po miso x 2 followed by pitocin. AROM performed at 0600 on 11/3/18 with return of clear fluid. Epidural was received for pain relief.   OB MD patient.     Delivery Course:   Pt became complete at 1238 and started pushing. CNM presented to room to staff resident delivery of infant. Pt with excellent pushing effort and fetal descent. Pushed for approximately 5 minutes. Delivered a vigorous baby boy at 1303 who was immediately placed on mom's abdomen. Umbilical cord was double clamped and cut by FOB after the cord stopped pulsing.  Pitocin started after delivery of infant.    Dr. Vela presented to room at this time and was present for the remainder of care, including delivery of placenta and repair.      CNM present for delivery and assisted by resident. See OB resident delivery note below written for learning purposes.    JAMIE Johnson, STEVE      OB Delivery Note    Monica Puckett is a 23 year old now , who presented at 36w6d for bleeding, likely secondary to hemorrhagic cystitis with history of lupus nephritis. MFM recommended IOL. She received PO miso x2, after which pitocin was started. She was AROMed at 0600 on 11/3 for clear fluid. Initially desired fentanyl and nitrous for pain management but ultimately received an epidural. She was noted to be complete at 1238. She pushed for 5 minutes and had a spontaneous vaginal delivery of a viable male infant in OA position at 1303 on 11/3.  A nuchal cord was noted and reduced. Shoulders delivered easily. Infant with delayed cry following stimulation. Placed on mother's chest. Apgars of 8 and 9 with weight pending. The cord was double clamped after 60 seconds and cut.  A cord segment and cord blood were obtained.  The placenta was then delivered using fundal massage and gentle traction. The uterus was noted to be firm  after 20U of IV pitocin and fundal massage. The perineum was assessed for lacerations and a small second degree perineal laceration was noted.  The laceration was repaired in standard fashion using 3-0 vicryl suture. On final examination of the perineum, the repair was noted to be hemostatic. Total EBL was 100cc. The placenta appeared intact with a 3V umbilical cord. Majo Kern CNM was present for the delivery of the infant, and Dr. Vela was present for delivery of the placenta and repair.     Fidencio Garduno MD  OB/GYN Resident, PGY-1  11/3/2018 1:44 PM     I was present and assisted throughout the delivery of placenta and repair of perineal laceration.  I agree with the note above.  Kathi Vela MD      Delivery Summary    Monica Puckett MRN# [id number]   Age: 23 year old YOB: 1995       Labor Event Times    Labor onset date:  11/3/18 Onset time:  10:15 AM   Dilation complete date:  11/3/18 Complete time:  12:38 PM            Labor Events     labor?:  No    steroids:  None   Labor Type:  Induction, AROM   Predominate monitoring during 1st stage:  continuous electronic fetal monitoring      Antibiotics received during labor?:  No      Rupture identifier:  Rupture 1   Rupture date/time: 11/3/18 0600   Rupture type:  Artificial Rupture of Membranes   Fluid color:  Clear   Fluid odor:  Normal      Induction:  Misoprostol, Oxytocin, AROM   Induction date/time:     Cervical ripening date/time:        Additional Management:  IOL for maternal lupus nephritis   1:1 continuous labor support provided by?:  none Labor partogram used?:  no         Delivery/Placenta Date and Time    Delivery Date:  11/3/18 Delivery Time:   1:03 PM   Placenta Date/Time:  11/3/2018  1:09 PM   Oxytocin given at the time of delivery:  after delivery of baby      Vaginal Counts    Initial count performed by 2 team members:   Two Team Members   Solange Enrique Suture Needles  Sponges Instruments   Initial counts 2 0 5    Added to count 0 1 0    Final counts 2 1 5       Placed during labor Accounted for at the end of labor   No    No    No       Final count performed by 2 team members:   Two Team Members   Solange Ortega         Final count correct?:  Yes         Apgars    Living status:  Living    1 Minute 5 Minute 10 Minute 15 Minute 20 Minute   Skin color: 0  1       Heart rate: 2  2       Reflex irritability: 2  2       Muscle tone: 2  2       Respiratory effort: 2  2       Total: 8  9          Apgars assigned by:  MANUEL LEAL RN      Cord    Vessels:  3 Vessels Complications:  Nuchal   Cord Blood Disposition:  Lab Gases Sent?:  No          Resuscitation       Gonzales Care at Delivery:  Stimulate to cry, to mother for skin to skin.       Skin to Skin and Feeding Plan    Skin to skin initiation date/time: 11/3/18 1301   Skin to skin with:  Mother   Skin to skin end date/time:     How do you plan to feed your baby:  Breastfeeding      Labor Events and Shoulder Dystocia    Fetal Tracing Prior to Delivery:  Category 2   Fetal Tracing Comments:  Intermittent variable decelerations, reactive and reassuring   Shoulder dystocia present?:  Neg            Delivery (Maternal) (Provider to Complete) ([id number])    Episiotomy:  None   Perineal lacerations:  2nd Repaired?:  Yes   Est. blood loss (mL):  100   Number of repair packets:  1         Mother's Information  Mother: Monica Puckett #[id number]    Start of Mother's Information     IO Blood Loss  18 1015 - 18 1400    Mom's I/O Activity            End of Mother's Information  Mother: Monica Puckett #[id number]            Delivery - Provider to Complete ([id number])    Delivering clinician:  FARAZ BOWMAN   CNM Care:  Any CNM care in labor   Attempted Delivery Types (Choose all that apply):  Spontaneous Vaginal Delivery   Delivery Type (Choose the 1 that will go to the Birth History):  Vaginal,  Spontaneous Delivery                     Other personnel:   Provider Role   ADELFO MICHAUD Delivery Assist   ANKUR MADRID Resident   YESENIA LANDAVERDE Resident            Placenta    Delayed Cord Clamping:  Done   Date/Time:  11/3/2018  1:09 PM   Removal:  Spontaneous   Disposition:  Hospital disposal      Anesthesia    Method:  INTRAVENOUS REGIONAL, Nitrous Oxide, Epidural   Cervical dilation at placement:  0-3         Presentation and Position    Presentation:  Vertex   Position:  Middle Occiput Anterior

## 2018-11-03 NOTE — PROGRESS NOTES
[facility]  Labor Progress Note    Subjective:  Patient is feeling nauseated. Feels more pressure. Got an epidural.    Objective:   Patient Vitals for the past 4 hrs:   BP Temp Temp src SpO2   18 1130 - - - 94 %   18 1110 108/57 - - 97 %   18 1105 109/56 - - 97 %   18 1100 105/55 97.7  F (36.5  C) Oral 96 %   18 1055 107/59 - - 97 %   18 1050 107/58 - - -   18 1045 110/55 - - -   18 1043 109/59 - - -   18 1041 107/56 - - -   18 1039 110/57 - - -   18 1037 104/58 - - 98 %   18 1035 94/51 - - -   18 1033 97/47 - - -   18 1031 137/57 - - -   18 1029 116/59 - - -   18 1027 115/55 - - 100 %     SVE: 10/100/+3    FHT: Baseline 130, moderate variability, accelerations present, few early decelerations, intermittent variable decelerations  [place]: contractions q1-2 minutes    Assessment/Plan:  Ms. Monica Puckett is a 23 year old  at 37w0d by 5+6wk US, here for IOL d/t oma hematuria in the setting of lupus cystitis.      IOL:   - s/p PO miso x2. On pitocin, currently at 12. S/p AROM with clear fluid. Will continue pitocin augmentation.  - S/p epidural  - Nausea d/t labor progression  - Anticipate       FWB:   - Category 2 FHT d/t intermittent variable decelerations. Overall reactive and reassuring with moderate variability  - GBS neg  - Placenta posterior, EFW 6.5#, vtx by BSUS     Hematuria in setting up lupus cystitis:   - cystitis pain: oxy 2.5mg with early ripening, pyridium PRN  - monitor bleeding w/pad weights until ROM, Hgb 10.9, recheck PRN      PNC:   - NOB labs: Rh pos, GC, rachell negative, Hep B Ag NR, Hep C Ab NR, HIV NR, rubella immune  - Pt declined Tdap, influenza vaccines  - anatomy wnl  - GCT wnl  - varicella nonimmune, offer vaccine postpartum    Fidencio Garduno MD  OB/GYN Resident, PGY-1  11/3/2018 12:42 PM

## 2018-11-03 NOTE — PROGRESS NOTES
"  FHT - Strip Review    Monica Puckett      MRN: [id number]   Age: 23 year old YOB: 1995   Date of Admission: 2018    Objective:  Patient Vitals for the past 24 hrs:   BP Temp Temp src Resp Height Weight   18 0115 105/51 99.7  F (37.6  C) Oral 18 - -   18 2300 116/55 99.1  F (37.3  C) Oral 18 - -   18 2140 110/57 100  F (37.8  C) Oral 18 - -   18 2000 113/62 99  F (37.2  C) Oral 16 - -   18 1810 115/59 98.4  F (36.9  C) Oral 16 - -   18 1545 - 98.7  F (37.1  C) Oral 16 - -   18 1525 126/67 - - - - -   18 1451 124/62 - - - - -   18 1408 129/66 - - - - -   18 1341 119/60 - - - - -   18 1257 129/59 - - - - -   18 1159 114/68 98.2  F (36.8  C) Oral 20 - -   18 1150 122/60 - - - - -   18 1120 121/67 - - - - -   18 1029 112/63 - - - - -   18 1000 118/59 - - - - -   18 0806 122/53 98  F (36.7  C) Oral 20 - -   18 0545 - - - - 1.676 m (5' 6\") 72.6 kg (160 lb)   18 0544 119/56 98.4  F (36.9  C) Oral 20 - -       FHT:  bpm, moderate variability, + accels, intermittent variable deceleration  [place]: 3/10 contractions    Assessment/Plan:  Ms. Monica Puckett is a 23 year old  at 37w0d by 5+6wk US, here for IOL d/t oma hematuria in the setting of lupus cystitis.      IOL: S/p PO miso x2. On pitocin, currently at 8.      Hematuria in setting up lupus cystitis:   - cystitis pain: oxy 2.5mg with early ripening, pyridium PRN  - monitor bleeding w/ pad weights until ROM, Hgb 10.9, recheck PRN      FWB: Category 2 FHT d/ intermittent variable decelerations. Overall reactive and reassuring with moderate variability  - GBS neg  - Placenta posterior, EFW 6.5#, vtx by BSUS      PNC: NOB labs: Rh pos, GC, rachell negative, Hep B Ag NR, Hep C Ab NR, HIV NR, rubella immune  -  Pt declined Tdap, influenza vaccines  - anatomy wnl  - GCT wnl  - varicella nonimmune, offer vaccine postpartum    Yina" MD Jose R PGY3  OB/GYN  11/3/2018 4:06 AM

## 2018-11-03 NOTE — ANESTHESIA PREPROCEDURE EVALUATION
Anesthesia Pre-Procedure Evaluation    Patient: Monica Puckett   MRN:     [id number] Gender:   female   Age:    23 year old :      1995        Preoperative Diagnosis: * No pre-op diagnosis entered *   * No procedures listed *     Past Medical History:   Diagnosis Date     Chronic kidney disease      H/O suicide attempt      Lupus      Lupus      RA (rheumatoid arthritis) (H)      Rheumatoid arthritis (H)      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)       Past Surgical History:   Procedure Laterality Date     CYSTOSCOPY N/A 2018    Procedure: CYSTOSCOPY;  Cystoscopy and Bladder Wall Injection;  Surgeon: Ismael Valadez MD;  Location: [facility] OR     CYSTOSCOPY, INTRAVESICAL INJECTION N/A 10/18/2018    Procedure: Cystoscopy, Guided Bladder Wall Injection   ;  Surgeon: Ismael Valadez MD;  Location: [facility] OR     HC BIOPSY RENAL, PERCUTANEOUS  13          Anesthesia Evaluation       history and physical reviewed . Pt has had prior anesthetic. Type: General and Regional    No history of anesthetic complications          ROS/MED HX    ENT/Pulmonary:  - neg pulmonary ROS     Neurologic:  - neg neurologic ROS     Cardiovascular:  - neg cardiovascular ROS   (+) ----. : . . fainting (syncope). :. .       METS/Exercise Tolerance:  >4 METS   Hematologic:     (+) Anemia, Other Hematologic Disorder-SLE,      Musculoskeletal:   (+) arthritis, , , other musculoskeletal- SLE      GI/Hepatic:     (+) GERD       Renal/Genitourinary:     (+) chronic renal disease, Pt does not require dialysis, Other Renal/ Genitourinary, Lupus Nephritis      Endo:  - neg endo ROS       Psychiatric:     (+) psychiatric history depression (h/o suicidal ideation)      Infectious Disease:  - neg infectious disease ROS       Malignancy:      - no malignancy   Other:    (+) Possibly pregnant                        PHYSICAL EXAM:   Mental Status/Neuro: A/A/O   Airway: Facies: Feasible  Mallampati: II  Mouth/Opening: Full  TM distance: > 6 cm  Neck ROM:  "Full   Respiratory: Auscultation: CTAB     Resp. Rate: Normal     Resp. Effort: Normal      CV: Rhythm: Regular  Heart: Normal Sounds   Comments:                    Lab Results   Component Value Date    WBC 10.3 11/02/2018    HGB 10.9 (L) 11/02/2018    HCT 34.5 (L) 11/02/2018     11/02/2018    CRP 13.0 (H) 07/10/2018    SED 41 (H) 07/10/2018     09/13/2017    POTASSIUM 3.7 09/13/2017    CHLORIDE 107 09/13/2017    CO2 25 09/13/2017    BUN 15 09/13/2017    CR 0.54 11/02/2018    GLC 83 10/18/2018    VIRGEN 9.1 09/13/2017    PHOS 4.9 (H) 06/04/2014    ALBUMIN 2.9 (L) 07/10/2018    PROTTOTAL 7.3 09/13/2017    ALT 12 11/02/2018    AST 10 11/02/2018    ALKPHOS 80 09/13/2017    BILITOTAL 0.5 09/13/2017    LIPASE 126 09/13/2017    PTT 30 11/02/2018    INR 0.90 11/02/2018    FIBR 527 (H) 11/02/2018    TSH 2.48 03/27/2013    T4 1.50 03/27/2013    HCG Negative 09/13/2017       Preop Vitals  BP Readings from Last 3 Encounters:   11/03/18 114/65   10/26/18 110/74   10/18/18 109/56    Pulse Readings from Last 3 Encounters:   10/26/18 94   10/18/18 78   09/26/18 94      Resp Readings from Last 3 Encounters:   11/03/18 24   10/18/18 18   09/14/18 14    SpO2 Readings from Last 3 Encounters:   10/18/18 95%   09/14/18 94%   09/07/18 98%      Temp Readings from Last 1 Encounters:   11/03/18 37  C (98.6  F) (Oral)    Ht Readings from Last 1 Encounters:   11/02/18 1.676 m (5' 6\")      Wt Readings from Last 1 Encounters:   11/02/18 72.6 kg (160 lb)    Estimated body mass index is 25.82 kg/(m^2) as calculated from the following:    Height as of this encounter: 1.676 m (5' 6\").    Weight as of this encounter: 72.6 kg (160 lb).     LDA:  Peripheral IV 11/02/18 Right Upper forearm (Active)   Site Assessment WDL 11/3/2018  7:35 AM   Line Status Infusing 11/3/2018  7:35 AM   Phlebitis Scale 0-->no symptoms 11/3/2018  7:35 AM   Infiltration Scale 0 11/3/2018  7:35 AM   Number of days:1            Assessment:   ASA SCORE: 2 emergent   "   NPO Status: Increased aspiration risk   Documentation: Deferred   Proceeding: Proceed without further delay     Plan:   Anes. Type:  Regional     RA Details:  Labor/OB Procedure; Catheter     RA-Location/Type: Epidural (L)   Pre-Induction: None   Induction:  Not applicable   Airway: Native Airway   Access/Monitoring: PIV   Maintenance: LA-Catheter (PCES)   Emergence: N/a   Logistics: Remote Procedure; Observation/Admission     Postop Pain/Sedation Strategy:  Advanced Options: LA-Catheter     PONV Management:  Adult Risk Factors: Female, Non-Smoker  Prevention: Other (As per OB service)     CONSENT: Direct conversation   Plan and risks discussed with: Patient; Spouse   Blood Products: Consent Deferred (Minimal Blood Loss)     Comments for Plan/Consent:  Lumbar epidural catheter placement explained to the patient, risk/benefits/alternatives were discussed, all questions answered. Patient expresses understanding of risks and decides to proceed with catheter placement.                 Renal dysfunction               Ada Stone MD

## 2018-11-03 NOTE — ANESTHESIA PROCEDURE NOTES
Epidural Procedure Note    Staff:     Anesthesiologist:  ISIAH CHERRY  Location: OB     Procedure start time:  11/3/2018 10:10 AM     Procedure end time:  11/3/2018 10:35 AM   Pre-procedure checklist:   patient identified, IV checked, site marked, risks and benefits discussed, informed consent, monitors and equipment checked, pre-op evaluation, at physician/surgeon's request and post-op pain management      Correct Patient: Yes      Correct Position: Yes      Correct Site: Yes      Correct Procedure: Yes      Correct Laterality:  Yes    Site Marked:  Yes  Procedure:     Procedure:  Epidural catheter    ASA:  2 and Emergent    Diagnosis:  Active labor    Position:  Sitting    Sterile Prep: chloraprep, mask, sterile gloves and patient draped      Insertion site:  L3-4    Local skin infiltration:  1% lidocaine    amount (mL):  3    Approach:  Midline    Needle gauge (G):  17    Needle Length (in):  3.5    Block Needle Type:  Touhy    Injection Technique:  LORT saline    ELSY at (cm):  4    Attempts:  1    Redirects:  0    Catheter gauge (G):  19    Catheter threaded easily: Yes      Threaded to cm at skin:  10    Threaded in epidural space (cm):  6    Paresthesias:  No    Aspiration negative for Heme or CSF: Yes      Test dose (mL):  3     Local anesthetic:  Lidocaine 1.5% w/ 1:200,000 epinephrine    Test dose time:  10:24    Test dose negative for signs of intravascular, subdural or intrathecal injection: Yes    Assessment/Narrative:     Sensory Level Left:  T12    Sensory Level Right:  T12

## 2018-11-03 NOTE — DISCHARGE SUMMARY
[facility] Discharge Summary    Monica Puckett MRN# [id number]   Age: 23 year old YOB: 1995     Date of Admission:  2018  Date of Discharge::  18  Admitting Physician:  Yamile Arias MD  Discharge Physician:  Kathi Caicedo MD          Admission Diagnoses:   - IUP at 37w0d  - Stage V lupus nephritis, lupus cystitis  - Rheumatoid arthritis  - Major depressive disorder  - History of suicide attempt in previous pregnancy, took bottle of unisom before calling for help  - ADHD  - Varicella non-immune          Discharge Diagnosis:   - IUP at 37w0d, now delivered  - Same as above          Procedures:   Procedure(s): -   - Epidural          Medications Prior to Admission:     Prescriptions Prior to Admission   Medication Sig Dispense Refill Last Dose     azaTHIOprine (IMURAN) 50 MG tablet Take 1 tablet (50 mg) by mouth daily Labs due in 4 weeks 30 tablet 0 Past Week at Unknown time     ferrous sulfate Dried 160 (50 Fe) MG tablet Take 1 tablet by mouth 2 times daily   Past Week at Unknown time     hydroxychloroquine (PLAQUENIL) 200 MG tablet Take 2 tablets (400 mg) by mouth daily 60 tablet 11 2018 at Unknown time     Omega-3 Fatty Acids (FISH OIL PO) Take 1 tablet by mouth daily Fish oil with DHA   2018 at Unknown time     Prenatal Vit-Fe Fumarate-FA (PRENATAL MULTIVITAMIN  PLUS IRON) 27-0.8 MG TABS Take 1 tablet by mouth daily   2018 at Unknown time     ValACYclovir HCl (VALTREX PO) Take 500 mg by mouth as needed   2018 at Unknown time     VITAMIN D, CHOLECALCIFEROL, PO Take 2,000 Units by mouth daily   2018 at Unknown time     Amphetamine-Dextroamphetamine (ADDERALL PO) Take 20 mg by mouth daily   More than a month at Unknown time     Amphetamine-Dextroamphetamine (ADDERALL XR PO) Take 20 mg by mouth daily   More than a month at Unknown time     hydrOXYzine (ATARAX) 25 MG tablet Take 2-4 tablets ( mg) by mouth nightly as needed for other (pain adjunct)  Can take 25-50mg once during day but can make you sleepy (Patient not taking: Reported on 10/26/2018) 30 tablet 0 More than a month at Unknown time     predniSONE (DELTASONE) 20 MG tablet Take 1 tablet (20 mg) by mouth daily (Patient not taking: Reported on 10/26/2018) 30 tablet 1 More than a month at Unknown time     predniSONE (DELTASONE) 5 MG tablet 4tab=20 mg qd x 5 days, 3tab=15 mg qd x 5 days, 2tab=10 mg qd x 5 days, 1 tab=5 mg qd x 5 days then stop. (Patient not taking: Reported on 10/26/2018) 50 tablet 0 More than a month at Unknown time     [DISCONTINUED] oxyCODONE IR (ROXICODONE) 5 MG tablet Take 2.5 mg by mouth every 4 hours as needed   Past Month at Unknown time             Discharge Medications:      Monica Puckett   [place] Medication Instructions ELIZABETH:[id number]    Printed on:11/04/18 6937   Medication Information                      Amphetamine-Dextroamphetamine (ADDERALL PO)  Take 20 mg by mouth daily             Amphetamine-Dextroamphetamine (ADDERALL XR PO)  Take 20 mg by mouth daily             azaTHIOprine (IMURAN) 50 MG tablet  Take 1 tablet (50 mg) by mouth daily Labs due in 4 weeks             ferrous sulfate Dried 160 (50 Fe) MG tablet  Take 1 tablet by mouth 2 times daily             hydroxychloroquine (PLAQUENIL) 200 MG tablet  Take 2 tablets (400 mg) by mouth daily             hydrOXYzine (ATARAX) 25 MG tablet  Take 2-4 tablets ( mg) by mouth nightly as needed for other (pain adjunct) Can take 25-50mg once during day but can make you sleepy             ibuprofen (ADVIL/MOTRIN) 600 MG tablet  Take 1 tablet (600 mg) by mouth every 6 hours as needed for moderate pain             Omega-3 Fatty Acids (FISH OIL PO)  Take 1 tablet by mouth daily Fish oil with DHA             oxyCODONE IR (ROXICODONE) 5 MG tablet  Take 0.5 tablets (2.5 mg) by mouth every 4 hours as needed for severe pain or breakthrough pain             phenazopyridine (PYRIDIUM) 100 MG tablet  Take 1 tablet (100 mg) by mouth  "3 times daily as needed for urinary tract discomfort             predniSONE (DELTASONE) 20 MG tablet  Take 1 tablet (20 mg) by mouth daily             predniSONE (DELTASONE) 5 MG tablet  4tab=20 mg qd x 5 days, 3tab=15 mg qd x 5 days, 2tab=10 mg qd x 5 days, 1 tab=5 mg qd x 5 days then stop.             Prenatal Vit-Fe Fumarate-FA (PRENATAL MULTIVITAMIN  PLUS IRON) 27-0.8 MG TABS  Take 1 tablet by mouth daily             senna-docusate (SENOKOT-S;PERICOLACE) 8.6-50 MG per tablet  Take 1 tablet by mouth 2 times daily as needed for constipation             ValACYclovir HCl (VALTREX PO)  Take 500 mg by mouth as needed             VITAMIN D, CHOLECALCIFEROL, PO  Take 2,000 Units by mouth daily                       Consultations:     Anesthesia          Brief Admission History     Ms. Monica Puckett is a 23 year old  at 36w6d by 5w6d US, who presents with bleeding first noted when she used the toilet. She notes the toilet filled with blood. She wiped a few times and noted clot on the tissue. She feels that the bleeding came from her vagina. She also notes \"knife like\" pain in lower abdomen for the last two days, radiating to her lower back. Denies burning with urination. No cramping pain, no LOF. Good fetal movement.  Denies fevers, chills. Denies symptoms of HA, vision changes, SOB, chest pain, fevers, chills, palpitations, nausea, vomiting, RUQ pain, dysuria, constipation, diarrhea.         Brief Intrapartum Course:     Per [facility] recommendations, labor was induced. She received PO miso x2, after which pitocin was started. She was AROMed at 0600 on 11/3 for clear fluid. Initially desired fentanyl and nitrous for pain management but ultimately received an epidural. She was noted to be complete at 1238. She pushed for 5 minutes and had a spontaneous vaginal delivery of a viable male infant in OA position at 1303 on 11/3.  A nuchal cord was noted and reduced. Shoulders delivered easily. Infant with delayed cry " following stimulation. Placed on mother's chest. Apgars of 8 and 9 with weight pending. The cord was double clamped after 60 seconds and cut.  A cord segment and cord blood were obtained.  The placenta was then delivered using fundal massage and gentle traction. The uterus was noted to be firm after 20U of IV pitocin and fundal massage. The perineum was assessed for lacerations and a small second degree perineal laceration was noted.  The laceration was repaired in standard fashion using 3-0 vicryl suture. On final examination of the perineum, the repair was noted to be hemostatic. Total EBL was 100cc. The placenta appeared intact with a 3V umbilical cord. Majo Kern CNM was present for the delivery, and Dr. Vela was present for delivery of the placenta and repair.            Hospital Course:     The patient's hospital course was unremarkable.  On discharge, she reported continued bladder pain that was improved with oxycodone. Vaginal bleeding is similar to peak menstrual flow.  Voiding without difficulty.  Ambulating well and tolerating a normal diet.  No fever.  Breastfeeding well.  Infant is stable. She was discharged on post-partum day #1.    Post-partum hemoglobin: 10.9          Discharge Instructions and Follow-Up:   Discharge diet: Regular   Discharge activity: Pelvic rest for 6 weeks including no sexual intercourse, tampons, or douching.    Discharge follow-up: Follow up with nephrology postpartum in December  Follow up with your primary OB in 1 week for a mood check and in 6 weeks for a postpartum visit           Discharge Disposition:   Discharged to home in stable condition      Yina Odell MD  OB/GYN, PGY3  11/04/18     OB/GYN Staff -- Pt seen and examined by me. Agree with note as above.  MD Mireya

## 2018-11-03 NOTE — PROVIDER NOTIFICATION
11/02/18 2140   Provider Notification   Provider Name/Title Dr. JONNY Odell   Method of Notification In Department   Notification Reason Maternal Vital Sign Change   Dr. JONNY Odell notified that patient has an evelated temp of 100.0 orally.  New orders for 500 mL IV fluid bolus.

## 2018-11-03 NOTE — PROGRESS NOTES
Labor Progress Note    Monica Puckett      MRN: [id number]   Age: 23 year old YOB: 1995   Date of Admission: 2018    Subjective:  Continues to feel more uncomfortable with contractions, mostly due to back pain. Feels increased urinary frequency. Has not had more hematuria since 300.    Objective:  Patient Vitals for the past 24 hrs:   BP Temp Temp src Resp   18 0400 112/62 99.8  F (37.7  C) Oral 18   18 0115 105/51 99.7  F (37.6  C) Oral 18   18 2300 116/55 99.1  F (37.3  C) Oral 18   18 2140 110/57 100  F (37.8  C) Oral 18   18 2000 113/62 99  F (37.2  C) Oral 16   18 1810 115/59 98.4  F (36.9  C) Oral 16   18 1545 - 98.7  F (37.1  C) Oral 16   18 1525 126/67 - - -   18 1451 124/62 - - -   18 1408 129/66 - - -   18 1341 119/60 - - -   18 1257 129/59 - - -   18 1159 114/68 98.2  F (36.8  C) Oral 20   18 1150 122/60 - - -   18 1120 121/67 - - -   18 1029 112/63 - - -   18 1000 118/59 - - -   18 0806 122/53 98  F (36.7  C) Oral 20     Cvx: 3.5/80/-2, AROM clear    FHT:  bpm ,moderate variability, + accels, one late deceleration immediately after AROM. Overall reassuring with moderate variability.  [place]: 310 contractions    Assessment/Plan:  Ms. Monica Puckett is a 23 year old  at 37w0d by 5+6wk US, here for IOL d/t oma hematuria in the setting of lupus cystitis.      IOL: S/p PO miso x2. On pitocin, currently at 8. S/p AROM with clear fluid.       Hematuria in setting up lupus cystitis:   - cystitis pain: oxy 2.5mg with early ripening, pyridium PRN  - monitor bleeding w/ pad weights until ROM, Hgb 10.9, recheck PRN      FWB: Category 2 FHT d/ intermittent variable decelerations. Overall reactive and reassuring with moderate variability  - GBS neg  - Placenta posterior, EFW 6.5#, vtx by BSUS      PNC: NOB labs: Rh pos, GC, rachell negative, Hep B Ag NR, Hep C Ab NR, HIV NR,  rubella immune  -  Pt declined Tdap, influenza vaccines  - anatomy wnl  - GCT wnl  - varicella nonimmune, offer vaccine postpartum      Yina Odell MD PGY3  OB/GYN  11/3/2018 6:06 AM

## 2018-11-03 NOTE — PROGRESS NOTES
Report to Enzo ROJO in [facility]. Pt going to room 7136.  She will attempt to void prior to transfer, she still has some left sided numbness d/t the epidural. Ate lunch and had motrin

## 2018-11-04 VITALS
TEMPERATURE: 98 F | OXYGEN SATURATION: 97 % | RESPIRATION RATE: 16 BRPM | DIASTOLIC BLOOD PRESSURE: 49 MMHG | SYSTOLIC BLOOD PRESSURE: 100 MMHG | HEART RATE: 75 BPM | BODY MASS INDEX: 25.71 KG/M2 | WEIGHT: 160 LBS | HEIGHT: 66 IN

## 2018-11-04 LAB — HGB BLD-MCNC: 10.2 G/DL (ref 11.7–15.7)

## 2018-11-04 PROCEDURE — 25000132 ZZH RX MED GY IP 250 OP 250 PS 637: Performed by: STUDENT IN AN ORGANIZED HEALTH CARE EDUCATION/TRAINING PROGRAM

## 2018-11-04 PROCEDURE — 85018 HEMOGLOBIN: CPT | Performed by: STUDENT IN AN ORGANIZED HEALTH CARE EDUCATION/TRAINING PROGRAM

## 2018-11-04 PROCEDURE — 36415 COLL VENOUS BLD VENIPUNCTURE: CPT | Performed by: STUDENT IN AN ORGANIZED HEALTH CARE EDUCATION/TRAINING PROGRAM

## 2018-11-04 PROCEDURE — 25000131 ZZH RX MED GY IP 250 OP 636 PS 637: Performed by: STUDENT IN AN ORGANIZED HEALTH CARE EDUCATION/TRAINING PROGRAM

## 2018-11-04 RX ORDER — IBUPROFEN 600 MG/1
600 TABLET, FILM COATED ORAL EVERY 6 HOURS PRN
Qty: 30 TABLET | Refills: 0 | Status: SHIPPED | OUTPATIENT
Start: 2018-11-04

## 2018-11-04 RX ORDER — AMOXICILLIN 250 MG
1 CAPSULE ORAL 2 TIMES DAILY PRN
Qty: 30 TABLET | Refills: 0 | Status: SHIPPED | OUTPATIENT
Start: 2018-11-04 | End: 2022-04-05

## 2018-11-04 RX ORDER — POLYETHYLENE GLYCOL 3350 17 G/17G
17 POWDER, FOR SOLUTION ORAL DAILY
Status: DISCONTINUED | OUTPATIENT
Start: 2018-11-04 | End: 2018-11-04 | Stop reason: HOSPADM

## 2018-11-04 RX ORDER — PHENAZOPYRIDINE HYDROCHLORIDE 100 MG/1
100 TABLET, FILM COATED ORAL 3 TIMES DAILY PRN
Qty: 15 TABLET | Refills: 1 | Status: SHIPPED | OUTPATIENT
Start: 2018-11-04 | End: 2022-04-05

## 2018-11-04 RX ORDER — OXYCODONE HYDROCHLORIDE 5 MG/1
2.5 TABLET ORAL EVERY 4 HOURS PRN
Qty: 30 TABLET | Refills: 0 | Status: SHIPPED | OUTPATIENT
Start: 2018-11-04 | End: 2022-04-05

## 2018-11-04 RX ADMIN — ACETAMINOPHEN 650 MG: 325 TABLET, FILM COATED ORAL at 00:17

## 2018-11-04 RX ADMIN — OXYCODONE HYDROCHLORIDE 2.5 MG: 5 TABLET ORAL at 03:05

## 2018-11-04 RX ADMIN — IBUPROFEN 800 MG: 800 TABLET ORAL at 00:17

## 2018-11-04 RX ADMIN — IBUPROFEN 800 MG: 800 TABLET ORAL at 05:53

## 2018-11-04 RX ADMIN — IBUPROFEN 800 MG: 800 TABLET ORAL at 12:09

## 2018-11-04 RX ADMIN — OXYCODONE HYDROCHLORIDE 2.5 MG: 5 TABLET ORAL at 08:27

## 2018-11-04 RX ADMIN — ACETAMINOPHEN 650 MG: 325 TABLET, FILM COATED ORAL at 12:08

## 2018-11-04 RX ADMIN — OXYCODONE HYDROCHLORIDE 2.5 MG: 5 TABLET ORAL at 00:07

## 2018-11-04 RX ADMIN — POLYETHYLENE GLYCOL 3350 17 G: 17 POWDER, FOR SOLUTION ORAL at 08:24

## 2018-11-04 RX ADMIN — SENNOSIDES AND DOCUSATE SODIUM 2 TABLET: 8.6; 5 TABLET ORAL at 08:24

## 2018-11-04 RX ADMIN — HYDROXYCHLOROQUINE SULFATE 400 MG: 200 TABLET, FILM COATED ENTERAL at 08:21

## 2018-11-04 RX ADMIN — Medication 50 MG: at 08:27

## 2018-11-04 RX ADMIN — OXYCODONE HYDROCHLORIDE 2.5 MG: 5 TABLET ORAL at 13:53

## 2018-11-04 RX ADMIN — ACETAMINOPHEN 650 MG: 325 TABLET, FILM COATED ORAL at 05:54

## 2018-11-04 NOTE — PLAN OF CARE
Problem: Patient Care Overview  Goal: Plan of Care/Patient Progress Review  Outcome: Adequate for Discharge Date Met: 11/04/18  Data: Vital signs stable, assessments within normal limits. no signs of infection noted. Patient discharge information, medication given and  instructed of signs/symptoms to look for and report per discharge instructions.   Discharge outcomes on care plan met. No apparent pain.  Action: Review of care plan, teach back, and discharge instructions done with patient. Infant identification with ID bands done,  verification with signature obtained.   Response:  Patient states understanding and comfort with self cares. All questions about self care addressed. patient discharged at 1615.

## 2018-11-04 NOTE — PLAN OF CARE
Problem: Patient Care Overview  Goal: Plan of Care/Patient Progress Review  Outcome: Improving   Vital signs within normal limits. Postpartum checks within normal limits.Patient eating and drinking normally. Patient able to empty bladder independently and is up ambulating. Patient performing self cares and is able to care for infant. Pain well managed with oxy, tylenol and ibuprofen, see flow. Positive attachment behaviors observed with infant. FOB present. Will continue with plan of care.

## 2018-11-07 DIAGNOSIS — N30.90 CYSTITIS: Primary | ICD-10-CM

## 2018-11-07 DIAGNOSIS — M32.19 SYSTEMIC LUPUS ERYTHEMATOSUS WITH OTHER ORGAN INVOLVEMENT, UNSPECIFIED SLE TYPE (H): ICD-10-CM

## 2018-11-07 RX ORDER — OXYCODONE AND ACETAMINOPHEN 5; 325 MG/1; MG/1
1 TABLET ORAL EVERY 8 HOURS PRN
Qty: 30 TABLET | Refills: 0 | Status: SHIPPED | OUTPATIENT
Start: 2018-11-07 | End: 2018-12-28

## 2018-11-07 NOTE — PROGRESS NOTES
Discussed with OB Dr. Caicedo. She said it's OK for Monica to take percocet with breastfeeding. Monica has severe case of lupus cystitis which causes her pain on voiding and I agreed to prescribe her some percocet till we have her lupus under better control as she is in so much pain. Rx was printed and placed in RN folder.

## 2018-11-12 ENCOUNTER — TELEPHONE (OUTPATIENT)
Dept: RHEUMATOLOGY | Facility: CLINIC | Age: 23
End: 2018-11-12

## 2018-11-12 NOTE — TELEPHONE ENCOUNTER
Per patient request, script has been mailed to [facility] in [place].   Shawna Mora CMA  11/12/2018 2:11 PM

## 2018-11-14 ENCOUNTER — OFFICE VISIT (OUTPATIENT)
Dept: RHEUMATOLOGY | Facility: CLINIC | Age: 23
End: 2018-11-14
Attending: INTERNAL MEDICINE
Payer: COMMERCIAL

## 2018-11-14 ENCOUNTER — TELEPHONE (OUTPATIENT)
Dept: RHEUMATOLOGY | Facility: CLINIC | Age: 23
End: 2018-11-14

## 2018-11-14 VITALS
OXYGEN SATURATION: 97 % | TEMPERATURE: 98 F | HEART RATE: 71 BPM | SYSTOLIC BLOOD PRESSURE: 106 MMHG | BODY MASS INDEX: 23.4 KG/M2 | WEIGHT: 145 LBS | DIASTOLIC BLOOD PRESSURE: 74 MMHG

## 2018-11-14 DIAGNOSIS — N04.20 NEPHROTIC SYNDROME WITH LESION OF MEMBRANOUS GLOMERULONEPHRITIS: ICD-10-CM

## 2018-11-14 DIAGNOSIS — M32.9 SYSTEMIC LUPUS ERYTHEMATOSUS, UNSPECIFIED SLE TYPE, UNSPECIFIED ORGAN INVOLVEMENT STATUS (H): Primary | ICD-10-CM

## 2018-11-14 PROCEDURE — G0463 HOSPITAL OUTPT CLINIC VISIT: HCPCS | Mod: ZF

## 2018-11-14 RX ORDER — PREDNISONE 5 MG/1
TABLET ORAL
Qty: 30 TABLET | Refills: 3 | Status: SHIPPED | OUTPATIENT
Start: 2018-11-14 | End: 2021-11-29

## 2018-11-14 RX ORDER — HYDROXYCHLOROQUINE SULFATE 200 MG/1
400 TABLET, FILM COATED ORAL DAILY
Qty: 60 TABLET | Refills: 11 | Status: SHIPPED | OUTPATIENT
Start: 2018-11-14 | End: 2019-02-19

## 2018-11-14 RX ORDER — PREDNISONE 20 MG/1
TABLET ORAL
Qty: 90 TABLET | Refills: 1 | Status: SHIPPED | OUTPATIENT
Start: 2018-11-14 | End: 2018-12-28

## 2018-11-14 ASSESSMENT — PAIN SCALES - GENERAL: PAINLEVEL: EXTREME PAIN (8)

## 2018-11-14 NOTE — TELEPHONE ENCOUNTER
Left message requesting return call to discuss, labs NEED to be done today or tomorrow and pt needs to schedule follow up with Dr. Reina.    Flavia Mayfield RN  [facility]

## 2018-11-14 NOTE — TELEPHONE ENCOUNTER
Called and spoke with pt regarding labs.  She forgot to get them today, there is a lab in [place], will get them tomorrow.    Flavia Mayfield RN  [facility]

## 2018-11-14 NOTE — NURSING NOTE
Chief Complaint   Patient presents with     RECHECK     SLE     /74  Pulse 71  Temp 98  F (36.7  C) (Oral)  Wt 65.8 kg (145 lb)  LMP 02/11/2018  SpO2 97%  BMI 23.4 kg/m2  Maira Doe MA

## 2018-11-14 NOTE — PROGRESS NOTES
[facility] - Rheumatology Clinic Visit (urgent visit with concern for ongoing lupus cystitis, lupus flare)     Monica Puckett MRN# [id number]   YOB: 1995    Primary care provider: Una Canales  Date of last visit: 2018    DOS: 2018          Assessment and Plan:     Problem list:   # SLE w/ LN class V (diagnosed 2013 age 16 y/o), features of pleuritic chest pain, malar rash/photosensitivity, fatigue, myalgias, arthralgias, oral ulcers, significant proteinuria w/ total urine Pr/Cr to 5 (2013), with + AWILDA (1:160 speckled pattern, repeat 10/2016 neg), +RNP (48.7), previously +ds-DNA (10/2016 neg) and +Sm (10/2016 neg), normal C3/C4, chronic mild leukopenia  # perceived poor tolerance to cellcept - GI upset. Discontinued ~  2016 (about 2-3 months prior to conception)  # mild leukopenia (3.7), normal differential  # Prior treatment: high dose steroids (mostly off steroids since ), oral CYC (~2013 - 2013), HCQ (2016 - 2016, now 10/2016 - current), Cellcept (~2013 - 2016), RTX (?2014 and 2015)    Discussion:    Pt  with history of SLE with LN class V diagnosed 2013 most recently managed on HCQ.    She started having severe lower abdominal pain with dysuria during her 2nd pregnancy leading to several ER visits with neg work up for UTIs and urinary stones. Her UA showed >100 protein and finally she got diagnosed with lupus cystitis based on cytoscopy which showed severe bladder inflammation. This is a rare manifestation of lupus and could also cause GI sx that Monica had (passing mucous, GI upset).    Monica failed to respond to 20 mg prednisone taper and then 2 local steroid inj to the bladder wall on 2018 and 10/18/2018 and finally started AZA 50 mg every day in 2018 which she stopped a month ago after being on it for a month given lack of efficacy.    She gave birth (NVD) to her son Renato at 37 wk on 11/3/2018 (instead of IVET  11/20/2018) when she presented to L&D with severe bladder pain and gross hematuria. Delivery went wel land her son is healthy, she is breast feeding.    She is here today to discuss Tx options. She wants to go back on cytoxan (which she had at age 17 to control LN) and even try rituximab as both helped her. She reports not responding to MMF in the past.    Monica has not done any lupus labs for awhile and I highly advised her to do it today to assess her disease activity. I informed her that cytoxan enters the breast milk and not enough data about use of MMF in breastfeeding mothers, she plans to taper off breastfeeding and use donor milk as thinks her lupus tx is a priority.     I told her that I don't recommend cytoxan as 1st line given potential SEs of infertility (she plans to have more children in 5-6 yr from now), hemorrhagic cystitis (hard to distinguish from lupus cystitis in her case) and immunosuppression just to treat lupus cystitis. I recommend MMF as there are case reports of success using it for m/o lupus cystitis. It will not cause infertility but it's teratogenic and should not become pregnant on it. If she fails MMF, cytoxan could be used.    She reports planning for banking her eggs if she decides to use cytoxan which is going to be costy and lengthy process, also she is still breastfeeding. Therefore we agreed to try a taper of prednisone taper at higher dose of 60 mg every day and consider another local bladder steroid inj by urology. I hope after delivery and by changing hormones to baseline, she responds to steroid and would not require further tx. I advised her not to breastfeed 3-4 hr after taking prednisone.     She has her mom staying with her and her fiance took some time off work to support her.    Plan:    Continue  mg bid    Was reminded to have annual eye exam on HCQ    Labs today    Prednisone taper: [id number] mg a day each for one week then stop, wait 3-4  hours before breasfeeding    I asked her to let me know when she is done breastfeeding to plan next step, possibly a trial of MMF pending test results and urology eval on     I prescribed her percocet 5/325mg q8hr prn pain. She is tearful and in so much pain and I want to help her with pain, I checked with Dr. Caicedo OB and she said it would be ok to take percocet with breastfeeding. Monica said she does not to stay on percocet long term as it causes her constipation, N/V (has to take zofran) and she can't stand taking it long term.     at 11:30 am    Orders Placed This Encounter   Procedures     CBC with platelets differential     Comprehensive metabolic panel     DNA double stranded antibodies     Complement C4     Complement C3     Erythrocyte sedimentation rate auto     CRP inflammation     Routine UA with Micro Reflex to Culture     Protein  random urine with Creat Ratio     Creatinine random urine     Complement total                 Active Problem List:     Patient Active Problem List    Diagnosis Date Noted      (normal spontaneous vaginal delivery) 2018     Priority: Medium     Encounter for triage in pregnant patient 2018     Priority: Medium     Indication for care in labor and delivery, antepartum 2018     Priority: Medium     Labor and delivery indication for care or intervention 10/18/2018     Priority: Medium     Long-term use of Plaquenil 10/19/2017     Priority: Medium     Systemic lupus erythematosus (H) 2013     Priority: Medium     Observation after surgery 2013     Priority: Medium     Pain in joint, shoulder region 2011     Priority: Medium   Acne  Dysmenorrhea           History of Present Illness:     Patient is a 21 year old female referred by rheumatologist Dr. Ambar Muniz from [facility] for SLE and high risk pregnancy. The patient has a history of SLE with stage V LN (diagnosed age 17).   She reportedly developed a flu-like  "illness 11/2012 and had several UC visits from 11/2012 to 1/2013. She presented to the ED in 1/2013 after waking up with an erythematous facial rash and swelling of her face which was initially thought to be angioedema. She notes at the time experiencing fevers, fatigue, oral ulcer, and pleuritic chest pain. Outpatient workup was initiated and she was found to have a +AWILDA, +ds-DNA, +Sm and significant proteinuria. She was hospitalized at [facility] and underwent a renal biopsy which reportedly showed LN class V. She was referred to pediatric rheumatology and has since followed with Dr. Wyatt from 1/2013 until last year. She was previously seen by Dr. Yeh at  Pediatric nephrology, but for the past 2 years or so she has not seen a nephrologist. She was initially treated with high dose steroids (solumedrol 1g, unclear how many days), HCQ, and prednisone and reports being placed on \"chemotherapy\" pill which she thinks was cyclophosphamide for the first 4-5 months (+ a GnRH agonist). Per media tab scanned notes it appears she was given oral cyclophosphamide 50 mg tablets, but unclear dose. Notes she was then put back on prednisone 75 mg daily and was transitioned to cellcept 1000 mg BID. Notes she received RTX infusions (unclear dose and number) for what sounds like 2 cycles, last cycle received around 1/2015. She states the RTX was given due to \"flares\" of her lupus which included significant arthralgias, fatigue, skin rashes, and \"abnormal blood tests.\" She thinks her last flare was at least 2 years ago and that her lupus has otherwise been very well controlled. She self-stopped both her HCQ and cellcept about 2-3 months ago due to some GI upset that got to be \"annyoing.\"     She had a pregnancy test that resulted positive and she was referred back to rheumatology. She was evaluated by Dr. Muniz from [facility] adult rheumatology where extensive workup including repeat blood tests w/ serology, kidney " "function, UA were obtained, which all resulted mostly normal other than +RNP. Her AWILDA, ds-DNA, C3/C4, SSA/SSB, Sm were all negative. UA was negative for protein, blood, or WBC. No total urine Pr/Cr ratio was obtained, but per chart review, this was last >0.5 g in 2/2013. She was told to restart her HCQ at 400 mg QD and was referred to lupus clinic here at the [facility].     She notes very intermittent arthralgias of her hands \"just here and there,\" some fatigue (which she attributes to being pregnant), and some lower abdominal cramping pain that is intermittent and severe for the past 2-3 weeks lasting seconds to 3 minutes occuring 2x per day. She had a TVUS yesterday which showed a normal gestational sac measuring around 5 weeks. She has not yet seen an OB. Estimated due date June 24/2016.     2/8/2017: Patient was hospitalized 4 weeks ago for bronchitis/severe cough that was unresponsive to antibiotics. There was some concern for DVT at that time but ultrasound was negative. Patient has been having intermittent malar rash along with intermittent swelling, myalgias and arthralgias that are predominantly in the hands but also in the ankles. Patient feels that her energy level has been about the same as previous. Chest pain is not bad currently, but has recently been increased, associated with cough. Negative for oral ulcers. Patient was recently seen several days ago in [facility] ED for medication overdose but she threw up most of the pills and was discharged to Mom the same day. Patient has been taking her Plaquenil daily but has forgotten every once in a while.     6/2017: She delivered her son (arianna) on 6/19/2017, had NVD with no complications. She is doing both breast feeding and formula.   Reports throbbing pain over L flank since delivery. Pain is not improving, percocet helps to ease the pain.  Hands are puffy and swollen and gets gelling. AM stiffness is 10-15 minutes.  No oral ulcers. No skin rash today but " had the malar rash intermittently.  No hair loss.  No pleuritic CP.  Ran out of HCQ 4 days ago.    3/2018: She reports she recently found out she was pregnant. Her LMP was 2/12 giving an estimated delivery date of 11/19/2018. She is currently ~5 weeks gestational age. She has been feeling lightheaded and fatigued. She had a runny nose and cough for the past two weeks, which she says is finally improved. She reports a mild rash on her face. She says her joint pain fluctuates, today it is not too bad. She feels it most in her wrists and knees. She has felt some L abd pain. She denies dysuria.    7/2018: Today, Ms. Puckett is currently 22 weeks pregnant with a expected delivery date of November 20th, 2018. She complains of extreme pain with urination and right sided flank pain. The patient says the symptoms have been present for approximately 3 months. She has had 4 visits to the ED during this time for these symptoms, one of those times resulting in a 3 day admission where she was started on IV antibiotics and sent home with a catheter to use for a week. She feels the same symptoms now, except that the flank pain has began to appear in her left side as well, though not as excruciatingly painful as the right side. She says she has been given numerous antibiotics and the work up for this has been negative for UTI multiple times. There is concern for lupus cystitis from her OBGYN at this point as well. She also complains of DIP swelling, return of her malar rash, vomiting, low grade fever, increased sensation of bladder pressure, dizziness, URI-like symptoms, SOB due to pain with inhalation from her flank, and bladder spasms. She is crying currently from the pain at the time of this examination. Her UA from this visit shows a large number of proteins. UA also shows >100 WBC and many bacteria, there is a culture that is currently pending among other lab work.     9/2018: 29 wk pregnant with IVET of 11/20/2018. Continues to  have severe pain on urination. Scheduled for cystoscopy today. Flanke pain improved after prednisone 20 mg every day taper.     Today: for active lupus cystis, I recommended AZA long time ago, it took her a while to do TPMT testing (NL), then she started taking it at 50 mg every day in 2018 but reports stopping it a month ago given lack of efficacy.    She had 2 local bladder steroid inj since last visit, done by urology which has not helped.    She delivered her son Renato at 37 weeks on 11/3/2018 when she presented with gross hematuria and severe pain sec lupus cystitis. Her son is healthy, she had NVD and recovered well.    Monica is not feeling well, still dealing with active lupus cystitis causing her severe pain/discomfort even at rest which gets worse with voiding, no gross hematuria.    Hands are puffy. Has fatigue. On the leave from work.    Reports stomach upset and passing white mucous with her stool.    Worried about her bladder inflammation causes scars and chronic pain in future, asks about percocet at higher dose of 7.5 mg as that's the only med that has helped to relieve the pain and allow her to rest and take care of her  son.    She is asking for cytoxan and rituximab as both helped to control her lupus in the past, she never had lupus cystitis till now. She has no immediate pregnancy plans, but 5-6 yr from now might want to have more kids.    Currently is breastfeeding. Her other son in 1.4 yo and she did breastfeeding x 1 month and plans the same for her son but is open to use the donor breastmilk if has to stop to take meds to control her lupus.         Review of Systems:   A comprehensive ROS was done. Positives are per HPI.             Past Medical History:   PMH - acne, dysmenorrhea, SLE  PSH - wisdom tooth extraction, renal biopsy 2013  Injuries - prior fracture       Social History:     Social History     Occupational History      [employer]      Social History Main Topics      Smoking status: Never Smoker     Smokeless tobacco: Never Used      Comment: no second hand smoke exposure at home     Alcohol use No     Drug use: No     Sexual activity: Yes     Partners: Male   Working as a          Family History:   Mother - arthritis, likely OA  MGM - arthritis, likely OA         Allergies:   Estrogens  Influenza tri-split 08-09 vac  Measles/mumps/rubella vacc  Varicella virus vacc live    Allergies   Allergen Reactions     Estrogens Other (See Comments)     Lupus flares     No Clinical Screening - See Comments Other (See Comments) and Rash     She is immunosuppressed   Gets very sick from flu vaccines (or any vaccine)     mmr     Varicella Virus Vaccine Live Other (See Comments)     She is immunosuppressed               Medications:     Current Outpatient Prescriptions   Medication Sig Dispense Refill     Amphetamine-Dextroamphetamine (ADDERALL PO) Take 20 mg by mouth daily       Amphetamine-Dextroamphetamine (ADDERALL XR PO) Take 20 mg by mouth daily       azaTHIOprine (IMURAN) 50 MG tablet Take 1 tablet (50 mg) by mouth daily Labs due in 4 weeks 30 tablet 0     ferrous sulfate Dried 160 (50 Fe) MG tablet Take 1 tablet by mouth 2 times daily       hydroxychloroquine (PLAQUENIL) 200 MG tablet Take 2 tablets (400 mg) by mouth daily 60 tablet 11     hydrOXYzine (ATARAX) 25 MG tablet Take 2-4 tablets ( mg) by mouth nightly as needed for other (pain adjunct) Can take 25-50mg once during day but can make you sleepy (Patient not taking: Reported on 10/26/2018) 30 tablet 0     ibuprofen (ADVIL/MOTRIN) 600 MG tablet Take 1 tablet (600 mg) by mouth every 6 hours as needed for moderate pain 30 tablet 0     Omega-3 Fatty Acids (FISH OIL PO) Take 1 tablet by mouth daily Fish oil with DHA       oxyCODONE IR (ROXICODONE) 5 MG tablet Take 0.5 tablets (2.5 mg) by mouth every 4 hours as needed for severe pain or breakthrough pain 30 tablet 0     oxyCODONE-acetaminophen (PERCOCET) 5-325 MG  per tablet Take 1 tablet by mouth every 8 hours as needed for pain 30 tablet 0     phenazopyridine (PYRIDIUM) 100 MG tablet Take 1 tablet (100 mg) by mouth 3 times daily as needed for urinary tract discomfort 15 tablet 1     predniSONE (DELTASONE) 20 MG tablet Take 1 tablet (20 mg) by mouth daily (Patient not taking: Reported on 10/26/2018) 30 tablet 1     predniSONE (DELTASONE) 5 MG tablet 4tab=20 mg qd x 5 days, 3tab=15 mg qd x 5 days, 2tab=10 mg qd x 5 days, 1 tab=5 mg qd x 5 days then stop. (Patient not taking: Reported on 10/26/2018) 50 tablet 0     Prenatal Vit-Fe Fumarate-FA (PRENATAL MULTIVITAMIN  PLUS IRON) 27-0.8 MG TABS Take 1 tablet by mouth daily       senna-docusate (SENOKOT-S;PERICOLACE) 8.6-50 MG per tablet Take 1 tablet by mouth 2 times daily as needed for constipation 30 tablet 0     ValACYclovir HCl (VALTREX PO) Take 500 mg by mouth as needed       VITAMIN D, CHOLECALCIFEROL, PO Take 2,000 Units by mouth daily              Physical Exam:   Blood pressure 106/74, pulse 71, temperature 98  F (36.7  C), temperature source Oral, weight 65.8 kg (145 lb), last menstrual period 2018, SpO2 97 %, currently breastfeeding.  Wt Readings from Last 4 Encounters:   18 65.8 kg (145 lb)   18 72.6 kg (160 lb)   10/26/18 72.6 kg (160 lb)   10/18/18 73 kg (160 lb 15 oz)     BP Readings from Last 3 Encounters:   18 106/74   18 100/49   10/26/18 110/74       Constitutional: in pain, NAD, holding his  son Renato  Eyes: PERRLA, normal conjunctiva, sclera  ENT: nl external ears, nose, lips. No mucous membrane lesions, normal saliva pool  Neck: no cervical or supraclavicular lymphadenopathy  Resp: Lungs clear to ausculation,  CV: RRR, no m/r/g, no LE edema  GI: soft, NT abdomen  Lymph: no cervical, supraclavicular nodes  MS: no active synovitis or joint tenderness or joint deformities,  Skin: no malar rash or other rashes  Psych: tearful         Data:     Component      Latest Ref Rng &  Units 6/6/2017   WBC      4.0 - 11.0 10e9/L 9.2   RBC Count      3.8 - 5.2 10e12/L 3.58 (L)   Hemoglobin      11.7 - 15.7 g/dL 11.4 (L)   Hematocrit      35.0 - 47.0 % 33.6 (L)   MCV      78 - 100 fl 94   MCH      26.5 - 33.0 pg 31.8   MCHC      31.5 - 36.5 g/dL 33.9   RDW      10.0 - 15.0 % 12.5   Platelet Count      150 - 450 10e9/L 290   Diff Method       Automated Method   % Neutrophils      % 77.8   % Lymphocytes      % 14.9   % Monocytes      % 6.7   % Eosinophils      % 0.5   % Basophils      % 0.1   Absolute Neutrophil      1.6 - 8.3 10e9/L 7.1   Absolute Lymphocytes      0.8 - 5.3 10e9/L 1.4   Absolute Monocytes      0.0 - 1.3 10e9/L 0.6   Absolute Eosinophils      0.0 - 0.7 10e9/L 0.1   Absolute Basophils      0.0 - 0.2 10e9/L 0.0   Color Urine       Yellow   Appearance Urine       Clear   Glucose Urine      NEG mg/dL Negative   Bilirubin Urine      NEG Negative   Ketones Urine      NEG mg/dL Negative   Specific Gravity Urine      1.003 - 1.035 1.020   Blood Urine      NEG Negative   pH Urine      5.0 - 7.0 pH 5.5   Protein Albumin Urine      NEG mg/dL Negative   Urobilinogen Urine      0.2 - 1.0 EU/dL 0.2   Nitrite Urine      NEG Negative   Leukocyte Esterase Urine      NEG Negative   Source       Midstream Urine   Creatinine      0.52 - 1.04 mg/dL 0.50 (L)   GFR Estimate      >60 mL/min/1.7m2 >90 . . .   GFR Estimate If Black      >60 mL/min/1.7m2 >90 . . .   Protein Random Urine      g/L 0.14   Protein Total Urine g/gr Creatinine      0 - 0.2 g/g Cr 0.12   Albumin      3.4 - 5.0 g/dL 2.9 (L)   ALT      0 - 50 U/L 16   AST      0 - 45 U/L 18   Complement C3      76 - 169 mg/dL 153   Complement C4      15 - 50 mg/dL 29   CRP Inflammation      0.0 - 8.0 mg/L 3.2   Sed Rate      0 - 20 mm/h 47 (H)   DNA-ds      <10 IU/mL 2   Creatinine Urine Random      mg/dL 130   Uric Acid      2.6 - 6.0 mg/dL 3.1   Complement CH50 Total       38 (L)     Results for orders placed or performed during the hospital encounter  of 18   CBC with platelets   Result Value Ref Range    WBC 10.3 4.0 - 11.0 10e9/L    RBC Count 3.82 3.8 - 5.2 10e12/L    Hemoglobin 10.9 (L) 11.7 - 15.7 g/dL    Hematocrit 34.5 (L) 35.0 - 47.0 %    MCV 90 78 - 100 fl    MCH 28.5 26.5 - 33.0 pg    MCHC 31.6 31.5 - 36.5 g/dL    RDW 13.9 10.0 - 15.0 %    Platelet Count 293 150 - 450 10e9/L   Fibrinogen activity   Result Value Ref Range    Fibrinogen 527 (H) 200 - 420 mg/dL   INR   Result Value Ref Range    INR 0.90 0.86 - 1.14   Partial thromboplastin time   Result Value Ref Range    PTT 30 22 - 37 sec   Drug abuse scrn 7 UR (/) (RH, SH, UR)   Result Value Ref Range    Amphetamine Qual Urine Negative NEG^Negative    Cannabinoids Qual Urine Negative NEG^Negative    Cocaine Qual Urine Negative NEG^Negative    Opiates Qualitative Urine Negative NEG^Negative    Pcp Qual Urine Negative NEG^Negative   UA with Microscopic reflex to Culture   Result Value Ref Range    Color Urine Red     Appearance Urine Cloudy     Glucose Urine Negative NEG^Negative mg/dL    Bilirubin Urine Negative NEG^Negative    Ketones Urine Negative NEG^Negative mg/dL    Specific Gravity Urine 1.025 1.003 - 1.035    Blood Urine Large (A) NEG^Negative    pH Urine 7.5 (H) 5.0 - 7.0 pH    Protein Albumin Urine 300 (A) NEG^Negative mg/dL    Urobilinogen mg/dL Normal 0.0 - 2.0 mg/dL    Nitrite Urine Negative NEG^Negative    Leukocyte Esterase Urine Small (A) NEG^Negative    Source Midstream Urine     WBC Urine 4952 (H) 0 - 5 /HPF    RBC Urine >182 (H) 0 - 2 /HPF    Transitional Epi 24 (H) 0 - 1 /HPF   Treponema Abs w Reflex to RPR and Titer   Result Value Ref Range    Treponema Antibodies Nonreactive NR^Nonreactive   AST   Result Value Ref Range    AST 10 0 - 45 U/L   ALT   Result Value Ref Range    ALT 12 0 - 50 U/L   Creatinine   Result Value Ref Range    Creatinine 0.54 0.52 - 1.04 mg/dL    GFR Estimate >90 >60 mL/min/1.7m2    GFR Estimate If Black >90 >60 mL/min/1.7m2   Hemoglobin    Result Value Ref Range    Hemoglobin 10.2 (L) 11.7 - 15.7 g/dL   Fetal hemoglobin stain Kleihauer   Result Value Ref Range    Kleihauer-Betke       No fetal cells seen  Rhogam not required  Patient Rh positive  Test performed at [facility]     ABO/Rh type and screen   Result Value Ref Range    ABO O     RH(D) Pos     Antibody Screen Neg     Test Valid Only At          [facility]    Specimen Expires 11/05/2018    Urine Culture Aerobic Bacterial   Result Value Ref Range    Specimen Description Midstream Urine     Special Requests Specimen received in preservative     Culture Micro       10,000 to 50,000 colonies/mL  mixed urogenital wayne  Susceptibility testing not routinely done       US OB 10/25/2016:  1. Gestational sac present measuring 5 weeks and 6 days..  2. No adnexal masses are seen.    Prior workup through [facility] reviewed 10/26/2016:  Positive/abnormal:  AWILDA 1/2013 1:160 speckled, repeat AWILDA neg 10/24/2016  RNP 48.7  CBC - WBC 3.7 w/ normal diff  ESR 45 (4/2013), no repeat  Negative/normal:  Bustos (reportedly initially +)  SSA, SSB  ANCA  Lupus anticoagulant screen abnormal, but confirmatory testing negative  Cardiolipin IgA/G/M neg  Beta 2 GP1 Ab IgA/G/M neg  C3/C4 (never abnormal)  Cr - 0.68, lytes normal   CK  HCV  HIV  HLA-B27  LFT's normal  Quant gold negative  RF negative x2  Anti-CCP negative x2  TSH   RPR  Prior Lyme  TTG  Ds-DNA neg x6 (reportedly initially +)  UA - trace LE, otherwise negative for protein/blood/WBC  Hgb, plts  CRP < 2.9    Component      Latest Ref Rng & Units 11/2/2018   Color Urine       Red   Appearance Urine       Cloudy   Glucose Urine      NEG:Negative mg/dL Negative   Bilirubin Urine      NEG:Negative Negative   Ketones Urine      NEG:Negative mg/dL Negative   Specific Gravity Urine      1.003 - 1.035 1.025   Blood Urine      NEG:Negative Large (A)   pH Urine      5.0 - 7.0 pH 7.5 (H)   Protein Albumin Urine       NEG:Negative mg/dL 300 (A)   Urobilinogen mg/dL      0.0 - 2.0 mg/dL Normal   Nitrite Urine      NEG:Negative Negative   Leukocyte Esterase Urine      NEG:Negative Small (A)   Source       Midstream Urine   WBC Urine      0 - 5 /HPF 4952 (H)   RBC Urine      0 - 2 /HPF >182 (H)   Transitional Epi      0 - 1 /HPF 24 (H)   WBC      4.0 - 11.0 10e9/L 10.3   RBC Count      3.8 - 5.2 10e12/L 3.82   Hemoglobin      11.7 - 15.7 g/dL 10.9 (L)   Hematocrit      35.0 - 47.0 % 34.5 (L)   MCV      78 - 100 fl 90   MCH      26.5 - 33.0 pg 28.5   MCHC      31.5 - 36.5 g/dL 31.6   RDW      10.0 - 15.0 % 13.9   Platelet Count      150 - 450 10e9/L 293   Amphetamine Qual Urine      NEG:Negative Negative   Cannabinoids Qual Urine      NEG:Negative Negative   Cocaine Qual Urine      NEG:Negative Negative   Opiates Qualitative Urine      NEG:Negative Negative   Pcp Qual Urine      NEG:Negative Negative   ABO       O   RH(D)       Pos   Antibody Screen       Neg   Test Valid Only At       [facility]   Specimen Expires       11/05/2018   Specimen Description       Midstream Urine   Special Requests       Specimen received in preservative   Culture Micro       10,000 to 50,000 colonies/mL . . .   Creatinine      0.52 - 1.04 mg/dL 0.54   GFR Estimate      >60 mL/min/1.7m2 >90   GFR Estimate If Black      >60 mL/min/1.7m2 >90   Fibrinogen      200 - 420 mg/dL 527 (H)   Kleihauer-Betke       No fetal cells seen . . .   INR      0.86 - 1.14 0.90   PTT      22 - 37 sec 30   Treponema Antibodies      NR:Nonreactive Nonreactive   AST      0 - 45 U/L 10   ALT      0 - 50 U/L 12

## 2018-11-14 NOTE — MR AVS SNAPSHOT
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          Care Instructions    Labs today    Prednisone taper: 43-05-33-40-97-12-20-15-10-5 mg a day each for one week then stop, wait 3-4 hours before breasfeeding    Let me know when you are done breastfeeding, to consider cellcept or cytoxan    Let me know if the pain med does not help    12/7 at 11:30 am          Follow-ups after your visit        Your next 10 appointments already scheduled     Nov 21, 2018  1:00 PM CST   Return Urogyn with Ismael Valadez MD   [facility] ([facility])    [facility]  [address] [place] [address], [address]  [facility] [address] [phone]           Please call [facility] , [phone], with any questions or concerns you may have regarding your appointment            Dec 28, 2018  1:00 PM CST   (Arrive by 12:45 PM)   RETURN LUPUS with Chente Reina MD   [facility] ([facility])    [address] [place] [address]  [address]  [facility] [address] [phone]              Future tests that were ordered for you today     Open Future Orders        Priority Expected Expires Ordered    Complement total Routine  5/16/2019 11/14/2018    Complement C3 Routine  5/16/2019 11/14/2018    Erythrocyte sedimentation rate auto Routine  5/16/2019 11/14/2018    CRP inflammation Routine  5/16/2019 11/14/2018    Routine UA with Micro Reflex to Culture Routine  5/16/2019 11/14/2018    Protein  random urine with Creat Ratio Routine  5/16/2019  11/14/2018    Creatinine random urine Routine  5/16/2019 11/14/2018    CBC with platelets differential Routine  5/16/2019 11/14/2018    Comprehensive metabolic panel Routine  5/16/2019 11/14/2018    DNA double stranded antibodies Routine  5/16/2019 11/14/2018    Complement C4 Routine  5/16/2019 11/14/2018            Who to contact     If you have questions or need follow up information about today's clinic visit or your schedule please contact [facility] directly at [phone].  Normal or non-critical lab and imaging results will be communicated to you by HyprKeyhart, letter or phone within 4 business days after the clinic has received the results. If you do not hear from us within 7 days, please contact the clinic through Sooligan or phone. If you have a critical or abnormal lab result, we will notify you by phone as soon as possible.  Submit refill requests through Sooligan or call your pharmacy and they will forward the refill request to us. Please allow 3 business days for your refill to be completed.          Additional Information About Your Visit        Sooligan Information     S[employer]igan gives you secure access to your electronic health record. If you see a primary care provider, you can also send messages to your care team and make appointments. If you have questions, please call your primary care clinic.  If you do not have a primary care provider, please call [phone] and they will assist you.        Care EveryWhere ID     This is your Care EveryWhere ID. This could be used by other organizations to access your [facility] medical records  [id number]        Your Vitals Were     Pulse Temperature Last Period Pulse Oximetry BMI (Body Mass Index)       71 98  F (36.7  C) (Oral) 02/11/2018 97% 23.4 kg/m2        Blood Pressure from Last 3 Encounters:   11/14/18 106/74   11/04/18 100/49   10/26/18 110/74    Weight from Last 3 Encounters:   11/14/18 65.8 kg (145 lb)   11/02/18 72.6 kg (160 lb)   10/26/18  72.6 kg (160 lb)                 Today's Medication Changes          These changes are accurate as of 11/14/18  1:54 PM.  If you have any questions, ask your nurse or doctor.               These medicines have changed or have updated prescriptions.        Dose/Directions    * predniSONE 20 MG tablet   Commonly known as:  DELTASONE   This may have changed:    - how much to take  - how to take this  - when to take this  - additional instructions   Used for:  Systemic lupus erythematosus, unspecified SLE type, unspecified organ involvement status (H)   Changed by:  Chente Reina MD        59-89-45-31-04-26-20-15-10-5 mg a day each for one week then stop   Quantity:  90 tablet   Refills:  1       * predniSONE 5 MG tablet   Commonly known as:  DELTASONE   This may have changed:  additional instructions   Used for:  Systemic lupus erythematosus, unspecified SLE type, unspecified organ involvement status (H)   Changed by:  Chente Reina MD        79-83-76-28-56-43-20-15-10-5 mg a day each for one week then stop   Quantity:  30 tablet   Refills:  3       * Notice:  This list has 2 medication(s) that are the same as other medications prescribed for you. Read the directions carefully, and ask your doctor or other care provider to review them with you.      Stop taking these medicines if you haven't already. Please contact your care team if you have questions.     azaTHIOprine 50 MG tablet   Commonly known as:  IMURAN   Stopped by:  Chente Reina MD                Where to get your medicines      These medications were sent to [facility] #[id number] - [place], [place] - [address], [facility] [address]     Phone:  [phone]     hydroxychloroquine 200 MG tablet    predniSONE 20 MG tablet    predniSONE 5 MG tablet                Primary Care Provider Office Phone # Fax #    Amy Schumer, [phone]       [address] [address]         Equal Access to Services     [facility] : Hadii aad ku hadcalvinparvez Tobiali, waannada luqadaha, qaybta kaangelikakiesha carrington. So [facility] [phone].    ATENCIÓN: Si habla fausto, tiene a napoles disposición servicios gratuitos de asistencia lingüística. Timothyame al [phone].    We comply with applicable federal civil rights laws and [place] laws. We do not discriminate on the basis of race, color, national origin, age, disability, sex, sexual orientation, or gender identity.            Thank you!     Thank you for choosing [facility]  for your care. Our goal is always to provide you with excellent care. Hearing back from our patients is one way we can continue to improve our services. Please take a few minutes to complete the written survey that you may receive in the mail after your visit with us. Thank you!             Your Updated Medication List - Protect others around you: Learn how to safely use, store and throw away your medicines at www.disposemymeds.org.          This list is accurate as of 11/14/18  1:54 PM.  Always use your most recent med list.                   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       hydrOXYzine 25 MG tablet    ATARAX    30 tablet    Take 2-4 tablets ( mg) by mouth nightly as needed for other (pain adjunct) Can take 25-50mg once during day but can make you sleepy    Acute cystitis without  hematuria,  contractions       ibuprofen 600 MG tablet    ADVIL/MOTRIN    30 tablet    Take 1 tablet (600 mg) by mouth every 6 hours as needed for moderate pain     (normal spontaneous vaginal delivery)       oxyCODONE IR 5 MG tablet    ROXICODONE    30 tablet    Take 0.5 tablets (2.5 mg) by mouth every 4 hours as needed for severe pain or breakthrough pain    Acute cystitis with hematuria       oxyCODONE-acetaminophen 5-325 MG per tablet    PERCOCET    30 tablet    Take 1 tablet by mouth every 8 hours as needed for pain    Cystitis, Systemic lupus erythematosus with other organ involvement, unspecified SLE type (H)       phenazopyridine 100 MG tablet    PYRIDIUM    15 tablet    Take 1 tablet (100 mg) by mouth 3 times daily as needed for urinary tract discomfort    Acute cystitis with hematuria       * predniSONE 20 MG tablet    DELTASONE    90 tablet    64-46-14-80-80-60-20-15-10-5 mg a day each for one week then stop    Systemic lupus erythematosus, unspecified SLE type, unspecified organ involvement status (H)       * predniSONE 5 MG tablet    DELTASONE    30 tablet    65-61-34-63-57-54-20-15-10-5 mg a day each for one week then stop    Systemic lupus erythematosus, unspecified SLE type, unspecified organ involvement status (H)       prenatal multivitamin plus iron 27-0.8 MG Tabs per tablet      Take 1 tablet by mouth daily        senna-docusate 8.6-50 MG per tablet    SENOKOT-S;PERICOLACE    30 tablet    Take 1 tablet by mouth 2 times daily as needed for constipation     (normal spontaneous vaginal delivery)       VALTREX PO      Take 500 mg by mouth as needed        VITAMIN D (CHOLECALCIFEROL) PO      Take 2,000 Units by mouth daily        * Notice:  This list has 4 medication(s) that are the same as other medications prescribed for you. Read the directions carefully, and ask your doctor or other care provider to review them with you.

## 2018-11-14 NOTE — LETTER
2018      RE: Monica Puckett  [id number]  [facility] [address]       [facility] - Rheumatology Clinic Visit (urgent visit with concern for ongoing lupus cystitis, lupus flare)     Monica Puckett MRN# [id number]   YOB: 1995    Primary care provider: Una Canales  Date of last visit: 2018    DOS: 2018          Assessment and Plan:     Problem list:   # SLE w/ LN class V (diagnosed 2013 age 16 y/o), features of pleuritic chest pain, malar rash/photosensitivity, fatigue, myalgias, arthralgias, oral ulcers, significant proteinuria w/ total urine Pr/Cr to 5 (2013), with + AWILDA (1:160 speckled pattern, repeat 10/2016 neg), +RNP (48.7), previously +ds-DNA (10/2016 neg) and +Sm (10/2016 neg), normal C3/C4, chronic mild leukopenia  # perceived poor tolerance to cellcept - GI upset. Discontinued ~  2016 (about 2-3 months prior to conception)  # mild leukopenia (3.7), normal differential  # Prior treatment: high dose steroids (mostly off steroids since ), oral CYC (~2013 - 2013), HCQ (2016 - 2016, now 10/2016 - current), Cellcept (~2013 - 2016), RTX (?2014 and 2015)    Discussion:    Pt  with history of SLE with LN class V diagnosed 2013 most recently managed on HCQ.    She started having severe lower abdominal pain with dysuria during her 2nd pregnancy leading to several ER visits with neg work up for UTIs and urinary stones. Her UA showed >100 protein and finally she got diagnosed with lupus cystitis based on cytoscopy which showed severe bladder inflammation. This is a rare manifestation of lupus and could also cause GI sx that Monica had (passing mucous, GI upset).    Monica failed to respond to 20 mg prednisone taper and then 2 local steroid inj to the bladder wall on 2018 and 10/18/2018 and finally started AZA 50 mg every day in 2018 which she stopped a month ago after being on it for a month given lack of  efficacy.    She gave birth (NVD) to her son Renato at 37 wk on 11/3/2018 (instead of IVET 11/20/2018) when she presented to L&D with severe bladder pain and gross hematuria. Delivery went wel land her son is healthy, she is breast feeding.    She is here today to discuss Tx options. She wants to go back on cytoxan (which she had at age 17 to control LN) and even try rituximab as both helped her. She reports not responding to MMF in the past.    Monica has not done any lupus labs for awhile and I highly advised her to do it today to assess her disease activity. I informed her that cytoxan enters the breast milk and not enough data about use of MMF in breastfeeding mothers, she plans to taper off breastfeeding and use donor milk as thinks her lupus tx is a priority.     I told her that I don't recommend cytoxan as 1st line given potential SEs of infertility (she plans to have more children in 5-6 yr from now), hemorrhagic cystitis (hard to distinguish from lupus cystitis in her case) and immunosuppression just to treat lupus cystitis. I recommend MMF as there are case reports of success using it for m/o lupus cystitis. It will not cause infertility but it's teratogenic and should not become pregnant on it. If she fails MMF, cytoxan could be used.    She reports planning for banking her eggs if she decides to use cytoxan which is going to be costy and lengthy process, also she is still breastfeeding. Therefore we agreed to try a taper of prednisone taper at higher dose of 60 mg every day and consider another local bladder steroid inj by urology. I hope after delivery and by changing hormones to baseline, she responds to steroid and would not require further tx. I advised her not to breastfeed 3-4 hr after taking prednisone.     She has her mom staying with her and her fiance took some time off work to support her.    Plan:    Continue  mg bid    Was reminded to have annual eye exam on HCQ    Labs  today    Prednisone taper: 44-45-99-20-52-97-20-15-10-5 mg a day each for one week then stop, wait 3-4 hours before breasfeeding    I asked her to let me know when she is done breastfeeding to plan next step, possibly a trial of MMF pending test results and urology eval on     I prescribed her percocet 5/325mg q8hr prn pain. She is tearful and in so much pain and I want to help her with pain, I checked with Dr. Caicedo OB and she said it would be ok to take percocet with breastfeeding. Monica said she does not to stay on percocet long term as it causes her constipation, N/V (has to take zofran) and she can't stand taking it long term.     at 11:30 am    Orders Placed This Encounter   Procedures     CBC with platelets differential     Comprehensive metabolic panel     DNA double stranded antibodies     Complement C4     Complement C3     Erythrocyte sedimentation rate auto     CRP inflammation     Routine UA with Micro Reflex to Culture     Protein  random urine with Creat Ratio     Creatinine random urine     Complement total                 Active Problem List:     Patient Active Problem List    Diagnosis Date Noted      (normal spontaneous vaginal delivery) 2018     Priority: Medium     Encounter for triage in pregnant patient 2018     Priority: Medium     Indication for care in labor and delivery, antepartum 2018     Priority: Medium     Labor and delivery indication for care or intervention 10/18/2018     Priority: Medium     Long-term use of Plaquenil 10/19/2017     Priority: Medium     Systemic lupus erythematosus (H) 2013     Priority: Medium     Observation after surgery 2013     Priority: Medium     Pain in joint, shoulder region 2011     Priority: Medium   Acne  Dysmenorrhea           History of Present Illness:     Patient is a 21 year old female referred by rheumatologist Dr. Ambar Muniz from [facility] for SLE and high risk pregnancy. The  "patient has a history of SLE with stage V LN (diagnosed age 17).   She reportedly developed a flu-like illness 11/2012 and had several UC visits from 11/2012 to 1/2013. She presented to the ED in 1/2013 after waking up with an erythematous facial rash and swelling of her face which was initially thought to be angioedema. She notes at the time experiencing fevers, fatigue, oral ulcer, and pleuritic chest pain. Outpatient workup was initiated and she was found to have a +AWILDA, +ds-DNA, +Sm and significant proteinuria. She was hospitalized at [facility] and underwent a renal biopsy which reportedly showed LN class V. She was referred to pediatric rheumatology and has since followed with Dr. Wyatt from 1/2013 until last year. She was previously seen by Dr. Yeh at  Pediatric nephrology, but for the past 2 years or so she has not seen a nephrologist. She was initially treated with high dose steroids (solumedrol 1g, unclear how many days), HCQ, and prednisone and reports being placed on \"chemotherapy\" pill which she thinks was cyclophosphamide for the first 4-5 months (+ a GnRH agonist). Per media tab scanned notes it appears she was given oral cyclophosphamide 50 mg tablets, but unclear dose. Notes she was then put back on prednisone 75 mg daily and was transitioned to cellcept 1000 mg BID. Notes she received RTX infusions (unclear dose and number) for what sounds like 2 cycles, last cycle received around 1/2015. She states the RTX was given due to \"flares\" of her lupus which included significant arthralgias, fatigue, skin rashes, and \"abnormal blood tests.\" She thinks her last flare was at least 2 years ago and that her lupus has otherwise been very well controlled. She self-stopped both her HCQ and cellcept about 2-3 months ago due to some GI upset that got to be \"annyoing.\"     She had a pregnancy test that resulted positive and she was referred back to rheumatology. She was evaluated by Dr. Muniz " "from [facility] adult rheumatology where extensive workup including repeat blood tests w/ serology, kidney function, UA were obtained, which all resulted mostly normal other than +RNP. Her AWILDA, ds-DNA, C3/C4, SSA/SSB, Sm were all negative. UA was negative for protein, blood, or WBC. No total urine Pr/Cr ratio was obtained, but per chart review, this was last >0.5 g in 2/2013. She was told to restart her HCQ at 400 mg QD and was referred to lupus clinic here at the [facility].     She notes very intermittent arthralgias of her hands \"just here and there,\" some fatigue (which she attributes to being pregnant), and some lower abdominal cramping pain that is intermittent and severe for the past 2-3 weeks lasting seconds to 3 minutes occuring 2x per day. She had a TVUS yesterday which showed a normal gestational sac measuring around 5 weeks. She has not yet seen an OB. Estimated due date June 24/2016.     2/8/2017: Patient was hospitalized 4 weeks ago for bronchitis/severe cough that was unresponsive to antibiotics. There was some concern for DVT at that time but ultrasound was negative. Patient has been having intermittent malar rash along with intermittent swelling, myalgias and arthralgias that are predominantly in the hands but also in the ankles. Patient feels that her energy level has been about the same as previous. Chest pain is not bad currently, but has recently been increased, associated with cough. Negative for oral ulcers. Patient was recently seen several days ago in [facility] ED for medication overdose but she threw up most of the pills and was discharged to Mom the same day. Patient has been taking her Plaquenil daily but has forgotten every once in a while.     6/2017: She delivered her son (arianna) on 6/19/2017, had NVD with no complications. She is doing both breast feeding and formula.   Reports throbbing pain over L flank since delivery. Pain is not improving, percocet helps to ease the pain.  Hands are " puffy and swollen and gets gelling. AM stiffness is 10-15 minutes.  No oral ulcers. No skin rash today but had the malar rash intermittently.  No hair loss.  No pleuritic CP.  Ran out of HCQ 4 days ago.    3/2018: She reports she recently found out she was pregnant. Her LMP was 2/12 giving an estimated delivery date of 11/19/2018. She is currently ~5 weeks gestational age. She has been feeling lightheaded and fatigued. She had a runny nose and cough for the past two weeks, which she says is finally improved. She reports a mild rash on her face. She says her joint pain fluctuates, today it is not too bad. She feels it most in her wrists and knees. She has felt some L abd pain. She denies dysuria.    7/2018: Today, Ms. Puckett is currently 22 weeks pregnant with a expected delivery date of November 20th, 2018. She complains of extreme pain with urination and right sided flank pain. The patient says the symptoms have been present for approximately 3 months. She has had 4 visits to the ED during this time for these symptoms, one of those times resulting in a 3 day admission where she was started on IV antibiotics and sent home with a catheter to use for a week. She feels the same symptoms now, except that the flank pain has began to appear in her left side as well, though not as excruciatingly painful as the right side. She says she has been given numerous antibiotics and the work up for this has been negative for UTI multiple times. There is concern for lupus cystitis from her OBGYN at this point as well. She also complains of DIP swelling, return of her malar rash, vomiting, low grade fever, increased sensation of bladder pressure, dizziness, URI-like symptoms, SOB due to pain with inhalation from her flank, and bladder spasms. She is crying currently from the pain at the time of this examination. Her UA from this visit shows a large number of proteins. UA also shows >100 WBC and many bacteria, there is a culture that  is currently pending among other lab work.     2018: 29 wk pregnant with IVET of 2018. Continues to have severe pain on urination. Scheduled for cystoscopy today. Flanke pain improved after prednisone 20 mg every day taper.     Today: for active lupus cystis, I recommended AZA long time ago, it took her a while to do TPMT testing (NL), then she started taking it at 50 mg every day in 2018 but reports stopping it a month ago given lack of efficacy.    She had 2 local bladder steroid inj since last visit, done by urology which has not helped.    She delivered her son Renato at 37 weeks on 11/3/2018 when she presented with gross hematuria and severe pain sec lupus cystitis. Her son is healthy, she had NVD and recovered well.    Monica is not feeling well, still dealing with active lupus cystitis causing her severe pain/discomfort even at rest which gets worse with voiding, no gross hematuria.    Hands are puffy. Has fatigue. On the leave from work.    Reports stomach upset and passing white mucous with her stool.    Worried about her bladder inflammation causes scars and chronic pain in future, asks about percocet at higher dose of 7.5 mg as that's the only med that has helped to relieve the pain and allow her to rest and take care of her  son.    She is asking for cytoxan and rituximab as both helped to control her lupus in the past, she never had lupus cystitis till now. She has no immediate pregnancy plans, but 5-6 yr from now might want to have more kids.    Currently is breastfeeding. Her other son in 1.6 yo and she did breastfeeding x 1 month and plans the same for her son but is open to use the donor breastmilk if has to stop to take meds to control her lupus.         Review of Systems:   A comprehensive ROS was done. Positives are per HPI.             Past Medical History:   PMH - acne, dysmenorrhea, SLE  PSH - wisdom tooth extraction, renal biopsy 2013  Injuries - prior fracture       Social  History:     Social History     Occupational History      [employer]      Social History Main Topics     Smoking status: Never Smoker     Smokeless tobacco: Never Used      Comment: no second hand smoke exposure at home     Alcohol use No     Drug use: No     Sexual activity: Yes     Partners: Male   Working as a          Family History:   Mother - arthritis, likely OA  MGM - arthritis, likely OA         Allergies:   Estrogens  Influenza tri-split 08-09 vac  Measles/mumps/rubella vacc  Varicella virus vacc live    Allergies   Allergen Reactions     Estrogens Other (See Comments)     Lupus flares     No Clinical Screening - See Comments Other (See Comments) and Rash     She is immunosuppressed   Gets very sick from flu vaccines (or any vaccine)     mmr     Varicella Virus Vaccine Live Other (See Comments)     She is immunosuppressed               Medications:     Current Outpatient Prescriptions   Medication Sig Dispense Refill     Amphetamine-Dextroamphetamine (ADDERALL PO) Take 20 mg by mouth daily       Amphetamine-Dextroamphetamine (ADDERALL XR PO) Take 20 mg by mouth daily       azaTHIOprine (IMURAN) 50 MG tablet Take 1 tablet (50 mg) by mouth daily Labs due in 4 weeks 30 tablet 0     ferrous sulfate Dried 160 (50 Fe) MG tablet Take 1 tablet by mouth 2 times daily       hydroxychloroquine (PLAQUENIL) 200 MG tablet Take 2 tablets (400 mg) by mouth daily 60 tablet 11     hydrOXYzine (ATARAX) 25 MG tablet Take 2-4 tablets ( mg) by mouth nightly as needed for other (pain adjunct) Can take 25-50mg once during day but can make you sleepy (Patient not taking: Reported on 10/26/2018) 30 tablet 0     ibuprofen (ADVIL/MOTRIN) 600 MG tablet Take 1 tablet (600 mg) by mouth every 6 hours as needed for moderate pain 30 tablet 0     Omega-3 Fatty Acids (FISH OIL PO) Take 1 tablet by mouth daily Fish oil with DHA       oxyCODONE IR (ROXICODONE) 5 MG tablet Take 0.5 tablets (2.5 mg) by mouth every 4 hours  as needed for severe pain or breakthrough pain 30 tablet 0     oxyCODONE-acetaminophen (PERCOCET) 5-325 MG per tablet Take 1 tablet by mouth every 8 hours as needed for pain 30 tablet 0     phenazopyridine (PYRIDIUM) 100 MG tablet Take 1 tablet (100 mg) by mouth 3 times daily as needed for urinary tract discomfort 15 tablet 1     predniSONE (DELTASONE) 20 MG tablet Take 1 tablet (20 mg) by mouth daily (Patient not taking: Reported on 10/26/2018) 30 tablet 1     predniSONE (DELTASONE) 5 MG tablet 4tab=20 mg qd x 5 days, 3tab=15 mg qd x 5 days, 2tab=10 mg qd x 5 days, 1 tab=5 mg qd x 5 days then stop. (Patient not taking: Reported on 10/26/2018) 50 tablet 0     Prenatal Vit-Fe Fumarate-FA (PRENATAL MULTIVITAMIN  PLUS IRON) 27-0.8 MG TABS Take 1 tablet by mouth daily       senna-docusate (SENOKOT-S;PERICOLACE) 8.6-50 MG per tablet Take 1 tablet by mouth 2 times daily as needed for constipation 30 tablet 0     ValACYclovir HCl (VALTREX PO) Take 500 mg by mouth as needed       VITAMIN D, CHOLECALCIFEROL, PO Take 2,000 Units by mouth daily              Physical Exam:   Blood pressure 106/74, pulse 71, temperature 98  F (36.7  C), temperature source Oral, weight 65.8 kg (145 lb), last menstrual period 2018, SpO2 97 %, currently breastfeeding.  Wt Readings from Last 4 Encounters:   18 65.8 kg (145 lb)   18 72.6 kg (160 lb)   10/26/18 72.6 kg (160 lb)   10/18/18 73 kg (160 lb 15 oz)     BP Readings from Last 3 Encounters:   18 106/74   18 100/49   10/26/18 110/74       Constitutional: in pain, NAD, holding his  son Renato  Eyes: PERRLA, normal conjunctiva, sclera  ENT: nl external ears, nose, lips. No mucous membrane lesions, normal saliva pool  Neck: no cervical or supraclavicular lymphadenopathy  Resp: Lungs clear to ausculation,  CV: RRR, no m/r/g, no LE edema  GI: soft, NT abdomen  Lymph: no cervical, supraclavicular nodes  MS: no active synovitis or joint tenderness or joint  deformities,  Skin: no malar rash or other rashes  Psych: tearful         Data:     Component      Latest Ref Rng & Units 6/6/2017   WBC      4.0 - 11.0 10e9/L 9.2   RBC Count      3.8 - 5.2 10e12/L 3.58 (L)   Hemoglobin      11.7 - 15.7 g/dL 11.4 (L)   Hematocrit      35.0 - 47.0 % 33.6 (L)   MCV      78 - 100 fl 94   MCH      26.5 - 33.0 pg 31.8   MCHC      31.5 - 36.5 g/dL 33.9   RDW      10.0 - 15.0 % 12.5   Platelet Count      150 - 450 10e9/L 290   Diff Method       Automated Method   % Neutrophils      % 77.8   % Lymphocytes      % 14.9   % Monocytes      % 6.7   % Eosinophils      % 0.5   % Basophils      % 0.1   Absolute Neutrophil      1.6 - 8.3 10e9/L 7.1   Absolute Lymphocytes      0.8 - 5.3 10e9/L 1.4   Absolute Monocytes      0.0 - 1.3 10e9/L 0.6   Absolute Eosinophils      0.0 - 0.7 10e9/L 0.1   Absolute Basophils      0.0 - 0.2 10e9/L 0.0   Color Urine       Yellow   Appearance Urine       Clear   Glucose Urine      NEG mg/dL Negative   Bilirubin Urine      NEG Negative   Ketones Urine      NEG mg/dL Negative   Specific Gravity Urine      1.003 - 1.035 1.020   Blood Urine      NEG Negative   pH Urine      5.0 - 7.0 pH 5.5   Protein Albumin Urine      NEG mg/dL Negative   Urobilinogen Urine      0.2 - 1.0 EU/dL 0.2   Nitrite Urine      NEG Negative   Leukocyte Esterase Urine      NEG Negative   Source       Midstream Urine   Creatinine      0.52 - 1.04 mg/dL 0.50 (L)   GFR Estimate      >60 mL/min/1.7m2 >90 . . .   GFR Estimate If Black      >60 mL/min/1.7m2 >90 . . .   Protein Random Urine      g/L 0.14   Protein Total Urine g/gr Creatinine      0 - 0.2 g/g Cr 0.12   Albumin      3.4 - 5.0 g/dL 2.9 (L)   ALT      0 - 50 U/L 16   AST      0 - 45 U/L 18   Complement C3      76 - 169 mg/dL 153   Complement C4      15 - 50 mg/dL 29   CRP Inflammation      0.0 - 8.0 mg/L 3.2   Sed Rate      0 - 20 mm/h 47 (H)   DNA-ds      <10 IU/mL 2   Creatinine Urine Random      mg/dL 130   Uric Acid      2.6 - 6.0  mg/dL 3.1   Complement CH50 Total       38 (L)     Results for orders placed or performed during the hospital encounter of 18   CBC with platelets   Result Value Ref Range    WBC 10.3 4.0 - 11.0 10e9/L    RBC Count 3.82 3.8 - 5.2 10e12/L    Hemoglobin 10.9 (L) 11.7 - 15.7 g/dL    Hematocrit 34.5 (L) 35.0 - 47.0 %    MCV 90 78 - 100 fl    MCH 28.5 26.5 - 33.0 pg    MCHC 31.6 31.5 - 36.5 g/dL    RDW 13.9 10.0 - 15.0 %    Platelet Count 293 150 - 450 10e9/L   Fibrinogen activity   Result Value Ref Range    Fibrinogen 527 (H) 200 - 420 mg/dL   INR   Result Value Ref Range    INR 0.90 0.86 - 1.14   Partial thromboplastin time   Result Value Ref Range    PTT 30 22 - 37 sec   Drug abuse scrn 7 UR (/) (RH, SH, UR)   Result Value Ref Range    Amphetamine Qual Urine Negative NEG^Negative    Cannabinoids Qual Urine Negative NEG^Negative    Cocaine Qual Urine Negative NEG^Negative    Opiates Qualitative Urine Negative NEG^Negative    Pcp Qual Urine Negative NEG^Negative   UA with Microscopic reflex to Culture   Result Value Ref Range    Color Urine Red     Appearance Urine Cloudy     Glucose Urine Negative NEG^Negative mg/dL    Bilirubin Urine Negative NEG^Negative    Ketones Urine Negative NEG^Negative mg/dL    Specific Gravity Urine 1.025 1.003 - 1.035    Blood Urine Large (A) NEG^Negative    pH Urine 7.5 (H) 5.0 - 7.0 pH    Protein Albumin Urine 300 (A) NEG^Negative mg/dL    Urobilinogen mg/dL Normal 0.0 - 2.0 mg/dL    Nitrite Urine Negative NEG^Negative    Leukocyte Esterase Urine Small (A) NEG^Negative    Source Midstream Urine     WBC Urine 4952 (H) 0 - 5 /HPF    RBC Urine >182 (H) 0 - 2 /HPF    Transitional Epi 24 (H) 0 - 1 /HPF   Treponema Abs w Reflex to RPR and Titer   Result Value Ref Range    Treponema Antibodies Nonreactive NR^Nonreactive   AST   Result Value Ref Range    AST 10 0 - 45 U/L   ALT   Result Value Ref Range    ALT 12 0 - 50 U/L   Creatinine   Result Value Ref Range    Creatinine  0.54 0.52 - 1.04 mg/dL    GFR Estimate >90 >60 mL/min/1.7m2    GFR Estimate If Black >90 >60 mL/min/1.7m2   Hemoglobin   Result Value Ref Range    Hemoglobin 10.2 (L) 11.7 - 15.7 g/dL   Fetal hemoglobin stain Kleihauer   Result Value Ref Range    Kleihauer-Betke       No fetal cells seen  Rhogam not required  Patient Rh positive  Test performed at [facility]     ABO/Rh type and screen   Result Value Ref Range    ABO O     RH(D) Pos     Antibody Screen Neg     Test Valid Only At          [facility]    Specimen Expires 11/05/2018    Urine Culture Aerobic Bacterial   Result Value Ref Range    Specimen Description Midstream Urine     Special Requests Specimen received in preservative     Culture Micro       10,000 to 50,000 colonies/mL  mixed urogenital wayne  Susceptibility testing not routinely done       US OB 10/25/2016:  1. Gestational sac present measuring 5 weeks and 6 days..  2. No adnexal masses are seen.    Prior workup through [facility] reviewed 10/26/2016:  Positive/abnormal:  AWILDA 1/2013 1:160 speckled, repeat AWILDA neg 10/24/2016  RNP 48.7  CBC - WBC 3.7 w/ normal diff  ESR 45 (4/2013), no repeat  Negative/normal:  Bustos (reportedly initially +)  SSA, SSB  ANCA  Lupus anticoagulant screen abnormal, but confirmatory testing negative  Cardiolipin IgA/G/M neg  Beta 2 GP1 Ab IgA/G/M neg  C3/C4 (never abnormal)  Cr - 0.68, lytes normal   CK  HCV  HIV  HLA-B27  LFT's normal  Quant gold negative  RF negative x2  Anti-CCP negative x2  TSH   RPR  Prior Lyme  TTG  Ds-DNA neg x6 (reportedly initially +)  UA - trace LE, otherwise negative for protein/blood/WBC  Hgb, plts  CRP < 2.9    Component      Latest Ref Rng & Units 11/2/2018   Color Urine       Red   Appearance Urine       Cloudy   Glucose Urine      NEG:Negative mg/dL Negative   Bilirubin Urine      NEG:Negative Negative   Ketones Urine      NEG:Negative mg/dL Negative   Specific Gravity Urine      1.003 - 1.035  1.025   Blood Urine      NEG:Negative Large (A)   pH Urine      5.0 - 7.0 pH 7.5 (H)   Protein Albumin Urine      NEG:Negative mg/dL 300 (A)   Urobilinogen mg/dL      0.0 - 2.0 mg/dL Normal   Nitrite Urine      NEG:Negative Negative   Leukocyte Esterase Urine      NEG:Negative Small (A)   Source       Midstream Urine   WBC Urine      0 - 5 /HPF 4952 (H)   RBC Urine      0 - 2 /HPF >182 (H)   Transitional Epi      0 - 1 /HPF 24 (H)   WBC      4.0 - 11.0 10e9/L 10.3   RBC Count      3.8 - 5.2 10e12/L 3.82   Hemoglobin      11.7 - 15.7 g/dL 10.9 (L)   Hematocrit      35.0 - 47.0 % 34.5 (L)   MCV      78 - 100 fl 90   MCH      26.5 - 33.0 pg 28.5   MCHC      31.5 - 36.5 g/dL 31.6   RDW      10.0 - 15.0 % 13.9   Platelet Count      150 - 450 10e9/L 293   Amphetamine Qual Urine      NEG:Negative Negative   Cannabinoids Qual Urine      NEG:Negative Negative   Cocaine Qual Urine      NEG:Negative Negative   Opiates Qualitative Urine      NEG:Negative Negative   Pcp Qual Urine      NEG:Negative Negative   ABO       O   RH(D)       Pos   Antibody Screen       Neg   Test Valid Only At       [facility]   Specimen Expires       11/05/2018   Specimen Description       Midstream Urine   Special Requests       Specimen received in preservative   Culture Micro       10,000 to 50,000 colonies/mL . . .   Creatinine      0.52 - 1.04 mg/dL 0.54   GFR Estimate      >60 mL/min/1.7m2 >90   GFR Estimate If Black      >60 mL/min/1.7m2 >90   Fibrinogen      200 - 420 mg/dL 527 (H)   Kleihauer-Betke       No fetal cells seen . . .   INR      0.86 - 1.14 0.90   PTT      22 - 37 sec 30   Treponema Antibodies      NR:Nonreactive Nonreactive   AST      0 - 45 U/L 10   ALT      0 - 50 U/L 12       Chente Reina MD

## 2018-11-14 NOTE — PATIENT INSTRUCTIONS
Labs today    Prednisone taper: 17-31-31-98-68-75-20-15-10-5 mg a day each for one week then stop, wait 3-4 hours before breasfeeding    Let me know when you are done breastfeeding, to consider cellcept or cytoxan    Let me know if the pain med does not help    12/7 at 11:30 am

## 2018-11-21 ENCOUNTER — TELEPHONE (OUTPATIENT)
Dept: OBGYN | Facility: CLINIC | Age: 23
End: 2018-11-21

## 2018-11-21 NOTE — TELEPHONE ENCOUNTER
Spoke with Dr. Valadez regarding patient request. He would like patient to come to clinic for post-op appointment and they can discuss the injections at that time.    Spoke with Monica and gave her recommendation from Dr. Valadez. She agreed with plan and was rescheduled for next week.

## 2018-11-21 NOTE — TELEPHONE ENCOUNTER
----- Message from Nguyễn Garveynick sent at 11/21/2018 12:19 PM CST -----  Regarding: PT is requesting a call back from Dr. Valadez  Contact: [phone]  PT is requesting a call back from Dr. Valadez.  She had to cancel her post op appointment today because she was at an appointment with her son but she is requesting a call back regarding an injection.  Please follow up with the PT at [phone].    Thank you,  [facility]

## 2018-12-05 DIAGNOSIS — M32.9 SYSTEMIC LUPUS ERYTHEMATOSUS, UNSPECIFIED SLE TYPE, UNSPECIFIED ORGAN INVOLVEMENT STATUS (H): ICD-10-CM

## 2018-12-05 DIAGNOSIS — R82.90 NONSPECIFIC FINDING ON EXAMINATION OF URINE: Primary | ICD-10-CM

## 2018-12-05 LAB
ALBUMIN UR-MCNC: >=300 MG/DL
APPEARANCE UR: ABNORMAL
BASOPHILS # BLD AUTO: 0 10E9/L (ref 0–0.2)
BASOPHILS NFR BLD AUTO: 0.1 %
BILIRUB UR QL STRIP: NEGATIVE
COLOR UR AUTO: YELLOW
CREAT UR-MCNC: 71 MG/DL
CRP SERPL-MCNC: <2.9 MG/L (ref 0–8)
DIFFERENTIAL METHOD BLD: NORMAL
EOSINOPHIL # BLD AUTO: 0.5 10E9/L (ref 0–0.7)
EOSINOPHIL NFR BLD AUTO: 4.9 %
ERYTHROCYTE [DISTWIDTH] IN BLOOD BY AUTOMATED COUNT: 15 % (ref 10–15)
ERYTHROCYTE [SEDIMENTATION RATE] IN BLOOD BY WESTERGREN METHOD: 16 MM/H (ref 0–20)
GLUCOSE UR STRIP-MCNC: NEGATIVE MG/DL
HCT VFR BLD AUTO: 40.4 % (ref 35–47)
HGB BLD-MCNC: 12.9 G/DL (ref 11.7–15.7)
HGB UR QL STRIP: ABNORMAL
KETONES UR STRIP-MCNC: NEGATIVE MG/DL
LEUKOCYTE ESTERASE UR QL STRIP: ABNORMAL
LYMPHOCYTES # BLD AUTO: 1.7 10E9/L (ref 0.8–5.3)
LYMPHOCYTES NFR BLD AUTO: 17.1 %
MCH RBC QN AUTO: 28.3 PG (ref 26.5–33)
MCHC RBC AUTO-ENTMCNC: 31.9 G/DL (ref 31.5–36.5)
MCV RBC AUTO: 89 FL (ref 78–100)
MONOCYTES # BLD AUTO: 0.9 10E9/L (ref 0–1.3)
MONOCYTES NFR BLD AUTO: 8.5 %
NEUTROPHILS # BLD AUTO: 6.9 10E9/L (ref 1.6–8.3)
NEUTROPHILS NFR BLD AUTO: 69.4 %
NITRATE UR QL: NEGATIVE
PH UR STRIP: 7 PH (ref 5–7)
PLATELET # BLD AUTO: 304 10E9/L (ref 150–450)
PROT UR-MCNC: 1.42 G/L
PROT/CREAT 24H UR: 2.01 G/G CR (ref 0–0.2)
RBC # BLD AUTO: 4.56 10E12/L (ref 3.8–5.2)
RBC #/AREA URNS AUTO: ABNORMAL /HPF
SOURCE: ABNORMAL
SP GR UR STRIP: 1.02 (ref 1–1.03)
UROBILINOGEN UR STRIP-ACNC: 0.2 EU/DL (ref 0.2–1)
WBC # BLD AUTO: 10 10E9/L (ref 4–11)
WBC #/AREA URNS AUTO: >100 /HPF

## 2018-12-05 PROCEDURE — 86140 C-REACTIVE PROTEIN: CPT | Performed by: INTERNAL MEDICINE

## 2018-12-05 PROCEDURE — 86162 COMPLEMENT TOTAL (CH50): CPT | Mod: 90 | Performed by: INTERNAL MEDICINE

## 2018-12-05 PROCEDURE — 99000 SPECIMEN HANDLING OFFICE-LAB: CPT | Performed by: INTERNAL MEDICINE

## 2018-12-05 PROCEDURE — 85025 COMPLETE CBC W/AUTO DIFF WBC: CPT | Performed by: INTERNAL MEDICINE

## 2018-12-05 PROCEDURE — 84156 ASSAY OF PROTEIN URINE: CPT | Performed by: INTERNAL MEDICINE

## 2018-12-05 PROCEDURE — 36415 COLL VENOUS BLD VENIPUNCTURE: CPT | Performed by: INTERNAL MEDICINE

## 2018-12-05 PROCEDURE — 86160 COMPLEMENT ANTIGEN: CPT | Performed by: INTERNAL MEDICINE

## 2018-12-05 PROCEDURE — 87086 URINE CULTURE/COLONY COUNT: CPT | Performed by: INTERNAL MEDICINE

## 2018-12-05 PROCEDURE — 81001 URINALYSIS AUTO W/SCOPE: CPT | Performed by: INTERNAL MEDICINE

## 2018-12-05 PROCEDURE — 80053 COMPREHEN METABOLIC PANEL: CPT | Performed by: INTERNAL MEDICINE

## 2018-12-05 PROCEDURE — 85652 RBC SED RATE AUTOMATED: CPT | Performed by: INTERNAL MEDICINE

## 2018-12-05 PROCEDURE — 86225 DNA ANTIBODY NATIVE: CPT | Performed by: INTERNAL MEDICINE

## 2018-12-05 NOTE — LETTER
Patient:  Monica Puckett  :   1995  MRN:     [id number]        Ms.Hailey Puckett  [address] [facility] [address]        2018    Dear ,    We are writing to inform you of your test results. Inflammation markers (ESR, CRP) are improved but there is significant amount of protein in the urine. You missed your Friday appointment with me. I highly recommend follow up with Dr. Santana (your kidney doctor) regarding proteinuria as this might need kidney biopsy to determine next step of treatment (favor cellcept).      Results for orders placed or performed in visit on 18   CBC with platelets differential   Result Value Ref Range    WBC 10.0 4.0 - 11.0 10e9/L    RBC Count 4.56 3.8 - 5.2 10e12/L    Hemoglobin 12.9 11.7 - 15.7 g/dL    Hematocrit 40.4 35.0 - 47.0 %    MCV 89 78 - 100 fl    MCH 28.3 26.5 - 33.0 pg    MCHC 31.9 31.5 - 36.5 g/dL    RDW 15.0 10.0 - 15.0 %    Platelet Count 304 150 - 450 10e9/L    % Neutrophils 69.4 %    % Lymphocytes 17.1 %    % Monocytes 8.5 %    % Eosinophils 4.9 %    % Basophils 0.1 %    Absolute Neutrophil 6.9 1.6 - 8.3 10e9/L    Absolute Lymphocytes 1.7 0.8 - 5.3 10e9/L    Absolute Monocytes 0.9 0.0 - 1.3 10e9/L    Absolute Eosinophils 0.5 0.0 - 0.7 10e9/L    Absolute Basophils 0.0 0.0 - 0.2 10e9/L    Diff Method Automated Method    Comprehensive metabolic panel   Result Value Ref Range    Sodium 141 133 - 144 mmol/L    Potassium 4.3 3.4 - 5.3 mmol/L    Chloride 108 94 - 109 mmol/L    Carbon Dioxide 25 20 - 32 mmol/L    Anion Gap 8 3 - 14 mmol/L    Glucose 83 70 - 99 mg/dL    Urea Nitrogen 15 7 - 30 mg/dL    Creatinine 0.90 0.52 - 1.04 mg/dL    GFR Estimate 79 >60 mL/min/1.7m2    GFR Estimate If Black >90 >60 mL/min/1.7m2    Calcium 9.1 8.5 - 10.1 mg/dL    Bilirubin Total 0.3 0.2 - 1.3 mg/dL    Albumin 3.8 3.4 - 5.0 g/dL    Protein Total 7.1 6.8 - 8.8 g/dL    Alkaline Phosphatase 84 40 - 150 U/L    ALT 21 0 - 50 U/L    AST 16 0 - 45 U/L   DNA  double stranded antibodies   Result Value Ref Range    DNA-ds 1 <10 IU/mL   Complement C4   Result Value Ref Range    Complement C4 30 15 - 50 mg/dL   Complement C3   Result Value Ref Range    Complement C3 121 76 - 169 mg/dL   Erythrocyte sedimentation rate auto   Result Value Ref Range    Sed Rate 16 0 - 20 mm/h   CRP inflammation   Result Value Ref Range    CRP Inflammation <2.9 0.0 - 8.0 mg/L   Protein  random urine with Creat Ratio   Result Value Ref Range    Protein Random Urine 1.42 g/L    Protein Total Urine g/gr Creatinine 2.01 (H) 0 - 0.2 g/g Cr   Complement total   Result Value Ref Range    Complement CH50 Total 39 (L) 60 - 144  Units   Creatinine urine calculation only   Result Value Ref Range    Creatinine Urine 71 mg/dL   *UA reflex to Microscopic and Culture ([facility] (except [place] and [place])   Result Value Ref Range    Color Urine Yellow     Appearance Urine Cloudy     Glucose Urine Negative NEG^Negative mg/dL    Bilirubin Urine Negative NEG^Negative    Ketones Urine Negative NEG^Negative mg/dL    Specific Gravity Urine 1.020 1.003 - 1.035    Blood Urine Moderate (A) NEG^Negative    pH Urine 7.0 5.0 - 7.0 pH    Protein Albumin Urine >=300 (A) NEG^Negative mg/dL    Urobilinogen Urine 0.2 0.2 - 1.0 EU/dL    Nitrite Urine Negative NEG^Negative    Leukocyte Esterase Urine Moderate (A) NEG^Negative    Source Midstream Urine    Urine Microscopic   Result Value Ref Range    WBC Urine >100 (A) OTO5^0 - 5 /HPF    RBC Urine (A) OTO2^O - 2 /HPF     Unable to quantitate other elements due to packed WBC's.    Urine Culture Aerobic Bacterial   Result Value Ref Range    Specimen Description Midstream Urine     Culture Micro <10,000 colonies/mL  mixed urogenital wayne          Chente Reina MD

## 2018-12-06 LAB
ALBUMIN SERPL-MCNC: 3.8 G/DL (ref 3.4–5)
ALP SERPL-CCNC: 84 U/L (ref 40–150)
ALT SERPL W P-5'-P-CCNC: 21 U/L (ref 0–50)
ANION GAP SERPL CALCULATED.3IONS-SCNC: 8 MMOL/L (ref 3–14)
AST SERPL W P-5'-P-CCNC: 16 U/L (ref 0–45)
BACTERIA SPEC CULT: NORMAL
BILIRUB SERPL-MCNC: 0.3 MG/DL (ref 0.2–1.3)
BUN SERPL-MCNC: 15 MG/DL (ref 7–30)
C3 SERPL-MCNC: 121 MG/DL (ref 76–169)
C4 SERPL-MCNC: 30 MG/DL (ref 15–50)
CALCIUM SERPL-MCNC: 9.1 MG/DL (ref 8.5–10.1)
CH50 SERPL-ACNC: 39 CAE UNITS (ref 60–144)
CHLORIDE SERPL-SCNC: 108 MMOL/L (ref 94–109)
CO2 SERPL-SCNC: 25 MMOL/L (ref 20–32)
CREAT SERPL-MCNC: 0.9 MG/DL (ref 0.52–1.04)
DSDNA AB SER-ACNC: 1 IU/ML
GFR SERPL CREATININE-BSD FRML MDRD: 79 ML/MIN/1.7M2
GLUCOSE SERPL-MCNC: 83 MG/DL (ref 70–99)
POTASSIUM SERPL-SCNC: 4.3 MMOL/L (ref 3.4–5.3)
PROT SERPL-MCNC: 7.1 G/DL (ref 6.8–8.8)
SODIUM SERPL-SCNC: 141 MMOL/L (ref 133–144)
SPECIMEN SOURCE: NORMAL

## 2018-12-11 NOTE — RESULT ENCOUNTER NOTE
Inflammation markers (ESR, CRP) are improved but there is significant amount of protein in the urine. You missed your Friday appointment with me. I highly recommend follow up with Dr. Santana (your kidney doctor) regarding proteinuria as this might need kidney biopsy to determine next step of treatment (favor cellcept).

## 2018-12-27 ENCOUNTER — MYC MEDICAL ADVICE (OUTPATIENT)
Dept: RHEUMATOLOGY | Facility: CLINIC | Age: 23
End: 2018-12-27

## 2018-12-27 DIAGNOSIS — N30.90 CYSTITIS: ICD-10-CM

## 2018-12-27 DIAGNOSIS — M32.9 SYSTEMIC LUPUS ERYTHEMATOSUS, UNSPECIFIED SLE TYPE, UNSPECIFIED ORGAN INVOLVEMENT STATUS (H): ICD-10-CM

## 2018-12-27 DIAGNOSIS — M32.19 SYSTEMIC LUPUS ERYTHEMATOSUS WITH OTHER ORGAN INVOLVEMENT, UNSPECIFIED SLE TYPE (H): ICD-10-CM

## 2018-12-28 RX ORDER — PREDNISONE 20 MG/1
20 TABLET ORAL DAILY
Qty: 90 TABLET | Refills: 1 | Status: CANCELLED | OUTPATIENT
Start: 2018-12-28

## 2018-12-28 RX ORDER — OXYCODONE AND ACETAMINOPHEN 5; 325 MG/1; MG/1
1 TABLET ORAL EVERY 8 HOURS PRN
Qty: 40 TABLET | Refills: 0 | Status: SHIPPED | OUTPATIENT
Start: 2018-12-28 | End: 2019-01-12

## 2018-12-28 NOTE — TELEPHONE ENCOUNTER
I don't know how much prednisone she is on, seems she got an appointment with Dr. Santana on 1/12. Can't resume cellcept (start diose 500 mg bid) till we make sure she is done breastfeeding. I recommend her to be on prednisone 20 mg a day till she sees Dr. Santana. I am ok with refill of her pain med if anybody could sign the Rx.

## 2018-12-28 NOTE — TELEPHONE ENCOUNTER
Called and spoke with pt regarding Dr Reina's suggestion.  She is fine with restarting the prednisone, and would like pain medicine if provider is willing to sign for it.  She is aware of appt with Dr. Santana, will be here.      Phone call cut out in the middle of conversation.  Will try back after talking to provider in clinic about pain medication.     reviewed:        Flavia Mayfield RN  [facility]

## 2018-12-29 NOTE — TELEPHONE ENCOUNTER
Discussed with Dr. Henson, he is willing to sign prescription for pain medication, called and spoke with pt, she would like to come to clinic to pick it up.  Let her know that she will need her ID and can come to the first floor to get it.    Flavia Mayfield RN  R[facility]umatology Clinic

## 2018-12-30 RX ORDER — PREDNISONE 20 MG/1
20 TABLET ORAL DAILY
Qty: 30 TABLET | Refills: 1 | Status: ON HOLD | OUTPATIENT
Start: 2018-12-30 | End: 2019-01-18

## 2019-01-12 ENCOUNTER — OFFICE VISIT (OUTPATIENT)
Dept: NEPHROLOGY | Facility: CLINIC | Age: 24
End: 2019-01-12
Attending: INTERNAL MEDICINE
Payer: COMMERCIAL

## 2019-01-12 VITALS
TEMPERATURE: 98.6 F | SYSTOLIC BLOOD PRESSURE: 111 MMHG | HEART RATE: 76 BPM | DIASTOLIC BLOOD PRESSURE: 77 MMHG | HEIGHT: 66 IN | BODY MASS INDEX: 23.4 KG/M2 | OXYGEN SATURATION: 96 %

## 2019-01-12 DIAGNOSIS — M32.14 DRUG-INDUCED SYSTEMIC LUPUS ERYTHEMATOSUS WITH GLOMERULAR DISEASE (H): Primary | ICD-10-CM

## 2019-01-12 DIAGNOSIS — M32.14 LUPUS NEPHRITIS (H): ICD-10-CM

## 2019-01-12 DIAGNOSIS — M32.0 DRUG-INDUCED SYSTEMIC LUPUS ERYTHEMATOSUS WITH GLOMERULAR DISEASE (H): Primary | ICD-10-CM

## 2019-01-12 DIAGNOSIS — N30.90 CYSTITIS: ICD-10-CM

## 2019-01-12 DIAGNOSIS — M32.19 SYSTEMIC LUPUS ERYTHEMATOSUS WITH OTHER ORGAN INVOLVEMENT, UNSPECIFIED SLE TYPE (H): ICD-10-CM

## 2019-01-12 DIAGNOSIS — M32.14 LUPUS NEPHRITIS (H): Primary | ICD-10-CM

## 2019-01-12 LAB
ALBUMIN SERPL-MCNC: 3.9 G/DL (ref 3.4–5)
ALBUMIN UR-MCNC: 100 MG/DL
ANION GAP SERPL CALCULATED.3IONS-SCNC: 6 MMOL/L (ref 3–14)
APPEARANCE UR: ABNORMAL
BASOPHILS # BLD AUTO: 0 10E9/L (ref 0–0.2)
BASOPHILS NFR BLD AUTO: 0.3 %
BILIRUB UR QL STRIP: NEGATIVE
BUN SERPL-MCNC: 14 MG/DL (ref 7–30)
CALCIUM SERPL-MCNC: 8.9 MG/DL (ref 8.5–10.1)
CHLORIDE SERPL-SCNC: 106 MMOL/L (ref 94–109)
CO2 SERPL-SCNC: 27 MMOL/L (ref 20–32)
COLOR UR AUTO: YELLOW
CREAT SERPL-MCNC: 0.94 MG/DL (ref 0.52–1.04)
CREAT UR-MCNC: 97 MG/DL
DIFFERENTIAL METHOD BLD: ABNORMAL
EOSINOPHIL # BLD AUTO: 0.4 10E9/L (ref 0–0.7)
EOSINOPHIL NFR BLD AUTO: 6.6 %
ERYTHROCYTE [DISTWIDTH] IN BLOOD BY AUTOMATED COUNT: 15.5 % (ref 10–15)
GFR SERPL CREATININE-BSD FRML MDRD: 86 ML/MIN/{1.73_M2}
GLUCOSE SERPL-MCNC: 82 MG/DL (ref 70–99)
GLUCOSE UR STRIP-MCNC: NEGATIVE MG/DL
HCT VFR BLD AUTO: 41.4 % (ref 35–47)
HGB BLD-MCNC: 13.1 G/DL (ref 11.7–15.7)
HGB UR QL STRIP: ABNORMAL
IMM GRANULOCYTES # BLD: 0 10E9/L (ref 0–0.4)
IMM GRANULOCYTES NFR BLD: 0.2 %
INR PPP: 0.93 (ref 0.86–1.14)
KETONES UR STRIP-MCNC: NEGATIVE MG/DL
LEUKOCYTE ESTERASE UR QL STRIP: ABNORMAL
LYMPHOCYTES # BLD AUTO: 1.5 10E9/L (ref 0.8–5.3)
LYMPHOCYTES NFR BLD AUTO: 22.2 %
MCH RBC QN AUTO: 28.5 PG (ref 26.5–33)
MCHC RBC AUTO-ENTMCNC: 31.6 G/DL (ref 31.5–36.5)
MCV RBC AUTO: 90 FL (ref 78–100)
MONOCYTES # BLD AUTO: 0.6 10E9/L (ref 0–1.3)
MONOCYTES NFR BLD AUTO: 9.8 %
NEUTROPHILS # BLD AUTO: 4 10E9/L (ref 1.6–8.3)
NEUTROPHILS NFR BLD AUTO: 60.9 %
NITRATE UR QL: NEGATIVE
NRBC # BLD AUTO: 0 10*3/UL
NRBC BLD AUTO-RTO: 0 /100
PH UR STRIP: 6 PH (ref 5–7)
PHOSPHATE SERPL-MCNC: 3.9 MG/DL (ref 2.5–4.5)
PLATELET # BLD AUTO: 293 10E9/L (ref 150–450)
POTASSIUM SERPL-SCNC: 4 MMOL/L (ref 3.4–5.3)
PROT UR-MCNC: 1.4 G/L
PROT/CREAT 24H UR: 1.44 G/G CR (ref 0–0.2)
RBC # BLD AUTO: 4.59 10E12/L (ref 3.8–5.2)
RBC #/AREA URNS AUTO: 35 /HPF (ref 0–2)
SODIUM SERPL-SCNC: 139 MMOL/L (ref 133–144)
SOURCE: ABNORMAL
SP GR UR STRIP: 1.01 (ref 1–1.03)
UROBILINOGEN UR STRIP-MCNC: 0 MG/DL (ref 0–2)
WBC # BLD AUTO: 6.5 10E9/L (ref 4–11)
WBC #/AREA URNS AUTO: >182 /HPF (ref 0–5)
WBC CLUMPS #/AREA URNS HPF: PRESENT /HPF

## 2019-01-12 PROCEDURE — 87086 URINE CULTURE/COLONY COUNT: CPT | Performed by: INTERNAL MEDICINE

## 2019-01-12 PROCEDURE — 81001 URINALYSIS AUTO W/SCOPE: CPT | Performed by: INTERNAL MEDICINE

## 2019-01-12 PROCEDURE — 85610 PROTHROMBIN TIME: CPT | Performed by: INTERNAL MEDICINE

## 2019-01-12 PROCEDURE — 80069 RENAL FUNCTION PANEL: CPT | Performed by: INTERNAL MEDICINE

## 2019-01-12 PROCEDURE — 84156 ASSAY OF PROTEIN URINE: CPT | Performed by: INTERNAL MEDICINE

## 2019-01-12 PROCEDURE — 86160 COMPLEMENT ANTIGEN: CPT | Performed by: INTERNAL MEDICINE

## 2019-01-12 PROCEDURE — 85025 COMPLETE CBC W/AUTO DIFF WBC: CPT | Performed by: INTERNAL MEDICINE

## 2019-01-12 PROCEDURE — 50200 RENAL BIOPSY PERQ: CPT | Mod: ZF | Performed by: INTERNAL MEDICINE

## 2019-01-12 PROCEDURE — G0463 HOSPITAL OUTPT CLINIC VISIT: HCPCS | Mod: ZF

## 2019-01-12 PROCEDURE — 36415 COLL VENOUS BLD VENIPUNCTURE: CPT | Performed by: INTERNAL MEDICINE

## 2019-01-12 PROCEDURE — 86225 DNA ANTIBODY NATIVE: CPT | Performed by: INTERNAL MEDICINE

## 2019-01-12 RX ORDER — OXYCODONE AND ACETAMINOPHEN 5; 325 MG/1; MG/1
1 TABLET ORAL EVERY 8 HOURS PRN
Status: DISCONTINUED | OUTPATIENT
Start: 2019-01-12 | End: 2019-01-13 | Stop reason: CLARIF

## 2019-01-12 RX ORDER — OXYCODONE AND ACETAMINOPHEN 5; 325 MG/1; MG/1
1 TABLET ORAL EVERY 8 HOURS PRN
Qty: 40 TABLET | Refills: 0 | Status: SHIPPED | OUTPATIENT
Start: 2019-01-12 | End: 2019-01-12

## 2019-01-12 ASSESSMENT — PAIN SCALES - GENERAL: PAINLEVEL: NO PAIN (0)

## 2019-01-12 NOTE — LETTER
1/12/2019       RE: Monica Campbell  [address]  [facility] [address]       Nephrology Clinic    Monica Campbell MRN:[id number] YOB: 1995  Date of Service: 01/13/2019  Primary care provider: Una Canales  Requesting physician: Dr Chente Reina      REASON FOR CONSULT: Pyuria and possible lupus flare.    HISTORY OF PRESENT ILLNESS:   Monica Campbell is a 22 year old female who presents for evaluation of recurrent dysuria, pyuria, in setting of repeatedly negative urine cultures. Question whether these could be related to lupus nephritis or lupus cystitis.    SLE x 5 years, since age 17. 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     Got IV medrol + cyclophosphamide x 6 months -> MMF + MTX   -> MTX po weekly for about 3 months , then stopped because quiescence.    -> + RITUXIMAB every 8 months x 3 doses at which time she was on no other med.  Last dose beginning of 2016.  Preg  #1, delivery June 19.  Preg was uneventful, full term! Healthy Rogelio.  After delivery, had pain x 7 weeks but eventually cleared. Remained on no immunosuppressants other that HCQ.  Was asymptomatic other than urinary tract symptoms.    Has had hospitalized in mid June 2018 for flank pain, [facility].  She was told she had increase protein, rbc, and wbc in urine, and concern about a flare of lupus nephritis.  She Reports having been told of hydronephrosis on the  Right side during her last hospitalization.  She Reportedly received  IV antibiotics x 3 days followed by oral Abx  (Cefaclor?) x 10 days.  Cultures were reportedly neg.  After an initial improvement, the symptoms returned  fast.  Prednisone started last week 20 mg daily    MS CAMPBELL gave birth (NVD) to her son Renato at 37 wk on 11/3/2018 (instead of IVET 11/20/2018) when she presented to L&D with severe bladder pain and gross hematuria. Delivery went well and her son is healthy,   She is still having a lot of dysuria and gross hematuria.  She is voiding  "about every 30 min.  Waking up frequently at night to void, about every 30-60 min.  There is a lot of pain,which seems quite incapacitating.  She responded to the first bladder instillation with steroids, but repeat treatment did not seem to help.  This week, she has noted loose yellowish stool, but with some dark blood for a total of 2-3 times.  She has abd discomfort, with N, but no Vomiting.  No F/Chill.  Cough  Productive of some yellow sputum.  She is currently is still breast feeding \"a little\" until a treatment plan for SLE is established.    ROS  Positive for myalgias and arthralgias  \"pretty bad\"   No oral ulcers better since on pred.  mild  BLACK which she attribute to the pain.    Mild swelling in fingers, intermittent.  No Hair loss.    PAST MEDICAL HISTORY:  Past Medical History:   Diagnosis Date     Chronic kidney disease      H/O suicide attempt      Lupus      Lupus      RA (rheumatoid arthritis) (H)      Rheumatoid arthritis (H)      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)      PAST SURGICAL HISTORY:  Past Surgical History:   Procedure Laterality Date     CYSTOSCOPY N/A 9/14/2018    Procedure: CYSTOSCOPY;  Cystoscopy and Bladder Wall Injection;  Surgeon: Ismael Valadez MD;  Location: [facility] OR     CYSTOSCOPY, INTRAVESICAL INJECTION N/A 10/18/2018    Procedure: Cystoscopy, Guided Bladder Wall Injection   ;  Surgeon: Ismael Valadez MD;  Location: [facility] OR     HC BIOPSY RENAL, PERCUTANEOUS  1/18/13     MEDICATIONS:  Current Outpatient Medications   Medication Sig Dispense Refill     hydroxychloroquine (PLAQUENIL) 200 MG tablet Take 2 tablets (400 mg) by mouth daily 60 tablet 11            Omega-3 Fatty Acids (FISH OIL PO) Take 1 tablet by mouth daily Fish oil with DHA       oxyCODONE-acetaminophen (PERCOCET) 5-325 MG tablet Take 1 tablet by mouth every 8 hours as needed for pain 40 tablet 0     phenazopyridine (PYRIDIUM) 100 MG tablet Take 1 tablet (100 mg) by mouth 3 times daily as needed for urinary tract " discomfort 15 tablet NOT TAKING>  No benefit             predniSONE (DELTASONE) 5 MG tablet 47-86-91-68-30-65-20-15-10-5 mg a day each for one week then stop 30 tablet Currently at 10 mg daily     Prenatal Vit-Fe Fumarate-FA (PRENATAL MULTIVITAMIN  PLUS IRON) 27-0.8 MG TABS Take 1 tablet by mouth daily               ValACYclovir HCl (VALTREX PO) Take 500 mg by mouth as needed       VITAMIN D, CHOLECALCIFEROL, PO Take 2,000 Units by mouth daily                 .   ALLERGIES:    Allergies   Allergen Reactions     Estrogens Other (See Comments)     Lupus flares     No Clinical Screening - See Comments Other (See Comments) and Rash     She is immunosuppressed   Gets very sick from flu vaccines (or any vaccine)     mmr     Varicella Virus Vaccine Live Other (See Comments)     She is immunosuppressed      REVIEW OF SYSTEMS:  A comprehensive review of systems was performed and found to be negative except as described here or above.   SOCIAL HISTORY:   Social History     Socioeconomic History     Marital status: Single     Spouse name: Not on file     Number of children: Not on file     Years of education: Not on file     Highest education level: Not on file   Social Needs     Financial resource strain: Not on file     Food insecurity - worry: Not on file     Food insecurity - inability: Not on file     Transportation needs - medical: Not on file     Transportation needs - non-medical: Not on file   Occupational History     Employer: [employer]    Tobacco Use     Smoking status: Never Smoker     Smokeless tobacco: Never Used     Tobacco comment: no second hand smoke exposure at home   Substance and Sexual Activity     Alcohol use: No     Drug use: No     Sexual activity: Yes     Partners: Male   Other Topics Concern     Not on file   Social History Narrative     Not on file     FAMILY MEDICAL HISTORY:   No family history on file.  PHYSICAL EXAM:   /77   Pulse 76   Temp 98.6  F (37  C) (Oral)   Ht 1.676 m (5'  "6\")   LMP 02/11/2018   SpO2 96%   BMI 23.40 kg/m     GENERAL APPEARANCE: alert and no distress  EYES: nonicteric  HENT: mouth without ulcers or lesions  NECK: supple, no adenopathy  RESP: lungs clear to auscultation   CV: 2/6 sys m no rub.   ABDOMEN: soft, nontender, normal bowel sounds . Some suprapubic tenderness  Extremities: no clubbing, cyanosis, or edema  MS: no evidence of inflammation in joints, no muscle tenderness  SKIN: no malar rash  NEURO: mentation intact and speech normal  PSYCH: affect normal/bright   LABS:     CMP  Recent Labs   Lab Test 12/05/18  1153 11/02/18  0936 07/10/18  1557 09/13/17  2115 06/27/17  1603 01/21/17  2041 06/04/14  1600 04/28/14  1201 04/17/14  1515 03/11/14  1107     --   --  140 142 140 142 141 142  141   POTASSIUM 4.3  --   --  3.7 4.1 3.4 4.1 4.2 3.8 4.5   BUN 15  --   --  15 12 7 15 14 12 14   CR 0.90 0.54 0.52 0.63 0.65 0.53 0.74 0.63 0.53  0.62 0.50   GFRESTIMATED 79 >90 >90 >90 >90     >90     >90 >90 >90  >90   ALBUMIN 3.8  --  2.9* 4.1 3.2* 3.2* 3.9 4.2 4.1  4.1 4.0    < > = values in this interval not displayed.     Creatinine   Date Value Ref Range Status   01/12/2019 0.94 0.52 - 1.04 mg/dL Final   12/05/2018 0.90 0.52 - 1.04 mg/dL Final   11/02/2018 0.54 0.52 - 1.04 mg/dL Final   07/10/2018 0.52 0.52 - 1.04 mg/dL Final   09/13/2017 0.63 0.52 - 1.04 mg/dL Final   06/27/2017 0.65 0.52 - 1.04 mg/dL Final   06/06/2017 0.50 (L) 0.52 - 1.04 mg/dL Final   01/21/2017 0.53 0.52 - 1.04 mg/dL Final   06/04/2014 0.74 0.50 - 1.00 mg/dL Final   04/28/2014 0.63 0.50 - 1.00 mg/dL Final   04/17/2014 0.53 0.50 - 1.00 mg/dL Final   04/17/2014 0.62 0.50 - 1.00 mg/dL Final   03/27/2014 0.58 0.50 - 1.00 mg/dL Final   03/11/2014 0.50 0.50 - 1.00 mg/dL Final   02/03/2014 0.66 0.50 - 1.00 mg/dL Final     GFR Estimate   Date Value Ref Range Status   01/12/2019 86 >60 mL/min/[1.73_m2] Final   12/05/2018 79 >60 mL/min/1.7m2 Final     Comment:     Non  GFR Calc "   11/02/2018 >90 >60 mL/min/1.7m2 Final     Comment:     Non  GFR Calc   07/10/2018 >90 >60 mL/min/1.7m2 Final     Comment:     Non  GFR Calc   09/13/2017 >90 >60 mL/min/1.7m2 Final     Comment:     Non  GFR Calc   06/27/2017 >90  Non  GFR Calc   >60 mL/min/1.7m2 Final   06/06/2017 >90  Non  GFR Calc   >60 mL/min/1.7m2 Final   01/21/2017 >90  Non  GFR Calc   >60 mL/min/1.7m2 Final   06/04/2014 >90 >60 mL/min/1.7m2 Final   04/28/2014 >90 >60 mL/min/1.7m2 Final       CBC  Recent Labs   Lab Test 01/12/19  1141 12/05/18  1153 11/02/18  0621 07/10/18  1557   HGB 13.1 12.9 10.9* 11.2*   WBC 6.5 10.0 10.3 10.0   RBC 4.59 4.56 3.82 3.53*   HCT 41.4 40.4 34.5* 33.6*   MCV 90 89 90 95   MCH 28.5 28.3 28.5 31.7   MCHC 31.6 31.9 31.6 33.3   RDW 15.5* 15.0 13.9 13.0    304 293 329     INR  Recent Labs   Lab Test 01/12/19  1141 11/02/18  0621 01/18/13  1033 01/14/13  1140   INR 0.93 0.90 0.82* 0.97   PTT  --  30 54* 55*       URINE STUDIES  Recent Labs   Lab Test 01/12/19  1110 12/05/18  1203 11/02/18  0613 07/18/18  0940 07/10/18  1600  06/27/17  1604 06/06/17  1715   COLOR Yellow Yellow Red Yellow Yellow   < > Yellow Yellow   APPEARANCE Cloudy Cloudy Cloudy Cloudy Turbid   < > Clear Clear   URINEGLC Negative Negative Negative Negative Negative   < > Negative Negative   URINEBILI Negative Negative Negative Negative Negative   < > Negative Negative   URINEKETONE Negative Negative Negative Negative Negative   < > Negative Negative   SG 1.012 1.020 1.025 1.012 1.020   < > 1.010 1.020   UBLD Small* Moderate* Large* Negative Moderate*   < > Small* Negative   URINEPH 6.0 7.0 7.5* 8.0* 7.5*   < > 7.0 5.5   PROTEIN 100* >=300* 300* 100* 100*   < > Negative Negative   UROBILINOGEN  --  0.2  --   --  0.2  --  0.2 0.2   NITRITE Negative Negative Negative Negative Negative   < > Negative Negative   LEUKEST Large* Moderate* Small* Large*  Large*   < > Small* Negative   RBCU 35* Unable to quantitate other elements due to packed WBC's. * >182* 9* 25-50*   < > O - 2  --    WBCU >182* >100* 4,952* >182* >100*   < > 2-5*  --     < > = values in this interval not displayed.     Urine microscopy performed myself: high density TNTC WBC, few transitional cells seen in the fields with lower cell density.  No RBC or other casts seen.      Recent Labs   Lab Test 01/12/19  1110 12/05/18  1203 07/18/18  0940 07/10/18  1600 03/23/18  1103 06/27/17  1604 06/06/17  1715 02/08/17  1257 06/04/14  1600 04/28/14  1200 04/17/14  1514 03/27/14  1725 03/11/14  1100 02/03/14  1233 11/13/13  1509 10/01/13  1115 08/19/13  1346 05/30/13  1529 03/27/13  1540 02/26/13  1140 01/18/13  1230 01/14/13  0945   UTPG 1.44* 2.01* 0.96* 0.99* 0.08 Unable to calculate due to low value 0.12 0.08 0.03 <0.03 <0.06 0.39* <0.06 0.06 <0.05 0.14 0.08 <0.03 0.39* 2.59* 3.22* 2.41*     [facility] [place]  US RENAL AND BLADDER COMPLETE  5/18/2018 5:12 PM    INDICATION: Left flank and low back pain 13 weeks and 3 days pregnant, history  of lupus  TECHNIQUE: Routine kidneys and bladder.  COMPARISON: CT 09/07/2017    FINDINGS:  No ascites. Right kidney measures 12.3 x 5.5 x 6.6 cm and the left 11.0 x 4.6 x  5.1 cm. No hydronephrosis. No calculi. Bladder is unremarkable.    CONCLUSION:  1.  Normal ultrasound of kidneys.    This report was electronically interpreted by: Dr. Debra Mendes MD ON 05/18/2018 at 17:21      ASSESSMENT AND PLAN:   22 y.o.  female with a history of SLE and Class V nephritis, who was referred for evaluation of pyuria, dysuria, and increased proteinuria.  She is now 2 months postpartum, but continues to have severe pyuria and painful dysuria, frequency, nocturia which are incapacitating - and especially affecting her ability to care for your baby and young child.    The urine sediment is not interpretable to assess the level of activity of lupus nephritis.   However, she also has proteinurial to a degree which likely reflects some active nephritis.  In addition, compared to mid-late 2017 (before she was pregnant), her Cr is increased from ~ 0.6 to 0.9 (confirmed in Dec 2018 and today), which is clinically significant.  We discussed the possible need to perform a kidney biopsy to determine the class of lupus nephritis and severity of inflammation.  Per her report, urology favors the use of cyclophosphamide for the treatment of SLE cystitis.  If we are going to resort to cyclophosphamide therapy anyway, one may argue that a kidney biopsy may not be absolutely necessary at this time, however, I do believe it would be helpful to determine how much active nephritis we are facing.  This could affect the duration of treatment, prognosis, and follow up/monitoring of nephritis with treatment.  I will communicate with Dr Reina and Dr Ismael Meadows  (urogynecology) as to the optimal treatment plan for the severe cystitis.  Ms Puckett is interested in undergoing a kidney biopsy which we will arrange for the coming days-weeks.  We discussed the potential need to use cyclophosphamide, particularly with respect to possible effect on fertility.  She understands the risks, and stated that she is currently not contemplating a future pregnancy - She has discussed this with her partner as well as her mother:  She feels that she is not committed to having another child.  She is anxious to get the SLE under control.  We also discussed contraception as she is currently on none.  She does NOT want to have an IUD, or a progestin subcutaneous implant.       At this point:  1- with the increased in Cr, and the proteinuria, we will plan on a kidney biopsy soon.  2- I will check with Dr Ismeal Meadows (urogyn) regarding the use of cyclophosphamide for the cystitis.  I could not find good data on the treatment of lupus cystitis specifically, however case reports seem to direct to the use of pulse  methylpred + cyclophos (with also some mention of MMF).  To expedite the biopsy, + pulse therapy, it may be best to plan a hospital admission is Ms Puckett is agreeable.    THis visit took more than 45 min, during which > 50% of the time was spent in discussing treatment options and risks, as well as implication regarding fertility/contraception    Don Santana MD  January 12, 2019

## 2019-01-12 NOTE — PROGRESS NOTES
Nephrology Clinic    Monica Campbell MRN:[id number] YOB: 1995  Date of Service: 01/13/2019  Primary care provider: Una Canales  Requesting physician: Dr Chente Reina      REASON FOR CONSULT: Pyuria and possible lupus flare.    HISTORY OF PRESENT ILLNESS:   Monica Campbell is a 22 year old female who presents for evaluation of recurrent dysuria, pyuria, in setting of repeatedly negative urine cultures. Question whether these could be related to lupus nephritis or lupus cystitis.    SLE x 5 years, since age 17. 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     Got IV medrol + cyclophosphamide x 6 months -> MMF + MTX   -> MTX po weekly for about 3 months , then stopped because quiescence.    -> + RITUXIMAB every 8 months x 3 doses at which time she was on no other med.  Last dose beginning of 2016.  Preg  #1, delivery June 19.  Preg was uneventful, full term! Healthy Rogelio.  After delivery, had pain x 7 weeks but eventually cleared. Remained on no immunosuppressants other that HCQ.  Was asymptomatic other than urinary tract symptoms.    Has had hospitalized in mid June 2018 for flank pain, [facility].  She was told she had increase protein, rbc, and wbc in urine, and concern about a flare of lupus nephritis.  She Reports having been told of hydronephrosis on the  Right side during her last hospitalization.  She Reportedly received  IV antibiotics x 3 days followed by oral Abx  (Cefaclor?) x 10 days.  Cultures were reportedly neg.  After an initial improvement, the symptoms returned  fast.  Prednisone started last week 20 mg daily    MS CAMPBELL gave birth (NVD) to her son Renato at 37 wk on 11/3/2018 (instead of IVET 11/20/2018) when she presented to L&D with severe bladder pain and gross hematuria. Delivery went well and her son is healthy,   She is still having a lot of dysuria and gross hematuria.  She is voiding about every 30 min.  Waking up frequently at night to void, about every 30-60 min.   "There is a lot of pain,which seems quite incapacitating.  She responded to the first bladder instillation with steroids, but repeat treatment did not seem to help.  This week, she has noted loose yellowish stool, but with some dark blood for a total of 2-3 times.  She has abd discomfort, with N, but no Vomiting.  No F/Chill.  Cough  Productive of some yellow sputum.  She is currently is still breast feeding \"a little\" until a treatment plan for SLE is established.    ROS  Positive for myalgias and arthralgias  \"pretty bad\"   No oral ulcers better since on pred.  mild  BLACK which she attribute to the pain.    Mild swelling in fingers, intermittent.  No Hair loss.    PAST MEDICAL HISTORY:  Past Medical History:   Diagnosis Date     Chronic kidney disease      H/O suicide attempt      Lupus      Lupus      RA (rheumatoid arthritis) (H)      Rheumatoid arthritis (H)      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)      PAST SURGICAL HISTORY:  Past Surgical History:   Procedure Laterality Date     CYSTOSCOPY N/A 9/14/2018    Procedure: CYSTOSCOPY;  Cystoscopy and Bladder Wall Injection;  Surgeon: Ismael Valadez MD;  Location: [facility] OR     CYSTOSCOPY, INTRAVESICAL INJECTION N/A 10/18/2018    Procedure: Cystoscopy, Guided Bladder Wall Injection   ;  Surgeon: Ismael Valadez MD;  Location: [facility] OR     HC BIOPSY RENAL, PERCUTANEOUS  1/18/13     MEDICATIONS:  Current Outpatient Medications   Medication Sig Dispense Refill     hydroxychloroquine (PLAQUENIL) 200 MG tablet Take 2 tablets (400 mg) by mouth daily 60 tablet 11            Omega-3 Fatty Acids (FISH OIL PO) Take 1 tablet by mouth daily Fish oil with DHA       oxyCODONE-acetaminophen (PERCOCET) 5-325 MG tablet Take 1 tablet by mouth every 8 hours as needed for pain 40 tablet 0     phenazopyridine (PYRIDIUM) 100 MG tablet Take 1 tablet (100 mg) by mouth 3 times daily as needed for urinary tract discomfort 15 tablet NOT TAKING>  No benefit             predniSONE (DELTASONE) 5 MG " "tablet 68-14-88-73-67-52-20-15-10-5 mg a day each for one week then stop 30 tablet Currently at 10 mg daily     Prenatal Vit-Fe Fumarate-FA (PRENATAL MULTIVITAMIN  PLUS IRON) 27-0.8 MG TABS Take 1 tablet by mouth daily               ValACYclovir HCl (VALTREX PO) Take 500 mg by mouth as needed       VITAMIN D, CHOLECALCIFEROL, PO Take 2,000 Units by mouth daily                 .   ALLERGIES:    Allergies   Allergen Reactions     Estrogens Other (See Comments)     Lupus flares     No Clinical Screening - See Comments Other (See Comments) and Rash     She is immunosuppressed   Gets very sick from flu vaccines (or any vaccine)     mmr     Varicella Virus Vaccine Live Other (See Comments)     She is immunosuppressed      REVIEW OF SYSTEMS:  A comprehensive review of systems was performed and found to be negative except as described here or above.   SOCIAL HISTORY:   Social History     Socioeconomic History     Marital status: Single     Spouse name: Not on file     Number of children: Not on file     Years of education: Not on file     Highest education level: Not on file   Social Needs     Financial resource strain: Not on file     Food insecurity - worry: Not on file     Food insecurity - inability: Not on file     Transportation needs - medical: Not on file     Transportation needs - non-medical: Not on file   Occupational History     Employer: [employer]    Tobacco Use     Smoking status: Never Smoker     Smokeless tobacco: Never Used     Tobacco comment: no second hand smoke exposure at home   Substance and Sexual Activity     Alcohol use: No     Drug use: No     Sexual activity: Yes     Partners: Male   Other Topics Concern     Not on file   Social History Narrative     Not on file     FAMILY MEDICAL HISTORY:   No family history on file.  PHYSICAL EXAM:   /77   Pulse 76   Temp 98.6  F (37  C) (Oral)   Ht 1.676 m (5' 6\")   LMP 02/11/2018   SpO2 96%   BMI 23.40 kg/m    GENERAL APPEARANCE: alert and " no distress  EYES: nonicteric  HENT: mouth without ulcers or lesions  NECK: supple, no adenopathy  RESP: lungs clear to auscultation   CV: 2/6 sys m no rub.   ABDOMEN: soft, nontender, normal bowel sounds . Some suprapubic tenderness  Extremities: no clubbing, cyanosis, or edema  MS: no evidence of inflammation in joints, no muscle tenderness  SKIN: no malar rash  NEURO: mentation intact and speech normal  PSYCH: affect normal/bright   LABS:     CMP  Recent Labs   Lab Test 12/05/18  1153 11/02/18  0936 07/10/18  1557 09/13/17  2115 06/27/17  1603 01/21/17  2041 06/04/14  1600 04/28/14  1201 04/17/14  1515 03/11/14  1107     --   --  140 142 140 142 141 142  141   POTASSIUM 4.3  --   --  3.7 4.1 3.4 4.1 4.2 3.8 4.5   BUN 15  --   --  15 12 7 15 14 12 14   CR 0.90 0.54 0.52 0.63 0.65 0.53 0.74 0.63 0.53  0.62 0.50   GFRESTIMATED 79 >90 >90 >90 >90     >90     >90 >90 >90  >90   ALBUMIN 3.8  --  2.9* 4.1 3.2* 3.2* 3.9 4.2 4.1  4.1 4.0    < > = values in this interval not displayed.     Creatinine   Date Value Ref Range Status   01/12/2019 0.94 0.52 - 1.04 mg/dL Final   12/05/2018 0.90 0.52 - 1.04 mg/dL Final   11/02/2018 0.54 0.52 - 1.04 mg/dL Final   07/10/2018 0.52 0.52 - 1.04 mg/dL Final   09/13/2017 0.63 0.52 - 1.04 mg/dL Final   06/27/2017 0.65 0.52 - 1.04 mg/dL Final   06/06/2017 0.50 (L) 0.52 - 1.04 mg/dL Final   01/21/2017 0.53 0.52 - 1.04 mg/dL Final   06/04/2014 0.74 0.50 - 1.00 mg/dL Final   04/28/2014 0.63 0.50 - 1.00 mg/dL Final   04/17/2014 0.53 0.50 - 1.00 mg/dL Final   04/17/2014 0.62 0.50 - 1.00 mg/dL Final   03/27/2014 0.58 0.50 - 1.00 mg/dL Final   03/11/2014 0.50 0.50 - 1.00 mg/dL Final   02/03/2014 0.66 0.50 - 1.00 mg/dL Final     GFR Estimate   Date Value Ref Range Status   01/12/2019 86 >60 mL/min/[1.73_m2] Final   12/05/2018 79 >60 mL/min/1.7m2 Final     Comment:     Non  GFR Calc   11/02/2018 >90 >60 mL/min/1.7m2 Final     Comment:     Non  GFR Calc    07/10/2018 >90 >60 mL/min/1.7m2 Final     Comment:     Non  GFR Calc   09/13/2017 >90 >60 mL/min/1.7m2 Final     Comment:     Non  GFR Calc   06/27/2017 >90  Non  GFR Calc   >60 mL/min/1.7m2 Final   06/06/2017 >90  Non  GFR Calc   >60 mL/min/1.7m2 Final   01/21/2017 >90  Non  GFR Calc   >60 mL/min/1.7m2 Final   06/04/2014 >90 >60 mL/min/1.7m2 Final   04/28/2014 >90 >60 mL/min/1.7m2 Final       CBC  Recent Labs   Lab Test 01/12/19  1141 12/05/18  1153 11/02/18  0621 07/10/18  1557   HGB 13.1 12.9 10.9* 11.2*   WBC 6.5 10.0 10.3 10.0   RBC 4.59 4.56 3.82 3.53*   HCT 41.4 40.4 34.5* 33.6*   MCV 90 89 90 95   MCH 28.5 28.3 28.5 31.7   MCHC 31.6 31.9 31.6 33.3   RDW 15.5* 15.0 13.9 13.0    304 293 329     INR  Recent Labs   Lab Test 01/12/19  1141 11/02/18  0621 01/18/13  1033 01/14/13  1140   INR 0.93 0.90 0.82* 0.97   PTT  --  30 54* 55*       URINE STUDIES  Recent Labs   Lab Test 01/12/19  1110 12/05/18  1203 11/02/18  0613 07/18/18  0940 07/10/18  1600  06/27/17  1604 06/06/17  1715   COLOR Yellow Yellow Red Yellow Yellow   < > Yellow Yellow   APPEARANCE Cloudy Cloudy Cloudy Cloudy Turbid   < > Clear Clear   URINEGLC Negative Negative Negative Negative Negative   < > Negative Negative   URINEBILI Negative Negative Negative Negative Negative   < > Negative Negative   URINEKETONE Negative Negative Negative Negative Negative   < > Negative Negative   SG 1.012 1.020 1.025 1.012 1.020   < > 1.010 1.020   UBLD Small* Moderate* Large* Negative Moderate*   < > Small* Negative   URINEPH 6.0 7.0 7.5* 8.0* 7.5*   < > 7.0 5.5   PROTEIN 100* >=300* 300* 100* 100*   < > Negative Negative   UROBILINOGEN  --  0.2  --   --  0.2  --  0.2 0.2   NITRITE Negative Negative Negative Negative Negative   < > Negative Negative   LEUKEST Large* Moderate* Small* Large* Large*   < > Small* Negative   RBCU 35* Unable to quantitate other elements due to packed  WBC's. * >182* 9* 25-50*   < > O - 2  --    WBCU >182* >100* 4,952* >182* >100*   < > 2-5*  --     < > = values in this interval not displayed.     Urine microscopy performed myself: high density TNTC WBC, few transitional cells seen in the fields with lower cell density.  No RBC or other casts seen.      Recent Labs   Lab Test 01/12/19  1110 12/05/18  1203 07/18/18  0940 07/10/18  1600 03/23/18  1103 06/27/17  1604 06/06/17  1715 02/08/17  1257 06/04/14  1600 04/28/14  1200 04/17/14  1514 03/27/14  1725 03/11/14  1100 02/03/14  1233 11/13/13  1509 10/01/13  1115 08/19/13  1346 05/30/13  1529 03/27/13  1540 02/26/13  1140 01/18/13  1230 01/14/13  0945   UTPG 1.44* 2.01* 0.96* 0.99* 0.08 Unable to calculate due to low value 0.12 0.08 0.03 <0.03 <0.06 0.39* <0.06 0.06 <0.05 0.14 0.08 <0.03 0.39* 2.59* 3.22* 2.41*     [facility] [place]  US RENAL AND BLADDER COMPLETE  5/18/2018 5:12 PM    INDICATION: Left flank and low back pain 13 weeks and 3 days pregnant, history  of lupus  TECHNIQUE: Routine kidneys and bladder.  COMPARISON: CT 09/07/2017    FINDINGS:  No ascites. Right kidney measures 12.3 x 5.5 x 6.6 cm and the left 11.0 x 4.6 x  5.1 cm. No hydronephrosis. No calculi. Bladder is unremarkable.    CONCLUSION:  1.  Normal ultrasound of kidneys.    This report was electronically interpreted by: Dr. Debra Mendes MD ON 05/18/2018 at 17:21      ASSESSMENT AND PLAN:   22 y.o.  female with a history of SLE and Class V nephritis, who was referred for evaluation of pyuria, dysuria, and increased proteinuria.  She is now 2 months postpartum, but continues to have severe pyuria and painful dysuria, frequency, nocturia which are incapacitating - and especially affecting her ability to care for your baby and young child.    The urine sediment is not interpretable to assess the level of activity of lupus nephritis.  However, she also has proteinurial to a degree which likely reflects some active nephritis.  In  addition, compared to mid-late 2017 (before she was pregnant), her Cr is increased from ~ 0.6 to 0.9 (confirmed in Dec 2018 and today), which is clinically significant.  We discussed the possible need to perform a kidney biopsy to determine the class of lupus nephritis and severity of inflammation.  Per her report, urology favors the use of cyclophosphamide for the treatment of SLE cystitis.  If we are going to resort to cyclophosphamide therapy anyway, one may argue that a kidney biopsy may not be absolutely necessary at this time, however, I do believe it would be helpful to determine how much active nephritis we are facing.  This could affect the duration of treatment, prognosis, and follow up/monitoring of nephritis with treatment.  I will communicate with Dr Reina and Dr Ismael Meadows  (urogynecology) as to the optimal treatment plan for the severe cystitis.  Ms Puckett is interested in undergoing a kidney biopsy which we will arrange for the coming days-weeks.  We discussed the potential need to use cyclophosphamide, particularly with respect to possible effect on fertility.  She understands the risks, and stated that she is currently not contemplating a future pregnancy - She has discussed this with her partner as well as her mother:  She feels that she is not committed to having another child.  She is anxious to get the SLE under control.  We also discussed contraception as she is currently on none.  She does NOT want to have an IUD, or a progestin subcutaneous implant.       At this point:  1- with the increased in Cr, and the proteinuria, we will plan on a kidney biopsy soon.  2- I will check with Dr Ismael Meadows (urogyn) regarding the use of cyclophosphamide for the cystitis.  I could not find good data on the treatment of lupus cystitis specifically, however case reports seem to direct to the use of pulse methylpred + cyclophos (with also some mention of MMF).  To expedite the biopsy, + pulse therapy, it  may be best to plan a hospital admission is Ms Puckett is agreeable.    THis visit took more than 45 min, during which > 50% of the time was spent in discussing treatment options and risks, as well as implication regarding fertility/contraception    Don Santana MD  January 12, 2019

## 2019-01-13 PROBLEM — M32.14 LUPUS NEPHRITIS (H): Status: ACTIVE | Noted: 2019-01-13

## 2019-01-13 LAB
BACTERIA SPEC CULT: NO GROWTH
Lab: NORMAL
SPECIMEN SOURCE: NORMAL

## 2019-01-14 LAB
C3 SERPL-MCNC: 126 MG/DL (ref 76–169)
C4 SERPL-MCNC: 30 MG/DL (ref 15–50)
DSDNA AB SER-ACNC: 1 IU/ML

## 2019-01-15 ENCOUNTER — CARE COORDINATION (OUTPATIENT)
Dept: NEPHROLOGY | Facility: CLINIC | Age: 24
End: 2019-01-15

## 2019-01-15 DIAGNOSIS — R80.9 PROTEINURIA: Primary | ICD-10-CM

## 2019-01-15 DIAGNOSIS — M32.14 LUPUS NEPHRITIS (H): ICD-10-CM

## 2019-01-15 RX ORDER — NICOTINE POLACRILEX 4 MG
15-30 LOZENGE BUCCAL
Status: CANCELLED | OUTPATIENT
Start: 2019-01-15

## 2019-01-15 RX ORDER — DEXTROSE MONOHYDRATE 25 G/50ML
25-50 INJECTION, SOLUTION INTRAVENOUS
Status: CANCELLED | OUTPATIENT
Start: 2019-01-15

## 2019-01-15 RX ORDER — LIDOCAINE 40 MG/G
CREAM TOPICAL
Status: CANCELLED | OUTPATIENT
Start: 2019-01-15

## 2019-01-15 NOTE — PROGRESS NOTES
Nephrology Note: Nursing Outreach Encounter    REASON FOR CALL:                                                      REASON FOR CALL: Care Coordination                                          SITUATION/BACKROUND:                                                    Patient is being treated for Lupus Nephritis.    Patient seen by Dr. Santana in clinic on Saturday, 1/12. After discussing patient's care with rheumatology, he would like patient to proceed with kidney biopsy and Cytoxan infusion for treatment of lupus cystitis/ nephritis.       PLAN:                                                      Follow Up:   - First dose of Cytoxan scheduled for Thursday, 1/17 at noon   - Kidney biopsy scheduled Friday 1/18. Check in time 7:30 AM with procedure time at 9:00 AM     Patient verbalized understanding and will contact the clinic with any further questions or concerns.     [place], RN  Nephrology RN Care Coordinator

## 2019-01-17 ENCOUNTER — INFUSION THERAPY VISIT (OUTPATIENT)
Dept: INFUSION THERAPY | Facility: CLINIC | Age: 24
End: 2019-01-17
Attending: INTERNAL MEDICINE
Payer: COMMERCIAL

## 2019-01-17 VITALS
DIASTOLIC BLOOD PRESSURE: 55 MMHG | RESPIRATION RATE: 16 BRPM | HEIGHT: 66 IN | TEMPERATURE: 98.2 F | OXYGEN SATURATION: 100 % | SYSTOLIC BLOOD PRESSURE: 104 MMHG | BODY MASS INDEX: 23.05 KG/M2 | HEART RATE: 70 BPM | WEIGHT: 143.4 LBS

## 2019-01-17 DIAGNOSIS — M32.14 LUPUS NEPHRITIS (H): Primary | ICD-10-CM

## 2019-01-17 LAB
ALBUMIN SERPL-MCNC: 3.6 G/DL (ref 3.4–5)
ALBUMIN UR-MCNC: 100 MG/DL
ALP SERPL-CCNC: 84 U/L (ref 40–150)
ALT SERPL W P-5'-P-CCNC: 24 U/L (ref 0–50)
APPEARANCE UR: ABNORMAL
AST SERPL W P-5'-P-CCNC: 19 U/L (ref 0–45)
BILIRUB DIRECT SERPL-MCNC: <0.1 MG/DL (ref 0–0.2)
BILIRUB SERPL-MCNC: 0.3 MG/DL (ref 0.2–1.3)
BILIRUB UR QL STRIP: NEGATIVE
COLOR UR AUTO: YELLOW
ERYTHROCYTE [DISTWIDTH] IN BLOOD BY AUTOMATED COUNT: 15.3 % (ref 10–15)
GLUCOSE UR STRIP-MCNC: NEGATIVE MG/DL
HCG UR QL: NEGATIVE
HCT VFR BLD AUTO: 35.9 % (ref 35–47)
HGB BLD-MCNC: 11.5 G/DL (ref 11.7–15.7)
HGB UR QL STRIP: ABNORMAL
KETONES UR STRIP-MCNC: NEGATIVE MG/DL
LEUKOCYTE ESTERASE UR QL STRIP: ABNORMAL
MCH RBC QN AUTO: 28.5 PG (ref 26.5–33)
MCHC RBC AUTO-ENTMCNC: 32 G/DL (ref 31.5–36.5)
MCV RBC AUTO: 89 FL (ref 78–100)
NITRATE UR QL: NEGATIVE
PH UR STRIP: 6 PH (ref 5–7)
PLATELET # BLD AUTO: 328 10E9/L (ref 150–450)
PROT SERPL-MCNC: 6.6 G/DL (ref 6.8–8.8)
RBC # BLD AUTO: 4.04 10E12/L (ref 3.8–5.2)
RBC #/AREA URNS AUTO: 175 /HPF (ref 0–2)
SOURCE: ABNORMAL
SP GR UR STRIP: 1.01 (ref 1–1.03)
SQUAMOUS #/AREA URNS AUTO: 5 /HPF (ref 0–1)
UROBILINOGEN UR STRIP-MCNC: 0 MG/DL (ref 0–2)
WBC # BLD AUTO: 8.3 10E9/L (ref 4–11)
WBC #/AREA URNS AUTO: >182 /HPF (ref 0–5)
WBC CLUMPS #/AREA URNS HPF: PRESENT /HPF

## 2019-01-17 PROCEDURE — 85027 COMPLETE CBC AUTOMATED: CPT | Performed by: INTERNAL MEDICINE

## 2019-01-17 PROCEDURE — 96417 CHEMO IV INFUS EACH ADDL SEQ: CPT

## 2019-01-17 PROCEDURE — 80076 HEPATIC FUNCTION PANEL: CPT | Performed by: INTERNAL MEDICINE

## 2019-01-17 PROCEDURE — 96375 TX/PRO/DX INJ NEW DRUG ADDON: CPT

## 2019-01-17 PROCEDURE — 81001 URINALYSIS AUTO W/SCOPE: CPT | Performed by: INTERNAL MEDICINE

## 2019-01-17 PROCEDURE — 96413 CHEMO IV INFUSION 1 HR: CPT

## 2019-01-17 PROCEDURE — 81025 URINE PREGNANCY TEST: CPT | Performed by: INTERNAL MEDICINE

## 2019-01-17 PROCEDURE — 96367 TX/PROPH/DG ADDL SEQ IV INF: CPT

## 2019-01-17 PROCEDURE — 25000128 H RX IP 250 OP 636: Mod: ZF | Performed by: INTERNAL MEDICINE

## 2019-01-17 RX ORDER — ONDANSETRON 2 MG/ML
8 INJECTION INTRAMUSCULAR; INTRAVENOUS ONCE
Status: COMPLETED | OUTPATIENT
Start: 2019-01-17 | End: 2019-01-17

## 2019-01-17 RX ORDER — ONDANSETRON 8 MG/1
8 TABLET, FILM COATED ORAL EVERY 8 HOURS PRN
Qty: 10 TABLET | Refills: 1 | Status: SHIPPED | OUTPATIENT
Start: 2019-01-17 | End: 2022-12-20

## 2019-01-17 RX ADMIN — MESNA 520 MG: 100 INJECTION, SOLUTION INTRAVENOUS at 14:12

## 2019-01-17 RX ADMIN — SODIUM CHLORIDE 250 ML: 9 INJECTION, SOLUTION INTRAVENOUS at 12:46

## 2019-01-17 RX ADMIN — MESNA 520 MG: 100 INJECTION, SOLUTION INTRAVENOUS at 15:09

## 2019-01-17 RX ADMIN — ONDANSETRON HYDROCHLORIDE 8 MG: 2 INJECTION, SOLUTION INTRAMUSCULAR; INTRAVENOUS at 14:05

## 2019-01-17 RX ADMIN — CYCLOPHOSPHAMIDE 845 MG: 1 INJECTION, POWDER, FOR SOLUTION INTRAVENOUS; ORAL at 14:32

## 2019-01-17 RX ADMIN — DEXTROSE MONOHYDRATE 500 MG/HR: 50 INJECTION, SOLUTION INTRAVENOUS at 12:56

## 2019-01-17 ASSESSMENT — MIFFLIN-ST. JEOR: SCORE: 1427.21

## 2019-01-17 NOTE — PROGRESS NOTES
Nursing Note  Monica Puckett presents today to [facility] for:   Chief Complaint   Patient presents with     Infusion     Cytoxan and Solu-Medrol for lupus nephritis/cystitis     During today's [facility] appointment, orders from Dr. Santana were completed.  Frequency: Cytoxan: monthly x 6. Today is first dose. Solu-Medrol: once.    Progress note:  Patient identification verified by name and date of birth.  Assessment completed.  Vitals recorded in Doc Flowsheets.  Patient was provided with education regarding infusion and possible side effects.  Patient verbalized understanding.      needed: No  Premedications: administered per order.   Infusion Rates: Solu-Medrol given over 30 minutes concurrently with NS given over 1 hour. Zofran given over 15 minutes. Mesna given over 15 minutes pre and post Cytoxan.   Cytoxan given over 30 minutes.   Approximate Infusion length: 3 hours.  Labs: were drawn per orders.   Vascular access: peripheral IV placed today.  Treatment Conditions:   Lab parameters: WBC 11.5, urine pregnancy negative, ALT/AST WNL.  UA (+) large amount blood in urine. Dr. Santana paged. Okay to proceed with infusion and remove urine blood parameter for future infusions.    ~~~ NOTE: If the patient answers yes to any of the questions below, hold the infusion and contact ordering provider or on-call provider.    1. Have you recently had an elevated temperature, fever, chills, productive cough, coughing for 3 weeks or longer or hemoptysis,  abnormal vital signs, night sweats,  chest pain or have you noticed a decrease in your appetite, unexplained weight loss or fatigue? No  2. Do you have any open wounds or new incisions? No  3. Do you have any recent or upcoming hospitalizations, surgeries or dental procedures? No  4. Do you currently have or recently have had any signs of illness or infection or are you on any antibiotics? No  5. Have you  had any new, sudden or worsening abdominal pain? No  6. Have you or anyone in your household received a live vaccination in the past 4 weeks? Please note:  No live vaccines while on biologic/chemotherapy until 6 months after the last treatment.  Patient can receive the flu vaccine (shot only) and the pneumovax.  It is optimal for the patient to get these vaccines mid cycle, but they can be given at any time as long as it is not on the day of the infusion. No  7. Have you recently been diagnosed with any new nervous system diseases (ie. Multiple sclerosis, Guillain Piffard, seizures, neurological changes) or cancer diagnosis? Are you on any form of radiation or chemotherapy? No  8. Are you pregnant or breast feeding or do you have plans of pregnancy in the future? No  9. Have you been having any signs of worsening depression or suicidal ideations?  (benlysta only) No  10. Have there been any other new onset medical symptoms? No  Reminded patient to  home Zofran Rx and start taking Q8 hrs PRN.   Dr. Santana here during visit to see patient.     Patient tolerated infusion: well.    POST-INFUSION OF BIOLOGICAL MEDICATION:  Reviewed with patient.  Given biologic medication or medication hand-out. Inform patient if any fever, chills or signs of infection, new symptoms, abdominal pain, heart palpitations, shortness of breath, reaction, weakness, neurological changes, seek medical attention immediately and should not receive infusions. No live virus vaccines prior to or during treatment or up to 6 months post infusion. If the patient has an upcoming procedure or surgery, this should be discussed with the rheumatologist and surgeon or provider.    Discharge Plan:   AVS given to patient.   Follow up plan of care with: ongoing infusions at [facility].  Discharge instructions were reviewed with patient.  Patient/representative verbalized understanding of discharge instructions and all questions  "answered.  Patient discharged from [facility] in stable condition.    Sara Delgado RN    Administrations This Visit     0.9% sodium chloride BOLUS     Admin Date  01/17/2019 Action  New Bag Dose  250 mL Rate  250 mL/hr Route  Intravenous Administered By  Sara Delgado RN          cyclophosphamide (CYTOXAN) 845 mg in sodium chloride 0.9 % 317 mL non-oncology use (chemotherapy)     Admin Date  01/17/2019 Action  New Bag Dose  845 mg Rate  634 mL/hr Route  Intravenous Administered By  Sara Delgado RN          mesna (MESNEX) 520 mg in sodium chloride 0.9 % 60 mL infusion     Admin Date  01/17/2019 Action  New Bag Dose  520 mg Rate  240 mL/hr Route  Intravenous Administered By  Sara Delgado RN           Admin Date  01/17/2019 Action  New Bag Dose  520 mg Rate  240 mL/hr Route  Intravenous Administered By  Sara Delgado RN          methylPREDNISolone sodium succinate (solu-MEDROL) 250 mg in D5W 100 mL     Admin Date  01/17/2019 Action  New Bag Dose  500 mg/hr Rate  200 mL/hr Route  Intravenous Administered By  Sara Delgado RN          ondansetron (ZOFRAN) injection 8 mg     Admin Date  01/17/2019 Action  Given Dose  8 mg Route  Intravenous Administered By  Sara Delgado RN               Vital signs:  Temp: 98.2  F (36.8  C) Temp src: Oral BP: 123/87 Pulse: 72   Resp: 16 SpO2: 100 % O2 Device: None (Room air)   Height: 167.6 cm (5' 6\") Weight: 65 kg (143 lb 6.4 oz)  Estimated body mass index is 23.15 kg/m  as calculated from the following:    Height as of this encounter: 1.676 m (5' 6\").    Weight as of this encounter: 65 kg (143 lb 6.4 oz).          "

## 2019-01-17 NOTE — PROGRESS NOTES
Per telephone orders by Dr. Santana: Patient to receive 1 time dose of 250 mg Solu-Medrol IV today. No oral home Mesna prescription needed as patient will patient receive IV Mesna pre and post cyclophosphamide.     Sara Delgado RN

## 2019-01-17 NOTE — PATIENT INSTRUCTIONS
Dear Monica Puckett    Thank you for choosing [facility] ([facility]) for your infusion.  The following information is a summary of our appointment as well as important reminders.      Please remember to  Zofran script and take as prescribed if needed (nausea).   Labs to be drawn 10 days after infusion.    EDUCATION POST BIOLOGICAL/CHEMOTHERAPY INFUSION  Call the triage nurse at your clinic or seek medical attention if you have chills and/or temperature greater than or equal to 100.5, uncontrolled nausea/vomiting, diarrhea, constipation, dizziness, shortness of breath, chest pain, heart palpitations, weakness or any other new or concerning symptoms, questions or concerns.  You can not have any live virus vaccines prior to or during treatment or up to 6 months post infusion.  If you have an upcoming surgery, medical procedure or dental procedure during treatment, this should be discussed with your ordering physician and your surgeon/dentist.  If you are having any concerning symptom, if you are unsure if you should get your next infusion or wish to speak to a provider before your next infusion, please call your care coordinator or triage nurse at your clinic to notify them so we can adequately serve you.    What is this medicine?  CYCLOPHOSPHAMIDE (sye kloe YADIRA fa mide) is a chemotherapy drug. It slows the growth of cancer cells. This medicine is used to treat many types of cancer like lymphoma, myeloma, leukemia, breast cancer, and ovarian cancer, to name a few.  This medicine may be used for other purposes; ask your health care provider or pharmacist if you have questions.  What should I tell my health care provider before I take this medicine?  They need to know if you have any of these conditions:    blood disorders    history of other chemotherapy    infection    kidney disease    liver disease    recent or ongoing radiation therapy    tumors in  the bone marrow    an unusual or allergic reaction to cyclophosphamide, other chemotherapy, other medicines, foods, dyes, or preservatives    pregnant or trying to get pregnant    breast-feeding  How should I use this medicine?  This drug is usually given as an injection into a vein or muscle or by infusion into a vein. It is administered in a hospital or clinic by a specially trained health care professional.  Talk to your pediatrician regarding the use of this medicine in children. Special care may be needed.  Overdosage: If you think you have taken too much of this medicine contact a poison control center or emergency room at once.  NOTE: This medicine is only for you. Do not share this medicine with others.  What if I miss a dose?  It is important not to miss your dose. Call your doctor or health care professional if you are unable to keep an appointment.  What may interact with this medicine?  This medicine may interact with the following medications:    amiodarone    amphotericin B    azathioprine    certain antiviral medicines for HIV or AIDS such as protease inhibitors (e.g., indinavir, ritonavir) and zidovudine    certain blood pressure medications such as benazepril, captopril, enalapril, fosinopril, lisinopril, moexipril, monopril, perindopril, quinapril, ramipril, trandolapril    certain cancer medications such as anthracyclines (e.g., daunorubicin, doxorubicin), busulfan, cytarabine, paclitaxel, pentostatin, tamoxifen, trastuzumab    certain diuretics such as chlorothiazide, chlorthalidone, hydrochlorothiazide, indapamide, metolazone    certain medicines that treat or prevent blood clots like warfarin    certain muscle relaxants such as succinylcholine    cyclosporine    etanercept    indomethacin    medicines to increase blood counts like filgrastim, pegfilgrastim, sargramostim    medicines used as general anesthesia    metronidazole    natalizumab  This list may not describe all possible interactions.  Give your health care provider a list of all the medicines, herbs, non-prescription drugs, or dietary supplements you use. Also tell them if you smoke, drink alcohol, or use illegal drugs. Some items may interact with your medicine.  What should I watch for while using this medicine?  Visit your doctor for checks on your progress. This drug may make you feel generally unwell. This is not uncommon, as chemotherapy can affect healthy cells as well as cancer cells. Report any side effects. Continue your course of treatment even though you feel ill unless your doctor tells you to stop.  Drink water or other fluids as directed. Urinate often, even at night.  In some cases, you may be given additional medicines to help with side effects. Follow all directions for their use.  Call your doctor or health care professional for advice if you get a fever, chills or sore throat, or other symptoms of a cold or flu. Do not treat yourself. This drug decreases your body's ability to fight infections. Try to avoid being around people who are sick.  This medicine may increase your risk to bruise or bleed. Call your doctor or health care professional if you notice any unusual bleeding.  Be careful brushing and flossing your teeth or using a toothpick because you may get an infection or bleed more easily. If you have any dental work done, tell your dentist you are receiving this medicine.  You may get drowsy or dizzy. Do not drive, use machinery, or do anything that needs mental alertness until you know how this medicine affects you.  Do not become pregnant while taking this medicine or for 1 year after stopping it. Women should inform their doctor if they wish to become pregnant or think they might be pregnant. Men should not father a child while taking thismedicine and for 4 months after stopping it. There is a potential for serious side effects to an unborn child. Talk to your health care professional or pharmacist for more  information. Do not breast-feed an infant while taking this medicine.  This medicine may interfere with the ability to have a child. This medicine has caused ovarian failure in some women. This medicine has caused reduced sperm counts in some men. You should talk with your doctor or health care professional if you are concerned about your fertility.  If you are going to have surgery, tell your doctor or health care professional that you have taken this medicine.  What side effects may I notice from receiving this medicine?  Side effects that you should report to your doctor or health care professional as soon as possible:    allergic reactions like skin rash, itching or hives, swelling of the face, lips, or tongue    low blood counts - this medicine may decrease the number of white blood cells, red blood cells and platelets. You may be at increased risk for infections and bleeding.    signs of infection - fever or chills, cough, sore throat, pain or difficulty passing urine    signs of decreased platelets or bleeding - bruising, pinpoint red spots on the skin, black, tarry stools, blood in the urine    signs of decreased red blood cells - unusually weak or tired, fainting spells, lightheadedness    breathing problems    dark urine    dizziness    palpitations    swelling of the ankles, feet, hands    trouble passing urine or change in the amount of urine    weight gain    yellowing of the eyes or skin  Side effects that usually do not require medical attention (report to your doctor or health care professional if they continue or are bothersome):    changes in nail or skin color    hair loss    missed menstrual periods    mouth sores    nausea, vomiting  This list may not describe all possible side effects. Call your doctor for medical advice about side effects. You may report side effects to FDA at 5-014-FDA-2680.  Where should I keep my medicine?  This drug is given in a hospital or clinic and will not be stored  at home.  NOTE:This sheet is a summary. It may not cover all possible information. If you have questions about this medicine, talk to your doctor, pharmacist, or health care provider. Copyright  2016 Gold Standard      We look forward in seeing you on your next appointment here at [facility].  Please don t hesitate to call us at [phone] to reschedule any of your appointments or to speak with one of the [facility] registered nurses.  It was a pleasure taking care of you today.    Sincerely,    [facility]  [address] [place] [address]  [id number]  Phone:  [phone]

## 2019-01-18 ENCOUNTER — MYC MEDICAL ADVICE (OUTPATIENT)
Dept: NEPHROLOGY | Facility: CLINIC | Age: 24
End: 2019-01-18

## 2019-01-18 ENCOUNTER — HOSPITAL ENCOUNTER (OUTPATIENT)
Dept: ULTRASOUND IMAGING | Facility: CLINIC | Age: 24
End: 2019-01-18
Attending: RADIOLOGY | Admitting: RADIOLOGY
Payer: COMMERCIAL

## 2019-01-18 ENCOUNTER — APPOINTMENT (OUTPATIENT)
Dept: MEDSURG UNIT | Facility: CLINIC | Age: 24
End: 2019-01-18
Attending: RADIOLOGY
Payer: COMMERCIAL

## 2019-01-18 ENCOUNTER — HOSPITAL ENCOUNTER (OUTPATIENT)
Facility: CLINIC | Age: 24
Discharge: HOME | End: 2019-01-18
Attending: RADIOLOGY | Admitting: RADIOLOGY
Payer: COMMERCIAL

## 2019-01-18 VITALS
RESPIRATION RATE: 15 BRPM | OXYGEN SATURATION: 95 % | SYSTOLIC BLOOD PRESSURE: 108 MMHG | DIASTOLIC BLOOD PRESSURE: 58 MMHG | TEMPERATURE: 98.1 F

## 2019-01-18 VITALS
SYSTOLIC BLOOD PRESSURE: 101 MMHG | OXYGEN SATURATION: 96 % | HEIGHT: 66 IN | WEIGHT: 143 LBS | DIASTOLIC BLOOD PRESSURE: 50 MMHG | BODY MASS INDEX: 22.98 KG/M2 | RESPIRATION RATE: 16 BRPM

## 2019-01-18 DIAGNOSIS — M32.14 LUPUS NEPHRITIS (H): Primary | ICD-10-CM

## 2019-01-18 DIAGNOSIS — R80.9 PROTEINURIA: ICD-10-CM

## 2019-01-18 DIAGNOSIS — M32.14 LUPUS NEPHRITIS (H): ICD-10-CM

## 2019-01-18 LAB
ABO + RH BLD: NORMAL
ABO + RH BLD: NORMAL
ANION GAP SERPL CALCULATED.3IONS-SCNC: 8 MMOL/L (ref 3–14)
B-HCG FREE SERPL-ACNC: 1 [IU]/L (ref 0.86–1.14)
BASOPHILS # BLD AUTO: 0 10E9/L (ref 0–0.2)
BASOPHILS NFR BLD AUTO: 0.1 %
BLD GP AB SCN SERPL QL: NORMAL
BLOOD BANK CMNT PATIENT-IMP: NORMAL
BUN SERPL-MCNC: 14 MG/DL (ref 7–30)
CALCIUM SERPL-MCNC: 9.4 MG/DL (ref 8.5–10.1)
CHLORIDE SERPL-SCNC: 110 MMOL/L (ref 94–109)
CO2 SERPL-SCNC: 24 MMOL/L (ref 20–32)
CREAT SERPL-MCNC: 0.86 MG/DL (ref 0.52–1.04)
DIFFERENTIAL METHOD BLD: ABNORMAL
EOSINOPHIL # BLD AUTO: 0 10E9/L (ref 0–0.7)
EOSINOPHIL NFR BLD AUTO: 0.3 %
ERYTHROCYTE [DISTWIDTH] IN BLOOD BY AUTOMATED COUNT: 16.1 % (ref 10–15)
ERYTHROCYTE [DISTWIDTH] IN BLOOD BY AUTOMATED COUNT: 16.4 % (ref 10–15)
GFR SERPL CREATININE-BSD FRML MDRD: >90 ML/MIN/{1.73_M2}
GLUCOSE SERPL-MCNC: 100 MG/DL (ref 70–99)
HCT VFR BLD AUTO: 34.7 % (ref 35–47)
HCT VFR BLD AUTO: 35.9 % (ref 35–47)
HGB BLD-MCNC: 11.3 G/DL (ref 11.7–15.7)
HGB BLD-MCNC: 11.5 G/DL (ref 11.7–15.7)
IMM GRANULOCYTES # BLD: 0 10E9/L (ref 0–0.4)
IMM GRANULOCYTES NFR BLD: 0.2 %
LYMPHOCYTES # BLD AUTO: 2.2 10E9/L (ref 0.8–5.3)
LYMPHOCYTES NFR BLD AUTO: 20.3 %
MCH RBC QN AUTO: 28.8 PG (ref 26.5–33)
MCH RBC QN AUTO: 29.5 PG (ref 26.5–33)
MCHC RBC AUTO-ENTMCNC: 32 G/DL (ref 31.5–36.5)
MCHC RBC AUTO-ENTMCNC: 32.6 G/DL (ref 31.5–36.5)
MCV RBC AUTO: 90 FL (ref 78–100)
MCV RBC AUTO: 91 FL (ref 78–100)
MONOCYTES # BLD AUTO: 0.6 10E9/L (ref 0–1.3)
MONOCYTES NFR BLD AUTO: 5.7 %
NEUTROPHILS # BLD AUTO: 7.9 10E9/L (ref 1.6–8.3)
NEUTROPHILS NFR BLD AUTO: 73.4 %
NRBC # BLD AUTO: 0 10*3/UL
NRBC BLD AUTO-RTO: 0 /100
PLATELET # BLD AUTO: 347 10E9/L (ref 150–450)
PLATELET # BLD AUTO: 394 10E9/L (ref 150–450)
POTASSIUM SERPL-SCNC: 3.9 MMOL/L (ref 3.4–5.3)
RBC # BLD AUTO: 3.83 10E12/L (ref 3.8–5.2)
RBC # BLD AUTO: 3.99 10E12/L (ref 3.8–5.2)
SODIUM SERPL-SCNC: 142 MMOL/L (ref 133–144)
SPECIMEN EXP DATE BLD: NORMAL
WBC # BLD AUTO: 10.8 10E9/L (ref 4–11)
WBC # BLD AUTO: 12.9 10E9/L (ref 4–11)

## 2019-01-18 PROCEDURE — 88313 SPECIAL STAINS GROUP 2: CPT | Performed by: RADIOLOGY

## 2019-01-18 PROCEDURE — 86850 RBC ANTIBODY SCREEN: CPT | Performed by: STUDENT IN AN ORGANIZED HEALTH CARE EDUCATION/TRAINING PROGRAM

## 2019-01-18 PROCEDURE — 40000168 ZZH STATISTIC PP CARE STAGE 3

## 2019-01-18 PROCEDURE — 25000132 ZZH RX MED GY IP 250 OP 250 PS 637: Performed by: STUDENT IN AN ORGANIZED HEALTH CARE EDUCATION/TRAINING PROGRAM

## 2019-01-18 PROCEDURE — 85027 COMPLETE CBC AUTOMATED: CPT | Performed by: RADIOLOGY

## 2019-01-18 PROCEDURE — 88348 ELECTRON MICROSCOPY DX: CPT | Performed by: RADIOLOGY

## 2019-01-18 PROCEDURE — 88350 IMFLUOR EA ADDL 1ANTB STN PX: CPT | Performed by: RADIOLOGY

## 2019-01-18 PROCEDURE — 25000125 ZZHC RX 250: Performed by: RADIOLOGY

## 2019-01-18 PROCEDURE — 86901 BLOOD TYPING SEROLOGIC RH(D): CPT | Performed by: STUDENT IN AN ORGANIZED HEALTH CARE EDUCATION/TRAINING PROGRAM

## 2019-01-18 PROCEDURE — 76942 ECHO GUIDE FOR BIOPSY: CPT | Mod: TC

## 2019-01-18 PROCEDURE — 25000132 ZZH RX MED GY IP 250 OP 250 PS 637: Performed by: INTERNAL MEDICINE

## 2019-01-18 PROCEDURE — 88305 TISSUE EXAM BY PATHOLOGIST: CPT | Performed by: RADIOLOGY

## 2019-01-18 PROCEDURE — 88346 IMFLUOR 1ST 1ANTB STAIN PX: CPT | Performed by: RADIOLOGY

## 2019-01-18 PROCEDURE — 85610 PROTHROMBIN TIME: CPT

## 2019-01-18 PROCEDURE — 85025 COMPLETE CBC W/AUTO DIFF WBC: CPT | Performed by: INTERNAL MEDICINE

## 2019-01-18 PROCEDURE — 80048 BASIC METABOLIC PNL TOTAL CA: CPT | Performed by: INTERNAL MEDICINE

## 2019-01-18 PROCEDURE — 85610 PROTHROMBIN TIME: CPT | Performed by: STUDENT IN AN ORGANIZED HEALTH CARE EDUCATION/TRAINING PROGRAM

## 2019-01-18 PROCEDURE — 86900 BLOOD TYPING SEROLOGIC ABO: CPT | Performed by: STUDENT IN AN ORGANIZED HEALTH CARE EDUCATION/TRAINING PROGRAM

## 2019-01-18 RX ORDER — ACETAMINOPHEN 325 MG/1
650 TABLET ORAL ONCE
Status: DISCONTINUED | OUTPATIENT
Start: 2019-01-18 | End: 2019-01-25 | Stop reason: HOSPADM

## 2019-01-18 RX ORDER — LORAZEPAM 0.5 MG/1
1 TABLET ORAL ONCE
Status: COMPLETED | OUTPATIENT
Start: 2019-01-18 | End: 2019-01-18

## 2019-01-18 RX ORDER — DEXTROSE MONOHYDRATE 25 G/50ML
25-50 INJECTION, SOLUTION INTRAVENOUS
Status: DISCONTINUED | OUTPATIENT
Start: 2019-01-18 | End: 2019-01-25 | Stop reason: HOSPADM

## 2019-01-18 RX ORDER — LORAZEPAM 0.5 MG/1
1 TABLET ORAL ONCE
Status: DISCONTINUED | OUTPATIENT
Start: 2019-01-18 | End: 2019-01-25 | Stop reason: HOSPADM

## 2019-01-18 RX ORDER — DEXTROSE MONOHYDRATE 25 G/50ML
25-50 INJECTION, SOLUTION INTRAVENOUS
Status: DISCONTINUED | OUTPATIENT
Start: 2019-01-18 | End: 2019-01-19 | Stop reason: HOSPADM

## 2019-01-18 RX ORDER — LIDOCAINE 40 MG/G
CREAM TOPICAL
Status: DISCONTINUED | OUTPATIENT
Start: 2019-01-18 | End: 2019-01-19 | Stop reason: HOSPADM

## 2019-01-18 RX ORDER — LORAZEPAM 1 MG/1
1 TABLET ORAL ONCE
Status: COMPLETED | OUTPATIENT
Start: 2019-01-18 | End: 2019-01-18

## 2019-01-18 RX ORDER — NICOTINE POLACRILEX 4 MG
15-30 LOZENGE BUCCAL
Status: DISCONTINUED | OUTPATIENT
Start: 2019-01-18 | End: 2019-01-25 | Stop reason: HOSPADM

## 2019-01-18 RX ORDER — NICOTINE POLACRILEX 4 MG
15-30 LOZENGE BUCCAL
Status: DISCONTINUED | OUTPATIENT
Start: 2019-01-18 | End: 2019-01-19 | Stop reason: HOSPADM

## 2019-01-18 RX ORDER — LIDOCAINE HYDROCHLORIDE 10 MG/ML
50 INJECTION, SOLUTION INFILTRATION; PERINEURAL ONCE
Status: COMPLETED | OUTPATIENT
Start: 2019-01-18 | End: 2019-01-18

## 2019-01-18 RX ORDER — OXYCODONE AND ACETAMINOPHEN 5; 325 MG/1; MG/1
2 TABLET ORAL ONCE
Status: COMPLETED | OUTPATIENT
Start: 2019-01-18 | End: 2019-01-18

## 2019-01-18 RX ADMIN — OXYCODONE HYDROCHLORIDE AND ACETAMINOPHEN 1 TABLET: 5; 325 TABLET ORAL at 14:45

## 2019-01-18 RX ADMIN — LORAZEPAM 1 MG: 0.5 TABLET ORAL at 08:50

## 2019-01-18 RX ADMIN — LIDOCAINE HYDROCHLORIDE 15 ML: 10 INJECTION, SOLUTION INFILTRATION; PERINEURAL at 11:54

## 2019-01-18 RX ADMIN — LORAZEPAM 1 MG: 0.5 TABLET ORAL at 11:02

## 2019-01-18 ASSESSMENT — MIFFLIN-ST. JEOR: SCORE: 1425.39

## 2019-01-18 NOTE — PROGRESS NOTES
Call to Nephrology Fellow [id number], He came to see her and stated that she was good to go home. Left unit with her fiance to the car.

## 2019-01-18 NOTE — DISCHARGE INSTRUCTIONS
[facility] Care Following Kidney Biopsy    Physician: EDDIE Santana MD/ JONNY Franklin MD              Date:January 18, 2019    ACTIVITY:      Relax and take it easy, no strenuous activity for 24 hours.    No heavy lifting (>10 lbs.) for 1 week    DIET:            Resume your regular diet and drink plenty of fluids, unless you are fluid restricted                    DRAINAGE:    There should be minimal drainage from the biopsy site. If bleeding soaks the dressing, you should lie down and apply pressure to the site for a minimum of 10 minutes. If the bleeding persists, refer to the emergency contact numbers below; whether the bleeding persists or not, you should report the occurrence to one of the numbers below.    Refer to the emergency numbers below for the following:    Excessive bleeding or drainage    Excessive swelling, redness, or tenderness at the site    Fever above 100.5 degrees orally    Severe pain    Drainage that is green, yellow, thick white, or has a bad odor    Passage of bloody urine or clots after you are discharged.     Emergency Contact Numbers:             [phone]      Ask for the Adult Nephrology Fellow on Call, someone is available 24 hours a day.

## 2019-01-18 NOTE — IP AVS SNAPSHOT
[facility], [place], [address] [address]  Phone:  [phone]                                    After Visit Summary   1/18/2019    Monica Puckett    MRN: [id number]           After Visit Summary Signature Page    I have received my discharge instructions, and my questions have been answered. I have discussed any challenges I see with this plan with the nurse or doctor.    ..........................................................................................................................................  Patient/Patient Representative Signature      ..........................................................................................................................................  Patient Representative Print Name and Relationship to Patient    ..................................................               ................................................  Date                                   Time    ..........................................................................................................................................  Reviewed by Signature/Title    ...................................................              ..............................................  Date                                               Time          22EPIC Rev 08/18

## 2019-01-18 NOTE — PROGRESS NOTES
Patient returned to the unit after having a kidney biopsy on the left side. Sleepy but tearful when she returned. Dressing on the Left back is dry and intact. States she is having a lot of pain int he bladder area and is requesting Percocet for this. Falls asleep very quickly when she is left alone. Family with her. Continue to monitor.

## 2019-01-18 NOTE — PROGRESS NOTES
Patient is ready for the procedure, Here for renal biopsy. ISTAT INR 1.0, await BMP, consent signed.

## 2019-01-18 NOTE — PROGRESS NOTES
Up to bathroom to void clear urine, this is her second void post procedure. Requesting a pain med, states she has a lot of pain in her back. Percocet given. Eating and taking po fluid. Mother and fiance with her.

## 2019-01-19 NOTE — PROCEDURES
01/18/2019   Procedure: native kidney biopsy  Physician: Tello Romero MD   Attending: Don Santana  Consesnt:  Monica Puckett     The indications and risks of the kidney biopsy, including bleeding severe enough to require hospitalization, transfusion, surgery, or even loss of the kidney or death, were explained, understood and agreed.  The kidney was visualized under ultrasound and biopsy site marked.  Patient was prepped and draped in the usual sterile manner.  Biopsy site was anesthetized with 1% lidocaine.  Kidney biopsy needle passes with a 18 ga needle under direct ultrasound guidance were made and tissue was sent to pathology.  There were no immediate complications.  Pressure was held over biopsy site and patient will be observed for signs of bleeding or other complications. Dr. Santana was present for the key portions of the procedure.   Patient remained hemodynamically stable post procedure, She had no hematuria and post procedure hgb= 11.3. Pre-procedure Hgb was 11.5.  Patient discharged home is stable condition.

## 2019-01-22 ENCOUNTER — MYC MEDICAL ADVICE (OUTPATIENT)
Dept: RHEUMATOLOGY | Facility: CLINIC | Age: 24
End: 2019-01-22

## 2019-01-22 DIAGNOSIS — N30.90 CYSTITIS: ICD-10-CM

## 2019-01-22 DIAGNOSIS — M32.19 SYSTEMIC LUPUS ERYTHEMATOSUS WITH OTHER ORGAN INVOLVEMENT, UNSPECIFIED SLE TYPE (H): ICD-10-CM

## 2019-01-23 LAB — COPATH REPORT: NORMAL

## 2019-01-24 RX ORDER — OXYCODONE AND ACETAMINOPHEN 5; 325 MG/1; MG/1
1 TABLET ORAL EVERY 8 HOURS PRN
Qty: 90 TABLET | Refills: 0 | Status: SHIPPED | OUTPATIENT
Start: 2019-01-24 | End: 2019-02-19

## 2019-01-24 NOTE — TELEPHONE ENCOUNTER
Last seen: 11/14/18   Pending appt: None  Medication: Percocet   Last filled: 1/12/19 by another provider  Qty: 40        Flavia Mayfield RN  Rheumatology Clinic

## 2019-01-24 NOTE — TELEPHONE ENCOUNTER
[facility] Call Center    Phone Message    May a detailed message be left on voicemail: yes    Reason for Call: Other: PT:  Pt wondering if she can come  her pain med RX Script today at the [facility], please call pt to follow up thanks.     Action Taken: Message routed to:  [facility] ([facility]): RHEUM

## 2019-01-24 NOTE — TELEPHONE ENCOUNTER
Prescription has been given to Ashley on first floor and pt was notified via my chart that it is ready for .    Flavia Mayfield RN  Rheumatology Clinic

## 2019-02-14 ENCOUNTER — INFUSION THERAPY VISIT (OUTPATIENT)
Dept: INFUSION THERAPY | Facility: CLINIC | Age: 24
End: 2019-02-14
Attending: INTERNAL MEDICINE
Payer: COMMERCIAL

## 2019-02-14 VITALS
DIASTOLIC BLOOD PRESSURE: 74 MMHG | TEMPERATURE: 98.6 F | OXYGEN SATURATION: 97 % | WEIGHT: 141.1 LBS | RESPIRATION RATE: 16 BRPM | HEIGHT: 66 IN | BODY MASS INDEX: 22.68 KG/M2 | SYSTOLIC BLOOD PRESSURE: 109 MMHG

## 2019-02-14 DIAGNOSIS — M32.14 LUPUS NEPHRITIS (H): Primary | ICD-10-CM

## 2019-02-14 LAB
ALBUMIN SERPL-MCNC: 3.7 G/DL (ref 3.4–5)
ALBUMIN UR-MCNC: 100 MG/DL
ALP SERPL-CCNC: 95 U/L (ref 40–150)
ALT SERPL W P-5'-P-CCNC: 20 U/L (ref 0–50)
AMORPH CRY #/AREA URNS HPF: ABNORMAL /HPF
APPEARANCE UR: ABNORMAL
AST SERPL W P-5'-P-CCNC: 14 U/L (ref 0–45)
BILIRUB DIRECT SERPL-MCNC: <0.1 MG/DL (ref 0–0.2)
BILIRUB SERPL-MCNC: 0.3 MG/DL (ref 0.2–1.3)
BILIRUB UR QL STRIP: NEGATIVE
COLOR UR AUTO: YELLOW
ERYTHROCYTE [DISTWIDTH] IN BLOOD BY AUTOMATED COUNT: 13.9 % (ref 10–15)
GLUCOSE UR STRIP-MCNC: NEGATIVE MG/DL
HCG UR QL: NEGATIVE
HCT VFR BLD AUTO: 39.3 % (ref 35–47)
HGB BLD-MCNC: 12.5 G/DL (ref 11.7–15.7)
HGB UR QL STRIP: ABNORMAL
KETONES UR STRIP-MCNC: NEGATIVE MG/DL
LEUKOCYTE ESTERASE UR QL STRIP: ABNORMAL
MCH RBC QN AUTO: 28.7 PG (ref 26.5–33)
MCHC RBC AUTO-ENTMCNC: 31.8 G/DL (ref 31.5–36.5)
MCV RBC AUTO: 90 FL (ref 78–100)
NITRATE UR QL: NEGATIVE
PH UR STRIP: 6 PH (ref 5–7)
PLATELET # BLD AUTO: 346 10E9/L (ref 150–450)
PROT SERPL-MCNC: 7.3 G/DL (ref 6.8–8.8)
RBC # BLD AUTO: 4.35 10E12/L (ref 3.8–5.2)
RBC #/AREA URNS AUTO: 98 /HPF (ref 0–2)
SOURCE: ABNORMAL
SP GR UR STRIP: 1.01 (ref 1–1.03)
SQUAMOUS #/AREA URNS AUTO: 7 /HPF (ref 0–1)
UROBILINOGEN UR STRIP-MCNC: 0 MG/DL (ref 0–2)
WBC # BLD AUTO: 9 10E9/L (ref 4–11)
WBC #/AREA URNS AUTO: >182 /HPF (ref 0–5)
WBC CLUMPS #/AREA URNS HPF: PRESENT /HPF

## 2019-02-14 PROCEDURE — 96361 HYDRATE IV INFUSION ADD-ON: CPT

## 2019-02-14 PROCEDURE — 81001 URINALYSIS AUTO W/SCOPE: CPT | Performed by: INTERNAL MEDICINE

## 2019-02-14 PROCEDURE — 80076 HEPATIC FUNCTION PANEL: CPT | Performed by: INTERNAL MEDICINE

## 2019-02-14 PROCEDURE — 96413 CHEMO IV INFUSION 1 HR: CPT

## 2019-02-14 PROCEDURE — 85027 COMPLETE CBC AUTOMATED: CPT | Performed by: INTERNAL MEDICINE

## 2019-02-14 PROCEDURE — 25800030 ZZH RX IP 258 OP 636: Mod: ZF | Performed by: INTERNAL MEDICINE

## 2019-02-14 PROCEDURE — 25000128 H RX IP 250 OP 636: Mod: ZF | Performed by: INTERNAL MEDICINE

## 2019-02-14 PROCEDURE — 96375 TX/PRO/DX INJ NEW DRUG ADDON: CPT

## 2019-02-14 PROCEDURE — 96417 CHEMO IV INFUS EACH ADDL SEQ: CPT

## 2019-02-14 PROCEDURE — 81025 URINE PREGNANCY TEST: CPT | Performed by: INTERNAL MEDICINE

## 2019-02-14 RX ORDER — ONDANSETRON 2 MG/ML
8 INJECTION INTRAMUSCULAR; INTRAVENOUS ONCE
Status: COMPLETED | OUTPATIENT
Start: 2019-02-14 | End: 2019-02-14

## 2019-02-14 RX ADMIN — CYCLOPHOSPHAMIDE 845 MG: 1 INJECTION, POWDER, FOR SOLUTION INTRAVENOUS; ORAL at 15:14

## 2019-02-14 RX ADMIN — MESNA 520 MG: 100 INJECTION, SOLUTION INTRAVENOUS at 16:29

## 2019-02-14 RX ADMIN — ONDANSETRON HYDROCHLORIDE 8 MG: 2 INJECTION, SOLUTION INTRAMUSCULAR; INTRAVENOUS at 14:41

## 2019-02-14 RX ADMIN — MESNA 520 MG: 100 INJECTION, SOLUTION INTRAVENOUS at 14:45

## 2019-02-14 RX ADMIN — SODIUM CHLORIDE 250 ML: 9 INJECTION, SOLUTION INTRAVENOUS at 13:37

## 2019-02-14 ASSESSMENT — MIFFLIN-ST. JEOR: SCORE: 1416.78

## 2019-02-14 NOTE — PROGRESS NOTES
Infusion nursing note:    Monica Puckett presents today to [facility] for a Cytoxan infusion.  During today's [facility] appointment orders from Dr. Santana were completed.  Dose # 2/6; monthly    Progress note:  ID verified by name and .  Assessment completed.  Vitals were stable throughout time in [facility].  verbal education given to patient/representative regarding infusion and possible side effects.  Patient/representative verbalized understanding.    ~~~ NOTE: If the patient answers yes to any of the questions below, hold the infusion and contact ordering provider or on-call provider.    1. Have you recently had an elevated temperature, fever, chills, productive cough, coughing for 3 weeks or longer or hemoptysis,  abnormal vital signs, night sweats,  chest pain or have you noticed a decrease in your appetite, unexplained weight loss or fatigue? No  2. Do you have any open wounds or new incisions? No  3. Do you have any recent or upcoming hospitalizations, surgeries or dental procedures? No  4. Do you currently have or recently have had any signs of illness or infection or are you on any antibiotics? No  5. Have you had any new, sudden or worsening abdominal pain? No  6. Have you or anyone in your household received a live vaccination in the past 4 weeks? Please note:  No live vaccines while on biologic/chemotherapy until 6 months after the last treatment.  Patient can receive the flu vaccine (shot only) and the pneumovax.  It is optimal for the patient to get these vaccines mid cycle, but they can be given at any time as long as it is not on the day of the infusion. No  7. Have you recently been diagnosed with any new nervous system diseases (ie. Multiple sclerosis, Guillain [place], seizures, neurological changes) or cancer diagnosis? Are you on any form of radiation or chemotherapy? No  8. Are you pregnant or breast feeding or do you have plans of pregnancy in the future? No  9. Have you been having any signs of worsening  depression or suicidal ideations?  (benlysta only) No  10. Have there been any other new onset medical symptoms? No    Note: Labs checked prior to infusion. Pt met parameters to proceed with infusion.  Zofran/Mesna given prior to infusion  Cytoxan given over 70 minutes; Pt reported some discomfort to IV approx 1/2 way during infusion. RN assessed and adjusted IV slightly and was able to obtain blood return easily from IV. Warm pack applied for comfort to arm and infusion restarted at slower rate.   15 minutes later pt reported more discomfort again with IV. RN assessed. IV with positive blood return. Discomfort stopped after infusion stopped. RN offered to restart IV but pt declined. Infusion continued at same rate.  5 minutes later pt called and noted her arm looked slightly swollen. RN assessed and arm was slightly swollen above IV site; however pt had shirt pulled up and warm pack applied to upper arm. Blood return still present. RN removed IV and restarted to right arm. Infusion completed in this IV.   Mesna given over 15 minutes post infusion    Discharge Plan:  Reviewed with patient.  Given biologic medication or medication hand-out. Inform patient if any fever, chills or signs of infection, new symptoms, abdominal pain, heart palpitations, shortness of breath, reaction, weakness, neurological changes, seek medical attention immediately and should not receive infusions. No live virus vaccines prior to or during treatment or up to 6 months post infusion. If the patient has an upcoming procedure or surgery, this should be discussed with the rheumatologist and surgeon or provider.    Discharge instructions were reviewed with patient Yes  Patient/representative verbalized understanding of discharge instructions and all questions answered Yes.    Discharged from [facility] at 1710 with mother to home.    Louann Fernando    Administrations This Visit     0.9% sodium chloride BOLUS     Admin Date  02/14/2019 Action  New  Bag Dose  250 mL Rate  250 mL/hr Route  Intravenous Administered By  Louann Fernando RN          cyclophosphamide (CYTOXAN) 845 mg in sodium chloride 0.9 % 317 mL non-oncology use (chemotherapy)     Admin Date  02/14/2019 Action  New Bag Dose  845 mg Rate  634 mL/hr Route  Intravenous Administered By  Louann Fernando RN          mesna (MESNEX) 520 mg in sodium chloride 0.9 % 60 mL infusion     Admin Date  02/14/2019 Action  New Bag Dose  520 mg Rate  240 mL/hr Route  Intravenous Administered By  Sara Delgado RN           Admin Date  02/14/2019 Action  New Bag Dose  520 mg Rate  240 mL/hr Route  Intravenous Administered By  Louann Fernando RN          ondansetron (ZOFRAN) injection 8 mg     Admin Date  02/14/2019 Action  Given Dose  8 mg Route  Intravenous Administered By  Sara Delgado, DARREL

## 2019-02-24 DIAGNOSIS — M32.14 LUPUS NEPHRITIS (H): Primary | ICD-10-CM

## 2019-02-27 ENCOUNTER — TELEPHONE (OUTPATIENT)
Dept: NEPHROLOGY | Facility: CLINIC | Age: 24
End: 2019-02-27

## 2019-02-27 NOTE — TELEPHONE ENCOUNTER
" [facility] Call Center    Phone Message    May a detailed message be left on voicemail: yes    Reason for Call: Other: Pt stating she is symptomatic via Mychart \"I'm throwing up and having more blood along with my pain in the kidney\" Would like call from nurse to discuss symptoms.     Action Taken: Message routed to:  [facility] ([facility]): Nephrology    "

## 2019-02-27 NOTE — TELEPHONE ENCOUNTER
Nephrology  Note: Nursing Triage Note    SITUATION/BACKROUND:                                                    oMnica Puckett is a 23 year old female who calls for pain, blood in urine, urinary symptoms.      ASSESSMENT:                                                      Description: Worsening kidney pain, blood in urine, and vomiting  Uremic Symptoms: Yes,  Nausea: Yes;   Onset/duration:  Has been ongoing, but worsened this morning  Precip. factors:  N/A  Associated symptoms:  Pain, vomiting, hematuria  Improves/worsens symptoms:  Oxycodone  Pain scale (0-10):  8/10  Pain Location: left lower back    MEDICATIONS:   Medication reconciliation completed: No  Taking medication(s) as prescribed? N/A  Taking over the counter medication(s?) N/A  Any medication side effects? No significant side effects    Any barriers to taking medication(s) as prescribed?  N/A  Medication(s) improving/managing symptoms?  Yes  Allergies: Yes    PLAN:                                                      Nursing Plan:   Nursing advice to patient Go to ER for urgent evaluation of worsening symptoms. Reviewed with Dr. Rodriguez.    Patient to comply with recommendation: Yes    If further questions/concerns or if symptoms do not improve, worsen or new symptoms develop, patient is advised to: present to Urgent Care/Emergency Room.      Jacinta Bush RN

## 2019-03-22 ENCOUNTER — TELEPHONE (OUTPATIENT)
Dept: NEPHROLOGY | Facility: CLINIC | Age: 24
End: 2019-03-22

## 2019-03-22 NOTE — TELEPHONE ENCOUNTER
Call to patient regarding appointment with Dr. Santana. Opening for Thurs 3/28. Left . Provided number for call back.  Alyson Pérez LPN  Nephrology  [phone]

## 2019-03-28 ENCOUNTER — TELEPHONE (OUTPATIENT)
Dept: NEPHROLOGY | Facility: CLINIC | Age: 24
End: 2019-03-28

## 2019-03-28 NOTE — TELEPHONE ENCOUNTER
Several attempts to reach patient to schedule in to see Dr. Santana. Patient not responding.  Alyson Pérez LPN  Nephrology  [phone]

## 2019-04-04 NOTE — TELEPHONE ENCOUNTER
Attempted to call patient regarding follow up appointment. Unable to leave a message.  Alyson Pérez LPN  Nephrology  [phone]

## 2019-06-02 ENCOUNTER — TRANSFERRED RECORDS (OUTPATIENT)
Dept: HEALTH INFORMATION MANAGEMENT | Facility: CLINIC | Age: 24
End: 2019-06-02

## 2019-07-10 ENCOUNTER — DOCUMENTATION ONLY (OUTPATIENT)
Dept: RHEUMATOLOGY | Facility: CLINIC | Age: 24
End: 2019-07-10

## 2019-07-11 NOTE — PROGRESS NOTES
Unfortunately Monica lost f/u in our clinic and nephrology clinic, has not had cytoxan since 2/2019 and no showed today. Our nurses have not been able to get hold of her. I received an alert about her receiving narcotics from other providers. I will not refill her narcotic any longer as she has not followed our tx plan and has lost follow up. I saw she was approved for medical marijuana.     Chente Reina MD

## 2019-11-05 ENCOUNTER — HEALTH MAINTENANCE LETTER (OUTPATIENT)
Age: 24
End: 2019-11-05

## 2020-11-22 ENCOUNTER — HEALTH MAINTENANCE LETTER (OUTPATIENT)
Age: 25
End: 2020-11-22

## 2021-06-01 VITALS — BODY MASS INDEX: 23.3 KG/M2 | HEIGHT: 66 IN | WEIGHT: 145 LBS

## 2021-06-19 NOTE — PROGRESS NOTES
Assessment/Plan:        Diagnoses and all orders for this visit:    Renal colic  -     Renal Function Profile; Future; Expected date: 6/29/18  -     Urinalysis Macroscopic  -     Renal Function Profile    UTI (urinary tract infection)  -     Stat Gram Stain; Future; Expected date: 6/29/18  -     Culture, Urine; Future; Expected date: 6/29/18  -     HM1(CBC and Differential); Future; Expected date: 6/29/18  -     C-Reactive Protein(CRP); Future; Expected date: 6/29/18  -     POCT urinalysis dipstick-[facility]  -     Culture, Urine  -     Urinalysis Macroscopic  -     HM1(CBC and Differential)  -     C-Reactive Protein(CRP)  -     HM1 (CBC with Diff)    Hydronephrosis, unspecified hydronephrosis type  -     Urinalysis Macroscopic  -     Symptom Control While Passing a Stone Education  -     Patient Stated Goal: Pass my stone  -     US Kidney Bilateral; Future; Expected date: 6/29/18    Other orders  -     prenatal vitamin iron-folic acid 27mg-0.8mg (PRENATAL S) 27 mg iron- 800 mcg Tab tablet; Take 1 tablet by mouth.  -     hydroxychloroquine (PLAQUENIL) 200 mg tablet; TAKE 2 TABLETS (400 MG) BY MOUTH DAILY; Refill: 11  -     acetaminophen tablet 1,000 mg (TYLENOL); Take 2 tablets (1,000 mg total) by mouth once.  -     acetaminophen (TYLENOL) 500 MG tablet;       Stone Management Plan  [facility] Stone Management 6/29/2018   Urinary Tract Infection Possible Infection   Renal Colic Inadequate outpatient management of symptoms   Renal Failure No suspicion of renal failure   R Hydronephrosis Mild   R Stone Event New event   R Current Plan MET   MET 2 week F/U   L Stone Event No current event         Subjective:      HPI  Ms. Monica Puckett is a 22 y.o.  female, currently 19 weeks pregnant, presenting to the [facility] [place] self referred for right flank pain and hydronephrosis.    She is a first time unidentified composition stone former who has not required stone clearance procedures. She has not  previously participated in stone risk evaluation. She has no identified modifiable stone risk factors. She has no identified non-modifiable stone risk factors.    She states history of right flank pain and dysuria over past 2 months. The pain was sharp, stabbing and constant, radiating into the right lower back. She also had burning with urination and suprapubic pressure. She was hospitalized at [place] 6/14 through 6/16 for IV antibiotics with diagnosis of clinical pyelonephritis. Renal ultrasound reported moderate bilateral hydronephrosis with no right ureteral jet visualized and dependent debris in bladder. Labs were unremarkable for infection or renal impairment. Urine culture failed to grow anything and she was discharged home on additional 7 day course of antibiotics, completed 6/23/18.     She has continued to have persistent symptoms and required ED visit 2 days ago, when she had dominguez catheter placed for concern of retention. She had immediate relief of pelvic pressure with catheter. A OB ultrasound showed no complications with pregnancy. Last night, she had recurrence of severe right flank pain, stating she was unable to walk secondary to the pain intensity. She attempted to use vicodin, but was unable to keep down secondary to nausea and vomiting. She was seen in ED yesterday with ultrasound reporting slight improvement of right hydronephrosis. Labs demonstrated mildly elevated CRP and leukocytosis with urinalysis positive for pyuria and hematuria, urine culture pending. Dominguez was left intact and she was sent home with vicodin.    Significant current symptoms include: 7/10 right flank pain, nausea and dysuria. She is fatigued from her persistent symptoms. Pertinent negative current symptoms include:  fever, chills, left flank pain and vomiting.     Should be noted patient's voiding symptoms of been going on for 2 months including urinary frequency up to 30-60 minute interval with dysuria and occasional  gross hematuria.  She has had negative cultures.  Possibility of interstitial cystitis is present.  In addition I note there was a report of lupus cystitis presenting with urinary symptoms reported in urology annals 2015 issue  Article stated lupus cystitis is a rare manifestation of SLE in most cases are from anesthesia.  Further stated that irritative voiding symptoms were typical and approximately 92% have an associated hydronephrosis.  Most patients do not require intervention and respond well to immunosuppressive therapy according to the article.  Spoke with Mrs. Puckett and informed her of this finding.  Hopefully they will get into mail to see the rheumatology people and can see urology if need be.    Renal ultrasound from 18 report only states mild to moderate right pelvocaliectasis, slightly improved from 18. Mild left renal pelvocaliectasis, stable. Bladder collapsed with dominguez in place.    Significant labs from presentation include moderate hematuria, severe pyuria, negative nitrite, few bacteria, pending results on urine culture , urine cultures from  and  no growth, mildly elevated WBC (13.3), slightly elevated C reactive protein (2.1), normal creatinine and normal potassium.    Provided tylenol 1000 mg oral for pain with some improvement.    PLAN    23 yo F  currently 19 weeks pregnant, with PMH of lupus and glomerularnephritis, no prior stone disease. Persistent right flank pain with irritative voiding symptoms x 2 months, no documented infection. Mild to moderate right hydronephrosis, etiology unclear.    She is instructed to removal dominguez catheter on 18; patient and patient's mother were provided education on this by DARREL Izaguirre.    Will proceed with surveillance and symptom management at this time. Given multiple negative urine cultures in recent past, will avoid antibiotics at this time and follow pending urine culture.     For symptom control, she  was prescribed dilaudid and ondansetron. Over the counter symptom control medications of Dramamine and Tylenol were recommended.    She will return to clinic in 2 weeks with repeat ultrasound.    Addendum: same day labs demonstrate CRP slightly more elevated 3.4 with no leukocytosis or renal impairment. This could be reactive secondary to lupus flare; will follow urine culture.    Patient also seen and examined by CONCHA Mallory   Review of Systems  A 12 point comprehensive review of systems is negative except for HPI    Past Medical History:   Diagnosis Date     ADHD      CKD (chronic kidney disease) stage 3, GFR 30-59 ml/min     Unknown if stage 3 or 4     Kidney stone      Lupus        Past Surgical History:   Procedure Laterality Date     RENAL BIOPSY         Current Outpatient Prescriptions   Medication Sig Dispense Refill     hydroxychloroquine (PLAQUENIL) 200 mg tablet TAKE 2 TABLETS (400 MG) BY MOUTH DAILY  11     prenatal vitamin iron-folic acid 27mg-0.8mg (PRENATAL S) 27 mg iron- 800 mcg Tab tablet Take 1 tablet by mouth.       Current Facility-Administered Medications   Medication Dose Route Frequency Provider Last Rate Last Dose     acetaminophen (TYLENOL) 500 MG tablet                Allergies   Allergen Reactions     Estrogens Other (See Comments)     Lupus flares     Influenza Tri-Split 08-09 Vac Other (See Comments)     She is immunosuppressed - can not take live influenza but should get the injectable      Measles,Mumps,Rubella Vacc(Pf) Other (See Comments)     She is immunosuppressed      Varicella Virus Vacc Live (Pf) Other (See Comments)     She is immunosuppressed        Social History     Social History     Marital status: Single     Spouse name: N/A     Number of children: N/A     Years of education: N/A     Occupational History           Social History Main Topics     Smoking status: Never Smoker     Smokeless tobacco: Never Used     Alcohol use No     Drug use: Not on  file     Sexual activity: Not on file     Other Topics Concern     Not on file     Social History Narrative     No narrative on file       Family History   Problem Relation Age of Onset     Urolithiasis Mother      Cancer Maternal Uncle      skin     Heart disease Maternal Uncle      Urolithiasis Maternal Grandfather      Clotting disorder Neg Hx      Diabetes Neg Hx      Gout Neg Hx        Objective:      Physical Exam  Vitals:    06/29/18 1039   BP: 106/57   Pulse: 97   Temp: 98.1  F (36.7  C)     General - well developed, well nourished, appropriate for age. Appears moderately uncomfortable   Heart - regular rate and rhythm, no murmur  Respiratory - normal effort, clear to auscultation, good air entry without adventitious noises  Abdomen - slender soft, non-tender, no hepatosplenomegaly, no masses.   - right flank moderate tenderness, no suprapubic tenderness, kidney and bladder non-palpable; dominguez intact, draining cloudy yellow urine with sediment  MSK - normal spinal curvature. no spinal tenderness. normal gait. muscular strength intact.  Neurology - cranial nerves II-XII grossly intact, normal sensation, no unsteadiness  Skin - intact, no bruising, no gouty tophi  Psych - oriented to time, place, and person, normal mood and affect.    Labs  Urinalysis POC (Office):  Nitrite, UA   Date Value Ref Range Status   06/29/2018 Negative Negative Final       Lab Urinalysis:  Blood, UA   Date Value Ref Range Status   06/29/2018 Large (!) Negative Final     Nitrite, UA   Date Value Ref Range Status   06/29/2018 Negative Negative Final     Leukocytes, UA   Date Value Ref Range Status   06/29/2018 Large (!) Negative Final     pH, UA   Date Value Ref Range Status   06/29/2018 7.0 5.0 - 8.0 Final

## 2021-09-19 ENCOUNTER — HEALTH MAINTENANCE LETTER (OUTPATIENT)
Age: 26
End: 2021-09-19

## 2021-11-18 ENCOUNTER — HOSPITAL ENCOUNTER (EMERGENCY)
Facility: CLINIC | Age: 26
Discharge: LEFT BEFORE DISCHARGE | End: 2021-11-18
Attending: EMERGENCY MEDICINE | Admitting: EMERGENCY MEDICINE
Payer: COMMERCIAL

## 2021-11-18 ENCOUNTER — TELEPHONE (OUTPATIENT)
Dept: RHEUMATOLOGY | Facility: CLINIC | Age: 26
End: 2021-11-18

## 2021-11-18 ENCOUNTER — APPOINTMENT (OUTPATIENT)
Dept: GENERAL RADIOLOGY | Facility: CLINIC | Age: 26
End: 2021-11-18
Attending: EMERGENCY MEDICINE
Payer: COMMERCIAL

## 2021-11-18 VITALS
TEMPERATURE: 99.1 F | RESPIRATION RATE: 18 BRPM | BODY MASS INDEX: 23.3 KG/M2 | SYSTOLIC BLOOD PRESSURE: 123 MMHG | OXYGEN SATURATION: 97 % | HEIGHT: 66 IN | WEIGHT: 145 LBS | DIASTOLIC BLOOD PRESSURE: 101 MMHG | HEART RATE: 117 BPM

## 2021-11-18 DIAGNOSIS — M32.9 SYSTEMIC LUPUS ERYTHEMATOSUS, UNSPECIFIED SLE TYPE, UNSPECIFIED ORGAN INVOLVEMENT STATUS (H): ICD-10-CM

## 2021-11-18 DIAGNOSIS — R05.9 COUGH: ICD-10-CM

## 2021-11-18 DIAGNOSIS — O99.891 MATERNAL BLOOD TRANSFUSION: ICD-10-CM

## 2021-11-18 DIAGNOSIS — Z33.1 PREGNANT STATE, INCIDENTAL: ICD-10-CM

## 2021-11-18 DIAGNOSIS — M79.10 MYALGIA: ICD-10-CM

## 2021-11-18 DIAGNOSIS — Z3A.13 13 WEEKS GESTATION OF PREGNANCY: ICD-10-CM

## 2021-11-18 DIAGNOSIS — R07.9 CHEST PAIN, UNSPECIFIED TYPE: ICD-10-CM

## 2021-11-18 DIAGNOSIS — O26.891 OTHER SPECIFIED PREGNANCY RELATED CONDITIONS, FIRST TRIMESTER: ICD-10-CM

## 2021-11-18 DIAGNOSIS — Z11.52 ENCOUNTER FOR SCREENING LABORATORY TESTING FOR SEVERE ACUTE RESPIRATORY SYNDROME CORONAVIRUS 2 (SARS-COV-2): ICD-10-CM

## 2021-11-18 LAB
ALBUMIN SERPL-MCNC: 3.1 G/DL (ref 3.4–5)
ALBUMIN UR-MCNC: 20 MG/DL
ALP SERPL-CCNC: 68 U/L (ref 40–150)
ALT SERPL W P-5'-P-CCNC: 22 U/L (ref 0–50)
ANION GAP SERPL CALCULATED.3IONS-SCNC: 6 MMOL/L (ref 3–14)
APPEARANCE UR: CLEAR
AST SERPL W P-5'-P-CCNC: 13 U/L (ref 0–45)
BASOPHILS # BLD AUTO: 0 10E3/UL (ref 0–0.2)
BASOPHILS NFR BLD AUTO: 0 %
BILIRUB SERPL-MCNC: 0.3 MG/DL (ref 0.2–1.3)
BILIRUB UR QL STRIP: NEGATIVE
BUN SERPL-MCNC: 12 MG/DL (ref 7–30)
CALCIUM SERPL-MCNC: 8.7 MG/DL (ref 8.5–10.1)
CHLORIDE BLD-SCNC: 107 MMOL/L (ref 94–109)
CO2 SERPL-SCNC: 25 MMOL/L (ref 20–32)
COLOR UR AUTO: YELLOW
CREAT SERPL-MCNC: 0.57 MG/DL (ref 0.52–1.04)
CRP SERPL-MCNC: 33 MG/L (ref 0–8)
DEPRECATED S PYO AG THROAT QL EIA: NEGATIVE
EOSINOPHIL # BLD AUTO: 0.2 10E3/UL (ref 0–0.7)
EOSINOPHIL NFR BLD AUTO: 1 %
ERYTHROCYTE [DISTWIDTH] IN BLOOD BY AUTOMATED COUNT: 12.4 % (ref 10–15)
GFR SERPL CREATININE-BSD FRML MDRD: >90 ML/MIN/1.73M2
GLUCOSE BLD-MCNC: 110 MG/DL (ref 70–99)
GLUCOSE UR STRIP-MCNC: NEGATIVE MG/DL
GROUP A STREP BY PCR: NOT DETECTED
HCT VFR BLD AUTO: 34.7 % (ref 35–47)
HGB BLD-MCNC: 12 G/DL (ref 11.7–15.7)
HGB UR QL STRIP: ABNORMAL
HOLD SPECIMEN: NORMAL
HOLD SPECIMEN: NORMAL
IMM GRANULOCYTES # BLD: 0.1 10E3/UL
IMM GRANULOCYTES NFR BLD: 1 %
KETONES UR STRIP-MCNC: ABNORMAL MG/DL
LEUKOCYTE ESTERASE UR QL STRIP: ABNORMAL
LYMPHOCYTES # BLD AUTO: 1 10E3/UL (ref 0.8–5.3)
LYMPHOCYTES NFR BLD AUTO: 8 %
MCH RBC QN AUTO: 30.8 PG (ref 26.5–33)
MCHC RBC AUTO-ENTMCNC: 34.6 G/DL (ref 31.5–36.5)
MCV RBC AUTO: 89 FL (ref 78–100)
MONOCYTES # BLD AUTO: 0.6 10E3/UL (ref 0–1.3)
MONOCYTES NFR BLD AUTO: 5 %
MUCOUS THREADS #/AREA URNS LPF: PRESENT /LPF
NEUTROPHILS # BLD AUTO: 10 10E3/UL (ref 1.6–8.3)
NEUTROPHILS NFR BLD AUTO: 85 %
NITRATE UR QL: NEGATIVE
NRBC # BLD AUTO: 0 10E3/UL
NRBC BLD AUTO-RTO: 0 /100
PH UR STRIP: 6 [PH] (ref 5–7)
PLATELET # BLD AUTO: 210 10E3/UL (ref 150–450)
POTASSIUM BLD-SCNC: 3.4 MMOL/L (ref 3.4–5.3)
PROT SERPL-MCNC: 6.4 G/DL (ref 6.8–8.8)
RBC # BLD AUTO: 3.9 10E6/UL (ref 3.8–5.2)
RBC URINE: 15 /HPF
SODIUM SERPL-SCNC: 138 MMOL/L (ref 133–144)
SP GR UR STRIP: 1.03 (ref 1–1.03)
SQUAMOUS EPITHELIAL: 1 /HPF
TRANSITIONAL EPI: <1 /HPF
TROPONIN I SERPL-MCNC: <0.015 UG/L (ref 0–0.04)
UROBILINOGEN UR STRIP-MCNC: 3 MG/DL
WBC # BLD AUTO: 11.8 10E3/UL (ref 4–11)
WBC URINE: 46 /HPF

## 2021-11-18 PROCEDURE — 86160 COMPLEMENT ANTIGEN: CPT | Performed by: EMERGENCY MEDICINE

## 2021-11-18 PROCEDURE — 93005 ELECTROCARDIOGRAM TRACING: CPT | Performed by: EMERGENCY MEDICINE

## 2021-11-18 PROCEDURE — 87086 URINE CULTURE/COLONY COUNT: CPT | Performed by: EMERGENCY MEDICINE

## 2021-11-18 PROCEDURE — 86235 NUCLEAR ANTIGEN ANTIBODY: CPT | Performed by: EMERGENCY MEDICINE

## 2021-11-18 PROCEDURE — 99285 EMERGENCY DEPT VISIT HI MDM: CPT | Mod: 25 | Performed by: EMERGENCY MEDICINE

## 2021-11-18 PROCEDURE — 87637 SARSCOV2&INF A&B&RSV AMP PRB: CPT | Performed by: EMERGENCY MEDICINE

## 2021-11-18 PROCEDURE — 87651 STREP A DNA AMP PROBE: CPT | Performed by: EMERGENCY MEDICINE

## 2021-11-18 PROCEDURE — 71046 X-RAY EXAM CHEST 2 VIEWS: CPT

## 2021-11-18 PROCEDURE — 81003 URINALYSIS AUTO W/O SCOPE: CPT | Performed by: EMERGENCY MEDICINE

## 2021-11-18 PROCEDURE — 36415 COLL VENOUS BLD VENIPUNCTURE: CPT | Performed by: EMERGENCY MEDICINE

## 2021-11-18 PROCEDURE — 93010 ELECTROCARDIOGRAM REPORT: CPT | Performed by: EMERGENCY MEDICINE

## 2021-11-18 PROCEDURE — 250N000013 HC RX MED GY IP 250 OP 250 PS 637: Performed by: EMERGENCY MEDICINE

## 2021-11-18 PROCEDURE — 86140 C-REACTIVE PROTEIN: CPT | Performed by: EMERGENCY MEDICINE

## 2021-11-18 PROCEDURE — 84484 ASSAY OF TROPONIN QUANT: CPT | Performed by: EMERGENCY MEDICINE

## 2021-11-18 PROCEDURE — C9803 HOPD COVID-19 SPEC COLLECT: HCPCS | Performed by: EMERGENCY MEDICINE

## 2021-11-18 PROCEDURE — 80053 COMPREHEN METABOLIC PANEL: CPT | Performed by: EMERGENCY MEDICINE

## 2021-11-18 PROCEDURE — 85025 COMPLETE CBC W/AUTO DIFF WBC: CPT | Performed by: EMERGENCY MEDICINE

## 2021-11-18 PROCEDURE — 71046 X-RAY EXAM CHEST 2 VIEWS: CPT | Mod: 26 | Performed by: RADIOLOGY

## 2021-11-18 PROCEDURE — 86225 DNA ANTIBODY NATIVE: CPT | Performed by: EMERGENCY MEDICINE

## 2021-11-18 RX ORDER — AMOXICILLIN 500 MG/1
500 CAPSULE ORAL 2 TIMES DAILY
Qty: 14 CAPSULE | Refills: 0 | Status: SHIPPED | OUTPATIENT
Start: 2021-11-18 | End: 2021-11-25

## 2021-11-18 RX ORDER — ACETAMINOPHEN 325 MG/1
650 TABLET ORAL ONCE
Status: COMPLETED | OUTPATIENT
Start: 2021-11-18 | End: 2021-11-18

## 2021-11-18 RX ORDER — ALBUTEROL SULFATE 90 UG/1
2 AEROSOL, METERED RESPIRATORY (INHALATION) EVERY 6 HOURS PRN
Qty: 18 G | Refills: 0 | Status: SHIPPED | OUTPATIENT
Start: 2021-11-18 | End: 2021-11-29

## 2021-11-18 RX ORDER — CEFTRIAXONE 2 G/1
2 INJECTION, POWDER, FOR SOLUTION INTRAMUSCULAR; INTRAVENOUS ONCE
Status: DISCONTINUED | OUTPATIENT
Start: 2021-11-18 | End: 2021-11-18 | Stop reason: HOSPADM

## 2021-11-18 RX ADMIN — ACETAMINOPHEN 650 MG: 325 TABLET, FILM COATED ORAL at 17:35

## 2021-11-18 ASSESSMENT — ENCOUNTER SYMPTOMS
FEVER: 0
COUGH: 1
FATIGUE: 1
SHORTNESS OF BREATH: 1
SORE THROAT: 1
WHEEZING: 1

## 2021-11-18 ASSESSMENT — MIFFLIN-ST. JEOR: SCORE: 1419.47

## 2021-11-18 NOTE — TELEPHONE ENCOUNTER
SUBJECTIVE/OBJECTIVE                                                    Monica Puckett is a 26 year old female who calls with pain in joints and muscles allover, rash on face, chest pain, severe fatigue that started about 2 months ago and has gotten worse over time. Patient stated that she has not seen any other Rheumatologist, just her PCP. She is currently taking Plaquenil 200mg, 2 tabs in the morning and 2 tabs in the evening. Patient is currently 13 weeks pregnant.     Patient also stated that she is currently in the ED waiting to be seen.    Last appointment: 7/11/2018  Next appointment: None Scheduled    Patient has diagnosis of: SLE  Rheumatology Provider: Dr. Chente Reina      Duration: more than a month    Onset: gradual    Description: Aching    Location: all over in joints and muscles    Intensity:  6-7    Pain Quality: aching    Accompanying signs and symptoms: fatigue, rash on face, chest pain    Aggravating factors include:  walking, climbing stairs and moving around    Functionality: Can do most things, but pain gets in the way some    Relieving factors include: Tylenol and Rest      History  Previous similar problem: YES- but this feels more severe than in the past  Is this a usual flare?: unknown       Current Therapies:  Prednisone? No    Dose: N/A  Infusion Therapy? No    Drug Name: N/A   Next Infusion: N/A  Current Rheumatology meds:   Home Therapies:  OTC pain medications (Tylenol) and rest      Problems taking medications regularly: No    Medication side effects: No significant side effects    Difficulty with ADLs: walking, stairs/curbs and sleeping    Allergies:   Allergies   Allergen Reactions     Estrogens Other (See Comments)     Lupus flares     No Clinical Screening - See Comments Other (See Comments) and Rash     She is immunosuppressed   Gets very sick from flu vaccines (or any vaccine)     mmr     Varicella Virus Vaccine Live Other (See Comments)     She is immunosuppressed       Additional history: as documented  Preferred Pharmacy:    [id number] - [place], [place] - [address]  [facility] - [place], [place] - [address]  [facility] [address] [facility] [employer] - [place], [place] - [address]  [facility] [place], [place] - [address]   Call back phone number:  [phone] (home) ,   Telephone Information:   Mobile [phone]       Routing to RN for assessment.    Shawna Mora CMA   11/18/2021 2:26 PM

## 2021-11-18 NOTE — ED TRIAGE NOTES
Patient has been experiencing flu like symptoms over the last month, gradually getting worse. Was seen and diagnosed with Walking pneumonia, but chest x-ray was negative. Started on Z-too and steroids. Pt is still experiencing symptoms and also now reports chest pain. History of lupus. States her butterfly rash has been flaring up. Pt is also 13 weeks pregnant.

## 2021-11-18 NOTE — TELEPHONE ENCOUNTER
"Patient would like to speak to clinical staff re: \"really bad lupus flare.\" Per patient, she is experiencing - inflammation, rash, chest pain, sick for last 2 mo, severe fatigue.     "

## 2021-11-19 ENCOUNTER — DOCUMENTATION ONLY (OUTPATIENT)
Dept: OTHER | Facility: CLINIC | Age: 26
End: 2021-11-19
Payer: COMMERCIAL

## 2021-11-19 LAB
ATRIAL RATE - MUSE: 93 BPM
BACTERIA UR CULT: NO GROWTH
C3 SERPL-MCNC: 124 MG/DL (ref 81–157)
C4 SERPL-MCNC: 23 MG/DL (ref 13–39)
DIASTOLIC BLOOD PRESSURE - MUSE: NORMAL MMHG
DSDNA AB SER-ACNC: 1.1 IU/ML
ENA SM IGG SER IA-ACNC: <1.6 U/ML
ENA SM IGG SER IA-ACNC: NEGATIVE
ENA SS-A AB SER IA-ACNC: <0.5 U/ML
ENA SS-A AB SER IA-ACNC: NEGATIVE
ENA SS-B IGG SER IA-ACNC: <0.6 U/ML
ENA SS-B IGG SER IA-ACNC: NEGATIVE
FLUAV RNA SPEC QL NAA+PROBE: NEGATIVE
FLUBV RNA RESP QL NAA+PROBE: NEGATIVE
INTERPRETATION ECG - MUSE: NORMAL
P AXIS - MUSE: 41 DEGREES
PR INTERVAL - MUSE: 122 MS
QRS DURATION - MUSE: 74 MS
QT - MUSE: 342 MS
QTC - MUSE: 425 MS
R AXIS - MUSE: 49 DEGREES
RSV RNA SPEC NAA+PROBE: NEGATIVE
SARS-COV-2 RNA RESP QL NAA+PROBE: NEGATIVE
SYSTOLIC BLOOD PRESSURE - MUSE: NORMAL MMHG
T AXIS - MUSE: 19 DEGREES
U1 SNRNP IGG SER IA-ACNC: 1.2 U/ML
U1 SNRNP IGG SER IA-ACNC: NEGATIVE
VENTRICULAR RATE- MUSE: 93 BPM

## 2021-11-19 NOTE — ED NOTES
Patient received in signout from Dr. Chatterjee.  See her note for full documentation.  In short, patient is admitted to ED observation with diagnosis of lupus flare.  Plan on signout is to follow-up on troponin to make sure there is no evidence of myocarditis.    Troponin negative.  EKG reviewed by me without any evidence of myocarditis.  We will continue with original plan for ED observation.      Update: Patient is now requesting to leave AGAINST MEDICAL ADVICE.  She feels that her symptoms are under control, would rather follow-up in outpatient setting.  On reassessment, she overall appears nontoxic.  She has not received her IV antibiotics, so I did discharge her with oral amoxicillin.  She requests prescription for albuterol as well which I provided.  Recommended to call rheumatology tomorrow to schedule follow-up appointment.  Educated on reasons to return to the ED.     Channing Ellison, DO  11/18/21 1929       Channing Ellison, DO  11/18/21 2004

## 2021-11-19 NOTE — RESULT ENCOUNTER NOTE
"Final urine culture report shows \"NO GROWTH\" and is NEGATIVE.  [facility] Emergency Dept discharge antibiotic: Amoxicillin  [facility] Emergency Dept discharge Rx antibiotic for UTI only (Yes/No): No  Recommendations in treatment per [facility] ED Lab result Urine culture protocol.    "

## 2021-11-19 NOTE — DISCHARGE INSTRUCTIONS
We recommend admission to the hospital.  You are electing to leave against our medical advice.  We prescribed amoxicillin (antibiotics) and albuterol.  Make sure to follow-up with your rheumatologist soon as possible.  Return to emergency department with any new or worsening symptoms.

## 2021-11-19 NOTE — RESULT ENCOUNTER NOTE
Negative for Influenza A, Influenza B, RSV and Covid19.  Patient will receive the Covid19 result via [employer] and a letter will be sent via [employer] (if active) or via the mail

## 2021-11-19 NOTE — RESULT ENCOUNTER NOTE
[facility] Emergency Dept discharge antibiotic (if prescribed): Amoxicillin 500 mg PO capsule, 1 capsule (500 mg) by mouth 2 times daily for 7 days   Date of Rx (if applicable):  11/18/21  No changes in treatment per [facility] ED Lab Result Urine culture protocol.

## 2021-11-29 ENCOUNTER — TELEPHONE (OUTPATIENT)
Dept: RHEUMATOLOGY | Facility: CLINIC | Age: 26
End: 2021-11-29

## 2021-11-29 ENCOUNTER — HOSPITAL ENCOUNTER (EMERGENCY)
Facility: CLINIC | Age: 26
Discharge: HOME | End: 2021-11-29
Attending: STUDENT IN AN ORGANIZED HEALTH CARE EDUCATION/TRAINING PROGRAM | Admitting: STUDENT IN AN ORGANIZED HEALTH CARE EDUCATION/TRAINING PROGRAM
Payer: COMMERCIAL

## 2021-11-29 VITALS
DIASTOLIC BLOOD PRESSURE: 81 MMHG | TEMPERATURE: 98.9 F | SYSTOLIC BLOOD PRESSURE: 128 MMHG | RESPIRATION RATE: 18 BRPM | HEART RATE: 112 BPM | OXYGEN SATURATION: 99 %

## 2021-11-29 DIAGNOSIS — M32.9 SYSTEMIC LUPUS ERYTHEMATOSUS, UNSPECIFIED SLE TYPE, UNSPECIFIED ORGAN INVOLVEMENT STATUS (H): ICD-10-CM

## 2021-11-29 DIAGNOSIS — J06.9 VIRAL URI WITH COUGH: Primary | ICD-10-CM

## 2021-11-29 PROCEDURE — 250N000009 HC RX 250: Performed by: STUDENT IN AN ORGANIZED HEALTH CARE EDUCATION/TRAINING PROGRAM

## 2021-11-29 PROCEDURE — 87798 DETECT AGENT NOS DNA AMP: CPT | Performed by: STUDENT IN AN ORGANIZED HEALTH CARE EDUCATION/TRAINING PROGRAM

## 2021-11-29 PROCEDURE — 250N000013 HC RX MED GY IP 250 OP 250 PS 637: Performed by: STUDENT IN AN ORGANIZED HEALTH CARE EDUCATION/TRAINING PROGRAM

## 2021-11-29 PROCEDURE — 99283 EMERGENCY DEPT VISIT LOW MDM: CPT | Performed by: STUDENT IN AN ORGANIZED HEALTH CARE EDUCATION/TRAINING PROGRAM

## 2021-11-29 RX ORDER — LIDOCAINE HYDROCHLORIDE 20 MG/ML
10 SOLUTION OROPHARYNGEAL ONCE
Status: COMPLETED | OUTPATIENT
Start: 2021-11-29 | End: 2021-11-29

## 2021-11-29 RX ORDER — PREDNISONE 5 MG/1
TABLET ORAL
Qty: 30 TABLET | Refills: 3 | Status: SHIPPED | OUTPATIENT
Start: 2021-11-29 | End: 2022-04-05

## 2021-11-29 RX ORDER — ALBUTEROL SULFATE 90 UG/1
2 AEROSOL, METERED RESPIRATORY (INHALATION) EVERY 6 HOURS PRN
Qty: 18 G | Refills: 0 | Status: SHIPPED | OUTPATIENT
Start: 2021-11-29 | End: 2022-12-20

## 2021-11-29 RX ORDER — MAGNESIUM HYDROXIDE/ALUMINUM HYDROXICE/SIMETHICONE 120; 1200; 1200 MG/30ML; MG/30ML; MG/30ML
30 SUSPENSION ORAL ONCE
Status: COMPLETED | OUTPATIENT
Start: 2021-11-29 | End: 2021-11-29

## 2021-11-29 RX ADMIN — ALUMINUM HYDROXIDE, MAGNESIUM HYDROXIDE, AND SIMETHICONE 30 ML: 200; 200; 20 SUSPENSION ORAL at 20:33

## 2021-11-29 RX ADMIN — LIDOCAINE HYDROCHLORIDE 10 ML: 20 SOLUTION ORAL; TOPICAL at 20:33

## 2021-11-29 NOTE — ED PROVIDER NOTES
[place] EMERGENCY DEPARTMENT ([facility])  21   History     Chief Complaint   Patient presents with     Cough     Pharyngitis     HPI  Monica Puckett is a 26 year old female () with a past medical history of systemic lupus erythematosus and lupus nephritis who presents to the emergency department for evaluation of pharyngitis and a cough.  Patient reports she has had a cough for 1-1/2 months and has now had a sore throat for 2 weeks.  Patient is taking amoxicillin no improvement.  She has been trying albuterol nebulizations and Tylenol every 4 hours without relief.  Patient states her throat continues to hurt extremely painful to swallow.  Patient denies shortness of breath.  Patient was seen at outside facility where she was told to present to the emergency department for CT of her neck to evaluate for bacterial infection and further work-up.      The patient reviewed.  Patient was seen on 2021 for similar symptoms.  Patient was advised admission however the wait time was too long for her so she decided to leave AGAINST MEDICAL ADVICE.  Patient was discharged with oral amoxicillin.  Today patient was seen by her primary care provider, was advised to present to the ED for a neck CT.    Past Medical History  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)      Past Surgical History:   Procedure Laterality Date     CYSTOSCOPY N/A 2018    Procedure: CYSTOSCOPY;  Cystoscopy and Bladder Wall Injection;  Surgeon: Ismael Valadez MD;  Location: [facility] OR     CYSTOSCOPY, INTRAVESICAL INJECTION N/A 10/18/2018    Procedure: Cystoscopy, Guided Bladder Wall Injection   ;  Surgeon: Ismael Valadez MD;  Location: [facility] OR     HC BIOPSY RENAL, PERCUTANEOUS  13     RENAL BIOPSY       albuterol (PROAIR HFA/PROVENTIL  HFA/VENTOLIN HFA) 108 (90 Base) MCG/ACT inhaler  predniSONE (DELTASONE) 5 MG tablet  Amphetamine-Dextroamphetamine (ADDERALL PO)  Amphetamine-Dextroamphetamine (ADDERALL XR PO)  hydroxychloroquine (PLAQUENIL) 200 MG tablet  hydrOXYzine (ATARAX) 25 MG tablet  ibuprofen (ADVIL/MOTRIN) 600 MG tablet  Omega-3 Fatty Acids (FISH OIL PO)  ondansetron (ZOFRAN) 8 MG tablet  oxyCODONE IR (ROXICODONE) 5 MG tablet  oxyCODONE-acetaminophen (PERCOCET) 5-325 MG tablet  phenazopyridine (PYRIDIUM) 100 MG tablet  Prenatal Vit-Fe Fumarate-FA (PRENATAL MULTIVITAMIN  PLUS IRON) 27-0.8 MG TABS  senna-docusate (SENOKOT-S;PERICOLACE) 8.6-50 MG per tablet  ValACYclovir HCl (VALTREX PO)  VITAMIN D, CHOLECALCIFEROL, PO      Allergies   Allergen Reactions     Estrogens Other (See Comments)     Lupus flares     No Clinical Screening - See Comments Other (See Comments) and Rash     She is immunosuppressed   Gets very sick from flu vaccines (or any vaccine)     mmr     Varicella Virus Vaccine Live Other (See Comments)     She is immunosuppressed      Family History  Family History   Problem Relation Age of Onset     Urolithiasis Mother      Cancer Maternal Uncle         skin     Heart Disease Maternal Uncle      Urolithiasis Maternal Grandfather      Clotting Disorder No family hx of      Diabetes No family hx of      Gout No family hx of      Social History   Social History     Tobacco Use     Smoking status: Never Smoker     Smokeless tobacco: Never Used     Tobacco comment: no second hand smoke exposure at home   Substance Use Topics     Alcohol use: No     Drug use: No      Past medical history, past surgical history, medications, allergies, family history, and social history were reviewed with the patient. No additional pertinent items.       Review of Systems   Constitutional: Positive for fever.   HENT: Positive for sore throat. Negative for trouble swallowing and voice change.    Respiratory: Positive for cough.    Gastrointestinal:  Positive for nausea.   All other systems reviewed and are negative.    A complete review of systems was performed with pertinent positives and negatives noted in the HPI, and all other systems negative.    Physical Exam   BP: 125/55  Pulse: 66  Temp: 98.9  F (37.2  C)  Resp: 20  SpO2: 99 %  Physical Exam  Vitals and nursing note reviewed.   Constitutional:       Appearance: She is not ill-appearing or toxic-appearing.      Comments: Well-appearing female, no respiratory distress, tolerating secretions, speaking in full sentences   HENT:      Head: Normocephalic and atraumatic.      Right Ear: External ear normal.      Left Ear: External ear normal.      Nose: Nose normal.      Mouth/Throat:      Comments: Uvula midline, or pharyngeal erythema without swelling, no evidence of PTA or RPA  Eyes:      Extraocular Movements: Extraocular movements intact.      Conjunctiva/sclera: Conjunctivae normal.   Neck:      Comments: No meningeal signs  Cardiovascular:      Rate and Rhythm: Normal rate and regular rhythm.   Pulmonary:      Effort: Pulmonary effort is normal.      Breath sounds: Normal breath sounds.   Abdominal:      General: There is no distension.      Palpations: Abdomen is soft.      Tenderness: There is no abdominal tenderness.   Musculoskeletal:         General: No deformity or signs of injury.      Cervical back: Neck supple. No rigidity.   Skin:     General: Skin is warm.      Findings: No rash.   Neurological:      General: No focal deficit present.      Mental Status: She is alert. Mental status is at baseline.   Psychiatric:         Mood and Affect: Mood normal.         Behavior: Behavior normal.       ED Course      Procedures       The medical record was reviewed and interpreted.  Previous labs reviewed and interpreted.  Previous images reviewed and interpreted: see reads from outside images.  Managed outpatient prescription medications.       No results found for any visits on 11/29/21.  Medications    lidocaine (viscous) (XYLOCAINE) 2 % solution 10 mL (10 mLs Mouth/Throat Given 11/29/21 2033)   alum & mag hydroxide-simethicone (MAALOX) suspension 30 mL (30 mLs Oral Given 11/29/21 2033)        Assessments & Plan (with Medical Decision Making)   MDM:    26 year old F presents with clinical signs and symptoms of a viral upper respiratory infection, she has been treated empirically with antibiotics and will cover her for community-acquired pneumonia, has recent unremarkable chest x-ray.  She was strep test negative, as well as mono test negative.  She has no clinical evidence of significant PTA or RPA, so had shared decision-making conversation with the patient about radiation and contrast exposure at this point in her pregnancy, she is agreeable to no CT scan at this time.  Most likely she has residual viral infection, did order pertussis testing as she reports posttussive emesis, though I believe that this will most likely be negative.  Staying Medrol Dosepak, will order, recommended continue albuterol, spoke with pharmacy who recommended over-the-counter guaifenesin as appropriate antitussives as needed the patient.    On reevaluation she is clinically improved.  I discussed all test results and the plan of care with the patient, who is agreeable to discharge with outpatient follow-up.  Strict return precautions given and all questions answered prior to discharge.    I have reviewed the nursing notes. I have reviewed the findings, diagnosis, plan and need for follow up with the patient.    Discharge Medication List as of 11/29/2021  8:38 PM          Final diagnoses:   Viral URI with cough       --  Any Brooks MD  [facility] EMERGENCY DEPARTMENT  11/29/2021

## 2021-11-29 NOTE — TELEPHONE ENCOUNTER
Dr Sola Angel from [facility] would like to talk to Dr Reina about pt and he upper respiratory symptoms that have been present for the past 2 months. Dr Sola Angel wants to know if there is anything else she should be looking in to with the pt having Lupus and being pregnant. Please have Dr Reina call Dr Sola Angel at  [phone] (her personal number).

## 2021-11-30 ENCOUNTER — PATIENT OUTREACH (OUTPATIENT)
Dept: CARE COORDINATION | Facility: CLINIC | Age: 26
End: 2021-11-30
Payer: COMMERCIAL

## 2021-11-30 DIAGNOSIS — Z71.89 OTHER SPECIFIED COUNSELING: ICD-10-CM

## 2021-11-30 LAB
B PARAPERT DNA SPEC QL NAA+PROBE: NOT DETECTED
B PERT DNA SPEC QL NAA+PROBE: NOT DETECTED

## 2021-11-30 ASSESSMENT — ENCOUNTER SYMPTOMS
VOICE CHANGE: 0
FEVER: 1
COUGH: 1
SORE THROAT: 1
TROUBLE SWALLOWING: 0
NAUSEA: 1

## 2021-11-30 NOTE — DISCHARGE INSTRUCTIONS
Please follow up with your PCP or OB/GYN in 1 week.  If your symptoms worsen or you are otherwise concerned, please present to the ED for repeat evaluation

## 2021-11-30 NOTE — TELEPHONE ENCOUNTER
Pt has been scheduled with Dr. Pugh on 12/6 in person.  She is considered a new pt as it has been 3 years since she was last seen.    Flavia Mayfield RN  Rheumatology Clinic

## 2021-11-30 NOTE — TELEPHONE ENCOUNTER
Pt has been scheduled with Dr. Pugh on Monday 12/6.  Dr. Reina did leave a message for provider.    Flavia Mayfield RN  Rheumatology Clinic

## 2021-11-30 NOTE — PROGRESS NOTES
[facility] J[facility]: Post-Discharge Note  SITUATION                                                      Admission:    Admission Date: 11/29/21   Reason for Admission: Cough   Pharyngitis  Discharge:   Discharge Date: 11/29/21  Discharge Diagnosis: Patient left AMA    BACKGROUND                                                        The medical record was reviewed and interpreted.  Previous labs reviewed and interpreted.  Previous images reviewed and interpreted: see reads from outside images.  Managed outpatient prescription medications.      ASSESSMENT      Enrollment  Primary Care Care Coordination Status: Not a Candidate (She has her initial appt with MITCH Jolly MD on 12/6, if appt is completed, she would be eligible at that time.)    Discharge Assessment  How are you doing now that you are home?: Patient said that she tired.  Sleeping a lot.  How are your symptoms? (Red Flag symptoms escalate to triage hotline per guidelines): Unchanged  Do you feel your condition is stable enough to be safe at home until your provider visit?: Yes  Does the patient have their discharge instructions? : Yes  Does the patient have questions regarding their discharge instructions? : No  Does the patient have all of their medications?: Yes  Do you have questions regarding any of your medications? : No  Do you have all of your needed medical supplies or equipment (DME)?  (i.e. oxygen tank, CPAP, cane, etc.): Yes  Discharge follow-up appointment scheduled within 14 calendar days? : Yes  Discharge Follow Up Appointment Date: 12/06/21  Discharge Follow Up Appointment Scheduled with?: Primary Care Provider (New PCP)    Post-op ([facility] Only)  If the patient had a surgery or procedure, do they have any questions for a nurse?: No             PLAN                                                      Outpatient Plan:  Patient left AMA    Future Appointments   Date Time Provider Department Center   12/6/2021 10:30 AM Gonzalo Pugh  DO [facility]         For any urgent concerns, please contact our 24 hour nurse triage line: [phone] ([phone])         Lydia Steele [facility]  [phone]  [facility]

## 2021-12-04 NOTE — TELEPHONE ENCOUNTER
NOTES Status Details   OFFICE NOTE from referring provider N/A    OFFICE NOTE from other specialist Internal 01.12.2019 Don Santana MD    11.14.2018 Chente Reina MD   DISCHARGE SUMMARY from hospital N/A    DISCHARGE REPORT from the ER Internal 11.29.2021 Any Brooks MD    11.18.2021 Channing Ellison, DO     MEDICATION LIST Care Everywhere  /Internal    LABS (Any and all labs)      Care Everywhere  /Internal    Biopsy/pathology (Anything related to diagnoses I.e. fluid aspirations, lip biopsy, muscle biopsy)      N/A     N/A    Imaging (All imaging related to diagnoses)     Echo N/A    HRCT N/A    CXR N/A    EMG N/A                   Scleroderma/Dermatomyositis diagnoses     Previous Cardiology notes  N/A    Previous Pulmonary notes N/A    Previous Dermatology notes N/A    Previous GI notes N/A    Lupus diagnoses     Previous Nephrology notes N/A    Previous Dermatology notes N/A    Previous Cardiology notes N/A

## 2021-12-06 ENCOUNTER — TELEPHONE (OUTPATIENT)
Dept: OTOLARYNGOLOGY | Facility: CLINIC | Age: 26
End: 2021-12-06

## 2021-12-06 ENCOUNTER — OFFICE VISIT (OUTPATIENT)
Dept: RHEUMATOLOGY | Facility: CLINIC | Age: 26
End: 2021-12-06
Attending: INTERNAL MEDICINE
Payer: COMMERCIAL

## 2021-12-06 ENCOUNTER — PRE VISIT (OUTPATIENT)
Dept: RHEUMATOLOGY | Facility: CLINIC | Age: 26
End: 2021-12-06
Payer: COMMERCIAL

## 2021-12-06 VITALS
BODY MASS INDEX: 23.5 KG/M2 | SYSTOLIC BLOOD PRESSURE: 105 MMHG | DIASTOLIC BLOOD PRESSURE: 68 MMHG | HEART RATE: 94 BPM | WEIGHT: 145.6 LBS | OXYGEN SATURATION: 98 %

## 2021-12-06 DIAGNOSIS — M32.9 SYSTEMIC LUPUS ERYTHEMATOSUS, UNSPECIFIED SLE TYPE, UNSPECIFIED ORGAN INVOLVEMENT STATUS (H): ICD-10-CM

## 2021-12-06 DIAGNOSIS — N04.20 NEPHROTIC SYNDROME WITH LESION OF MEMBRANOUS GLOMERULONEPHRITIS: ICD-10-CM

## 2021-12-06 DIAGNOSIS — R05.3 CHRONIC COUGH: ICD-10-CM

## 2021-12-06 DIAGNOSIS — O99.891 SYSTEMIC LUPUS COMPLICATING PREGNANCY (H): Primary | ICD-10-CM

## 2021-12-06 DIAGNOSIS — M32.9 SYSTEMIC LUPUS COMPLICATING PREGNANCY (H): Primary | ICD-10-CM

## 2021-12-06 PROCEDURE — 99204 OFFICE O/P NEW MOD 45 MIN: CPT | Mod: GC | Performed by: STUDENT IN AN ORGANIZED HEALTH CARE EDUCATION/TRAINING PROGRAM

## 2021-12-06 PROCEDURE — G0463 HOSPITAL OUTPT CLINIC VISIT: HCPCS

## 2021-12-06 RX ORDER — HYDROXYCHLOROQUINE SULFATE 200 MG/1
200 TABLET, FILM COATED ORAL 2 TIMES DAILY
Qty: 60 TABLET | Refills: 11 | Status: SHIPPED | OUTPATIENT
Start: 2021-12-06

## 2021-12-06 ASSESSMENT — PAIN SCALES - GENERAL: PAINLEVEL: SEVERE PAIN (6)

## 2021-12-06 NOTE — PATIENT INSTRUCTIONS
- Get labs at a [facility] lab.    - ENT referral.    - Maternal fetal medicine referral.    - Return to clinic in 2 months.

## 2021-12-06 NOTE — LETTER
2021       RE: Monica Puckett  [address] [facility] [address]     Dear Colleague,    Thank you for referring your patient, Monica Puckett, to the [facility] RHEUMATOLOGY [facility] [place] at [facility]. Please see a copy of my visit note below.    Rheumatology Visit - New Consult     Monica Puckett MRN# [id number]   YOB: 1995 Age: 26 year old     Date of Visit: 2021  Primary care provider: No Ref-Primary, Physician          Assessment and Plan:     ***          History of Present Illness:   Monica Puckett is a 26 year old female who presents for ***        Review of Systems:     Review Of Systems  Skin: negative  Eyes: negative  Ears/Nose/Throat: negative  Respiratory: No shortness of breath, dyspnea on exertion, cough, or hemoptysis  Cardiovascular: negative  Gastrointestinal: negative  Genitourinary: negative  Musculoskeletal: negative  Neurologic: negative  Psychiatric: negative  Hematologic/Lymphatic/Immunologic: negative  Endocrine: negative          Past Medical History:     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)        Patient Active Problem List    Diagnosis Date Noted     Cough 2021     Priority: Medium     Pregnant state, incidental 2021     Priority: Medium     Myalgia 2021     Priority: Medium     Chest pain, unspecified type 2021     Priority: Medium     Lupus nephritis (H) 2019     Priority: Medium      (normal spontaneous vaginal delivery) 2018     Priority: Medium     Encounter for triage in pregnant patient 2018     Priority: Medium     Indication for care in labor and delivery, antepartum 2018     Priority: Medium     Labor and delivery indication for care or intervention  10/18/2018     Priority: Medium     Long-term use of Plaquenil 10/19/2017     Priority: Medium     Systemic lupus erythematosus (H) 08/16/2013     Priority: Medium     Observation after surgery 01/18/2013     Priority: Medium     Pain in joint, shoulder region 01/14/2011     Priority: Medium             Past Surgical History:     Past Surgical History:   Procedure Laterality Date     CYSTOSCOPY N/A 9/14/2018    Procedure: CYSTOSCOPY;  Cystoscopy and Bladder Wall Injection;  Surgeon: Ismael Valadez MD;  Location: [facility] OR     CYSTOSCOPY, INTRAVESICAL INJECTION N/A 10/18/2018    Procedure: Cystoscopy, Guided Bladder Wall Injection   ;  Surgeon: Ismael Valadez MD;  Location: [facility] OR     HC BIOPSY RENAL, PERCUTANEOUS  1/18/13     RENAL BIOPSY               Social History:     Social History     Tobacco Use     Smoking status: Never Smoker     Smokeless tobacco: Never Used     Tobacco comment: no second hand smoke exposure at home   Substance Use Topics     Alcohol use: No             Family History:     Family History   Problem Relation Age of Onset     Urolithiasis Mother      Cancer Maternal Uncle         skin     Heart Disease Maternal Uncle      Urolithiasis Maternal Grandfather      Clotting Disorder No family hx of      Diabetes No family hx of      Gout No family hx of             Allergies:     Allergies   Allergen Reactions     Estrogens Other (See Comments)     Lupus flares     No Clinical Screening - See Comments Other (See Comments) and Rash     She is immunosuppressed   Gets very sick from flu vaccines (or any vaccine)     mmr     Varicella Virus Vaccine Live Other (See Comments)     She is immunosuppressed              Medications:     Current Outpatient Medications   Medication Sig Dispense Refill     albuterol (PROAIR HFA/PROVENTIL HFA/VENTOLIN HFA) 108 (90 Base) MCG/ACT inhaler Inhale 2 puffs into the lungs every 6 hours as needed for shortness of breath / dyspnea or wheezing 18 g 0      Amphetamine-Dextroamphetamine (ADDERALL PO) Take 20 mg by mouth daily       Amphetamine-Dextroamphetamine (ADDERALL XR PO) Take 20 mg by mouth daily       hydroxychloroquine (PLAQUENIL) 200 MG tablet Take 2 tablets (400 mg) by mouth daily 60 tablet 11     hydrOXYzine (ATARAX) 25 MG tablet Take 2-4 tablets ( mg) by mouth nightly as needed for other (pain adjunct) Can take 25-50mg once during day but can make you sleepy (Patient not taking: Reported on 10/26/2018) 30 tablet 0     ibuprofen (ADVIL/MOTRIN) 600 MG tablet Take 1 tablet (600 mg) by mouth every 6 hours as needed for moderate pain 30 tablet 0     Omega-3 Fatty Acids (FISH OIL PO) Take 1 tablet by mouth daily Fish oil with DHA       ondansetron (ZOFRAN) 8 MG tablet Take 1 tablet (8 mg) by mouth every 8 hours as needed for nausea (nausea/vomiting) 10 tablet 1     oxyCODONE IR (ROXICODONE) 5 MG tablet Take 0.5 tablets (2.5 mg) by mouth every 4 hours as needed for severe pain or breakthrough pain 30 tablet 0     oxyCODONE-acetaminophen (PERCOCET) 5-325 MG tablet Take 1 tablet by mouth every 8 hours as needed for pain 90 tablet 0     phenazopyridine (PYRIDIUM) 100 MG tablet Take 1 tablet (100 mg) by mouth 3 times daily as needed for urinary tract discomfort 15 tablet 1     predniSONE (DELTASONE) 5 MG tablet 22-68-33-22-45-79-20-15-10-5 mg a day each for one week then stop 30 tablet 3     Prenatal Vit-Fe Fumarate-FA (PRENATAL MULTIVITAMIN  PLUS IRON) 27-0.8 MG TABS Take 1 tablet by mouth daily       senna-docusate (SENOKOT-S;PERICOLACE) 8.6-50 MG per tablet Take 1 tablet by mouth 2 times daily as needed for constipation 30 tablet 0     ValACYclovir HCl (VALTREX PO) Take 500 mg by mouth as needed       VITAMIN D, CHOLECALCIFEROL, PO Take 2,000 Units by mouth daily              Physical Exam:   unknown if currently breastfeeding.  Constitutional: [id number] cooperative  Eyes: nl EOM, PERRLA, vision, conjunctiva, sclera  ENT: nl external ears, nose, hearing,  lips, teeth, gums, throat  No mucous membrane lesions, normal saliva pool  Neck: no mass or thyroid enlargement  Resp: lungs clear to auscultation, nl to palpation  CV: RRR, no murmurs, rubs or gallops, no edema  GI: no ABD mass or tenderness, no HSM  : not tested  Lymph: no cervical, supraclavicular, inguinal or epitrochlear nodes  MS: All TMJ, neck, shoulder, elbow, wrist, MCP/PIP/DIP, spine, hip, knee, ankle, and foot MTP/IP joints were examined and  found normal. No active synovitis or deformity. Full ROM.  Normal  strength. No dactylitis,  tenosynovitis, enthespathy   Skin: no nail pitting, alopecia, rash, nodules or lesions  Neuro: nl cranial nerves, strength, sensation, DTRs.   Psych: nl judgement, orientation, memory, affect.         Data:     Lab Results   Component Value Date    WBC 11.8 (H) 11/18/2021    WBC 9.0 02/14/2019    WBC 10.8 01/18/2019    HGB 12.0 11/18/2021    HGB 12.5 02/14/2019    HGB 11.3 (L) 01/18/2019    HCT 34.7 (L) 11/18/2021    HCT 39.3 02/14/2019    HCT 34.7 (L) 01/18/2019    MCV 89 11/18/2021    MCV 90 02/14/2019    MCV 91 01/18/2019     11/18/2021     02/14/2019     01/18/2019     Lab Results   Component Value Date    BUN 12 11/18/2021    BUN 14 01/18/2019    BUN 14 01/12/2019     No components found for: SEDRATE  Lab Results   Component Value Date    TSH 2.48 03/27/2013     Lab Results   Component Value Date    AST 13 11/18/2021    AST 14 02/14/2019    AST 19 01/17/2019    ALT 22 11/18/2021    ALT 20 02/14/2019    ALT 24 01/17/2019    ALKPHOS 68 11/18/2021    ALKPHOS 95 02/14/2019    ALKPHOS 84 01/17/2019     Reviewed Rheumatology lab flowsheet    Gonzalo Pugh DO      Patient is a 21 year old female referred by rheumatologist Dr. Ambar Muniz from [facility] for SLE and high risk pregnancy. The patient has a history of SLE with stage V LN (diagnosed age 17).   She reportedly developed a flu-like illness 11/2012 and had several UC visits  "from 11/2012 to 1/2013. She presented to the ED in 1/2013 after waking up with an erythematous facial rash and swelling of her face which was initially thought to be angioedema. She notes at the time experiencing fevers, fatigue, oral ulcer, and pleuritic chest pain. Outpatient workup was initiated and she was found to have a +AWILDA, +ds-DNA, +Sm and significant proteinuria. She was hospitalized at [facility] and underwent a renal biopsy which reportedly showed LN class V. She was referred to pediatric rheumatology and has since followed with Dr. Wyatt from 1/2013 until last year. She was previously seen by Dr. Yeh at  Pediatric nephrology, but for the past 2 years or so she has not seen a nephrologist. She was initially treated with high dose steroids (solumedrol 1g, unclear how many days), HCQ, and prednisone and reports being placed on \"chemotherapy\" pill which she thinks was cyclophosphamide for the first 4-5 months (+ a GnRH agonist). Per media tab scanned notes it appears she was given oral cyclophosphamide 50 mg tablets, but unclear dose. Notes she was then put back on prednisone 75 mg daily and was transitioned to cellcept 1000 mg BID. Notes she received RTX infusions (unclear dose and number) for what sounds like 2 cycles, last cycle received around 1/2015. She states the RTX was given due to \"flares\" of her lupus which included significant arthralgias, fatigue, skin rashes, and \"abnormal blood tests.\" She thinks her last flare was at least 2 years ago and that her lupus has otherwise been very well controlled. She self-stopped both her HCQ and cellcept about 2-3 months ago due to some GI upset that got to be \"annyoing.\"      She had a pregnancy test that resulted positive and she was referred back to rheumatology. She was evaluated by Dr. Muniz from [facility] adult rheumatology where extensive workup including repeat blood tests w/ serology, kidney function, UA were obtained, which all " "resulted mostly normal other than +RNP. Her AWILDA, ds-DNA, C3/C4, SSA/SSB, Sm were all negative. UA was negative for protein, blood, or WBC. No total urine Pr/Cr ratio was obtained, but per chart review, this was last >0.5 g in 2/2013. She was told to restart her HCQ at 400 mg QD and was referred to lupus clinic here at the [facility].      She notes very intermittent arthralgias of her hands \"just here and there,\" some fatigue (which she attributes to being pregnant), and some lower abdominal cramping pain that is intermittent and severe for the past 2-3 weeks lasting seconds to 3 minutes occuring 2x per day. She had a TVUS yesterday which showed a normal gestational sac measuring around 5 weeks. She has not yet seen an OB. Estimated due date June 24/2016.      2/8/2017: Patient was hospitalized 4 weeks ago for bronchitis/severe cough that was unresponsive to antibiotics. There was some concern for DVT at that time but ultrasound was negative. Patient has been having intermittent malar rash along with intermittent swelling, myalgias and arthralgias that are predominantly in the hands but also in the ankles. Patient feels that her energy level has been about the same as previous. Chest pain is not bad currently, but has recently been increased, associated with cough. Negative for oral ulcers. Patient was recently seen several days ago in [facility] ED for medication overdose but she threw up most of the pills and was discharged to Mom the same day. Patient has been taking her Plaquenil daily but has forgotten every once in a while.      6/2017: She delivered her son (arianna) on 6/19/2017, had NVD with no complications. She is doing both breast feeding and formula.   Reports throbbing pain over L flank since delivery. Pain is not improving, percocet helps to ease the pain.  Hands are puffy and swollen and gets gelling. AM stiffness is 10-15 minutes.  No oral ulcers. No skin rash today but had the malar rash " intermittently.  No hair loss.  No pleuritic CP.  Ran out of HCQ 4 days ago.     3/2018: She reports she recently found out she was pregnant. Her LMP was 2/12 giving an estimated delivery date of 11/19/2018. She is currently ~5 weeks gestational age. She has been feeling lightheaded and fatigued. She had a runny nose and cough for the past two weeks, which she says is finally improved. She reports a mild rash on her face. She says her joint pain fluctuates, today it is not too bad. She feels it most in her wrists and knees. She has felt some L abd pain. She denies dysuria.     7/2018: Today, Ms. Puckett is currently 22 weeks pregnant with a expected delivery date of November 20th, 2018. She complains of extreme pain with urination and right sided flank pain. The patient says the symptoms have been present for approximately 3 months. She has had 4 visits to the ED during this time for these symptoms, one of those times resulting in a 3 day admission where she was started on IV antibiotics and sent home with a catheter to use for a week. She feels the same symptoms now, except that the flank pain has began to appear in her left side as well, though not as excruciatingly painful as the right side. She says she has been given numerous antibiotics and the work up for this has been negative for UTI multiple times. There is concern for lupus cystitis from her OBGYN at this point as well. She also complains of DIP swelling, return of her malar rash, vomiting, low grade fever, increased sensation of bladder pressure, dizziness, URI-like symptoms, SOB due to pain with inhalation from her flank, and bladder spasms. She is crying currently from the pain at the time of this examination. Her UA from this visit shows a large number of proteins. UA also shows >100 WBC and many bacteria, there is a culture that is currently pending among other lab work.      9/2018: 29 wk pregnant with IVET of 11/20/2018. Continues to have severe pain  on urination. Scheduled for cystoscopy today. Flanke pain improved after prednisone 20 mg every day taper.      Today: for active lupus cystis, I recommended AZA long time ago, it took her a while to do TPMT testing (NL), then she started taking it at 50 mg every day in 2018 but reports stopping it a month ago given lack of efficacy.     She had 2 local bladder steroid inj since last visit, done by urology which has not helped.     She delivered her son Renato at 37 weeks on 11/3/2018 when she presented with gross hematuria and severe pain sec lupus cystitis. Her son is healthy, she had NVD and recovered well.     Monica is not feeling well, still dealing with active lupus cystitis causing her severe pain/discomfort even at rest which gets worse with voiding, no gross hematuria.     Hands are puffy. Has fatigue. On the leave from work.     Reports stomach upset and passing white mucous with her stool.     Worried about her bladder inflammation causes scars and chronic pain in future, asks about percocet at higher dose of 7.5 mg as that's the only med that has helped to relieve the pain and allow her to rest and take care of her  son.     She is asking for cytoxan and rituximab as both helped to control her lupus in the past, she never had lupus cystitis till now. She has no immediate pregnancy plans, but 5-6 yr from now might want to have more kids.     Currently is breastfeeding. Her other son in 1.6 yo and she did breastfeeding x 1 month and plans the same for her son but is open to use the donor breastmilk if has to stop to take meds to control her lupus.

## 2021-12-06 NOTE — TELEPHONE ENCOUNTER
Sent msg to schedulers. Inquiring about other location sites for an appt.    Heather Hernandez LPN

## 2021-12-06 NOTE — TELEPHONE ENCOUNTER
"[facility] Call Center    Phone Message    May a detailed message be left on voicemail: no     Reason for Call: Appointment Intake    Referring Provider Name: Chente Reina MD in  ADULT RHEUMATOLOGY  Diagnosis and/or Symptoms: Systemic lupus erythematosus, unspecified SLE type, unspecified organ involvemen...  Chronic cough [R05.3]    Additional Information:   persistent cough, sinus pain, productive sputum, failed antibiotics and steroids          OV Notes: \"persistant cough and sore throat, about two months. worse at night, but still bad all that time. coughs up yellow, milky mucus. nothing makes the sore throat or cough better. Had x-ray two weeks ago and it was noted that there was narrowing of the upper airway. Given prednisone and amoxicillin and she thinks it has not helped at all with symptoms. \"    Action Taken: Message routed to:  [facility] [[phone]] - both Throat and Nose Rhino issues stated - routing to clinic for review    Travel Screening: Not Applicable                "

## 2021-12-06 NOTE — NURSING NOTE
Chief Complaint   Patient presents with     Consult     initial visit SLE. persistant cough and sore throat, about two months. worse at night, but still bad all that time. coughs up yellow, milky mucus. nothing makes the sore throat or cough better. Had x-ray two weeks ago and it was noted that there was narrowing of the upper airway. Given prednisone and amoxicillin and she thinks it has not helped at all with symptoms.        /68   Pulse 94   Wt 66 kg (145 lb 9.6 oz)   SpO2 98%   BMI 23.50 kg/m        Stacy MOY, CMA

## 2021-12-06 NOTE — PROGRESS NOTES
Rheumatology Visit - New Consult     Monica Puckett MRN# [id number]   YOB: 1995 Age: 26 year old     Date of Visit: 2021  Primary care provider: No Ref-Primary, Physician          Assessment and Plan:     SLE c/b LN class V (2013) & lupus cystitis  Mild lupus flare   Patient with malar rash and polyarthralgias without evidence of synovitis on exam today.  Overall symptoms including fatigue consistent with mild flare.  Recent complements are normal along with dsDNA, but in setting of pregnancy these may be harder to interpret.  Patient already on steroid taper for sore throat.  Continuing the steroid taper should be helpful additionally for lupus flare.  Patient has been off hydroxychloroquine for past 6 months.  Will restart 200 mg twice daily.  Encourage patient to get annual eye exam.  Her last exam was 9 months ago she reports and normal vision without evidence of toxicity.    - Restart hydroxychloroquine 200 mg twice daily.  - Yearly eye exam (last one reported around 2021)  - Continue steroid taper as previously prescribed.  Should finish over the next 1 week.  - APS panel and urinalysis with protein to creatinine ratio.    Current pregnancy  Negative SSA history.  .  However, patient still high risk with lupus.  Patient would like to have a consult with maternal-fetal medicine, which seems reasonable.    - Referral to MFM.    Chronic productive cough and hoarseness  Green-yellow sputum.  Afebrile.  Symptoms did not improve with amoxicillin or with steroids.  Question if perhaps this is more involvement of the sinus tracts.  Covid, influenza a and B, and strep checked 3 weeks ago and were all negative.    - Referral to ENT given antibiotic failure cough, erythematous posterior oropharynx, hoarseness.    Patient seen and evaluated with attending, Dr. Reina.    Return to clinic in 2 months.    Ming Pugh  Rheumatology fellow  p:[phone]      Attending Note: I saw and  evaluated the patient with Dr. Pugh. I agree with the assessment and plan.    Chente Reina MD            History of Present Illness:   Monica Puckett is a 26 year old female with past medical history of SLE (diagnosed age 17).   She reportedly developed a flu-like illness 2012 and had several UC visits from 2012 to 2013. She presented to the ED in 2013 after waking up with an erythematous facial rash and swelling of her face which was initially thought to be angioedema. She notes at the time experiencing fevers, fatigue, oral ulcer, and pleuritic chest pain. Outpatient workup was initiated and she was found to have a +AWILDA, +ds-DNA, +Sm and significant proteinuria. She was hospitalized at [facility] and underwent a renal biopsy which reportedly showed LN class V.  Prior lupus medications include HCQ, CellCept, p.o. Cytoxan, RTX. Patient followed with Dr. Reina and Dr. Santana until the end of 2018 after which she was lost to follow-up.  Patient has had 2 successful pregnancies in 2017 and 2018.  Notably, patient with diagnosis of lupus cystitis around time of prior pregnancy that was seen on cystoscopy.    Ms. Puckett returns today to reestablish care as she feels her lupus is flaring at this time. 2 months ago, abrupt onset sore throat, productive cough (green-yellow). Has been on amoxicillin, and a couple prednisone tapers with no improvement. Currently on a 10 day taper. Taking tylenol consistently q4-6h due to pain in joints and throat. Malar rash has returned. Some swelling in fingers. 15 weeks gestation. .     Bladder symptoms have improved with pregnancy. No gross hematuria. Has been off HCQ for about 6 months.   Has had some vomiting. Non-bloody. This started after taking amoxicillin, but has continued. Seems to be exacerbated with cough. Has a hoarse voice.      for a construction company.       Review of Systems:     Review Of Systems  Skin: malar rash  Eyes:  negative  Ears/Nose/Throat: sore throat, post-nasal drip  Respiratory: Cough, hoarse voice  Cardiovascular: negative  Gastrointestinal: Nausea  Genitourinary: negative  Musculoskeletal: Diffuse polyarthralgias  Neurologic: negative  Psychiatric: negative  Hematologic/Lymphatic/Immunologic: negative  Endocrine: negative          Past Medical History:     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)        Patient Active Problem List    Diagnosis Date Noted     Cough 2021     Priority: Medium     Pregnant state, incidental 2021     Priority: Medium     Myalgia 2021     Priority: Medium     Chest pain, unspecified type 2021     Priority: Medium     Lupus nephritis (H) 2019     Priority: Medium      (normal spontaneous vaginal delivery) 2018     Priority: Medium     Encounter for triage in pregnant patient 2018     Priority: Medium     Indication for care in labor and delivery, antepartum 2018     Priority: Medium     Labor and delivery indication for care or intervention 10/18/2018     Priority: Medium     Long-term use of Plaquenil 10/19/2017     Priority: Medium     Systemic lupus erythematosus (H) 2013     Priority: Medium     Observation after surgery 2013     Priority: Medium     Pain in joint, shoulder region 2011     Priority: Medium             Past Surgical History:     Past Surgical History:   Procedure Laterality Date     CYSTOSCOPY N/A 2018    Procedure: CYSTOSCOPY;  Cystoscopy and Bladder Wall Injection;  Surgeon: Ismael Valadez MD;  [facility]     CYSTOSCOPY, INTRAVESICAL INJECTION N/A 10/18/2018    Procedure: Cystoscopy, Guided Bladder Wall Injection   ;  Surgeon: Ismael Valadez MD;  Location: [facility] OR     HC BIOPSY RENAL, PERCUTANEOUS  13      RENAL BIOPSY               Social History:     Social History     Tobacco Use     Smoking status: Never Smoker     Smokeless tobacco: Never Used     Tobacco comment: no second hand smoke exposure at home   Substance Use Topics     Alcohol use: No             Family History:     Family History   Problem Relation Age of Onset     Urolithiasis Mother      Cancer Maternal Uncle         skin     Heart Disease Maternal Uncle      Urolithiasis Maternal Grandfather      Clotting Disorder No family hx of      Diabetes No family hx of      Gout No family hx of             Allergies:     Allergies   Allergen Reactions     Estrogens Other (See Comments)     Lupus flares     No Clinical Screening - See Comments Other (See Comments) and Rash     She is immunosuppressed   Gets very sick from flu vaccines (or any vaccine)     mmr     Varicella Virus Vaccine Live Other (See Comments)     She is immunosuppressed              Medications:     Current Outpatient Medications   Medication Sig Dispense Refill     albuterol (PROAIR HFA/PROVENTIL HFA/VENTOLIN HFA) 108 (90 Base) MCG/ACT inhaler Inhale 2 puffs into the lungs every 6 hours as needed for shortness of breath / dyspnea or wheezing 18 g 0     Amphetamine-Dextroamphetamine (ADDERALL PO) Take 20 mg by mouth daily       Amphetamine-Dextroamphetamine (ADDERALL XR PO) Take 20 mg by mouth daily       hydroxychloroquine (PLAQUENIL) 200 MG tablet Take 2 tablets (400 mg) by mouth daily 60 tablet 11     hydrOXYzine (ATARAX) 25 MG tablet Take 2-4 tablets ( mg) by mouth nightly as needed for other (pain adjunct) Can take 25-50mg once during day but can make you sleepy (Patient not taking: Reported on 10/26/2018) 30 tablet 0     ibuprofen (ADVIL/MOTRIN) 600 MG tablet Take 1 tablet (600 mg) by mouth every 6 hours as needed for moderate pain 30 tablet 0     Omega-3 Fatty Acids (FISH OIL PO) Take 1 tablet by mouth daily Fish oil with DHA       ondansetron (ZOFRAN) 8 MG tablet Take 1 tablet  (8 mg) by mouth every 8 hours as needed for nausea (nausea/vomiting) 10 tablet 1     oxyCODONE IR (ROXICODONE) 5 MG tablet Take 0.5 tablets (2.5 mg) by mouth every 4 hours as needed for severe pain or breakthrough pain 30 tablet 0     oxyCODONE-acetaminophen (PERCOCET) 5-325 MG tablet Take 1 tablet by mouth every 8 hours as needed for pain 90 tablet 0     phenazopyridine (PYRIDIUM) 100 MG tablet Take 1 tablet (100 mg) by mouth 3 times daily as needed for urinary tract discomfort 15 tablet 1     predniSONE (DELTASONE) 5 MG tablet 71-45-22-72-81-53-20-15-10-5 mg a day each for one week then stop 30 tablet 3     Prenatal Vit-Fe Fumarate-FA (PRENATAL MULTIVITAMIN  PLUS IRON) 27-0.8 MG TABS Take 1 tablet by mouth daily       senna-docusate (SENOKOT-S;PERICOLACE) 8.6-50 MG per tablet Take 1 tablet by mouth 2 times daily as needed for constipation 30 tablet 0     ValACYclovir HCl (VALTREX PO) Take 500 mg by mouth as needed       VITAMIN D, CHOLECALCIFEROL, PO Take 2,000 Units by mouth daily              Physical Exam:   Blood pressure 105/68, pulse 94, weight 66 kg (145 lb 9.6 oz), SpO2 98 %, unknown if currently breastfeeding.     Constitutional: WD-WN-WG cooperative  Eyes: nl EOM, PERRLA, vision, conjunctiva, sclera  ENT: Erythematous posterior oropharynx with nasal drip noted.  No oral ulcers, normal salivary pool, hoarseness of voice  Neck: no mass or thyroid enlargement  Resp: lungs clear to auscultation, nl to palpation, productive cough frequently during visit  CV: RRR, no murmurs, rubs or gallops, no edema  GI: no ABD mass or tenderness, no HSM  Lymph: no cervical, supraclavicular nodes  MS: All neck, shoulder, elbow, wrist, MCP/PIP/DIP, spine, hip, knee, ankle, and foot MTP/IP joints were examined and  found normal.  Patient does have significant tenderness to palpation over wrists, MCPs, PIPs, but no oma synovitis or warmth appreciated. Full ROM.  Normal  strength.  Skin: Faint malar rash appreciated on  face otherwise no lesions.  Neuro: nl cranial nerves, strength, sensation  Psych: nl judgement, orientation, memory, affect.         Data:     Lab Results   Component Value Date    WBC 11.8 (H) 11/18/2021    WBC 9.0 02/14/2019    WBC 10.8 01/18/2019    HGB 12.0 11/18/2021    HGB 12.5 02/14/2019    HGB 11.3 (L) 01/18/2019    HCT 34.7 (L) 11/18/2021    HCT 39.3 02/14/2019    HCT 34.7 (L) 01/18/2019    MCV 89 11/18/2021    MCV 90 02/14/2019    MCV 91 01/18/2019     11/18/2021     02/14/2019     01/18/2019     Lab Results   Component Value Date    BUN 12 11/18/2021    BUN 14 01/18/2019    BUN 14 01/12/2019     No components found for: SEDRATE  Lab Results   Component Value Date    TSH 2.48 03/27/2013     Lab Results   Component Value Date    AST 13 11/18/2021    AST 14 02/14/2019    AST 19 01/17/2019    ALT 22 11/18/2021    ALT 20 02/14/2019    ALT 24 01/17/2019    ALKPHOS 68 11/18/2021    ALKPHOS 95 02/14/2019    ALKPHOS 84 01/17/2019     Reviewed Rheumatology lab flowsheet    Gonzalo Pugh DO

## 2021-12-09 ENCOUNTER — TELEPHONE (OUTPATIENT)
Dept: MATERNAL FETAL MEDICINE | Facility: CLINIC | Age: 26
End: 2021-12-09
Payer: COMMERCIAL

## 2021-12-09 NOTE — TELEPHONE ENCOUNTER
Writer called and spoke with Monica. She has been seen for prenatal care at [facility], but reports having a lupus flare for 2 months now. She reports green discharge, cough and cold symptoms She has been treated with Amoxicillin and tested for strep and Covid numerous times, but doesn't feel better.    Pt states that Rheum wanted her to switch her OB care to MFM and pt desires this too.    Pt would like apt with MFm for U/S and MFM consul in the next week if possible.    Louann Rosen RN

## 2021-12-10 ENCOUNTER — TRANSCRIBE ORDERS (OUTPATIENT)
Dept: MATERNAL FETAL MEDICINE | Facility: CLINIC | Age: 26
End: 2021-12-10
Payer: COMMERCIAL

## 2021-12-10 DIAGNOSIS — O26.90 PREGNANCY RELATED CONDITION, ANTEPARTUM: Primary | ICD-10-CM

## 2021-12-10 DIAGNOSIS — R76.8 SS-A ANTIBODY POSITIVE: Primary | ICD-10-CM

## 2021-12-15 ENCOUNTER — TELEPHONE (OUTPATIENT)
Dept: OTOLARYNGOLOGY | Facility: CLINIC | Age: 26
End: 2021-12-15
Payer: COMMERCIAL

## 2021-12-15 NOTE — TELEPHONE ENCOUNTER
FUTURE VISIT INFORMATION      FUTURE VISIT INFORMATION:    Date: 2/2/2022    Time: 8:30AM    Location: [facility]  REFERRAL INFORMATION:    Referring provider:  Gonzalo Pugh DO    Referring providers clinic:  [facility] [place]     Reason for visit/diagnosis      RECORDS REQUESTED FROM:       Clinic name Comments Records Status Imaging Status   [facility] [place]  12/6/2021 note and referral from Gonzalo Pugh DO Epic    [facility]  8/6/2020 MR Head brain  Care everywhere  req 12/15/21 - PACS                             12/15/2021 12:52PM sent a fax to adrianna for images - [facility]   12/23/21 3:36PM images received in PACS - [facility]

## 2021-12-15 NOTE — TELEPHONE ENCOUNTER
LVM after being unable to reach patient on multiple attempts. Left ENT scheduling number for patient to call and make appointment. Informed patient that unfortunately, ENT clinic in [facility] does not have any availability this week. Offered Patient [facility] as possible alternative.

## 2021-12-29 DIAGNOSIS — M32.14 LUPUS NEPHRITIS (H): Primary | ICD-10-CM

## 2022-01-09 ENCOUNTER — HEALTH MAINTENANCE LETTER (OUTPATIENT)
Age: 27
End: 2022-01-09

## 2022-02-02 ENCOUNTER — PRE VISIT (OUTPATIENT)
Dept: OTOLARYNGOLOGY | Facility: CLINIC | Age: 27
End: 2022-02-02

## 2022-02-09 NOTE — TELEPHONE ENCOUNTER
FUTURE VISIT INFORMATION      FUTURE VISIT INFORMATION:    Date: 4/1/22    Time: 2PM    [place] [facility]  REFERRAL INFORMATION:    Referring provider:  Gonzalo Pugh DO    Referring providers clinic:  [facility] [place]     Reason for visit/diagnosis       RECORDS REQUESTED FROM:         Clinic name Comments Records Status Imaging Status   [facility] [place]  12/6/2021 note and referral from Gonzalo Pugh DO Epic     [facility]  8/6/2020 MR Head brain  Care everywhere  req 12/15/21 - PACS

## 2022-04-01 ENCOUNTER — OFFICE VISIT (OUTPATIENT)
Dept: OTOLARYNGOLOGY | Facility: CLINIC | Age: 27
End: 2022-04-01
Payer: COMMERCIAL

## 2022-04-01 ENCOUNTER — PRE VISIT (OUTPATIENT)
Dept: OTOLARYNGOLOGY | Facility: CLINIC | Age: 27
End: 2022-04-01

## 2022-04-01 VITALS
SYSTOLIC BLOOD PRESSURE: 109 MMHG | DIASTOLIC BLOOD PRESSURE: 69 MMHG | HEIGHT: 66 IN | WEIGHT: 165 LBS | BODY MASS INDEX: 26.52 KG/M2 | TEMPERATURE: 97.5 F | HEART RATE: 97 BPM

## 2022-04-01 DIAGNOSIS — R05.3 CHRONIC COUGH: ICD-10-CM

## 2022-04-01 DIAGNOSIS — J32.2 CHRONIC ETHMOIDAL SINUSITIS: ICD-10-CM

## 2022-04-01 DIAGNOSIS — R05.9 COUGH: Primary | ICD-10-CM

## 2022-04-01 DIAGNOSIS — J32.0 CHRONIC MAXILLARY SINUSITIS: ICD-10-CM

## 2022-04-01 DIAGNOSIS — M32.9 SYSTEMIC LUPUS ERYTHEMATOSUS, UNSPECIFIED SLE TYPE, UNSPECIFIED ORGAN INVOLVEMENT STATUS (H): ICD-10-CM

## 2022-04-01 PROCEDURE — 87075 CULTR BACTERIA EXCEPT BLOOD: CPT | Performed by: OTOLARYNGOLOGY

## 2022-04-01 PROCEDURE — 99204 OFFICE O/P NEW MOD 45 MIN: CPT | Mod: 25 | Performed by: OTOLARYNGOLOGY

## 2022-04-01 PROCEDURE — 87070 CULTURE OTHR SPECIMN AEROBIC: CPT | Performed by: OTOLARYNGOLOGY

## 2022-04-01 PROCEDURE — 87185 SC STD ENZYME DETCJ PER NZM: CPT | Performed by: OTOLARYNGOLOGY

## 2022-04-01 PROCEDURE — 31231 NASAL ENDOSCOPY DX: CPT | Performed by: OTOLARYNGOLOGY

## 2022-04-01 ASSESSMENT — PAIN SCALES - GENERAL: PAINLEVEL: SEVERE PAIN (6)

## 2022-04-01 NOTE — PATIENT INSTRUCTIONS
"1. You were seen in the clinic today by Dr. Hodges. If you have any questions or concerns after your appointment, please call the clinic at [phone]. Press \"1\" for scheduling, press \"3\" for nurse advice.    2.   The following has been recommended for you based upon your appointment today:   -A culture has been sent to the lab. Once Dr. Hodges has had opportunity to review the results someone from the clinic will reach out to you to discuss a follow up plan.         [facility]manda Su[facility]rez [facility]  Department of Otolaryngology  [phone]    "

## 2022-04-01 NOTE — LETTER
2022       RE: Monica Puckett  [address] [facility] [address]     Dear Colleague,    Thank you for referring your patient, Monica Puckett, to the [facility] [place] [facility]. Please see a copy of my visit note below.      [facility]  New Patient Visit      Encounter date:   2022    Referring Provider:   Chente Reina MD  [address] [place] [id number]    Chief Complaint: Chronic cough, sinusitis      History of Present Illness:   Monica Puckett is a 26 year old female with history of lupus and currently pregnant (32 weeks, ) who presents for consultation regarding chronic cough and sinusitis. Before these symptoms she did not have any sinus issues. She tells me that for the last 8 months she has had chronic sinusitis that has seemed to prompt hoarseness with a very productive green-mucus cough.  She also has facial pressure, nasal congestion and ongoing postnasal drainage.  Over the last few weeks she has developed bilateral ear fullness. She has undergone two separate 7 day courses of antibiotics, but neither seemed to fully relieve her symptoms.     Her mother has history of chronic sinusitis.    She is on Plaquinal for her lupus and tells me it is under control.     Sino-Nasal Outcome Test (SNOT - 22)  DNT    [place] [facility]:  No sinonasal surgeries    Review of systems: A 14-point review of systems has been conducted and was negative for any notable symptoms, except as dictated in the history of present illness.     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)         Surgical History:   Past Surgical History:   Procedure Laterality Date     CYSTOSCOPY N/A 2018  "   Procedure: CYSTOSCOPY;  Cystoscopy and Bladder Wall Injection;  Surgeon: Ismael Valadez MD;  [facility]     CYSTOSCOPY, INTRAVESICAL INJECTION N/A 10/18/2018    Procedure: Cystoscopy, Guided Bladder Wall Injection   ;  Surgeon: Ismael Valadez MD;  [facility]     HC BIOPSY RENAL, PERCUTANEOUS  1/18/13     RENAL BIOPSY          Family History:  Family History   Problem Relation Age of Onset     Urolithiasis Mother      Cancer Maternal Uncle         skin     Heart Disease Maternal Uncle      Urolithiasis Maternal Grandfather      Clotting Disorder No family hx of      Diabetes No family hx of      Gout No family hx of         Social History:   Social History     Socioeconomic History     Marital status: Single     Spouse name: Not on file     Number of children: Not on file     Years of education: Not on file     Highest education level: Not on file   Occupational History     Employer: [employer]    Tobacco Use     Smoking status: Never Smoker     Smokeless tobacco: Never Used     Tobacco comment: no second hand smoke exposure at home   Substance and Sexual Activity     Alcohol use: No     Drug use: No     Sexual activity: Yes     Partners: Male   Other Topics Concern     Not on file   Social History Narrative     Not on file     Social Determinants of Health     Financial Resource Strain: Not on file   Food Insecurity: Not on file   Transportation Needs: Not on file   Physical Activity: Not on file   Stress: Not on file   Social Connections: Not on file   Intimate Partner Violence: Not on file   Housing Stability: Not on file        Physical Exam:  Vital signs: /69 (BP Location: Left arm, Patient Position: Sitting, Cuff Size: Adult Regular)   Pulse 97   Temp 97.5  F (36.4  C) (Temporal)   Ht 1.676 m (5' 6\")   Wt 74.8 kg (165 lb)   BMI 26.63 kg/m     General Appearance: No acute distress, appropriate demeanor, conversant  Eyes: moist conjunctivae; EOMI; pupils symmetric; visual acuity " grossly intact; no proptosis  Head: normocephalic; overall symmetric appearance without deformity  Face: overall symmetric without deformity; HB I/VI  Ears: Normal appearance of external ear; external meatus normal in appearance; Right serous fluid of the middle ear;  Nose: No external deformity; see endoscopy   Oral Cavity/oropharynx: Normal appearance of mucosa; tongue midline; no mass or lesions; oropharynx without obvious mucosal abnormality  Neck: no palpable lymphadenopathy; thyroid without palpable nodules  Lungs: symmetric chest rise; no wheezing  CV: Good distal perfusion; normal hear rate  Extremities: No deformity  Neurologic Exam: Cranial nerves II-XII are grossly intact; no focal deficit      Procedure Note  Procedure performed: Rigid nasal endoscopy  Indication: To evaluate for sinonasal pathology not visualized on routine anterior rhinoscopy  Anesthesia: 4% topical lidocaine with 0.05% oxymetazoline  Description of procedure: A 30 degree, 3 mm rigid endoscope was inserted into bilateral nasal cavities and the nasal valves, nasal cavity, middle meatus, sphenoethmoid recess, and nasopharynx were thoroughly evaluated for evidence of obstruction, edema, purulence, polyps and/or mass/lesion.     Iliana-Marvin Endoscopic Scoring System  Endoscopic observation Right Left   Polyps in middle meatus (0 = absent, 1 = restricted to middle meatus, 2 = Beyond middle meatus) 0 0   Discharge (0 = absent, 1 = thin and clear, 2 = thick, purulent) 2 2   Edema (0 = absent, 1 = mild-moderate, 2 = moderate-severe) 1 1   Crusting (0 = absent, 1 = mild-moderate, 2 = moderate-severe) 0 0   Scarring (0= absent, 1 = mild-moderate, 2 = moderate-severe) 0 0   Total 3 3     Findings  RT: active sinusitis with thickened secretion from the MM; thickened drainage within the nasal cavity;   LT: active sinusitis with thickened whitish secretions of the MM; secretions and edema eminating from the posterior ethmoid recess;    Nasopharynx  with thickened secretions     The patient tolerated the procedure well without complication.     Laboratory Review:  n/a    Imaging Review:  n/a    Pathology Review:  n/a    Assessment/Medical Decision Making:  Chronic sinusitis without nasal polyps, appears effective  Nasal congestion, postnasal drip and acute serous otitis media, right ear.  All likely secondary to chronic sinusitis    Chronic cough  Systemic lupus erythematosus      Plan:  Bilateral endoscopy today shows active signs of sinusitis; thickened mucus was suctioned bilaterally. Culture obtained today. I will consult with her OB/gyn (Dr. Hogue of [facility]) with results of her culture to start her on the appropriate long-term antibiotic and possible steroid. I would like her to continue Ayad med sinuses rinses. Follow-up after antibiotic course.     Ryan Hodges MD    [facility]  Rhinology  Endoscopic Skull Base Surgery  [facility]  Department of Otolaryngology - Head & Neck Surgery    Scribe Disclosure:  I, Fish Blankenship, am serving as a scribe to document services personally performed by Ryan Hodges MD at this visit, based upon the provider's statements to me. All documentation has been reviewed by the aforementioned provider prior to being entered into the official medical record.      Again, thank you for allowing me to participate in the care of your patient.      Sincerely,    Ryan Hodges MD

## 2022-04-01 NOTE — PROGRESS NOTES
[facility]  New Patient Visit      Encounter date:   2022    Referring Provider:   Chente Reina MD  [address] [place] [id number]    Chief Complaint: Chronic cough, sinusitis      History of Present Illness:   Monica Puckett is a 26 year old female with history of lupus and currently pregnant (32 weeks, ) who presents for consultation regarding chronic cough and sinusitis. Before these symptoms she did not have any sinus issues. She tells me that for the last 8 months she has had chronic sinusitis that has seemed to prompt hoarseness with a very productive green-mucus cough.  She also has facial pressure, nasal congestion and ongoing postnasal drainage.  Over the last few weeks she has developed bilateral ear fullness. She has undergone two separate 7 day courses of antibiotics, but neither seemed to fully relieve her symptoms.     Her mother has history of chronic sinusitis.    She is on Plaquinal for her lupus and tells me it is under control.     Sino-Nasal Outcome Test (SNOT - 22)  DNT    [facility]:  No sinonasal surgeries    Review of systems: A 14-point review of systems has been conducted and was negative for any notable symptoms, except as dictated in the history of present illness.     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)         Surgical History:   Past Surgical History:   Procedure Laterality Date     CYSTOSCOPY N/A 2018    Procedure: CYSTOSCOPY;  Cystoscopy and Bladder Wall Injection;  Surgeon: Ismael Valadez MD;  Location: [facility] OR     CYSTOSCOPY, INTRAVESICAL INJECTION N/A 10/18/2018    Procedure: Cystoscopy, Guided Bladder Wall Injection   ;  Surgeon: Ismael Valadez MD;  Location: [facility] OR     HC BIOPSY RENAL, PERCUTANEOUS   "1/18/13     RENAL BIOPSY          Family History:  Family History   Problem Relation Age of Onset     Urolithiasis Mother      Cancer Maternal Uncle         skin     Heart Disease Maternal Uncle      Urolithiasis Maternal Grandfather      Clotting Disorder No family hx of      Diabetes No family hx of      Gout No family hx of         Social History:   Social History     Socioeconomic History     Marital status: Single     Spouse name: Not on file     Number of children: Not on file     Years of education: Not on file     Highest education level: Not on file   Occupational History     Employer: [employer]    Tobacco Use     Smoking status: Never Smoker     Smokeless tobacco: Never Used     Tobacco comment: no second hand smoke exposure at home   Substance and Sexual Activity     Alcohol use: No     Drug use: No     Sexual activity: Yes     Partners: Male   Other Topics Concern     Not on file   Social History Narrative     Not on file     Social Determinants of Health     Financial Resource Strain: Not on file   Food Insecurity: Not on file   Transportation Needs: Not on file   Physical Activity: Not on file   Stress: Not on file   Social Connections: Not on file   Intimate Partner Violence: Not on file   Housing Stability: Not on file        Physical Exam:  Vital signs: /69 (BP Location: Left arm, Patient Position: Sitting, Cuff Size: Adult Regular)   Pulse 97   Temp 97.5  F (36.4  C) (Temporal)   Ht 1.676 m (5' 6\")   Wt 74.8 kg (165 lb)   BMI 26.63 kg/m     General Appearance: No acute distress, appropriate demeanor, conversant  Eyes: moist conjunctivae; EOMI; pupils symmetric; visual acuity grossly intact; no proptosis  Head: normocephalic; overall symmetric appearance without deformity  Face: overall symmetric without deformity; HB I/VI  Ears: Normal appearance of external ear; external meatus normal in appearance; Right serous fluid of the middle ear;  Nose: No external deformity; see " endoscopy   Oral Cavity/oropharynx: Normal appearance of mucosa; tongue midline; no mass or lesions; oropharynx without obvious mucosal abnormality  Neck: no palpable lymphadenopathy; thyroid without palpable nodules  Lungs: symmetric chest rise; no wheezing  CV: Good distal perfusion; normal hear rate  Extremities: No deformity  Neurologic Exam: Cranial nerves II-XII are grossly intact; no focal deficit      Procedure Note  Procedure performed: Rigid nasal endoscopy  Indication: To evaluate for sinonasal pathology not visualized on routine anterior rhinoscopy  Anesthesia: 4% topical lidocaine with 0.05% oxymetazoline  Description of procedure: A 30 degree, 3 mm rigid endoscope was inserted into bilateral nasal cavities and the nasal valves, nasal cavity, middle meatus, sphenoethmoid recess, and nasopharynx were thoroughly evaluated for evidence of obstruction, edema, purulence, polyps and/or mass/lesion.     [place] Endoscopic Scoring System  Endoscopic observation Right Left   Polyps in middle meatus (0 = absent, 1 = restricted to middle meatus, 2 = Beyond middle meatus) 0 0   Discharge (0 = absent, 1 = thin and clear, 2 = thick, purulent) 2 2   Edema (0 = absent, 1 = mild-moderate, 2 = moderate-severe) 1 1   Crusting (0 = absent, 1 = mild-moderate, 2 = moderate-severe) 0 0   Scarring (0= absent, 1 = mild-moderate, 2 = moderate-severe) 0 0   Total 3 3     Findings  RT: active sinusitis with thickened secretion from the MM; thickened drainage within the nasal cavity;   LT: active sinusitis with thickened whitish secretions of the MM; secretions and edema eminating from the posterior ethmoid recess;    Nasopharynx with thickened secretions     The patient tolerated the procedure well without complication.     Laboratory Review:  n/a    Imaging Review:  n/a    Pathology Review:  n/a    Assessment/Medical Decision Making:  Chronic sinusitis without nasal polyps, appears effective  Nasal congestion, postnasal drip  and acute serous otitis media, right ear.  All likely secondary to chronic sinusitis    Chronic cough  Systemic lupus erythematosus      Plan:  Bilateral endoscopy today shows active signs of sinusitis; thickened mucus was suctioned bilaterally. Culture obtained today. I will consult with her OB/gyn (Dr. Hogue of [facility]) with results of her culture to start her on the appropriate long-term antibiotic and possible steroid. I would like her to continue Ayad med sinuses rinses. Follow-up after antibiotic course.     Ryan Hodges MD    [facility]  Rhinology  Endoscopic Skull Base Surgery  [facility]  Department of Otolaryngology - Head & Neck Surgery    Scribe Disclosure:  I, Fish Blankenship, am serving as a scribe to document services personally performed by Ryan Hodges MD at this visit, based upon the provider's statements to me. All documentation has been reviewed by the aforementioned provider prior to being entered into the official medical record.

## 2022-04-01 NOTE — NURSING NOTE
"Chief Complaint   Patient presents with     Consult     sinus pain and pressure, barky cough, sore throat, plugged R ear, green phlegm    Blood pressure 109/69, pulse 97, temperature 97.5  F (36.4  C), temperature source Temporal, height 1.676 m (5' 6\"), weight 74.8 kg (165 lb), unknown if currently breastfeeding. Rosa Cordero, EMT  "

## 2022-04-03 ENCOUNTER — MYC MEDICAL ADVICE (OUTPATIENT)
Dept: OTOLARYNGOLOGY | Facility: CLINIC | Age: 27
End: 2022-04-03
Payer: COMMERCIAL

## 2022-04-03 ENCOUNTER — TELEPHONE (OUTPATIENT)
Dept: SURGERY | Facility: CLINIC | Age: 27
End: 2022-04-03
Payer: COMMERCIAL

## 2022-04-03 DIAGNOSIS — J01.90 ACUTE NON-RECURRENT SINUSITIS, UNSPECIFIED LOCATION: Primary | ICD-10-CM

## 2022-04-03 LAB — BACTERIA SPEC CULT: ABNORMAL

## 2022-04-03 RX ORDER — AMOXICILLIN 875 MG
875 TABLET ORAL 2 TIMES DAILY
Qty: 20 TABLET | Refills: 0 | Status: SHIPPED | OUTPATIENT
Start: 2022-04-03 | End: 2022-04-13

## 2022-04-03 NOTE — TELEPHONE ENCOUNTER
Patient called with worsening sinus symtoms which now include bilateral muffled hearing, aural fullness, facial pain and pressure. No issues with vision or EOM. Pt was noted to have a sinus infection during her visit with Dr. Ryan Hodges 4/1/2022. Cx were obtained and abx were going to be prescribed after discussion with her OBGYN and cultures resulted.     Current cultures  Aerobic H. Influenza   Anaerobic  No growth     Will send Amoxicillin 10day course to her [facility] pharmacy.    Jyothi Arguello MD PGY3  Otolaryngology Head and Neck Surgery

## 2022-04-04 RX ORDER — AMOXICILLIN 875 MG
875 TABLET ORAL 2 TIMES DAILY
Qty: 20 TABLET | Refills: 0 | Status: SHIPPED | OUTPATIENT
Start: 2022-04-04 | End: 2022-04-14

## 2022-04-04 RX ORDER — METHYLPREDNISOLONE 4 MG
TABLET, DOSE PACK ORAL
Qty: 21 TABLET | Refills: 0 | Status: SHIPPED | OUTPATIENT
Start: 2022-04-04 | End: 2022-12-20

## 2022-04-04 NOTE — RESULT ENCOUNTER NOTE
Ladarius Apodaca,   This patient was started on abx over weekend by one of the residents. I want to speak with her obgyn (Dr. Hogue, [facility] in [place]) about a potentially longer duration.     Can you contact her office and have her call me at her earliest convenience?    Thanks,  Marcos

## 2022-04-04 NOTE — TELEPHONE ENCOUNTER
Thank you, Constanza. Can you let the patient know that I am waiting to her from her ObGyn re: duration of antibiotics and adding steroids for pain.     Do you think we could get her in to otology for myringotomy to maybe help with ear pain?    Yunior Mojica

## 2022-04-04 NOTE — TELEPHONE ENCOUNTER
Called patient and spoke with her regarding plan of care. This writer advised patient that we are waiting for patient's OBGYN to call back regarding extending the course of antibiotics as well as adding steroids for the pain.    Patient is scheduled for audiology tomorrow and appointment with Dr. Vasques for a visit for myringotomy per request of Dr. Hodges.    Advised patient that we would contact her with recommendations. Patient knows to call back should she have questions. Patient appreciative of phone call.    This writer updated provider on the scheduling with neurotology. Nothing further needed at this time.    Patient verbalizes understanding of this plan and is in agreement. Patient has no further questions at this time.    LAY PEARSON LPN on 4/4/2022 at 5:35 PM

## 2022-04-05 ENCOUNTER — OFFICE VISIT (OUTPATIENT)
Dept: OTOLARYNGOLOGY | Facility: CLINIC | Age: 27
End: 2022-04-05
Payer: COMMERCIAL

## 2022-04-05 ENCOUNTER — OFFICE VISIT (OUTPATIENT)
Dept: AUDIOLOGY | Facility: CLINIC | Age: 27
End: 2022-04-05
Payer: COMMERCIAL

## 2022-04-05 VITALS
TEMPERATURE: 98.5 F | HEIGHT: 66 IN | OXYGEN SATURATION: 98 % | BODY MASS INDEX: 25.71 KG/M2 | HEART RATE: 95 BPM | WEIGHT: 160 LBS | DIASTOLIC BLOOD PRESSURE: 67 MMHG | SYSTOLIC BLOOD PRESSURE: 121 MMHG

## 2022-04-05 DIAGNOSIS — H90.11 CONDUCTIVE HEARING LOSS OF RIGHT EAR WITH UNRESTRICTED HEARING OF LEFT EAR: Primary | ICD-10-CM

## 2022-04-05 DIAGNOSIS — H65.192 ACUTE MEE (MIDDLE EAR EFFUSION), LEFT: ICD-10-CM

## 2022-04-05 DIAGNOSIS — H69.92 ETD (EUSTACHIAN TUBE DYSFUNCTION), LEFT: Primary | ICD-10-CM

## 2022-04-05 PROCEDURE — 99214 OFFICE O/P EST MOD 30 MIN: CPT | Mod: 25 | Performed by: OTOLARYNGOLOGY

## 2022-04-05 PROCEDURE — 92504 EAR MICROSCOPY EXAMINATION: CPT | Performed by: OTOLARYNGOLOGY

## 2022-04-05 PROCEDURE — 92550 TYMPANOMETRY & REFLEX THRESH: CPT | Performed by: AUDIOLOGIST

## 2022-04-05 PROCEDURE — 92565 STENGER TEST PURE TONE: CPT | Performed by: AUDIOLOGIST

## 2022-04-05 PROCEDURE — 92557 COMPREHENSIVE HEARING TEST: CPT | Performed by: AUDIOLOGIST

## 2022-04-05 ASSESSMENT — PAIN SCALES - GENERAL: PAINLEVEL: SEVERE PAIN (6)

## 2022-04-05 NOTE — NURSING NOTE
"Chief Complaint   Patient presents with     Consult     sudden hearing loss, otalgia      Blood pressure 121/67, pulse 95, temperature 98.5  F (36.9  C), height 1.676 m (5' 6\"), weight 72.6 kg (160 lb), SpO2 98 %, unknown if currently breastfeeding.    .  Shine Hurt LPN    "

## 2022-04-05 NOTE — LETTER
2022       RE: Monica Puckett  [address]  [facility] [address]     Dear Colleague,    Thank you for referring your patient, Monica Puckett, to the [facility] [place] at [facility]. Please see a copy of my visit note below.      Neurotology Clinic      Name: Monica Puckett  MRN: [id number]  Age: 26 year old  : 1995  Referring provider: Dr. Ryan Hodges  2022      Chief Complaint:   Consultation    History of Present Illness:   Monica Puckett is a 26 year old female, currently 32 weeks pregnant (), with history of lupus who presents for consultation regarding sudden hearing loss. Consultation was requested by Dr. Ryan Hodges. The patient has been following with my colleague, Dr. Hodges, for chronic cough and sinusitis. She was prescribed amoxicillin at her last visit for a sinus infection, and this was extended yesterday (2022). At her consultation visit earlier this week, she reported ear fullness not improved with a round of amoxicillin. Dr. Hodges also prescribed a Solu-Medrol Dosepak to help with ongoing ear and throat pain she has been having. He was prompted to refer the patient to my team after she developed sudden hearing loss.    Today, the patient presents with her FianceLinden. They report that she has had a chronic cough for the last 7 months. She reports that she had a small amount of decrease hearing in her right ear about one month ago that significantly worsened 2 weeks ago. Over the last 4 days, her hearing again has significantly worsened. She reports trying to take tylenol for ongoing right ear pain that is now radiating into her left ear. The patient denies any sinus issues or infections with her previous 2 children. She denies history of ear infections or ear tubes as a child.     Review of Systems:   Pertinent items are noted in HPI or as in patient entered ROS below, remainder  of complete ROS is negative.    ENT ROS 4/5/2022   Constitutional Problems with sleep   Neurology Headache   Psychology Frequently feeling anxious   Ears, Nose, Throat Hearing loss, Ear pain, Ringing/noise in ears, Nasal congestion or drainage, Sore throat, Hoarseness   Cardiopulmonary Cough, Breathing problems   Gastrointestinal/Genitourinary -   Hematologic -   Skin -      Active Medications:     Current Outpatient Medications:      albuterol (PROAIR HFA/PROVENTIL HFA/VENTOLIN HFA) 108 (90 Base) MCG/ACT inhaler, Inhale 2 puffs into the lungs every 6 hours as needed for shortness of breath / dyspnea or wheezing, Disp: 18 g, Rfl: 0     amoxicillin (AMOXIL) 875 MG tablet, Take 1 tablet (875 mg) by mouth 2 times daily for 10 days, Disp: 20 tablet, Rfl: 0     amoxicillin (AMOXIL) 875 MG tablet, Take 1 tablet (875 mg) by mouth 2 times daily for 10 days, Disp: 20 tablet, Rfl: 0     Amphetamine-Dextroamphetamine (ADDERALL PO), Take 20 mg by mouth daily , Disp: , Rfl:      Amphetamine-Dextroamphetamine (ADDERALL XR PO), Take 20 mg by mouth daily , Disp: , Rfl:      hydroxychloroquine (PLAQUENIL) 200 MG tablet, Take 1 tablet (200 mg) by mouth 2 times daily, Disp: 60 tablet, Rfl: 11     hydrOXYzine (ATARAX) 25 MG tablet, Take 2-4 tablets ( mg) by mouth nightly as needed for other (pain adjunct) Can take 25-50mg once during day but can make you sleepy, Disp: 30 tablet, Rfl: 0     ibuprofen (ADVIL/MOTRIN) 600 MG tablet, Take 1 tablet (600 mg) by mouth every 6 hours as needed for moderate pain, Disp: 30 tablet, Rfl: 0     methylPREDNISolone (MEDROL DOSEPAK) 4 MG tablet therapy pack, Follow Package Directions, Disp: 21 tablet, Rfl: 0     Omega-3 Fatty Acids (FISH OIL PO), Take 1 tablet by mouth daily Fish oil with DHA, Disp: , Rfl:      ondansetron (ZOFRAN) 8 MG tablet, Take 1 tablet (8 mg) by mouth every 8 hours as needed for nausea (nausea/vomiting), Disp: 10 tablet, Rfl: 1     Prenatal Vit-Fe Fumarate-FA (PRENATAL  MULTIVITAMIN  PLUS IRON) 27-0.8 MG TABS, Take 1 tablet by mouth daily, Disp: , Rfl:      ValACYclovir HCl (VALTREX PO), Take 500 mg by mouth as needed, Disp: , Rfl:      VITAMIN D, CHOLECALCIFEROL, PO, Take 2,000 Units by mouth daily, Disp: , Rfl:       Allergies:   Estrogens, No clinical screening - see comments, and Varicella virus vaccine live      Past 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)      Patient Active Problem List   Diagnosis     Pain in joint, shoulder region     Observation after surgery     Systemic lupus erythematosus (H)     Long-term use of Plaquenil     Labor and delivery indication for care or intervention     Encounter for triage in pregnant patient     Indication for care in labor and delivery, antepartum      (normal spontaneous vaginal delivery)     Lupus nephritis (H)     Cough     Pregnant state, incidental     Myalgia     Chest pain, unspecified type      Past Surgical History:  Past Surgical History:   Procedure Laterality Date     CYSTOSCOPY N/A 2018    Procedure: CYSTOSCOPY;  Cystoscopy and Bladder Wall Injection;  Surgeon: Ismael Valadez MD;  [facility] OR     CYSTOSCOPY, INTRAVESICAL INJECTION N/A 10/18/2018    Procedure: Cystoscopy, Guided Bladder Wall Injection   ;  Surgeon: Ismael Valadez MD;  Location: [facility] OR     HC BIOPSY RENAL, PERCUTANEOUS  13     RENAL BIOPSY       Family History:   Family History   Problem Relation Age of Onset     Urolithiasis Mother      Cancer Maternal Uncle         skin     Heart Disease Maternal Uncle      Urolithiasis Maternal Grandfather      Clotting Disorder No family hx of      Diabetes No family hx of      Gout No family hx of        Social History:   Social History     Tobacco Use     Smoking status: Never Smoker     Smokeless  "tobacco: Never Used     Tobacco comment: no second hand smoke exposure at home   Substance Use Topics     Alcohol use: No     Drug use: No      Physical Exam:   /67   Pulse 95   Temp 98.5  F (36.9  C)   Ht 1.676 m (5' 6\")   Wt 72.6 kg (160 lb)   SpO2 98%   BMI 25.82 kg/m       Constitutional:  The patient was accompanied by her fiance, Linden, well-groomed, and in no acute distress.     Skin: Normal:  warm and pink without rash   Neurologic: Alert and oriented x 3.  CN's III-XII within normal limits.  Voice normal.    Psychiatric: The patient's affect was calm, cooperative, and appropriate.     Communication:  Normal; communicates verbally, normal voice quality.   Respiratory: Breathing comfortably without stridor or exertion of accessory muscles.    Eyes: Pupils were equal and reactive.  Extraocular movement intact.     Ears: Pinnae and tragus non-tender.  EAC's and TM's were evaluated, results below     Otologic microscope exam:  Right ear: no perforation or effusions  Left ear: serous effusion      Audiogram:  AUDIOGRAM: She underwent an audiogram today. This demonstrated:        Right: Speech reception threshold is 25 dB with 100% word recognition at 75 dB. Tympanogram B type   Left: Speech reception threshold is 10 dB with 100% word recognition at 55 dB. Tympanogram A type     Audiogram was independently reviewed.    Assessment and Plan:  Monica Puckett is a 26 year old female, currently 32 weeks pregnant (), who presents for consultation regarding sudden hearing loss. She has a right serous effusion and reviewed that this is confirmed on audiogram done today. We discussed options of myringotomy vs surveillance with continued medications prescribed. She would like to continue with surveillance and taking medications as prescribed, and I feel that this is very reasonable. She will work on eustachian tube measures. She will follow up in 3 months with repeat audiogram and will reach out via [place] " if she has questions or concerns in the interim.    Jia Vasques MD  Otology & Neurotology  [facility]       Scribe Disclosure:  I, Constanza Elvira, am serving as a scribe to document services personally performed by Jia Vasques MD at this visit, based upon the provider's statements to me. All documentation has been reviewed by the aforementioned provider prior to being entered into the official medical record.    The documentation recorded by the scribe accurately reflects the services I personally performed and the decisions made by me.        Again, thank you for allowing me to participate in the care of your patient.      Sincerely,    Jia Vasques MD

## 2022-04-05 NOTE — TELEPHONE ENCOUNTER
ThanksJohnathan d/w her OBGYN and sent steroids/additional 10d of abx. Glad she got into see neuro-sita! Thanks for coordinating her care.

## 2022-04-05 NOTE — PROGRESS NOTES
Neurotology Clinic      Name: Monica Puckett  MRN: [id number]  Age: 26 year old  : 1995  Referring provider: Dr. Ryan Hodges  2022      Chief Complaint:   Consultation    History of Present Illness:   Monica Puckett is a 26 year old female, currently 32 weeks pregnant (), with history of lupus who presents for consultation regarding sudden hearing loss. Consultation was requested by Dr. Ryan Hodges. The patient has been following with my colleague, Dr. Hodges, for chronic cough and sinusitis. She was prescribed amoxicillin at her last visit for a sinus infection, and this was extended yesterday (2022). At her consultation visit earlier this week, she reported ear fullness not improved with a round of amoxicillin. Dr. Hodges also prescribed a Solu-Medrol Dosepak to help with ongoing ear and throat pain she has been having. He was prompted to refer the patient to my team after she developed sudden hearing loss.    Today, the patient presents with her FianceLinden. They report that she has had a chronic cough for the last 7 months. She reports that she had a small amount of decrease hearing in her right ear about one month ago that significantly worsened 2 weeks ago. Over the last 4 days, her hearing again has significantly worsened. She reports trying to take tylenol for ongoing right ear pain that is now radiating into her left ear. The patient denies any sinus issues or infections with her previous 2 children. She denies history of ear infections or ear tubes as a child.     Review of Systems:   Pertinent items are noted in HPI or as in patient entered ROS below, remainder of complete ROS is negative.    ENT ROS 2022   Constitutional Problems with sleep   Neurology Headache   Psychology Frequently feeling anxious   Ears, Nose, Throat Hearing loss, Ear pain, Ringing/noise in ears, Nasal congestion or drainage, Sore throat, Hoarseness   Cardiopulmonary Cough, Breathing problems    Gastrointestinal/Genitourinary -   Hematologic -   Skin -      Active Medications:     Current Outpatient Medications:      albuterol (PROAIR HFA/PROVENTIL HFA/VENTOLIN HFA) 108 (90 Base) MCG/ACT inhaler, Inhale 2 puffs into the lungs every 6 hours as needed for shortness of breath / dyspnea or wheezing, Disp: 18 g, Rfl: 0     amoxicillin (AMOXIL) 875 MG tablet, Take 1 tablet (875 mg) by mouth 2 times daily for 10 days, Disp: 20 tablet, Rfl: 0     amoxicillin (AMOXIL) 875 MG tablet, Take 1 tablet (875 mg) by mouth 2 times daily for 10 days, Disp: 20 tablet, Rfl: 0     Amphetamine-Dextroamphetamine (ADDERALL PO), Take 20 mg by mouth daily , Disp: , Rfl:      Amphetamine-Dextroamphetamine (ADDERALL XR PO), Take 20 mg by mouth daily , Disp: , Rfl:      hydroxychloroquine (PLAQUENIL) 200 MG tablet, Take 1 tablet (200 mg) by mouth 2 times daily, Disp: 60 tablet, Rfl: 11     hydrOXYzine (ATARAX) 25 MG tablet, Take 2-4 tablets ( mg) by mouth nightly as needed for other (pain adjunct) Can take 25-50mg once during day but can make you sleepy, Disp: 30 tablet, Rfl: 0     ibuprofen (ADVIL/MOTRIN) 600 MG tablet, Take 1 tablet (600 mg) by mouth every 6 hours as needed for moderate pain, Disp: 30 tablet, Rfl: 0     methylPREDNISolone (MEDROL DOSEPAK) 4 MG tablet therapy pack, Follow Package Directions, Disp: 21 tablet, Rfl: 0     Omega-3 Fatty Acids (FISH OIL PO), Take 1 tablet by mouth daily Fish oil with DHA, Disp: , Rfl:      ondansetron (ZOFRAN) 8 MG tablet, Take 1 tablet (8 mg) by mouth every 8 hours as needed for nausea (nausea/vomiting), Disp: 10 tablet, Rfl: 1     Prenatal Vit-Fe Fumarate-FA (PRENATAL MULTIVITAMIN  PLUS IRON) 27-0.8 MG TABS, Take 1 tablet by mouth daily, Disp: , Rfl:      ValACYclovir HCl (VALTREX PO), Take 500 mg by mouth as needed, Disp: , Rfl:      VITAMIN D, CHOLECALCIFEROL, PO, Take 2,000 Units by mouth daily, Disp: , Rfl:       Allergies:   Estrogens, No clinical screening - see comments,  "and Varicella virus vaccine live      Past 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)      Patient Active Problem List   Diagnosis     Pain in joint, shoulder region     Observation after surgery     Systemic lupus erythematosus (H)     Long-term use of Plaquenil     Labor and delivery indication for care or intervention     Encounter for triage in pregnant patient     Indication for care in labor and delivery, antepartum      (normal spontaneous vaginal delivery)     Lupus nephritis (H)     Cough     Pregnant state, incidental     Myalgia     Chest pain, unspecified type      Past Surgical History:  Past Surgical History:   Procedure Laterality Date     CYSTOSCOPY N/A 2018    Procedure: CYSTOSCOPY;  Cystoscopy and Bladder Wall Injection;  Surgeon: Ismael Valadez MD;  [facility]: [facility]     CYSTOSCOPY, INTRAVESICAL INJECTION N/A 10/18/2018    Procedure: Cystoscopy, Guided Bladder Wall Injection   ;  Surgeon: Ismael Valadez MD;  Location: [facility] OR     HC BIOPSY RENAL, PERCUTANEOUS  13     RENAL BIOPSY       Family History:   Family History   Problem Relation Age of Onset     Urolithiasis Mother      Cancer Maternal Uncle         skin     Heart Disease Maternal Uncle      Urolithiasis Maternal Grandfather      Clotting Disorder No family hx of      Diabetes No family hx of      Gout No family hx of        Social History:   Social History     Tobacco Use     Smoking status: Never Smoker     Smokeless tobacco: Never Used     Tobacco comment: no second hand smoke exposure at home   Substance Use Topics     Alcohol use: No     Drug use: No      Physical Exam:   /67   Pulse 95   Temp 98.5  F (36.9  C)   Ht 1.676 m (5' 6\")   Wt 72.6 kg (160 lb)   SpO2 98%   BMI 25.82 kg/m       Constitutional:  The " patient was accompanied by her fiance, Linden, well-groomed, and in no acute distress.     Skin: Normal:  warm and pink without rash   Neurologic: Alert and oriented x 3.  CN's III-XII within normal limits.  Voice normal.    Psychiatric: The patient's affect was calm, cooperative, and appropriate.     Communication:  Normal; communicates verbally, normal voice quality.   Respiratory: Breathing comfortably without stridor or exertion of accessory muscles.    Eyes: Pupils were equal and reactive.  Extraocular movement intact.     Ears: Pinnae and tragus non-tender.  EAC's and TM's were evaluated, results below     Otologic microscope exam:  Right ear: no perforation or effusions  Left ear: serous effusion      Audiogram:  AUDIOGRAM: She underwent an audiogram today. This demonstrated:        Right: Speech reception threshold is 25 dB with 100% word recognition at 75 dB. Tympanogram B type   Left: Speech reception threshold is 10 dB with 100% word recognition at 55 dB. Tympanogram A type     Audiogram was independently reviewed.    Assessment and Plan:  Monica Puckett is a 26 year old female, currently 32 weeks pregnant (), who presents for consultation regarding sudden hearing loss. She has a right serous effusion and reviewed that this is confirmed on audiogram done today. We discussed options of myringotomy vs surveillance with continued medications prescribed. She would like to continue with surveillance and taking medications as prescribed, and I feel that this is very reasonable. She will work on eustachian tube measures. She will follow up in 3 months with repeat audiogram and will reach out via MyChart if she has questions or concerns in the interim.    Jia Vasques MD  Otology & Neurotology  [facility]       Scribe Disclosure:  I, Constanza Felix, am serving as a scribe to document services personally performed by Jia Vasques MD at this visit, based upon the provider's statements to me.  All documentation has been reviewed by the aforementioned provider prior to being entered into the official medical record.    The documentation recorded by the scribe accurately reflects the services I personally performed and the decisions made by me.

## 2022-04-05 NOTE — PROGRESS NOTES
AUDIOLOGY REPORT    SUMMARY: Audiology visit completed. See audiogram for results.      RECOMMENDATIONS: Follow-up with ENT.      May[facility] Wiley.  Licensed Audiologist  [place] #[id number]

## 2022-04-05 NOTE — LETTER
Date:April 18, 2022      Patient was self referred, no letter generated. Do not send.        [facility] Health Information

## 2022-04-05 NOTE — PATIENT INSTRUCTIONS
1. You were seen in the ENT Clinic today by Dr. Vasques. If you have any questions or concerns after your appointment, please call   - Option 1: ENT Clinic: [phone]  - Option 2: Gisselle (Dr. Vasques' Nurse): [phone]  - Option 3: Heather (Dr. Vasques' Nurse): [phone]     2.   Plan to return to clinic in 2-3 months with a hearing test    Gisselle RN, BSN  ENT RN Care Coordinator  [facility] Otolaryngology

## 2022-04-08 LAB
BACTERIA SPEC CULT: ABNORMAL

## 2022-05-27 ENCOUNTER — TELEPHONE (OUTPATIENT)
Dept: OTOLARYNGOLOGY | Facility: CLINIC | Age: 27
End: 2022-05-27

## 2022-05-27 NOTE — TELEPHONE ENCOUNTER
[facility] Call Center    Phone Message    May a detailed message be left on voicemail: yes     Reason for Call: Other: Pt had a 5 week f/u Appt scheduled with Dr. Hodges for today but had to cancel as her  daughter is jaundiced. She would like to r/s but Dr. Hodges's next available isn't until . Please call pt to discuss r/s options. Thank you.     Action Taken: Message routed to:  [facility] ([facility] ENT    Travel Screening: Not Applicable

## 2022-07-06 ENCOUNTER — ANCILLARY PROCEDURE (OUTPATIENT)
Dept: CT IMAGING | Facility: CLINIC | Age: 27
End: 2022-07-06
Attending: OTOLARYNGOLOGY
Payer: COMMERCIAL

## 2022-07-06 ENCOUNTER — OFFICE VISIT (OUTPATIENT)
Dept: OTOLARYNGOLOGY | Facility: CLINIC | Age: 27
End: 2022-07-06
Payer: COMMERCIAL

## 2022-07-06 VITALS
WEIGHT: 160 LBS | HEART RATE: 95 BPM | DIASTOLIC BLOOD PRESSURE: 61 MMHG | BODY MASS INDEX: 25.71 KG/M2 | OXYGEN SATURATION: 95 % | SYSTOLIC BLOOD PRESSURE: 125 MMHG | TEMPERATURE: 97.8 F | HEIGHT: 66 IN

## 2022-07-06 DIAGNOSIS — R05.3 CHRONIC COUGH: ICD-10-CM

## 2022-07-06 DIAGNOSIS — J32.0 CHRONIC MAXILLARY SINUSITIS: Primary | ICD-10-CM

## 2022-07-06 DIAGNOSIS — J32.2 CHRONIC ETHMOIDAL SINUSITIS: ICD-10-CM

## 2022-07-06 DIAGNOSIS — J32.0 CHRONIC MAXILLARY SINUSITIS: ICD-10-CM

## 2022-07-06 DIAGNOSIS — R21 SKIN RASH: ICD-10-CM

## 2022-07-06 PROCEDURE — 31231 NASAL ENDOSCOPY DX: CPT | Performed by: OTOLARYNGOLOGY

## 2022-07-06 PROCEDURE — 70486 CT MAXILLOFACIAL W/O DYE: CPT | Mod: GC | Performed by: RADIOLOGY

## 2022-07-06 PROCEDURE — 99213 OFFICE O/P EST LOW 20 MIN: CPT | Mod: 25 | Performed by: OTOLARYNGOLOGY

## 2022-07-06 ASSESSMENT — PAIN SCALES - GENERAL: PAINLEVEL: SEVERE PAIN (6)

## 2022-07-06 NOTE — LETTER
Date:July 7, 2022      Patient was self referred, no letter generated. Do not send.        [facility] Health Information

## 2022-07-06 NOTE — PATIENT INSTRUCTIONS
"You were seen in the clinic today by Dr. Hodges. If you have any questions or concerns after your appointment, please call the clinic at [phone]. Press \"1\" for scheduling, press \"3\" for nurse advice.    2.   The following has been recommended for you based upon your appointment today:   -Referral has been placed to dermatology. If you don't hear from them to schedule you can call [phone].   -CT scan today.    3.   Dr. Hodges will call once he has had the opportunity to review your results, and discuss a plan of care at that time.       Constanza Cisse LPN  [facility]  Department of Otolaryngology  [phone]   "

## 2022-07-06 NOTE — LETTER
"7/6/2022       RE: Monica Puckett  [address] [place] [facility] [place] [facility] [place] [address]     Dear Colleague,    Thank you for referring your patient, Monica Puckett, to the [facility] [place] [facility]. Please see a copy of my visit note below.      [facility]  Return Visit  Encounter date:   July 6, 2022    Chief Complaint:   Chronic cough, sinusitis    Interval History:   Monica Puckett is a 26 year old female with a history of lupus who presents for follow up. At our last visit she showed signs of sinusitis and I prescribed her antibiotics. A few days following this appointment she followed up with my colleague Dr. Vasques for sudden hearing loss which did not improve with antibiotics. Today she tells me that she has had improved congestion but worsened pain in her sinuses. Her ears have clogged again and she has been unable to hear out of her right ear. She was supposed to see my colleague Dr. Vasques last week but did not feel well so this is rescheduled for 7/19/22. She notes that antibiotics and steroids seemed to help slightly and her cough calmed down after giving birth but she has been experiencing rhinorrhea. She is currently taking hydrochloroquine for her lupus and tells me this has been working well. Of note, she is currently breast feeding. She also mentions a rash that she has been experiencing since giving birth.     Sino-Nasal Outcome Test (SNOT - 22)  DNT    [place] [facility]  No sinonasal surgeries     Review of systems: A 14-point review of systems has been conducted and is negative for any notable symptoms, except as dictated in the history of present illness.     Physical Exam:  Vital signs: /61 (BP Location: Left arm, Patient Position: Sitting, Cuff Size: Adult Regular)   Pulse 95   Temp 97.8  F (36.6  C) (Temporal)   Ht 1.676 m (5' 6\")   Wt 72.6 kg (160 lb)   SpO2 95%   BMI 25.82 kg/m   "   General Appearance: No acute distress, appropriate demeanor, conversant  Eyes: moist conjunctivae; EOMI; pupils symmetric; visual acuity grossly intact; no proptosis  Head: normocephalic; overall symmetric appearance without deformity  Face: overall symmetric without deformity; HB I/VI  Ears: Normal appearance of external ear; external meatus normal in appearance; TMs intact without perforation bilaterally; Serous effusion of right   Nose: See procedure note below  Oral Cavity/oropharynx: Normal appearance of mucosa; tongue midline; no mass or lesions; oropharynx without obvious mucosal abnormality   Neck: no palpable lymphadenopathy; thyroid without palpable nodules  Lungs: symmetric chest rise; no wheezing  CV: Good distal perfusion; normal hear rate  Extremities: No deformity  Neurologic Exam: Cranial nerves II-XII are grossly intact; no focal deficit      Procedure Note  Procedure performed: Rigid nasal endoscopy  Indication: To evaluate for sinonasal pathology not visualized on routine anterior rhinoscopy  Anesthesia: 4% topical lidocaine with 0.05 % oxymetazoline  Description of procedure: A 30 degree, 3 mm rigid endoscope was inserted into bilateral nasal cavities and the nasal valves, nasal cavity, middle meatus, sphenoethmoid recess, nasopharynx were evaluated for evidence of obstruction, edema, purulence, polyps and/or mass/lesion.     Reserve-Marvin Endoscopic Scoring System  Endoscopic observation Right Left   Polyps in middle meatus (0 = absent, 1 = restricted to middle meatus, 2 = Beyond middle meatus) 0 0   Discharge (0 = absent, 1 = thin and clear, 2 = thick, purulent) 1 1   Edema (0 = absent, 1 = mild-moderate, 2 = moderate-severe) 1 1   Crusting (0 = absent, 1 = mild-moderate, 2 = moderate-severe) 0 0   Scarring (0= absent, 1 = mild-moderate, 2 = moderate-severe) 0 0   Total 2 2     Findings  RT: Edema of MM and SER. Clear, thick drainage  LT: Edema of MM and SER. Clear, thick drainage.     The  patient tolerated the procedure well without complication.     Laboratory Review:  N/A    Imaging Review:  N/A    Pathology Review:  Respiratory Aerobic bacterial culture:   Culture  4+ Haemophilus influenzae Abnormal      Anaerobic bacterial culture:  Culture   1+ Actinomyces species Abnormal    Identification obtained by MALDI-TOF mass spectrometry research use only database. Test characteristics determined and verified by the Infectious Diseases Diagnostic Laboratory.   Susceptibilities not routinely done  1+ Actinomyces odontolyticus Abnormal    Susceptibilities not routinely done  1+ Normal wayne     Assessment/Medical Decision Making:  Chronic sinusitis without nasal polyps  Nasal congestion, postnasal drip and acute serous otitis media, right ear.  All likely secondary to chronic sinusitis  Chronic cough   Systemic lupus erythematous      Plan:  She will obtain a CT scan now that she is no longer pregnant. I will discuss with pharmacy regarding a prolonged dose of prednisone for further cares as a precaution as she is currently breast feeding. No acute infection on edoscopy so I do not feel cultures are indicated at this time. I advise her to continue with Flonase and nasal rinses. She will follow-up pending CT scan results and we will reach out to her with the results.      She also requested a referral for dermatology for her rash which I will complete.     Ryan Hodges MD    [facility]  Rhinology, Endoscopic Skull Base Surgery  [facility]  Department of Otolaryngology - Head & Neck Surgery    Scribe Disclosure:  I, Birdie Machuca, am serving as a scribe to document services personally performed by Ryan Hodges MD at this visit, based upon the provider's statements to me. All documentation has been reviewed by the aforementioned provider prior to being entered into the official medical record.     Additional portions of the patient's history have been  reviewed below.   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)         Past Surgical History:   Procedure Laterality Date     CYSTOSCOPY N/A 9/14/2018    Procedure: CYSTOSCOPY;  Cystoscopy and Bladder Wall Injection;  Surgeon: Ismael Valadez MD;  [facility]     CYSTOSCOPY, INTRAVESICAL INJECTION N/A 10/18/2018    Procedure: Cystoscopy, Guided Bladder Wall Injection   ;  Surgeon: Ismael Valadez MD;  [facility]: [facility] OR     HC BIOPSY RENAL, PERCUTANEOUS  1/18/13     RENAL BIOPSY          Family History   Problem Relation Age of Onset     Urolithiasis Mother      Cancer Maternal Uncle         skin     Heart Disease Maternal Uncle      Urolithiasis Maternal Grandfather      Clotting Disorder No family hx of      Diabetes No family hx of      Gout No family hx of         Social History     Socioeconomic History     Marital status: Single   Occupational History     Employer: [employer]    Tobacco Use     Smoking status: Never Smoker     Smokeless tobacco: Never Used     Tobacco comment: no second hand smoke exposure at home   Substance and Sexual Activity     Alcohol use: No     Drug use: No     Sexual activity: Yes     Partners: Male            Again, thank you for allowing me to participate in the care of your patient.      Sincerely,    Ryan Hodges MD

## 2022-07-06 NOTE — PROGRESS NOTES
"  [facility]  Return Visit  Encounter date:   July 6, 2022    Chief Complaint:   Chronic cough, sinusitis    Interval History:   Monica Puckett is a 26 year old female with a history of lupus who presents for follow up. At our last visit she showed signs of sinusitis and I prescribed her antibiotics. A few days following this appointment she followed up with my colleague Dr. Vasques for sudden hearing loss which did not improve with antibiotics. Today she tells me that she has had improved congestion but worsened pain in her sinuses. Her ears have clogged again and she has been unable to hear out of her right ear. She was supposed to see my colleague Dr. Vasques last week but did not feel well so this is rescheduled for 7/19/22. She notes that antibiotics and steroids seemed to help slightly and her cough calmed down after giving birth but she has been experiencing rhinorrhea. She is currently taking hydrochloroquine for her lupus and tells me this has been working well. Of note, she is currently breast feeding. She also mentions a rash that she has been experiencing since giving birth.     Sino-Nasal Outcome Test (SNOT - 22)  DNT    [facility]  No sinonasal surgeries     Review of systems: A 14-point review of systems has been conducted and is negative for any notable symptoms, except as dictated in the history of present illness.     Physical Exam:  Vital signs: /61 (BP Location: Left arm, Patient Position: Sitting, Cuff Size: Adult Regular)   Pulse 95   Temp 97.8  F (36.6  C) (Temporal)   Ht 1.676 m (5' 6\")   Wt 72.6 kg (160 lb)   SpO2 95%   BMI 25.82 kg/m     General Appearance: No acute distress, appropriate demeanor, conversant  Eyes: moist conjunctivae; EOMI; pupils symmetric; visual acuity grossly intact; no proptosis  Head: normocephalic; overall symmetric appearance without deformity  Face: overall symmetric without deformity; HB I/VI  Ears: Normal appearance of " external ear; external meatus normal in appearance; TMs intact without perforation bilaterally; Serous effusion of right   Nose: See procedure note below  Oral Cavity/oropharynx: Normal appearance of mucosa; tongue midline; no mass or lesions; oropharynx without obvious mucosal abnormality   Neck: no palpable lymphadenopathy; thyroid without palpable nodules  Lungs: symmetric chest rise; no wheezing  CV: Good distal perfusion; normal hear rate  Extremities: No deformity  Neurologic Exam: Cranial nerves II-XII are grossly intact; no focal deficit      Procedure Note  Procedure performed: Rigid nasal endoscopy  Indication: To evaluate for sinonasal pathology not visualized on routine anterior rhinoscopy  Anesthesia: 4% topical lidocaine with 0.05 % oxymetazoline  Description of procedure: A 30 degree, 3 mm rigid endoscope was inserted into bilateral nasal cavities and the nasal valves, nasal cavity, middle meatus, sphenoethmoid recess, nasopharynx were evaluated for evidence of obstruction, edema, purulence, polyps and/or mass/lesion.     Iliana-Marvin Endoscopic Scoring System  Endoscopic observation Right Left   Polyps in middle meatus (0 = absent, 1 = restricted to middle meatus, 2 = Beyond middle meatus) 0 0   Discharge (0 = absent, 1 = thin and clear, 2 = thick, purulent) 1 1   Edema (0 = absent, 1 = mild-moderate, 2 = moderate-severe) 1 1   Crusting (0 = absent, 1 = mild-moderate, 2 = moderate-severe) 0 0   Scarring (0= absent, 1 = mild-moderate, 2 = moderate-severe) 0 0   Total 2 2     Findings  RT: Edema of MM and SER. Clear, thick drainage  LT: Edema of MM and SER. Clear, thick drainage.     The patient tolerated the procedure well without complication.     Laboratory Review:  N/A    Imaging Review:  N/A    Pathology Review:  Respiratory Aerobic bacterial culture:   Culture  4+ Haemophilus influenzae Abnormal      Anaerobic bacterial culture:  Culture   1+ Actinomyces species Abnormal    Identification  obtained by MALDI-TOF mass spectrometry research use only database. Test characteristics determined and verified by the [facility].   Susceptibilities not routinely done  1+ Actinomyces odontolyticus Abnormal    Susceptibilities not routinely done  1+ Normal wayne     Assessment/Medical Decision Making:  Chronic sinusitis without nasal polyps  Nasal congestion, postnasal drip and acute serous otitis media, right ear.  All likely secondary to chronic sinusitis  Chronic cough   Systemic lupus erythematous      Plan:  She will obtain a CT scan now that she is no longer pregnant. I will discuss with pharmacy regarding a prolonged dose of prednisone for further cares as a precaution as she is currently breast feeding. No acute infection on edoscopy so I do not feel cultures are indicated at this time. I advise her to continue with Flonase and nasal rinses. She will follow-up pending CT scan results and we will reach out to her with the results.      She also requested a referral for dermatology for her rash which I will complete.     Ryan Hodges MD    [facility]  Rhinology, Endoscopic Skull Base Surgery  [facility]  Department of Otolaryngology - Head & Neck Surgery    Scribe Disclosure:  I, Birdie Machuca, am serving as a scribe to document services personally performed by Ryan Hodges MD at this visit, based upon the provider's statements to me. All documentation has been reviewed by the aforementioned provider prior to being entered into the official medical record.     Additional portions of the patient's history have been reviewed below.   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)         Past Surgical History:   Procedure Laterality Date     CYSTOSCOPY N/A 9/14/2018    Procedure: CYSTOSCOPY;  Cystoscopy and Bladder Wall Injection;  Surgeon: Ismael Valadez MD;  Location: [facility] OR     CYSTOSCOPY, INTRAVESICAL INJECTION N/A 10/18/2018    Procedure: Cystoscopy, Guided Bladder Wall Injection   ;  Surgeon: Ismael Valadez MD;  Location: [facility] OR     HC BIOPSY RENAL, PERCUTANEOUS  1/18/13     RENAL BIOPSY          Family History   Problem Relation Age of Onset     Urolithiasis Mother      Cancer Maternal Uncle         skin     Heart Disease Maternal Uncle      Urolithiasis Maternal Grandfather      Clotting Disorder No family hx of      Diabetes No family hx of      Gout No family hx of         Social History     Socioeconomic History     Marital status: Single   Occupational History     Employer: [employer]    Tobacco Use     Smoking status: Never Smoker     Smokeless tobacco: Never Used     Tobacco comment: no second hand smoke exposure at home   Substance and Sexual Activity     Alcohol use: No     Drug use: No     Sexual activity: Yes     Partners: Male

## 2022-07-06 NOTE — NURSING NOTE
"Chief Complaint   Patient presents with     RECHECK     5 week follow up    Blood pressure 125/61, pulse 95, temperature 97.8  F (36.6  C), temperature source Temporal, height 1.676 m (5' 6\"), weight 72.6 kg (160 lb), SpO2 95 %, unknown if currently breastfeeding. Rosa Cordero, EMT  "

## 2022-07-08 NOTE — PROGRESS NOTES
"Followed up with patient per request of provider regarding her CT results. Per provider:    \"Can you let Birdie know that her CT is clear - no signs of sinusitis. This is great news!   She should do once daily sinus rinses followed by flonase nasal spray to help with her symptoms and follow up with Dr. Vasques for her RT ear fluid.   She may follow up with me as needed\"    Patient relayed the information. Patient was advised to follow up with Tiffanie Donaldson and Dr. Vasques as previously scheduled for the fluid in her right ear.    Patient verbalizes understanding of this plan and is in agreement. Patient has no further questions at this time.    LAY PEARSON LPN on 7/8/2022 at 10:30 AM        "

## 2022-07-08 NOTE — RESULT ENCOUNTER NOTE
Ladarius Apodaca,   Can you let Birdie know that her CT is clear - no signs of sinusitis. This is great news!    She should do once daily sinus rinses followed by flonase nasal spray to help with her symptoms and follow up with Dr. Vasques for her RT ear fluid.     She may follow up with me as needed.

## 2022-07-19 ENCOUNTER — OFFICE VISIT (OUTPATIENT)
Dept: OTOLARYNGOLOGY | Facility: CLINIC | Age: 27
End: 2022-07-19
Payer: COMMERCIAL

## 2022-07-19 VITALS
TEMPERATURE: 98.3 F | DIASTOLIC BLOOD PRESSURE: 75 MMHG | SYSTOLIC BLOOD PRESSURE: 135 MMHG | HEIGHT: 66 IN | WEIGHT: 150 LBS | HEART RATE: 99 BPM | BODY MASS INDEX: 24.11 KG/M2

## 2022-07-19 DIAGNOSIS — H69.91 EUSTACHIAN TUBE DYSFUNCTION, RIGHT: Primary | ICD-10-CM

## 2022-07-19 DIAGNOSIS — H92.11 OTORRHEA, RIGHT: ICD-10-CM

## 2022-07-19 PROCEDURE — 99203 OFFICE O/P NEW LOW 30 MIN: CPT | Mod: 25 | Performed by: REGISTERED NURSE

## 2022-07-19 PROCEDURE — 87070 CULTURE OTHR SPECIMN AEROBIC: CPT | Performed by: REGISTERED NURSE

## 2022-07-19 PROCEDURE — 69420 INCISION OF EARDRUM: CPT | Mod: RT | Performed by: REGISTERED NURSE

## 2022-07-19 ASSESSMENT — PAIN SCALES - GENERAL: PAINLEVEL: NO PAIN (0)

## 2022-07-19 NOTE — PROGRESS NOTES
Otolaryngology Clinic  July 19, 2022    HPI:  Monica Puckett is here for evaluation of right ear. Patient has a history of eustachian tube dysfunction. Last seen in clinic 4/5/22 by Dr. Vasques where patient was found to have a serous effusion of the right ear. Offered myringotomy vs observation. Patient opted for observation at that time. Has seen Dr. Hodges in the interim for chronic maxillary sinusitis. No acute sinusitis findings on CT. Dr. Hodges recommended saline rinses and flonase.     Patient reports being in antibiotics at beginning of the month. This intermittently improved right ear symptoms which then, unfortunately returned. Patient was scheduled for follow up for re-evaluation of ear. States that ear has been almost constantly plugged for 6 months now. Starting to have post auricular pain. Concerned for infection. Denies any otorrhea or dizziness.     Otologic microscope exam:    Patient's ear pathology required use of the binocular microscope for the purpose of cleaning and improving visualization in order to assure a more accurate diagnostic evaluation.    Right ear was examined under the microscope.  Ear canal is clean and dry. TM intact with serous effusion behind TM. No evidence of purulent fluid or bulging of TM.     Left ear was also examined under the microscope. Ear canal is clean and dry. TM visualized under microscope. Normal appearing TM, nicely aerated middle ear space.     Procedure: Right myringotomy  Performed by Dr. Stewart. TM anesthestized with phenol. Myringotomy performed in anterior inferior portion of TM. Serous fluid cultured. Majority of fluid suctioned using #20 suction. Patient tolerated procedure.    Assessment and Plan:  1. Eustachian tube dysfunction, right  Patient with chronic serous effusion of the right ear. Previously discussed myringotomy with Dr. Vasques which she would like to proceed with today. Myringotomy performed today. Majority of fluid suctioned. Patient reports  improvement of hearing. There is still fluid in the middle ear that could not be completely suctioned. Instructed patient that she may have some fluid from the ear for the next few days. Patient should call clinic with any new purulent drainage or worsening ear pain.     Reviewed dry ear precautions with patient. Recommend patient return to clinic in 1 month for ear recheck.     Tiffanie Donaldson DNP, APRN, CNP  Otolaryngology  Head & Neck Surgery  [phone]    30 minutes spent on the date of the encounter doing chart review, history and exam, documentation and further activities per the note excluding time spent performing myringotomy.

## 2022-07-19 NOTE — LETTER
7/19/2022       RE: Monica Puckett  [address] [place] [address]     Dear Colleague,    Thank you for referring your patient, Monica Puckett, to the [facility] E[facility]ROAT [facility]INIC [place] [facility]. Please see a copy of my visit note below.      Otolaryngology Clinic  July 19, 2022    HPI:  Monica Puckett is here for evaluation of right ear. Patient has a history of eustachian tube dysfunction. Last seen in clinic 4/5/22 by Dr. Vasques where patient was found to have a serous effusion of the right ear. Offered myringotomy vs observation. Patient opted for observation at that time. Has seen Dr. Hodges in the interim for chronic maxillary sinusitis. No acute sinusitis findings on CT. Dr. Hodges recommended saline rinses and flonase.     Patient reports being in antibiotics at beginning of the month. This intermittently improved right ear symptoms which then, unfortunately returned. Patient was scheduled for follow up for re-evaluation of ear. States that ear has been almost constantly plugged for 6 months now. Starting to have post auricular pain. Concerned for infection. Denies any otorrhea or dizziness.     Otologic microscope exam:    Patient's ear pathology required use of the binocular microscope for the purpose of cleaning and improving visualization in order to assure a more accurate diagnostic evaluation.    Right ear was examined under the microscope.  Ear canal is clean and dry. TM intact with serous effusion behind TM. No evidence of purulent fluid or bulging of TM.     Left ear was also examined under the microscope. Ear canal is clean and dry. TM visualized under microscope. Normal appearing TM, nicely aerated middle ear space.     Procedure: Right myringotomy  Performed by Dr. Stewart. TM anesthestized with phenol. Myringotomy performed in anterior inferior portion of TM. Serous fluid cultured. Majority of fluid suctioned using #20  suction. Patient tolerated procedure.    Assessment and Plan:  1. Eustachian tube dysfunction, right  Patient with chronic serous effusion of the right ear. Previously discussed myringotomy with Dr. Vasques which she would like to proceed with today. Myringotomy performed today. Majority of fluid suctioned. Patient reports improvement of hearing. There is still fluid in the middle ear that could not be completely suctioned. Instructed patient that she may have some fluid from the ear for the next few days. Patient should call clinic with any new purulent drainage or worsening ear pain.     Reviewed dry ear precautions with patient. Recommend patient return to clinic in 1 month for ear recheck.     Tiffanie Donaldson DNP, APRN, CNP  Otolaryngology  Head & Neck Surgery  [phone]    30 minutes spent on the date of the encounter doing chart review, history and exam, documentation and further activities per the note excluding time spent performing myringotomy.      Again, thank you for allowing me to participate in the care of your patient.      Sincerely,    Margaret Donaldson, NP

## 2022-07-19 NOTE — NURSING NOTE
"Chief Complaint   Patient presents with     RECHECK     Follow up       Blood pressure 135/75, pulse 99, temperature 98.3  F (36.8  C), temperature source Temporal, height 1.676 m (5' 6\"), weight 68 kg (150 lb), unknown if currently breastfeeding.    Blanka Oakley, EMT    "

## 2022-07-19 NOTE — LETTER
Date:July 20, 2022      Patient was self referred, no letter generated. Do not send.        [facility] Health Information

## 2022-07-21 LAB — BACTERIA SPEC CULT: NO GROWTH

## 2022-10-05 DIAGNOSIS — H69.91 EUSTACHIAN TUBE DYSFUNCTION, RIGHT: Primary | ICD-10-CM

## 2022-11-21 ENCOUNTER — HEALTH MAINTENANCE LETTER (OUTPATIENT)
Age: 27
End: 2022-11-21

## 2022-12-16 NOTE — PROGRESS NOTES
[facility] Dermatology Note  Encounter Date: Dec 20, 2022  Office Visit     Dermatology Problem List:  1. Seborrheic dermatitis  - Current tx: ketoconazole 2% shampoo  2. Verruca plantaris, right plantar foot  - Current tx: imiquimod 5% cream  3. Intertrigo  - Current tx: nystatin cream    PMHx: SLE on Plaquenil  ____________________________________________    Assessment & Plan:    # Intertrigo, abdominal fold  Discussed natural history of rash and treatment options such as antifungal creams and topical steroids.  - Start nystatin cream BID PRN for flares on the mid abdomen.    # Seborrheic dermatitis  # Folliculitis, scalp  - Start ketoconazole 2% shampoo every other day, leaving in for 3-5 minutes before rinsing.    # Verruca plantaris, right plantar foot  - Start imiquimod 5% cream to the affected areas every other night, increasing to nightly as tolerated. Wash off after 8 hours. Discussed that patient  may experience irritation from this medication. Recommend the patient wash hands after use or use gloves to apply. Keep medication away from pets.  Do not occlude treated area with bandages. Discussed this may take 3-4 months to see improvement.     # Dermatofibroma  Discussed the natural history and benign nature of this lesion. Reassurance provided that no additional treatment is necessary.     # Multiple benign nevi  - No concerning lesions today  - Monitor for ABCDEs of melanoma   - Continue sun protection - recommend SPF 30 or higher with frequent application   - Return sooner if noticing changing or symptomatic lesions     Procedures Performed:   None    Follow-up: 4 month(s) in-person, or earlier for new or changing lesions    Staff and Scribe:     Scribe Disclosure:  Rosendo BUNN, am serving as a scribe to document services personally performed by Telma Whiting PA-C based on data collection and the provider's statements to me.     Provider Disclosure:   The documentation  "recorded by the scribe accurately reflects the services I personally performed and the decisions made by me.    All risks, benefits and alternatives were discussed with patient.  Patient is in agreement and understands the assessment and plan.  All questions were answered.  Sun Screen Education was given.   Return to Clinic in 4 months or sooner as needed.   Telma Whiting PA-C   [facility] Dermat[facility]   ____________________________________________    CC: Skin Check (FBSC. No prior Hx. ) and Rash (Sx x 1 yr in stomach area. Irritation )    HPI:  Ms. Monica Puckett is a(n) 27 year old female who presents today as a new patient for a FBSE. Patient reports rashes popping up across her body, such as one on her stomach, not present today.  The rashes are generally warm/itchy and come and go. Her scalp also gets excessively dry. She generally washes her scalp every 2-3 days and has tried multiple OTC shampoos, including ketoconazole. These symptoms began after the birth of her 6 month old. She has tried an unspecified OTC dermatitis product, which has not helped much. Patient has a history of lupus with \"butterfly\" rash presentation, which is largely stable. She suspects she may have another undiagnosed autoimmune disease and is seeing [facility] in the near future. Patient does not have a family history eczema or other skin issues. Additionally, patient notes that some of her moles are changing, specifically those under her right eye, left arm, and left leg, as well as some plantar warts on the right foot treated with at-home paring and OTC treatments.    Patient is otherwise feeling well, without additional skin concerns.    Labs Reviewed:  N/A    Physical Exam:  Vitals: There were no vitals taken for this visit.  SKIN: Full skin, which includes the head/face, both arms, chest, back, abdomen,both legs, genitalia and/or groin buttocks, digits and/or nails, was examined.  - No notable erythema noted to " the mid abdominal fold.  - White greasy scale noted to several areas on the scalp.   - There are verrucous papules with thrombosed capillaries interrupting dermatoglyphics on the right plantar surface x5.  - There is a firm tan/flesh colored papule that dimples with lateral pressure on the left calf.  - Multiple regular brown pigmented macules and papules are identified on the trunk and extremities.   - No other lesions of concern on areas examined.     Medications:  Current Outpatient Medications   Medication     albuterol (PROAIR HFA/PROVENTIL HFA/VENTOLIN HFA) 108 (90 Base) MCG/ACT inhaler     Amphetamine-Dextroamphetamine (ADDERALL PO)     Amphetamine-Dextroamphetamine (ADDERALL XR PO)     hydroxychloroquine (PLAQUENIL) 200 MG tablet     hydrOXYzine (ATARAX) 25 MG tablet     ibuprofen (ADVIL/MOTRIN) 600 MG tablet     methylPREDNISolone (MEDROL DOSEPAK) 4 MG tablet therapy pack     Omega-3 Fatty Acids (FISH OIL PO)     ondansetron (ZOFRAN) 8 MG tablet     Prenatal Vit-Fe Fumarate-FA (PRENATAL MULTIVITAMIN  PLUS IRON) 27-0.8 MG TABS     ValACYclovir HCl (VALTREX PO)     VITAMIN D, CHOLECALCIFEROL, PO     No current facility-administered medications for this visit.      Past Medical History:   Patient Active Problem List   Diagnosis     Pain in joint, shoulder region     Observation after surgery     Systemic lupus erythematosus (H)     Long-term use of Plaquenil     Labor and delivery indication for care or intervention     Encounter for triage in pregnant patient     Indication for care in labor and delivery, antepartum      (normal spontaneous vaginal delivery)     Lupus nephritis (H)     Cough     Pregnant state, incidental     Myalgia     Chest pain, unspecified type     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

## 2022-12-20 ENCOUNTER — OFFICE VISIT (OUTPATIENT)
Dept: FAMILY MEDICINE | Facility: CLINIC | Age: 27
End: 2022-12-20
Attending: OTOLARYNGOLOGY
Payer: COMMERCIAL

## 2022-12-20 DIAGNOSIS — L21.9 SEBORRHEIC DERMATITIS: ICD-10-CM

## 2022-12-20 DIAGNOSIS — B07.0 VERRUCA PLANTARIS: ICD-10-CM

## 2022-12-20 DIAGNOSIS — R21 SKIN RASH: ICD-10-CM

## 2022-12-20 DIAGNOSIS — D22.9 MULTIPLE BENIGN NEVI: ICD-10-CM

## 2022-12-20 DIAGNOSIS — L73.9 FOLLICULITIS: ICD-10-CM

## 2022-12-20 DIAGNOSIS — L30.4 INTERTRIGO: ICD-10-CM

## 2022-12-20 DIAGNOSIS — D23.9 DERMATOFIBROMA: Primary | ICD-10-CM

## 2022-12-20 PROCEDURE — 99204 OFFICE O/P NEW MOD 45 MIN: CPT | Performed by: PHYSICIAN ASSISTANT

## 2022-12-20 RX ORDER — IMIQUIMOD 12.5 MG/.25G
CREAM TOPICAL
Qty: 12 PACKET | Refills: 3 | Status: SHIPPED | OUTPATIENT
Start: 2022-12-20 | End: 2024-06-19

## 2022-12-20 RX ORDER — KETOCONAZOLE 20 MG/ML
SHAMPOO TOPICAL
Qty: 120 ML | Refills: 5 | Status: SHIPPED | OUTPATIENT
Start: 2022-12-20 | End: 2024-06-19

## 2022-12-20 RX ORDER — NYSTATIN 100000 U/G
CREAM TOPICAL 2 TIMES DAILY
Qty: 60 G | Refills: 1 | Status: SHIPPED | OUTPATIENT
Start: 2022-12-20 | End: 2024-06-19

## 2022-12-20 ASSESSMENT — PAIN SCALES - GENERAL: PAINLEVEL: NO PAIN (0)

## 2022-12-20 NOTE — PATIENT INSTRUCTIONS
WARTS  WHAT CAUSES WARTS?  Warts are a very common problem. It is estimated that 10% of children and young adults are infected.   These harmless skin growths can develop on any part of the body. On the hands, warts are most often raised. Flat warts commonly occur on the face, arms and legs. Lesions on the soles of the feet are often compressed or appear flat because of the pressure exerted on this site during walking.   Although warts are generally not a risk to one s overall health, they can be a nuisance. They may bleed if injured, interfere with walking, and cause pain or embarrassment. Since a virus causes warts, they may spread on the body or to other children. However, despite exposure, some people never get warts while others develop many. There is currently no reliable way to prevent warts, although avoidance of certain activities or behaviors such as not picking or shaving over them may prevent further spreading.   Warts frequently resolve spontaneously. The average common wart, if left untreated, will usually disappear within a 2 year time period. This spontaneous disappearance is less common in older child and adults.    TREATMENT OPTIONS:  There is no single perfect treatment for warts.   Because salicylic acid is the only FDA-approved treatment for non-genital warts, the most commonly used treatments are considered  off-label.  The ideal treatment depends on the number, location, size of warts, as well as your skin type and the judgment of your provider.   Treatment is not always indicated. Because the virus that causes warts frequently appear while existing ones are being treated, multiple office visits may be required.   Warts may return weeks or months after an apparent cure.   Unfortunately, no matter what treatments are used, some warts occasionally fail to resolve.   Treatments are generally targeted either at destroying the tissue where the wart resides ( destructive methods ), or stimulating  the body s immune system to recognize and eliminate the infection (immunotherapy ). Destruction can be achieved with chemicals like salicylic acid, freezing with liquid nitrogen, creams containing 5-fluorouracil (Efudex), or with laser surgery. Immunotherapies include imiquimod (Aldara), a cream that stimulates skin cells to produce virus fighting molecules, and injection of a purified form of yeast ( candida antigen) into the wart to alert the immune system to fight off the virus. With the latter treatment, repeated  booster  injections are typically administered every 4-6 weeks in clinic. In younger patients, the use of oral cimitidine (Tagament) is sometimes successful at stimulating the immune system to fight off warts.     LIQUID NITROGEN TREATMENT:  Liquid nitrogen is a cold, liquefied gas with a temperature of 196 degrees below zero Celsius (-321 Fahrenheit). It is used to destroy superficial skin growths like warts. Liquid nitrogen causes stinging and mild pain while the growth is being frozen and then thaws. The discomfort usually lasts only a few minutes. A scar can sometimes result from this treatment, but not usually. After liquid nitrogen application, the treated site may become swollen and red. The skin may blister and form a blood blister. A scab or crust subsequently forms. If will fall off by itself within one to three weeks. You may wash your skin as usual. If clothing causes irritation, cover the area with a small bandage (Band-aid) and Vaseline.  Because one liquid nitrogen treatment often does not completely remove the wart; we often recommend at-home topical treatments following in-office therapy. However, you should not start these treatments until the treatment site has recovered, about 7 days. Potential adverse effects of treatment with liquid nitrogen are usually minor and temporary, but include pigmentation changes and rarely scarring.

## 2022-12-20 NOTE — LETTER
12/20/2022         RE: Monica Puckett  [address] [facility] [address]        Dear Colleague,    Thank you for referring your patient, Monica Puckett, to the [facility]. Please see a copy of my visit note below.    [facility] Dermatology Note  Encounter Date: Dec 20, 2022  Office Visit     Dermatology Problem List:  1. Seborrheic dermatitis  - Current tx: ketoconazole 2% shampoo  2. Verruca plantaris, right plantar foot  - Current tx: imiquimod 5% cream  3. Intertrigo  - Current tx: nystatin cream    PMHx: SLE on Plaquenil  ____________________________________________    Assessment & Plan:    # Intertrigo, abdominal fold  Discussed natural history of rash and treatment options such as antifungal creams and topical steroids.  - Start nystatin cream BID PRN for flares on the mid abdomen.    # Seborrheic dermatitis  # Folliculitis, scalp  - Start ketoconazole 2% shampoo every other day, leaving in for 3-5 minutes before rinsing.    # Verruca plantaris, right plantar foot  - Start imiquimod 5% cream to the affected areas every other night, increasing to nightly as tolerated. Wash off after 8 hours. Discussed that patient  may experience irritation from this medication. Recommend the patient wash hands after use or use gloves to apply. Keep medication away from pets.  Do not occlude treated area with bandages. Discussed this may take 3-4 months to see improvement.     # Dermatofibroma  Discussed the natural history and benign nature of this lesion. Reassurance provided that no additional treatment is necessary.     # Multiple benign nevi  - No concerning lesions today  - Monitor for ABCDEs of melanoma   - Continue sun protection - recommend SPF 30 or higher with frequent application   - Return sooner if noticing changing or symptomatic lesions     Procedures Performed:   None    Follow-up: 4 month(s) in-person, or earlier for new or changing lesions    Staff and  "Scribe:     Scribe Disclosure:  I, Rosendo Morris, am serving as a scribe to document services personally performed by Telma Whiting PA-C based on data collection and the provider's statements to me.     Provider Disclosure:   The documentation recorded by the scribe accurately reflects the services I personally performed and the decisions made by me.    All risks, benefits and alternatives were discussed with patient.  Patient is in agreement and understands the assessment and plan.  All questions were answered.  Sun Screen Education was given.   Return to Clinic in 4 months or sooner as needed.   Telma Whiting PA-C   [facility]   ____________________________________________    CC: Skin Check (FBSC. No prior Hx. ) and Rash (Sx x 1 yr in stomach area. Irritation )    HPI:  Ms. Monica Puckett is a(n) 27 year old female who presents today as a new patient for a FBSE. Patient reports rashes popping up across her body, such as one on her stomach, not present today.  The rashes are generally warm/itchy and come and go. Her scalp also gets excessively dry. She generally washes her scalp every 2-3 days and has tried multiple OTC shampoos, including ketoconazole. These symptoms began after the birth of her 6 month old. She has tried an unspecified OTC dermatitis product, which has not helped much. Patient has a history of lupus with \"butterfly\" rash presentation, which is largely stable. She suspects she may have another undiagnosed autoimmune disease and is seeing [place] in the near future. Patient does not have a family history eczema or other skin issues. Additionally, patient notes that some of her moles are changing, specifically those under her right eye, left arm, and left leg, as well as some plantar warts on the right foot treated with at-home paring and OTC treatments.    Patient is otherwise feeling well, without additional skin concerns.    Labs Reviewed:  N/A    Physical " Exam:  Vitals: There were no vitals taken for this visit.  SKIN: Full skin, which includes the head/face, both arms, chest, back, abdomen,both legs, genitalia and/or groin buttocks, digits and/or nails, was examined.  - No notable erythema noted to the mid abdominal fold.  - White greasy scale noted to several areas on the scalp.   - There are verrucous papules with thrombosed capillaries interrupting dermatoglyphics on the right plantar surface x5.  - There is a firm tan/flesh colored papule that dimples with lateral pressure on the left calf.  - Multiple regular brown pigmented macules and papules are identified on the trunk and extremities.   - No other lesions of concern on areas examined.     Medications:  Current Outpatient Medications   Medication     albuterol (PROAIR HFA/PROVENTIL HFA/VENTOLIN HFA) 108 (90 Base) MCG/ACT inhaler     Amphetamine-Dextroamphetamine (ADDERALL PO)     Amphetamine-Dextroamphetamine (ADDERALL XR PO)     hydroxychloroquine (PLAQUENIL) 200 MG tablet     hydrOXYzine (ATARAX) 25 MG tablet     ibuprofen (ADVIL/MOTRIN) 600 MG tablet     methylPREDNISolone (MEDROL DOSEPAK) 4 MG tablet therapy pack     Omega-3 Fatty Acids (FISH OIL PO)     ondansetron (ZOFRAN) 8 MG tablet     Prenatal Vit-Fe Fumarate-FA (PRENATAL MULTIVITAMIN  PLUS IRON) 27-0.8 MG TABS     ValACYclovir HCl (VALTREX PO)     VITAMIN D, CHOLECALCIFEROL, PO     No current facility-administered medications for this visit.      Past Medical History:   Patient Active Problem List   Diagnosis     Pain in joint, shoulder region     Observation after surgery     Systemic lupus erythematosus (H)     Long-term use of Plaquenil     Labor and delivery indication for care or intervention     Encounter for triage in pregnant patient     Indication for care in labor and delivery, antepartum      (normal spontaneous vaginal delivery)     Lupus nephritis (H)     Cough     Pregnant state, incidental     Myalgia     Chest pain, unspecified  type     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      Again, thank you for allowing me to participate in the care of your patient.        Sincerely,        Telma Whiting PA-C

## 2022-12-30 ENCOUNTER — OFFICE VISIT (OUTPATIENT)
Dept: URGENT CARE | Facility: URGENT CARE | Age: 27
End: 2022-12-30
Payer: COMMERCIAL

## 2022-12-30 VITALS
TEMPERATURE: 103.1 F | DIASTOLIC BLOOD PRESSURE: 84 MMHG | OXYGEN SATURATION: 97 % | SYSTOLIC BLOOD PRESSURE: 116 MMHG | BODY MASS INDEX: 24.21 KG/M2 | HEART RATE: 67 BPM | WEIGHT: 150 LBS | RESPIRATION RATE: 20 BRPM

## 2022-12-30 DIAGNOSIS — N61.0 MASTITIS, RIGHT, ACUTE: Primary | ICD-10-CM

## 2022-12-30 PROCEDURE — 99214 OFFICE O/P EST MOD 30 MIN: CPT | Performed by: PHYSICIAN ASSISTANT

## 2022-12-30 RX ORDER — CEPHALEXIN 500 MG/1
500 CAPSULE ORAL 4 TIMES DAILY
Qty: 28 CAPSULE | Refills: 0 | Status: SHIPPED | OUTPATIENT
Start: 2022-12-30 | End: 2023-01-06

## 2022-12-30 RX ORDER — ACETAMINOPHEN 325 MG/1
650 TABLET ORAL ONCE
Status: COMPLETED | OUTPATIENT
Start: 2022-12-30 | End: 2022-12-30

## 2022-12-30 RX ADMIN — ACETAMINOPHEN 650 MG: 325 TABLET ORAL at 11:19

## 2022-12-30 ASSESSMENT — PAIN SCALES - GENERAL: PAINLEVEL: EXTREME PAIN (8)

## 2022-12-30 NOTE — PROGRESS NOTES
Assessment & Plan     1. Mastitis, right, acute  Examination is consistent with mastitis.   She does not have signs of abscess at this time.   Advised starting keflex abx  Express ducts and empty breasts frequently  Fluids and warm compresses  Discussed indications for follow-up   - cephALEXin (KEFLEX) 500 MG capsule; Take 1 capsule (500 mg) by mouth 4 times daily for 7 days  Dispense: 28 capsule; Refill: 0  - acetaminophen (TYLENOL) tablet 650 mg      Return in about 2 days (around 1/1/2023), or if symptoms worsen or fail to improve.    Diagnosis and treatment plan was reviewed with patient and/or family.   We went over any labs or imaging. Discussed worsening symptoms or little to no relief despite treatment plan to follow-up with PCP or return to clinic.  Patient verbalizes understanding. All questions were addressed and answered.     Rosa Black PA-C  [facility]    CHIEF COMPLAINT:   Chief Complaint   Patient presents with     Urgent Care     Breast Pain     Patient is seven months postpartum and she's breastfeeding.  She stated that she has a clogged duct in the right breast that hurts.     Fever     Subjective     Monica is a 27 year old female who presents to clinic today for evaluation of breast pain. Started several hours ago. Reports that last night her daughter did not want to feed off the side. This AM got into the tub, and by the time she got out, symptoms were significantly worse. Developed a fever this AM. She noticed a thick string expressed from her breast. Has never had mastitis in the past, but has had plugged ducts.     Has SLE, but is not taking plaquenil.       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)      Past Surgical History:   Procedure Laterality Date     CYSTOSCOPY N/A  9/14/2018    Procedure: CYSTOSCOPY;  Cystoscopy and Bladder Wall Injection;  Surgeon: Ismael Valadez MD;  Location: [facility] OR     CYSTOSCOPY, INTRAVESICAL INJECTION N/A 10/18/2018    Procedure: Cystoscopy, Guided Bladder Wall Injection   ;  Surgeon: Ismael Valadez MD;  Location: [facility] OR     HC BIOPSY RENAL, PERCUTANEOUS  1/18/13     RENAL BIOPSY       Social History     Tobacco Use     Smoking status: Never     Smokeless tobacco: Never     Tobacco comments:     no second hand smoke exposure at home   Substance Use Topics     Alcohol use: No     Current Outpatient Medications   Medication     cephALEXin (KEFLEX) 500 MG capsule     Amphetamine-Dextroamphetamine (ADDERALL XR PO)     hydroxychloroquine (PLAQUENIL) 200 MG tablet     ibuprofen (ADVIL/MOTRIN) 600 MG tablet     imiquimod (ALDARA) 5 % external cream     ketoconazole (NIZORAL) 2 % external shampoo     nystatin (MYCOSTATIN) 155691 UNIT/GM external cream     Omega-3 Fatty Acids (FISH OIL PO)     Prenatal Vit-Fe Fumarate-FA (PRENATAL MULTIVITAMIN  PLUS IRON) 27-0.8 MG TABS     ValACYclovir HCl (VALTREX PO)     VITAMIN D, CHOLECALCIFEROL, PO     No current facility-administered medications for this visit.     Allergies   Allergen Reactions     Estrogens Other (See Comments)     Lupus flares     No Clinical Screening - See Comments Other (See Comments) and Rash     She is immunosuppressed   Gets very sick from flu vaccines (or any vaccine)     mmr     Varicella Virus Vaccine Live Other (See Comments)     She is immunosuppressed        10 point ROS of systems were all negative except for pertinent positives noted in my HPI.      Exam:   /84 (BP Location: Left arm, Patient Position: Sitting, Cuff Size: Adult Regular)   Pulse 67   Temp (!) 103.1  F (39.5  C) (Oral)   Resp 20   Wt 68 kg (150 lb)   SpO2 97%   BMI 24.21 kg/m    Constitutional: alert and no distress. Patient is crying.   ENT: MMM. Throat clear.   Neck: neck is supple, no cervical  lymphadenopathy or nuchal rigidity  Cardiovascular: RRR  Respiratory: CTA bilaterally, no rhonchi or rales  Breast: Right breast has TTP in the upper outer quadrant with erythema and some induration. Breast able to express milk.   Skin: no rashes  Neurologic: Speech clear, gait normal. Moves all extremities.    No results found for any visits on 12/30/22.

## 2023-01-30 NOTE — TELEPHONE ENCOUNTER
Pt called re: ongoing cough, flare. Pt 18 wks pregnant. Recently went to ER for increased cough. Nothing given.    Cough, diarrhea, no fever, past 2 months. Reports intermittent sharp chest pain, radiating to L arm, several wks. Does not feel like dyspepsia. Denied SOB, lightheadedness, no additional pain besides joint described below. Denied other MI S/S.    Reported pedal edema, bilateral, knees down, non-pitting. Sensation, movement intact.      Joint pain: bilateral, hands, 4/10, aching.     Stiffness: Yes, slight, in AM when wakes up.    Swelling/color change/warmth: Denies all.    What makes better/worse: movement loosens joints which helps pain.     Prednisone: No    Can be reached at [phone]. Detailed VM fine. Message sent to rheum pool   V-Y Flap Text: The defect edges were debeveled with a #15 scalpel blade.  Given the location of the defect, shape of the defect and the proximity to free margins a V-Y flap was deemed most appropriate.  Using a sterile surgical marker, an appropriate advancement flap was drawn incorporating the defect and placing the expected incisions within the relaxed skin tension lines where possible.    The area thus outlined was incised deep to adipose tissue with a #15 scalpel blade.  The skin margins were undermined to an appropriate distance in all directions utilizing iris scissors.

## 2023-04-16 ENCOUNTER — HEALTH MAINTENANCE LETTER (OUTPATIENT)
Age: 28
End: 2023-04-16

## 2024-06-19 ENCOUNTER — HOSPITAL ENCOUNTER (OUTPATIENT)
Dept: CT IMAGING | Facility: CLINIC | Age: 29
Discharge: HOME | End: 2024-06-19
Attending: NURSE PRACTITIONER | Admitting: NURSE PRACTITIONER
Payer: COMMERCIAL

## 2024-06-19 DIAGNOSIS — R10.9 ACUTE RIGHT FLANK PAIN: ICD-10-CM

## 2024-06-19 DIAGNOSIS — R69 DIAGNOSIS UNKNOWN: Primary | ICD-10-CM

## 2024-06-19 PROCEDURE — 74176 CT ABD & PELVIS W/O CONTRAST: CPT

## 2024-06-20 ENCOUNTER — VIRTUAL VISIT (OUTPATIENT)
Dept: UROLOGY | Facility: CLINIC | Age: 29
End: 2024-06-20
Payer: COMMERCIAL

## 2024-06-20 DIAGNOSIS — R69 DIAGNOSIS UNKNOWN: ICD-10-CM

## 2024-06-20 DIAGNOSIS — N20.0 CALCULUS OF RIGHT KIDNEY: Primary | ICD-10-CM

## 2024-06-20 PROCEDURE — 99214 OFFICE O/P EST MOD 30 MIN: CPT | Mod: 95 | Performed by: NURSE PRACTITIONER

## 2024-06-20 NOTE — NURSING NOTE
Is the patient currently in the state of [place]? YES    Visit mode:VIDEO    If the visit is dropped, the patient can be reconnected by: VIDEO VISIT: Text to cell phone:   Telephone Information:   Mobile [phone]       Will anyone else be joining the visit? NO  (If patient encounters technical issues they should call [phone] :[phone])    How would you like to obtain your AVS? MyChart    Are changes needed to the allergy or medication list? No, Pt stated no changes to allergies, and Pt stated no med changes    Are refills needed on medications prescribed by this physician? NO    Reason for visit: RECHGREGG HEDRICK

## 2024-06-20 NOTE — PROGRESS NOTES
Urology Video Office Visit    Video-Visit Details    Type of service:  Video Visit    Video Start Time: 1130    Video End Time: 1150    Originating Location (pt. Location): Home    Distant Location (provider location):  Off-site     Platform used for Video Visit: [employer]           Assessment and Plan:     Assessment:28 year old female with a right flank pain and nonobstructing right 7mm renal stone.     Plan:  -Reviewed CT scan with patient. No noted ureterolithiasis or hydronephrosis. Noted stable right nonobstructing right 7mm renal stone.   -We discussed the nature of nonobstructing renal stones and pain.  We reviewed the fact that widely held opinion in the field of urology is that nonobstructing stones should not cause pain; however, several publications have demonstrated improvement with stone removal in such circumstances. Discussed that stone removal for pain in the absence of obstruction is not a guarantee to improve pain and that the potential for pain improvement is likely around 50%.    -We discussed option of observation vs a definitive stone procedure for nonobstructing right renal stone. Discussed a right ureteroscopy and laser lithotripsy. I counseled the patient regarding the potential need for a ureteral stent after treatment and the necessity of removing the stent after surgery. I also discussed the possibility of additional procedures to render the patient stone free.  After discussing risks, benefits, and alternatives to treatment, patient elects to proceed with right URS/LL.  -Discussed possible other etiologies for right flank pain including GI and musculoskeletal. Discussed option of further evaluation with UA/UC. Pt deferred at this time.     -If having severe flank pain, fevers, chills, nausea, or vomiting please notify Urology clinic or be seen in the ER.     Patricia Mejia CNP  Department of Urology  June 20, 2024    I spent a total of 25 minutes spent on the date of the encounter  doing chart review, history and exam, documentation, and further activities as noted above.          Chief Complaint:   Right flank pain         History of Present Illness:    Monica Puckett is a pleasant 28 year old female who presents with concerns of a right flank pain.     Ms. Puckett was seen on 6/19/24 for concerns of severe right flank pain. She notes pain is better controlled at this time however still having significant right flank pain of 5/10. Denies any gross hematuria. Continues to have nausea but able to tolerate fluids at this time.     CT scan on 6/19/24 (images personally reviewed) revealed a right nonobstructing 7mm lower pole stone without hydronephrosis. No noted left nephrolithiasis or hydronephrosis.     She was initially seen in the ER on 6/6/24 for bilateral flank pain with abdominal pain. CT scan performed on 6/6/24 (images personally reviewed) revealed a right nonobstructing 5mm lower pole stone without hydronephrosis. No noted left nephrolithiasis or hydronephrosis.      Yesterday she was having severe right flank pain. She was using acetaminophen, ibuprofen, and dramamine with minimal relief of pain. She notes nausea and dizziness with severe pain. No bouts of emesis.         Past Medical History:     Past Medical History:   Diagnosis Date    Chronic kidney disease     H/O suicide attempt     Lupus (H)     Lupus (H)     RA (rheumatoid arthritis) (H)     Rheumatoid arthritis (H)     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)             Past Surgical History:     Past Surgical History:   Procedure Laterality Date    CYSTOSCOPY N/A 9/14/2018    Procedure: CYSTOSCOPY;  Cystoscopy and Bladder Wall Injection;  Surgeon: Ismael Valadez MD;  Location: [facility]    CYSTOSCOPY, INTRAVESICAL INJECTION N/A 10/18/2018    Procedure: Cystoscopy, Guided Bladder Wall Injection   ;  Surgeon: Ismael Valadez MD;  Location: [facility]    HC BIOPSY RENAL, PERCUTANEOUS  1/18/13    RENAL BIOPSY              Medications      Current Outpatient Medications   Medication Sig Dispense Refill    Amphetamine-Dextroamphetamine (ADDERALL XR PO) Take 20 mg by mouth daily       dimenhyDRINATE (DRAMAMINE) 50 MG tablet Take 1 tablet (50 mg) by mouth nightly as needed 30 tablet 0    hydroxychloroquine (PLAQUENIL) 200 MG tablet Take 1 tablet (200 mg) by mouth 2 times daily (Patient not taking: Reported on 6/19/2024) 60 tablet 11    ibuprofen (ADVIL/MOTRIN) 600 MG tablet Take 1 tablet (600 mg) by mouth every 6 hours as needed for moderate pain 30 tablet 0    Omega-3 Fatty Acids (FISH OIL PO) Take 1 tablet by mouth daily Fish oil with DHA (Patient not taking: Reported on 6/19/2024)      oxyCODONE (ROXICODONE) 5 MG tablet Take 1 tablet (5 mg) by mouth every 6 hours as needed for pain 10 tablet 0    ValACYclovir HCl (VALTREX PO) Take 500 mg by mouth as needed (Patient not taking: Reported on 6/19/2024)      VITAMIN D, CHOLECALCIFEROL, PO Take 2,000 Units by mouth daily       No current facility-administered medications for this visit.            Family History:     Family History   Problem Relation Age of Onset    Urolithiasis Mother     Cancer Maternal Uncle         skin    Heart Disease Maternal Uncle     Urolithiasis Maternal Grandfather     Clotting Disorder No family hx of     Diabetes No family hx of     Gout No family hx of             Social History:     Social History     Socioeconomic History    Marital status:      Spouse name: Not on file    Number of children: Not on file    Years of education: Not on file    Highest education level: Not on file   Occupational History     Employer: [employer]    Tobacco Use    Smoking status: Never    Smokeless tobacco: Never    Tobacco comments:     no second hand smoke exposure at home   Substance and Sexual Activity    Alcohol use: No    Drug use: No    Sexual activity: Yes     Partners: Male   Other Topics Concern    Not on file   Social History Narrative    Not on file     Social  Determinants of Health     Financial Resource Strain: Low Risk  (6/10/2024)    Received from [facility] [employer] [facility]    Financial Resource Strain     Difficulty of Paying Living Expenses: 3     Difficulty of Paying Living Expenses: Not on file   Food Insecurity: No Food Insecurity (6/10/2024)    Received from [facility] [employer] [facility]    Food Insecurity     Worried About Running Out of Food in the Last Year: 1   Transportation Needs: No Transportation Needs (6/10/2024)    Received from [facility] [employer] [facility]    Transportation Needs     Lack of Transportation (Medical): 1   Physical Activity: Unknown (1/22/2024)    Received from [facility]    Exercise Vital Sign     Days of Exercise per Week: 1 day     Minutes of Exercise per Session: Not on file   Stress: Not on file   Social Connections: Socially Integrated (6/10/2024)    Received from [facility] [employer] [facility]    Social Connections     Frequency of Communication with Friends and Family: 0   Interpersonal Safety: Not on file   Housing Stability: Low Risk  (6/10/2024)    Received from [facility] [employer] [facility]    Housing Stability     Unable to Pay for Housing in the Last Year: 1            Allergies:   Estrogens, No clinical screening - see comments, Influenza virus vaccine, Measles mumps and rubella virus vaccine live, Varicella virus vaccine live, and Varicella virus vaccine live         Review of Systems:  From intake questionnaire   Negative 14 system review except as noted on HPI, nurse's note.         Physical Exam:   General Appearance: Well groomed, hygenic  Eyes: No redness, discharge  Respiratory: No cough, no respiratory distress or labored breathing  Musculoskeletal: Grossly normal, full range of motion in upper extremities, no gross deficits  Skin: No discoloration or apparent rashes  Neurologic - No tremors  Psychiatric - Alert and oriented  The rest  of a comprehensive physical examination is deferred due to video visit restrictions        Labs:    I personally reviewed all applicable laboratory data and went over findings with patient  Significant for:    CBC RESULTS:  Recent Labs   Lab Test 11/18/21  1520 02/14/19  1320 01/18/19  1552 01/18/19  0900   WBC 11.8* 9.0 10.8 12.9*   HGB 12.0 12.5 11.3* 11.5*    346 347 394        BMP RESULTS:  Recent Labs   Lab Test 11/18/21  1520 01/18/19  0900 01/12/19  1141 12/05/18  1153 11/02/18  0936    142 139 141  --    POTASSIUM 3.4 3.9 4.0 4.3  --    CHLORIDE 107 110* 106 108  --    CO2 25 24 27 25  --    ANIONGAP 6 8 6 8  --    * 100* 82 83  --    BUN 12 14 14 15  --    CR 0.57 0.86 0.94 0.90 0.54   GFRESTIMATED >90 >90 86 79 >90   GFRESTBLACK  --  >90 >90 >90 >90   VIRGEN 8.7 9.4 8.9 9.1  --        UA RESULTS:   Recent Labs   Lab Test 11/18/21  1527 02/14/19  1305 01/17/19  1228   SG 1.029 1.011 1.011   URINEPH 6.0 6.0 6.0   NITRITE Negative Negative Negative   RBCU 15* 98* 175*   WBCU 46* >182* >182*       CALCIUM RESULTS  Lab Results   Component Value Date    VIRGEN 8.7 11/18/2021    VIRGEN 9.4 01/18/2019    VIRGEN 8.9 01/12/2019    VIRGEN 9.1 12/05/2018           Imaging:    I personally reviewed all applicable imaging and went over the below findings with patient.    Results for orders placed or performed during the hospital encounter of 06/19/24   CT Abdomen Pelvis w/o Contrast    Narrative    CT ABDOMEN AND PELVIS WITHOUT CONTRAST 6/19/2024 3:28 PM    CLINICAL HISTORY: Acute right flank pain.    TECHNIQUE: CT scan of the abdomen and pelvis was performed without IV  contrast. Multiplanar reformats were obtained. Dose reduction  techniques were used.    CONTRAST: None.    COMPARISON: None.    FINDINGS:   LOWER CHEST: Normal.    HEPATOBILIARY: Normal.    PANCREAS: Normal.    SPLEEN: Normal.    ADRENAL GLANDS: Normal.    KIDNEYS/BLADDER: Multiple nonobstructing intrarenal stones of the  right kidney. One of the  largest is at the lower pole measuring 7 mm  series 6 image 47. No hydronephrosis. No ureter stone. No visible  bladder stone. Bladder is decompressed. Left kidney shows no acute  abnormality.    BOWEL: Normal appendix. No bowel obstruction. No acute inflammation of  the bowel. Moderate stool.    LYMPH NODES: Normal.    VASCULATURE: Unremarkable.    PELVIC ORGANS: No acute abnormality.    MUSCULOSKELETAL: Normal.      Impression    IMPRESSION:   1.  No acute abnormality identified.  2.  Multiple nonobstructing stones within the right kidney. No  hydronephrosis or ureter stone identified. Normal appendix.    NESTOR LLANES MD         SYSTEM ID:  [id number]

## 2024-06-21 ENCOUNTER — TELEPHONE (OUTPATIENT)
Dept: UROLOGY | Facility: CLINIC | Age: 29
End: 2024-06-21
Payer: COMMERCIAL

## 2024-06-21 PROBLEM — N20.0 CALCULUS OF RIGHT KIDNEY: Status: ACTIVE | Noted: 2024-06-20

## 2024-06-26 ENCOUNTER — TELEPHONE (OUTPATIENT)
Dept: UROLOGY | Facility: CLINIC | Age: 29
End: 2024-06-26
Payer: COMMERCIAL

## 2024-06-26 NOTE — TELEPHONE ENCOUNTER
Attempted to leave message for pt to call RN regarding upcoming surgery with Dr. Sánchez. Mailbox full. Sent [employer] message with RN direct number for follow up.     UA negative 06/05    Pre-op to be updated by Dr. Sánchez DOS per notes.    DARREL Maynard  Care Coordinator- Urology   [phone]

## 2024-07-01 NOTE — TELEPHONE ENCOUNTER
Second attempt made to reach patient.     DARREL Maynard  Care Coordinator- Urology   [phone]

## 2024-07-02 ENCOUNTER — ANESTHESIA EVENT (OUTPATIENT)
Dept: SURGERY | Facility: AMBULATORY SURGERY CENTER | Age: 29
End: 2024-07-02

## 2024-07-03 ENCOUNTER — ANESTHESIA (OUTPATIENT)
Dept: SURGERY | Facility: AMBULATORY SURGERY CENTER | Age: 29
End: 2024-07-03

## 2024-07-03 ENCOUNTER — HOSPITAL ENCOUNTER (OUTPATIENT)
Facility: AMBULATORY SURGERY CENTER | Age: 29
Discharge: HOME | End: 2024-07-03
Attending: UROLOGY
Payer: COMMERCIAL

## 2024-07-03 VITALS
TEMPERATURE: 97.5 F | OXYGEN SATURATION: 98 % | RESPIRATION RATE: 16 BRPM | BODY MASS INDEX: 20.83 KG/M2 | HEART RATE: 80 BPM | HEIGHT: 65 IN | WEIGHT: 125 LBS | DIASTOLIC BLOOD PRESSURE: 51 MMHG | SYSTOLIC BLOOD PRESSURE: 96 MMHG

## 2024-07-03 DIAGNOSIS — N20.0 CALCULUS OF RIGHT KIDNEY: ICD-10-CM

## 2024-07-03 PROCEDURE — 52356 CYSTO/URETERO W/LITHOTRIPSY: CPT | Mod: RT | Performed by: UROLOGY

## 2024-07-03 PROCEDURE — 74420 UROGRAPHY RTRGR +-KUB: CPT | Mod: 26 | Performed by: UROLOGY

## 2024-07-03 PROCEDURE — 82365 CALCULUS SPECTROSCOPY: CPT | Mod: 90 | Performed by: INTERNAL MEDICINE

## 2024-07-03 PROCEDURE — 99000 SPECIMEN HANDLING OFFICE-LAB: CPT | Performed by: INTERNAL MEDICINE

## 2024-07-03 DEVICE — STENT, URETERAL, 4.8FR X 24CM, HYDROPLUS COATING, WITHOUT WIRE, PERCUFLEX PLUS: Type: IMPLANTABLE DEVICE | Site: URETER | Status: FUNCTIONAL

## 2024-07-03 RX ORDER — PHENAZOPYRIDINE HYDROCHLORIDE 200 MG/1
200 TABLET, FILM COATED ORAL 3 TIMES DAILY PRN
Qty: 12 TABLET | Refills: 0 | Status: SHIPPED | OUTPATIENT
Start: 2024-07-03

## 2024-07-03 RX ORDER — GLYCOPYRROLATE 0.2 MG/ML
INJECTION, SOLUTION INTRAMUSCULAR; INTRAVENOUS PRN
Status: DISCONTINUED | OUTPATIENT
Start: 2024-07-03 | End: 2024-07-03

## 2024-07-03 RX ORDER — SODIUM CHLORIDE, SODIUM LACTATE, POTASSIUM CHLORIDE, CALCIUM CHLORIDE 600; 310; 30; 20 MG/100ML; MG/100ML; MG/100ML; MG/100ML
INJECTION, SOLUTION INTRAVENOUS CONTINUOUS PRN
Status: DISCONTINUED | OUTPATIENT
Start: 2024-07-03 | End: 2024-07-03

## 2024-07-03 RX ORDER — FENTANYL CITRATE 0.05 MG/ML
25 INJECTION, SOLUTION INTRAMUSCULAR; INTRAVENOUS
Status: DISCONTINUED | OUTPATIENT
Start: 2024-07-03 | End: 2024-07-04 | Stop reason: HOSPADM

## 2024-07-03 RX ORDER — ACETAMINOPHEN 325 MG/1
975 TABLET ORAL ONCE
Status: DISCONTINUED | OUTPATIENT
Start: 2024-07-03 | End: 2024-07-04 | Stop reason: HOSPADM

## 2024-07-03 RX ORDER — SODIUM CHLORIDE, SODIUM LACTATE, POTASSIUM CHLORIDE, CALCIUM CHLORIDE 600; 310; 30; 20 MG/100ML; MG/100ML; MG/100ML; MG/100ML
INJECTION, SOLUTION INTRAVENOUS CONTINUOUS
Status: DISCONTINUED | OUTPATIENT
Start: 2024-07-03 | End: 2024-07-04 | Stop reason: HOSPADM

## 2024-07-03 RX ORDER — NALOXONE HYDROCHLORIDE 0.4 MG/ML
0.1 INJECTION, SOLUTION INTRAMUSCULAR; INTRAVENOUS; SUBCUTANEOUS
Status: DISCONTINUED | OUTPATIENT
Start: 2024-07-03 | End: 2024-07-04 | Stop reason: HOSPADM

## 2024-07-03 RX ORDER — OXYCODONE HYDROCHLORIDE 5 MG/1
5 TABLET ORAL
Status: COMPLETED | OUTPATIENT
Start: 2024-07-03 | End: 2024-07-03

## 2024-07-03 RX ORDER — DEXAMETHASONE SODIUM PHOSPHATE 4 MG/ML
4 INJECTION, SOLUTION INTRA-ARTICULAR; INTRALESIONAL; INTRAMUSCULAR; INTRAVENOUS; SOFT TISSUE
Status: DISCONTINUED | OUTPATIENT
Start: 2024-07-03 | End: 2024-07-04 | Stop reason: HOSPADM

## 2024-07-03 RX ORDER — LIDOCAINE 40 MG/G
CREAM TOPICAL
Status: DISCONTINUED | OUTPATIENT
Start: 2024-07-03 | End: 2024-07-04 | Stop reason: HOSPADM

## 2024-07-03 RX ORDER — FENTANYL CITRATE 0.05 MG/ML
25 INJECTION, SOLUTION INTRAMUSCULAR; INTRAVENOUS EVERY 5 MIN PRN
Status: DISCONTINUED | OUTPATIENT
Start: 2024-07-03 | End: 2024-07-04 | Stop reason: HOSPADM

## 2024-07-03 RX ORDER — OXYBUTYNIN CHLORIDE 5 MG/1
5 TABLET, EXTENDED RELEASE ORAL DAILY
Qty: 14 TABLET | Refills: 0 | Status: SHIPPED | OUTPATIENT
Start: 2024-07-03

## 2024-07-03 RX ORDER — PROPOFOL 10 MG/ML
INJECTION, EMULSION INTRAVENOUS CONTINUOUS PRN
Status: DISCONTINUED | OUTPATIENT
Start: 2024-07-03 | End: 2024-07-03

## 2024-07-03 RX ORDER — ONDANSETRON 4 MG/1
4 TABLET, ORALLY DISINTEGRATING ORAL EVERY 30 MIN PRN
Status: DISCONTINUED | OUTPATIENT
Start: 2024-07-03 | End: 2024-07-04 | Stop reason: HOSPADM

## 2024-07-03 RX ORDER — ONDANSETRON 2 MG/ML
4 INJECTION INTRAMUSCULAR; INTRAVENOUS EVERY 30 MIN PRN
Status: DISCONTINUED | OUTPATIENT
Start: 2024-07-03 | End: 2024-07-04 | Stop reason: HOSPADM

## 2024-07-03 RX ORDER — OXYCODONE HYDROCHLORIDE 5 MG/1
5 TABLET ORAL EVERY 6 HOURS PRN
Qty: 12 TABLET | Refills: 0 | Status: SHIPPED | OUTPATIENT
Start: 2024-07-03 | End: 2024-07-06

## 2024-07-03 RX ORDER — CEFAZOLIN SODIUM 2 G/100ML
2 INJECTION, SOLUTION INTRAVENOUS
Status: COMPLETED | OUTPATIENT
Start: 2024-07-03 | End: 2024-07-03

## 2024-07-03 RX ORDER — LIDOCAINE HYDROCHLORIDE 20 MG/ML
INJECTION, SOLUTION INFILTRATION; PERINEURAL PRN
Status: DISCONTINUED | OUTPATIENT
Start: 2024-07-03 | End: 2024-07-03

## 2024-07-03 RX ORDER — CEFAZOLIN SODIUM 2 G/100ML
2 INJECTION, SOLUTION INTRAVENOUS SEE ADMIN INSTRUCTIONS
Status: DISCONTINUED | OUTPATIENT
Start: 2024-07-03 | End: 2024-07-04 | Stop reason: HOSPADM

## 2024-07-03 RX ORDER — TAMSULOSIN HYDROCHLORIDE 0.4 MG/1
0.4 CAPSULE ORAL DAILY
Qty: 14 CAPSULE | Refills: 0 | Status: SHIPPED | OUTPATIENT
Start: 2024-07-03

## 2024-07-03 RX ORDER — HYDROMORPHONE HCL IN WATER/PF 6 MG/30 ML
0.2 PATIENT CONTROLLED ANALGESIA SYRINGE INTRAVENOUS EVERY 5 MIN PRN
Status: DISCONTINUED | OUTPATIENT
Start: 2024-07-03 | End: 2024-07-04 | Stop reason: HOSPADM

## 2024-07-03 RX ORDER — ACETAMINOPHEN 325 MG/1
975 TABLET ORAL ONCE
Status: COMPLETED | OUTPATIENT
Start: 2024-07-03 | End: 2024-07-03

## 2024-07-03 RX ORDER — FENTANYL CITRATE 50 UG/ML
INJECTION, SOLUTION INTRAMUSCULAR; INTRAVENOUS PRN
Status: DISCONTINUED | OUTPATIENT
Start: 2024-07-03 | End: 2024-07-03

## 2024-07-03 RX ORDER — HYDROMORPHONE HCL IN WATER/PF 6 MG/30 ML
0.4 PATIENT CONTROLLED ANALGESIA SYRINGE INTRAVENOUS EVERY 5 MIN PRN
Status: DISCONTINUED | OUTPATIENT
Start: 2024-07-03 | End: 2024-07-04 | Stop reason: HOSPADM

## 2024-07-03 RX ORDER — DEXAMETHASONE SODIUM PHOSPHATE 4 MG/ML
INJECTION, SOLUTION INTRA-ARTICULAR; INTRALESIONAL; INTRAMUSCULAR; INTRAVENOUS; SOFT TISSUE PRN
Status: DISCONTINUED | OUTPATIENT
Start: 2024-07-03 | End: 2024-07-03

## 2024-07-03 RX ORDER — FENTANYL CITRATE 0.05 MG/ML
50 INJECTION, SOLUTION INTRAMUSCULAR; INTRAVENOUS EVERY 5 MIN PRN
Status: DISCONTINUED | OUTPATIENT
Start: 2024-07-03 | End: 2024-07-04 | Stop reason: HOSPADM

## 2024-07-03 RX ORDER — KETOROLAC TROMETHAMINE 30 MG/ML
INJECTION, SOLUTION INTRAMUSCULAR; INTRAVENOUS PRN
Status: DISCONTINUED | OUTPATIENT
Start: 2024-07-03 | End: 2024-07-03

## 2024-07-03 RX ORDER — ONDANSETRON 2 MG/ML
INJECTION INTRAMUSCULAR; INTRAVENOUS PRN
Status: DISCONTINUED | OUTPATIENT
Start: 2024-07-03 | End: 2024-07-03

## 2024-07-03 RX ORDER — OXYCODONE HYDROCHLORIDE 10 MG/1
10 TABLET ORAL
Status: DISCONTINUED | OUTPATIENT
Start: 2024-07-03 | End: 2024-07-04 | Stop reason: HOSPADM

## 2024-07-03 RX ORDER — PROPOFOL 10 MG/ML
INJECTION, EMULSION INTRAVENOUS PRN
Status: DISCONTINUED | OUTPATIENT
Start: 2024-07-03 | End: 2024-07-03

## 2024-07-03 RX ADMIN — DEXAMETHASONE SODIUM PHOSPHATE 10 MG: 4 INJECTION, SOLUTION INTRA-ARTICULAR; INTRALESIONAL; INTRAMUSCULAR; INTRAVENOUS; SOFT TISSUE at 11:18

## 2024-07-03 RX ADMIN — KETOROLAC TROMETHAMINE 15 MG: 30 INJECTION, SOLUTION INTRAMUSCULAR; INTRAVENOUS at 11:27

## 2024-07-03 RX ADMIN — SODIUM CHLORIDE, SODIUM LACTATE, POTASSIUM CHLORIDE, CALCIUM CHLORIDE: 600; 310; 30; 20 INJECTION, SOLUTION INTRAVENOUS at 10:41

## 2024-07-03 RX ADMIN — CEFAZOLIN SODIUM 2 G: 2 INJECTION, SOLUTION INTRAVENOUS at 10:58

## 2024-07-03 RX ADMIN — PROPOFOL 180 MCG/KG/MIN: 10 INJECTION, EMULSION INTRAVENOUS at 11:03

## 2024-07-03 RX ADMIN — LIDOCAINE HYDROCHLORIDE 60 MG: 20 INJECTION, SOLUTION INFILTRATION; PERINEURAL at 11:03

## 2024-07-03 RX ADMIN — SODIUM CHLORIDE, SODIUM LACTATE, POTASSIUM CHLORIDE, CALCIUM CHLORIDE: 600; 310; 30; 20 INJECTION, SOLUTION INTRAVENOUS at 09:23

## 2024-07-03 RX ADMIN — FENTANYL CITRATE 50 MCG: 0.05 INJECTION, SOLUTION INTRAMUSCULAR; INTRAVENOUS at 12:02

## 2024-07-03 RX ADMIN — FENTANYL CITRATE 100 MCG: 50 INJECTION, SOLUTION INTRAMUSCULAR; INTRAVENOUS at 11:03

## 2024-07-03 RX ADMIN — ONDANSETRON 4 MG: 2 INJECTION INTRAMUSCULAR; INTRAVENOUS at 11:27

## 2024-07-03 RX ADMIN — GLYCOPYRROLATE 0.2 MG: 0.2 INJECTION, SOLUTION INTRAMUSCULAR; INTRAVENOUS at 11:18

## 2024-07-03 RX ADMIN — OXYCODONE HYDROCHLORIDE 5 MG: 5 TABLET ORAL at 12:27

## 2024-07-03 RX ADMIN — ACETAMINOPHEN 975 MG: 325 TABLET ORAL at 09:23

## 2024-07-03 RX ADMIN — PROPOFOL 200 MG: 10 INJECTION, EMULSION INTRAVENOUS at 11:03

## 2024-07-03 NOTE — ANESTHESIA PREPROCEDURE EVALUATION
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)       Past Surgical History:   Procedure Laterality Date    CYSTOSCOPY N/A 2018    Procedure: CYSTOSCOPY;  Cystoscopy and Bladder Wall Injection;  Surgeon: Ismael Valadez MD;  [facility]    CYSTOSCOPY, INTRAVESICAL INJECTION N/A 10/18/2018    Procedure: Cystoscopy, Guided Bladder Wall Injection   ;  Surgeon: Ismael Valadez MD;  [facility] OR    HC BIOPSY RENAL, PERCUTANEOUS  13    RENAL BIOPSY        Allergies   Allergen Reactions    Estrogens Other (See Comments)     Lupus flares    No Clinical Screening - See Comments Other (See Comments) and Rash     She is immunosuppressed   Gets very sick from flu vaccines (or any vaccine)     mmr    Influenza Virus Vaccine Other (See Comments)     Other reaction(s): Other (See Comments)   She is immunosuppressed - can not take live influenza but should get the injectable    She is immunosuppressed - can not take live influenza but should get the injectable    Measles Mumps And Rubella Virus Vaccine Live Other (See Comments)     She is immunosuppressed    Varicella Virus Vaccine Live Other (See Comments)     She is immunosuppressed     Varicella Virus Vaccine Live Other (See Comments)     Other reaction(s): Other (See Comments)   She is immunosuppressed    She is immunosuppressed      Social History     Tobacco Use    Smoking status: Never    Smokeless tobacco: Never    Tobacco comments:     no second hand  smoke exposure at home   Substance Use Topics    Alcohol use: Yes     Comment: rarely      Wt Readings from Last 1 Encounters:   07/01/24 56.7 kg (125 lb)        Anesthesia Evaluation            ROS/MED HX  ENT/Pulmonary:  - neg pulmonary ROS     Neurologic:  - neg neurologic ROS     Cardiovascular:  - neg cardiovascular ROS     METS/Exercise Tolerance:     Hematologic:  - neg hematologic  ROS     Musculoskeletal: Comment: Lupus  (+)  arthritis,             GI/Hepatic:  - neg GI/hepatic ROS     Renal/Genitourinary:     (+) renal disease, type: CRI,            Endo:       Psychiatric/Substance Use:       Infectious Disease:       Malignancy:       Other:      (+)  , H/O Chronic Pain,         Physical Exam    Airway  airway exam normal      Mallampati: I   TM distance: > 3 FB   Neck ROM: full   Mouth opening: > 3 cm    Respiratory Devices and Support         Dental       (+) Minor Abnormalities - some fillings, tiny chips      Cardiovascular   cardiovascular exam normal          Pulmonary   pulmonary exam normal                OUTSIDE LABS:  CBC:   Lab Results   Component Value Date    WBC 11.8 (H) 11/18/2021    WBC 9.0 02/14/2019    HGB 12.0 11/18/2021    HGB 12.5 02/14/2019    HCT 34.7 (L) 11/18/2021    HCT 39.3 02/14/2019     11/18/2021     02/14/2019     BMP:   Lab Results   Component Value Date     11/18/2021     01/18/2019    POTASSIUM 3.4 11/18/2021    POTASSIUM 3.9 01/18/2019    CHLORIDE 107 11/18/2021    CHLORIDE 110 (H) 01/18/2019    CO2 25 11/18/2021    CO2 24 01/18/2019    BUN 12 11/18/2021    BUN 14 01/18/2019    CR 0.57 11/18/2021    CR 0.86 01/18/2019     (H) 11/18/2021     (H) 01/18/2019     COAGS:   Lab Results   Component Value Date    PTT 30 11/02/2018    INR 0.93 01/12/2019    FIBR 527 (H) 11/02/2018     POC:   Lab Results   Component Value Date    BGM 84 09/14/2018    HCG Negative 02/14/2019     HEPATIC:   Lab Results   Component Value Date    ALBUMIN 3.1  (L) 11/18/2021    PROTTOTAL 6.4 (L) 11/18/2021    ALT 22 11/18/2021    AST 13 11/18/2021    ALKPHOS 68 11/18/2021    BILITOTAL 0.3 11/18/2021     OTHER:   Lab Results   Component Value Date    VIRGEN 8.7 11/18/2021    PHOS 3.9 01/12/2019    LIPASE 126 09/13/2017    TSH 2.48 03/27/2013    T4 1.50 03/27/2013    CRP 33.0 (H) 11/18/2021    SED 16 12/05/2018       Anesthesia Plan    ASA Status:  3    NPO Status:  NPO Appropriate    Anesthesia Type: General.     - Airway: LMA   Induction: Intravenous.   Maintenance: TIVA.        Consents    Anesthesia Plan(s) and associated risks, benefits, and realistic alternatives discussed. Questions answered and patient/representative(s) expressed understanding.     - Discussed:     - Discussed with:  Patient, Spouse      - Extended Intubation/Ventilatory Support Discussed: No.      - Patient is DNR/DNI Status: No          Postoperative Care    Pain management: Multi-modal analgesia.   PONV prophylaxis: Ondansetron (or other 5HT-3), Dexamethasone or Solumedrol     Comments:               Aniyah Mcnulty MD    I have reviewed the pertinent notes and labs in the chart from the past 30 days and (re)examined the patient.  Any updates or changes from those notes are reflected in this note.

## 2024-07-03 NOTE — OP NOTE
PREOPERATIVE DIAGNOSIS:  Right renal stone  POSTOPERATIVE DIAGNOSIS: Same  PROCEDURES PERFORMED:    Cystoscopy  Right retrograde pyelogram with interpretation of imaging  Right ureteroscopy with thulium laser lithotripsy and stone basket extraction  Right ureteral stent placement    STAFF SURGEON: Albaro Sánchez MD  RESIDENT(S) none present  ANESTHESIA: General  ESTIMATED BLOOD LOSS: 1 ml  COMPLICATIONS: None  SPECIMEN: Right renal stone for analysis  URETERAL STENT(S): 4.8 [place] by 24 cm right double-J stent    SIGNIFICANT FINDINGS: Papillary calcifications including a 7 mm lower pole stone    BRIEF OPERATIVE INDICATIONS: Patient presented with right flank pain and renal stone.  After a discussion of all risks, benefits, and alternatives, the patient elected to proceed with definitive stone management. The patient understands the potential need for more than one procedure to eliminate all stone burden.      DESCRIPTION OF PROCEDURE:  After informed consent was obtained, the patient was transported to the operating room & placed supine on the table. After adequate anesthesia was induced, the patient was placed in lithotomy and prepped and draped in the usual sterile fashion. A timeout was taken to confirm correct patient, procedure and laterality. Pre-operative IV antibiotics were administered.     We started the procedure by performing cystoscopy.  The bladder was anatomically unremarkable.  The right ureteral orifice was identified and cannulated with a sensor wire.  We gently dilated the ureter using an 8 [place] open-ended catheter.  We followed this with a 10 [place] which both passed with just mild resistance.  We next passed the flexible ureteroscope adjacent to the wire we met resistance distally.  We remove the wire and gently went through a mild distal ureteral narrowing, possible stricture,.  We did this atraumatically we were able to ultimately gain access to the remainder of the ureter which was  patent and without stone or injury.  Once within the kidney we performed meticulous nephroscopy identifying numerous papillary calcifications.  Nearly each papilla had several small papillary overgrowth calcifications which we brushed off with the tip of the ureteroscope.  There was a larger stone in the lower pole that we were able to basket translocated to a dependent upper pole calyx.  We then proceeded to use the 200  m thulium laser at a setting of 0.8 J and 8 Hz to fragment the stone into small pieces.  The stone dusted quite well and we were ultimately able to extract one of the representative small fragments for analysis.  We were confident there were no clinically significant residual fragments.  We performed a final retrograde pyelogram confirming no extravasation.  The retrograde pyelogram did show more renal pelvis dilation than we would have expected.  Pullback ureteroscopy was unremarkable though again we did identify a small mild narrowing in the distal ureter.  We placed a 4.8 x 24 cm double-J stent which we confirmed to have a full curl in the kidney as well as in the bladder.  We did leave a string on the stent to facilitate removal next week.    The bladder was drained and the patient was awoken from anesthesia and transported to the postanesthesia care unit in stable condition.     POSTOP PLAN:  -Stent removal in 1 week  -Imaging in 6 weeks, if continues to have flank pain recommend a renogram to look for possible right-sided obstruction

## 2024-07-03 NOTE — ANESTHESIA CARE TRANSFER NOTE
Patient: Monica Puckett    Procedure: Procedure(s):  Cystoscopy, Right Ureteroscopy, Right Holmium Laser Lithotripsy, Right Retrograde Pyelogram, Possible Right Ureteral Stent Placement       Diagnosis: Calculus of right kidney [N20.0]  Diagnosis Additional Information: No value filed.    Anesthesia Type:   General     Note:    Oropharynx: oropharynx clear of all foreign objects  Level of Consciousness: drowsy  Oxygen Supplementation: face mask  Level of Supplemental Oxygen (L/min / FiO2): 8  Independent Airway: airway patency satisfactory and stable  Dentition: dentition unchanged  Vital Signs Stable: post-procedure vital signs reviewed and stable  Report to RN Given: handoff report given  Patient transferred to: PACU    Handoff Report: Identifed the Patient, Identified the Reponsible Provider, Reviewed the pertinent medical history, Discussed the surgical course, Reviewed Intra-OP anesthesia mangement and issues during anesthesia, Set expectations for post-procedure period and Allowed opportunity for questions and acknowledgement of understanding      Vitals:  Vitals Value Taken Time   BP 99/58 07/03/24 1136   Temp 97.1  F (36.2  C) 07/03/24 1135   Pulse 88 07/03/24 1136   Resp 16 07/03/24 1135   SpO2 99 % 07/03/24 1136   Vitals shown include unfiled device data.    Electronically Signed By: JAMIE Nieves CRNA  July 3, 2024  11:39 AM

## 2024-07-03 NOTE — DISCHARGE INSTRUCTIONS
If you have any questions or concerns regarding your procedure, please contact Dr. Sánchez, his office number is [phone].    You have received 975 mg of Acetaminophen (Tylenol) at 9:23 AM. Please do not take an additional dose of Tylenol until after 3:23 PM.     Do not exceed 4,000 mg of acetaminophen during a 24 hour period and keep in mind that acetaminophen can also be found in many over-the-counter cold medications as well as narcotics that may be given for pain.     You received a medication called Toradol (a stronger IV ibuprofen) at 11:27 AM. Do NOT take any Ibuprofen / Advil / Aleve / Naproxen or products containing Ibuprofen until 5:27 PM or later.    5mg of oxycodone given at 12:30pm. Next available at 6:30pm.     [facility]   Adult Discharge Orders & Instructions     For 24 hours after surgery    Get plenty of rest.  A responsible adult must stay with you for at least 24 hours after you leave the hospital.   Do not drive or use heavy equipment.  If you have weakness or tingling, don't drive or use heavy equipment until this feeling goes away.  Do not drink alcohol.  Avoid strenuous or risky activities.  Ask for help when climbing stairs.   You may feel lightheaded.  IF so, sit for a few minutes before standing.  Have someone help you get up.   If you have nausea (feel sick to your stomach): Drink only clear liquids such as apple juice, ginger ale, broth or 7-Up.  Rest may also help.  Be sure to drink enough fluids.  Move to a regular diet as you feel able.  You may have a slight fever. Call the doctor if your fever is over 100 F (37.7 C) (taken under the tongue) or lasts longer than 24 hours.  You may have a dry mouth, a sore throat, muscle aches or trouble sleeping.  These should go away after 24 hours.  Do not make important or legal decisions.   Call your doctor for any of the followin.  Signs of infection (fever, growing tenderness at the surgery site, a large amount of  drainage or bleeding, severe pain, foul-smelling drainage, redness, swelling).    2. It has been over 8 to 10 hours since surgery and you are still not able to urinate (pass water).    3.  Headache for over 24 hours.

## 2024-07-03 NOTE — PROGRESS NOTES
I personally met with Monica Puckett and discussed their current medical situation.     We reviewed the nature of planned surgery, the risks benefits and complications and treatment alternatives.      Shared decision made to proceed with right ureteroscopic stone treatment    We also reviewed the following financial disclosures potentially applicable to their surgery  I informed the patient that I do have a financial consulting relationship with "[employer] a medical device company that makes products which could be used during the procedure  I presented an opportunity to ask questions  and informed them that they were capable of rescinding their consent or not proceeding without penalty if they desire to do so.

## 2024-07-03 NOTE — ANESTHESIA PROCEDURE NOTES
Airway       Patient location during procedure: OR  Staff -        CRNA: Rizwana Vale APRN CRNA       Performed By: CRNA  Consent for Airway        Urgency: elective  Indications and Patient Condition       Indications for airway management: maru-procedural        Final Airway Details       Final airway type: supraglottic airway    Supraglottic Airway Details        Type: LMA       Brand: Ambu AuraGain       LMA size: 4    Post intubation assessment        Placement verified by: capnometry, equal breath sounds and chest rise        Number of attempts at approach: 1       Number of other approaches attempted: 0       Secured with: tape       Ease of procedure: easy       Dentition: Intact

## 2024-07-03 NOTE — ANESTHESIA POSTPROCEDURE EVALUATION
Patient: Monica Puckett    Procedure: Procedure(s):  Cystoscopy, Right Ureteroscopy, Right Holmium Laser Lithotripsy, Right Retrograde Pyelogram, Possible Right Ureteral Stent Placement       Anesthesia Type:  General    Note:  Disposition: Outpatient   Postop Pain Control: Uneventful            Sign Out: Well controlled pain   PONV: No   Neuro/Psych: Uneventful            Sign Out: Acceptable/Baseline neuro status   Airway/Respiratory: Uneventful            Sign Out: Acceptable/Baseline resp. status   CV/Hemodynamics: Uneventful            Sign Out: Acceptable CV status; No obvious hypovolemia; No obvious fluid overload   Other NRE: NONE   DID A NON-ROUTINE EVENT OCCUR?            Last vitals:  Vitals Value Taken Time   BP 98/56 07/03/24 1200   Temp 97.1  F (36.2  C) 07/03/24 1135   Pulse 79 07/03/24 1214   Resp 16 07/03/24 1135   SpO2 91 % 07/03/24 1214   Vitals shown include unfiled device data.    Electronically Signed By: Aniyah Mcnulty MD  July 3, 2024  1:15 PM

## 2024-07-08 LAB
APPEARANCE STONE: NORMAL
COMPN STONE: NORMAL
SPECIMEN WT: NORMAL MG

## 2024-07-12 ENCOUNTER — TELEPHONE (OUTPATIENT)
Dept: UROLOGY | Facility: CLINIC | Age: 29
End: 2024-07-12
Payer: COMMERCIAL

## 2024-07-12 NOTE — TELEPHONE ENCOUNTER
Attempted to leave message for pt to follow up after URS with Dr. Sánchez. Mychart not read yet, mailbox full on VM. Will attempt again next week.     DARREL Maynard  Care Coordinator- Urology   [phone]

## 2024-08-07 ENCOUNTER — TELEPHONE (OUTPATIENT)
Dept: UROLOGY | Facility: CLINIC | Age: 29
End: 2024-08-07
Payer: COMMERCIAL

## 2024-08-15 ENCOUNTER — TELEPHONE (OUTPATIENT)
Dept: UROLOGY | Facility: CLINIC | Age: 29
End: 2024-08-15
Payer: COMMERCIAL

## 2024-08-31 ENCOUNTER — HEALTH MAINTENANCE LETTER (OUTPATIENT)
Age: 29
End: 2024-08-31

## 2025-05-27 DIAGNOSIS — N20.0 CALCULUS OF RIGHT KIDNEY: ICD-10-CM

## 2025-05-29 RX ORDER — TAMSULOSIN HYDROCHLORIDE 0.4 MG/1
0.4 CAPSULE ORAL DAILY
Qty: 14 CAPSULE | Refills: 0 | OUTPATIENT
Start: 2025-05-29

## 2025-05-29 RX ORDER — OXYBUTYNIN CHLORIDE 5 MG/1
5 TABLET, EXTENDED RELEASE ORAL DAILY
Qty: 14 TABLET | Refills: 0 | OUTPATIENT
Start: 2025-05-29

## (undated) DEVICE — SUCTION MANIFOLD DORNOCH ULTRA CART UL-CL500

## (undated) DEVICE — GLOVE PROTEXIS W/NEU-THERA 7.0  2D73TE70

## (undated) DEVICE — PAD CHUX UNDERPAD 30X36" P3036C

## (undated) DEVICE — LINEN TOWEL PACK X5 5464

## (undated) DEVICE — SOL NACL 0.9% IRRIG 3000ML BAG 2B7477

## (undated) DEVICE — LINEN GOWN X4 5410

## (undated) DEVICE — CATH INTERMITTENT CLEAN-CATH FEMALE 14FR 6" VINYL LF 420614

## (undated) DEVICE — STRAP KNEE/BODY 31143004

## (undated) DEVICE — SYR 50ML LL W/O NDL 309653

## (undated) DEVICE — SYR 30ML LL W/O NDL 302832

## (undated) DEVICE — TUBING EXTENSION SET 20" B1327

## (undated) DEVICE — NDL ASPIRATING INJECT 22GA 31.25CM

## (undated) DEVICE — JELLY LUBRICATING SURGILUBE 2OZ TUBE 0281-0205-02

## (undated) DEVICE — Device

## (undated) DEVICE — SOL NACL 0.9% IRRIG 1000ML BOTTLE 2F7124

## (undated) DEVICE — SOL WATER IRRIG 1000ML BOTTLE 2F7114

## (undated) DEVICE — SOL WATER IRRIG 3000ML BAG 2B7117

## (undated) DEVICE — GLOVE PROTEXIS MICRO 7.0  2D73PM70

## (undated) DEVICE — GLOVE PROTEXIS BLUE W/NEU-THERA 7.5  2D73EB75

## (undated) DEVICE — GOWN XLG DISP 9545

## (undated) RX ORDER — FENTANYL CITRATE 50 UG/ML
INJECTION, SOLUTION INTRAMUSCULAR; INTRAVENOUS
Status: DISPENSED
Start: 2018-09-14

## (undated) RX ORDER — PROPOFOL 10 MG/ML
INJECTION, EMULSION INTRAVENOUS
Status: DISPENSED
Start: 2018-10-18

## (undated) RX ORDER — LIDOCAINE HYDROCHLORIDE 20 MG/ML
INJECTION, SOLUTION EPIDURAL; INFILTRATION; INTRACAUDAL; PERINEURAL
Status: DISPENSED
Start: 2018-09-14

## (undated) RX ORDER — CEFAZOLIN SODIUM 2 G/100ML
INJECTION, SOLUTION INTRAVENOUS
Status: DISPENSED
Start: 2018-10-18

## (undated) RX ORDER — CEFAZOLIN SODIUM 2 G/100ML
INJECTION, SOLUTION INTRAVENOUS
Status: DISPENSED
Start: 2018-09-14

## (undated) RX ORDER — LORAZEPAM 0.5 MG/1
TABLET ORAL
Status: DISPENSED
Start: 2019-01-18

## (undated) RX ORDER — PHENAZOPYRIDINE HYDROCHLORIDE 200 MG/1
TABLET, FILM COATED ORAL
Status: DISPENSED
Start: 2018-09-14

## (undated) RX ORDER — PROPOFOL 10 MG/ML
INJECTION, EMULSION INTRAVENOUS
Status: DISPENSED
Start: 2018-09-14

## (undated) RX ORDER — LIDOCAINE HYDROCHLORIDE 20 MG/ML
INJECTION, SOLUTION EPIDURAL; INFILTRATION; INTRACAUDAL; PERINEURAL
Status: DISPENSED
Start: 2018-10-18

## (undated) RX ORDER — OXYCODONE HYDROCHLORIDE 5 MG/1
TABLET ORAL
Status: DISPENSED
Start: 2018-09-14

## (undated) RX ORDER — ACETAMINOPHEN 325 MG/1
TABLET ORAL
Status: DISPENSED
Start: 2018-09-14

## (undated) RX ORDER — OXYCODONE AND ACETAMINOPHEN 5; 325 MG/1; MG/1
TABLET ORAL
Status: DISPENSED
Start: 2019-01-18

## (undated) RX ORDER — OXYCODONE HYDROCHLORIDE 5 MG/1
TABLET ORAL
Status: DISPENSED
Start: 2018-10-18

## (undated) RX ORDER — PHENAZOPYRIDINE HYDROCHLORIDE 200 MG/1
TABLET, FILM COATED ORAL
Status: DISPENSED
Start: 2018-10-18

## (undated) RX ORDER — FENTANYL CITRATE 50 UG/ML
INJECTION, SOLUTION INTRAMUSCULAR; INTRAVENOUS
Status: DISPENSED
Start: 2018-10-18

## (undated) RX ORDER — PHENYLEPHRINE HCL IN 0.9% NACL 1 MG/10 ML
SYRINGE (ML) INTRAVENOUS
Status: DISPENSED
Start: 2018-10-18

## (undated) RX ORDER — LIDOCAINE HYDROCHLORIDE 10 MG/ML
INJECTION, SOLUTION INFILTRATION; PERINEURAL
Status: DISPENSED
Start: 2019-01-18